# Patient Record
Sex: MALE | Race: WHITE | Employment: OTHER | URBAN - NONMETROPOLITAN AREA
[De-identification: names, ages, dates, MRNs, and addresses within clinical notes are randomized per-mention and may not be internally consistent; named-entity substitution may affect disease eponyms.]

---

## 2017-07-18 LAB
ALBUMIN SERPL-MCNC: 4 G/DL (ref 3.5–5.2)
ALP BLD-CCNC: 66 U/L (ref 40–130)
ALT SERPL-CCNC: 23 U/L (ref 5–41)
ANION GAP SERPL CALCULATED.3IONS-SCNC: 17 MMOL/L (ref 7–19)
AST SERPL-CCNC: 21 U/L (ref 5–40)
BASOPHILS ABSOLUTE: 0.1 K/UL (ref 0–0.2)
BASOPHILS RELATIVE PERCENT: 1.2 % (ref 0–1)
BILIRUB SERPL-MCNC: 0.6 MG/DL (ref 0.2–1.2)
BUN BLDV-MCNC: 16 MG/DL (ref 8–23)
CALCIUM SERPL-MCNC: 9.2 MG/DL (ref 8.8–10.2)
CHLORIDE BLD-SCNC: 106 MMOL/L (ref 98–111)
CHOLESTEROL, TOTAL: 200 MG/DL (ref 160–199)
CO2: 23 MMOL/L (ref 22–29)
CREAT SERPL-MCNC: 0.7 MG/DL (ref 0.5–1.2)
EOSINOPHILS ABSOLUTE: 0.3 K/UL (ref 0–0.6)
EOSINOPHILS RELATIVE PERCENT: 5.4 % (ref 0–5)
GFR NON-AFRICAN AMERICAN: >60
GLUCOSE BLD-MCNC: 96 MG/DL (ref 74–109)
HCT VFR BLD CALC: 47 % (ref 42–52)
HDLC SERPL-MCNC: 38 MG/DL (ref 55–121)
HEMOGLOBIN: 15.4 G/DL (ref 14–18)
LDL CHOLESTEROL CALCULATED: 128 MG/DL
LYMPHOCYTES ABSOLUTE: 2.1 K/UL (ref 1.1–4.5)
LYMPHOCYTES RELATIVE PERCENT: 36.9 % (ref 20–40)
MCH RBC QN AUTO: 31 PG (ref 27–31)
MCHC RBC AUTO-ENTMCNC: 32.8 G/DL (ref 33–37)
MCV RBC AUTO: 94.6 FL (ref 80–94)
MONOCYTES ABSOLUTE: 0.7 K/UL (ref 0–0.9)
MONOCYTES RELATIVE PERCENT: 11.5 % (ref 0–10)
NEUTROPHILS ABSOLUTE: 2.6 K/UL (ref 1.5–7.5)
NEUTROPHILS RELATIVE PERCENT: 44.8 % (ref 50–65)
PDW BLD-RTO: 13.2 % (ref 11.5–14.5)
PLATELET # BLD: 238 K/UL (ref 130–400)
PMV BLD AUTO: 10.8 FL (ref 9.4–12.4)
POTASSIUM SERPL-SCNC: 4.5 MMOL/L (ref 3.5–5)
PROSTATE SPECIFIC ANTIGEN: 0.72 NG/ML (ref 0–4)
RBC # BLD: 4.97 M/UL (ref 4.7–6.1)
SODIUM BLD-SCNC: 146 MMOL/L (ref 136–145)
TOTAL PROTEIN: 7.4 G/DL (ref 6.6–8.7)
TRIGL SERPL-MCNC: 168 MG/DL (ref 150–199)
WBC # BLD: 5.7 K/UL (ref 4.8–10.8)

## 2017-07-26 RX ORDER — GABAPENTIN 300 MG/1
300 CAPSULE ORAL 2 TIMES DAILY
COMMUNITY
End: 2018-07-30 | Stop reason: ALTCHOICE

## 2017-07-26 RX ORDER — MECLIZINE HYDROCHLORIDE 25 MG/1
25 TABLET ORAL 3 TIMES DAILY PRN
COMMUNITY
End: 2017-07-28 | Stop reason: SDUPTHER

## 2017-07-26 RX ORDER — FLUTICASONE PROPIONATE 50 MCG
2 SPRAY, SUSPENSION (ML) NASAL DAILY PRN
COMMUNITY
End: 2018-07-30

## 2017-07-26 RX ORDER — DICLOFENAC SODIUM 75 MG/1
75 TABLET, DELAYED RELEASE ORAL 2 TIMES DAILY
COMMUNITY
End: 2017-07-28 | Stop reason: ALTCHOICE

## 2017-07-26 RX ORDER — OMEPRAZOLE 20 MG/1
20 CAPSULE, DELAYED RELEASE ORAL DAILY
COMMUNITY
End: 2017-09-28 | Stop reason: SDUPTHER

## 2017-07-27 PROBLEM — G60.9 IDIOPATHIC PERIPHERAL NEUROPATHY: Chronic | Status: ACTIVE | Noted: 2017-07-27

## 2017-07-27 PROBLEM — G47.33 OBSTRUCTIVE SLEEP APNEA: Chronic | Status: ACTIVE | Noted: 2017-07-27

## 2017-07-27 PROBLEM — S86.012A PARTIAL TEAR OF LEFT ACHILLES TENDON: Chronic | Status: ACTIVE | Noted: 2017-07-27

## 2017-07-27 PROBLEM — E78.01 FAMILIAL HYPERCHOLESTEROLEMIA: Chronic | Status: ACTIVE | Noted: 2017-07-27

## 2017-07-27 PROBLEM — K21.9 GASTROESOPHAGEAL REFLUX DISEASE WITHOUT ESOPHAGITIS: Chronic | Status: ACTIVE | Noted: 2017-07-27

## 2017-07-28 ENCOUNTER — OFFICE VISIT (OUTPATIENT)
Dept: INTERNAL MEDICINE | Age: 72
End: 2017-07-28
Payer: MEDICARE

## 2017-07-28 VITALS
WEIGHT: 290.5 LBS | RESPIRATION RATE: 20 BRPM | SYSTOLIC BLOOD PRESSURE: 118 MMHG | HEIGHT: 75 IN | HEART RATE: 80 BPM | BODY MASS INDEX: 36.12 KG/M2 | OXYGEN SATURATION: 98 % | DIASTOLIC BLOOD PRESSURE: 74 MMHG

## 2017-07-28 DIAGNOSIS — J31.0 CHRONIC RHINITIS: ICD-10-CM

## 2017-07-28 DIAGNOSIS — G60.9 IDIOPATHIC PERIPHERAL NEUROPATHY: Chronic | ICD-10-CM

## 2017-07-28 DIAGNOSIS — Z12.5 PROSTATE CANCER SCREENING: ICD-10-CM

## 2017-07-28 DIAGNOSIS — K21.9 GASTROESOPHAGEAL REFLUX DISEASE WITHOUT ESOPHAGITIS: Chronic | ICD-10-CM

## 2017-07-28 DIAGNOSIS — E78.01 FAMILIAL HYPERCHOLESTEROLEMIA: Primary | Chronic | ICD-10-CM

## 2017-07-28 DIAGNOSIS — G47.33 OBSTRUCTIVE SLEEP APNEA: Chronic | ICD-10-CM

## 2017-07-28 DIAGNOSIS — J32.0 CHRONIC MAXILLARY SINUSITIS: ICD-10-CM

## 2017-07-28 PROCEDURE — 99213 OFFICE O/P EST LOW 20 MIN: CPT | Performed by: INTERNAL MEDICINE

## 2017-07-28 PROCEDURE — G0439 PPPS, SUBSEQ VISIT: HCPCS | Performed by: INTERNAL MEDICINE

## 2017-07-28 RX ORDER — MECLIZINE HYDROCHLORIDE 25 MG/1
25 TABLET ORAL 3 TIMES DAILY PRN
Qty: 60 TABLET | Refills: 1 | Status: SHIPPED | OUTPATIENT
Start: 2017-07-28 | End: 2021-04-28 | Stop reason: ALTCHOICE

## 2017-07-28 RX ORDER — AZELASTINE 1 MG/ML
2 SPRAY, METERED NASAL 2 TIMES DAILY
Qty: 1 BOTTLE | Refills: 3 | Status: SHIPPED | OUTPATIENT
Start: 2017-07-28 | End: 2018-07-30

## 2017-07-28 RX ORDER — AZITHROMYCIN 250 MG/1
TABLET, FILM COATED ORAL
Qty: 1 PACKET | Refills: 0 | Status: SHIPPED | OUTPATIENT
Start: 2017-07-28 | End: 2017-08-07

## 2017-07-28 ASSESSMENT — PATIENT HEALTH QUESTIONNAIRE - PHQ9
SUM OF ALL RESPONSES TO PHQ QUESTIONS 1-9: 0
SUM OF ALL RESPONSES TO PHQ9 QUESTIONS 1 & 2: 0
1. LITTLE INTEREST OR PLEASURE IN DOING THINGS: 0
2. FEELING DOWN, DEPRESSED OR HOPELESS: 0

## 2017-07-28 ASSESSMENT — ENCOUNTER SYMPTOMS
SHORTNESS OF BREATH: 0
TROUBLE SWALLOWING: 0
NAUSEA: 0
COUGH: 1
VOICE CHANGE: 0
SINUS PRESSURE: 0
ABDOMINAL PAIN: 0

## 2017-09-29 RX ORDER — OMEPRAZOLE 20 MG/1
CAPSULE, DELAYED RELEASE ORAL
Qty: 90 CAPSULE | Refills: 3 | Status: SHIPPED | OUTPATIENT
Start: 2017-09-29 | End: 2018-09-23 | Stop reason: SDUPTHER

## 2017-10-17 ENCOUNTER — OFFICE VISIT (OUTPATIENT)
Dept: GASTROENTEROLOGY | Facility: CLINIC | Age: 72
End: 2017-10-17

## 2017-10-17 VITALS
BODY MASS INDEX: 34.69 KG/M2 | HEIGHT: 75 IN | HEART RATE: 62 BPM | OXYGEN SATURATION: 98 % | SYSTOLIC BLOOD PRESSURE: 122 MMHG | DIASTOLIC BLOOD PRESSURE: 82 MMHG | WEIGHT: 279 LBS

## 2017-10-17 DIAGNOSIS — Z86.010 HX OF ADENOMATOUS COLONIC POLYPS: Primary | ICD-10-CM

## 2017-10-17 PROBLEM — Z86.0101 HX OF ADENOMATOUS COLONIC POLYPS: Status: ACTIVE | Noted: 2017-10-17

## 2017-10-17 PROCEDURE — S0260 H&P FOR SURGERY: HCPCS | Performed by: CLINICAL NURSE SPECIALIST

## 2017-10-17 RX ORDER — MECLIZINE HYDROCHLORIDE 25 MG/1
25 TABLET ORAL
COMMUNITY
Start: 2017-07-28 | End: 2020-12-01

## 2017-10-17 RX ORDER — GABAPENTIN 300 MG/1
300 CAPSULE ORAL
COMMUNITY
End: 2020-12-01

## 2017-10-17 RX ORDER — OMEPRAZOLE 20 MG/1
20 CAPSULE, DELAYED RELEASE ORAL 2 TIMES DAILY
COMMUNITY
Start: 2017-09-29 | End: 2022-07-15

## 2017-10-17 RX ORDER — FLUTICASONE PROPIONATE 50 MCG
SPRAY, SUSPENSION (ML) NASAL
COMMUNITY
End: 2020-12-01

## 2017-10-17 RX ORDER — SODIUM, POTASSIUM,MAG SULFATES 17.5-3.13G
SOLUTION, RECONSTITUTED, ORAL ORAL
Qty: 2 BOTTLE | Refills: 0 | Status: ON HOLD | OUTPATIENT
Start: 2017-10-17 | End: 2017-11-30

## 2017-10-17 NOTE — PROGRESS NOTES
Axel Morris  1945      10/17/2017  Chief Complaint   Patient presents with   • Colonoscopy     Subjective   HPI  Axel Morris is a 72 y.o. male who presents as a referral for preventative maintenance hx of adenomatous polyp. He has no complaints of nausea or vomiting. No change in bowels. No wt loss. No BRBPR. No melena. There is no family hx for colon cancer. No abdominal pain.  Past Medical History:   Diagnosis Date   • Arthritis    • GERD (gastroesophageal reflux disease)    • Hx of colonic polyps    • Hyperlipidemia      Past Surgical History:   Procedure Laterality Date   • CHOLECYSTECTOMY     • COLONOSCOPY W/ POLYPECTOMY  07/12/2012    Adenomatous polyp at 50 cm repeat exam in 5 years   • ENDOSCOPY AND COLONOSCOPY  08/07/2009    Acute ulcerative esophagogastritis grade c, Hyperplastic polyp at 50 cm repeat colonoscopy in 3 years   • REPLACEMENT TOTAL KNEE BILATERAL     • TONSILLECTOMY       Outpatient Prescriptions Marked as Taking for the 10/17/17 encounter (Office Visit) with ALEXUS Cummins   Medication Sig Dispense Refill   • aspirin 81 MG tablet Take 81 mg by mouth.     • fluticasone (FLONASE) 50 MCG/ACT nasal spray into each nostril.     • gabapentin (NEURONTIN) 300 MG capsule Take 300 mg by mouth.     • meclizine (ANTIVERT) 25 MG tablet Take 25 mg by mouth.     • omeprazole (priLOSEC) 20 MG capsule TAKE 1 CAPSULE DAILY       Allergies   Allergen Reactions   • Penicillins      Social History     Social History   • Marital status:      Spouse name: N/A   • Number of children: N/A   • Years of education: N/A     Occupational History   • Not on file.     Social History Main Topics   • Smoking status: Former Smoker   • Smokeless tobacco: Never Used   • Alcohol use No   • Drug use: Not on file   • Sexual activity: Not on file     Other Topics Concern   • Not on file     Social History Narrative     Family History   Problem Relation Age of Onset   • Colon cancer Neg Hx    • Colon polyps Neg  "Hx      Health Maintenance   Topic Date Due   • TDAP/TD VACCINES (1 - Tdap) 02/07/1962   • PNEUMOCOCCAL VACCINES (65+ LOW/MEDIUM RISK) (1 of 2 - PCV13) 02/07/2008   • ZOSTER VACCINE  06/07/2017   • INFLUENZA VACCINE  08/01/2017   • LIPID PANEL  10/17/2017   • COLONOSCOPY  07/12/2022       REVIEW OF SYSTEMS  General: well appearing, no fever chills or sweats, no unexplained wt loss  HEENT: no acute visual or hearing disturbances  Cardiovascular: No chest pain or palpitations  Pulmonary: No shortness of breath, coughing, wheezing or hemoptysis  : No burning, urgency, hematuria, or dysuria  Musculoskeletal: No joint pain or stiffness  Peripheral: no edema  Skin: No lesions or rashes  Neuro: No dizziness, headaches, stroke, syncope  Endocrine: No hot or cold intolerances  Hematological: No blood dyscrasias    Objective   Vitals:    10/17/17 1307   BP: 122/82   Pulse: 62   SpO2: 98%   Weight: 279 lb (127 kg)   Height: 75\" (190.5 cm)     Body mass index is 34.87 kg/(m^2).    PHYSICAL EXAM  General: age appropriate well nourished well appearing, no acute distress  Head: normocephalic and atraumatic  Global assessment-supple  Neck-No JVD noted, no lymphadenopathy  Pulmonary-clear to auscultation bilaterally, normal respiratory effort  Cardiovascular-normal rate and rhythm, normal heart sounds, S1 and S2 noted  Abdomen-soft, non tender, non distended, normal bowel sounds all 4 quadrants, no hepatosplenomegaly noted  Extremities-No clubbing cyanosis or edema  Neuro-Non focal, converses appropriately, awake, alert, oriented    Assessment/Plan     Axel was seen today for colonoscopy.    Diagnoses and all orders for this visit:    Hx of adenomatous colonic polyps  -     SUPREP BOWEL PREP KIT 17.5-3.13-1.6 GM/180ML solution oral solution; Take as directed by office instructions provided  -     Case Request; Standing  -     Implement Anesthesia Orders Day of Procedure; Standing  -     Obtain Informed Consent; Standing  -     " Verify bowel prep was successful; Standing  -     Case Request        COLONOSCOPY WITH ANESTHESIA (N/A)  Body mass index is 34.87 kg/(m^2).    Patient instructions on prep prior to procedure provided to the patient.    All risks, benefits, alternatives, and indications of colonoscopy procedure have been discussed with the patient. Risks to include perforation of the colon requiring possible surgery or colostomy, risk of bleeding from biopsies or removal of colon tissue, possibility of missing a colon polyp or cancer, or adverse drug reaction.  Benefits to include the diagnosis and management of disease of the colon and rectum. Alternatives to include barium enema, radiographic evaluation, lab testing or no intervention. Pt verbalizes understanding and agrees.

## 2017-11-30 ENCOUNTER — HOSPITAL ENCOUNTER (OUTPATIENT)
Facility: HOSPITAL | Age: 72
Setting detail: HOSPITAL OUTPATIENT SURGERY
Discharge: HOME | End: 2017-11-30
Attending: INTERNAL MEDICINE | Admitting: INTERNAL MEDICINE

## 2017-11-30 ENCOUNTER — ANESTHESIA (OUTPATIENT)
Dept: GASTROENTEROLOGY | Facility: HOSPITAL | Age: 72
End: 2017-11-30

## 2017-11-30 ENCOUNTER — ANESTHESIA EVENT (OUTPATIENT)
Dept: GASTROENTEROLOGY | Facility: HOSPITAL | Age: 72
End: 2017-11-30

## 2017-11-30 VITALS
WEIGHT: 278 LBS | HEIGHT: 75 IN | OXYGEN SATURATION: 97 % | BODY MASS INDEX: 34.57 KG/M2 | TEMPERATURE: 98 F | HEART RATE: 76 BPM | DIASTOLIC BLOOD PRESSURE: 66 MMHG | RESPIRATION RATE: 16 BRPM | SYSTOLIC BLOOD PRESSURE: 112 MMHG

## 2017-11-30 DIAGNOSIS — Z86.010 HX OF ADENOMATOUS COLONIC POLYPS: ICD-10-CM

## 2017-11-30 PROCEDURE — 25010000002 PROPOFOL 10 MG/ML EMULSION: Performed by: NURSE ANESTHETIST, CERTIFIED REGISTERED

## 2017-11-30 PROCEDURE — 88305 TISSUE EXAM BY PATHOLOGIST: CPT | Performed by: INTERNAL MEDICINE

## 2017-11-30 PROCEDURE — 45385 COLONOSCOPY W/LESION REMOVAL: CPT | Performed by: INTERNAL MEDICINE

## 2017-11-30 RX ORDER — PROPOFOL 10 MG/ML
VIAL (ML) INTRAVENOUS AS NEEDED
Status: DISCONTINUED | OUTPATIENT
Start: 2017-11-30 | End: 2017-11-30 | Stop reason: SURG

## 2017-11-30 RX ORDER — SODIUM CHLORIDE 9 MG/ML
500 INJECTION, SOLUTION INTRAVENOUS CONTINUOUS PRN
Status: DISCONTINUED | OUTPATIENT
Start: 2017-11-30 | End: 2017-11-30 | Stop reason: HOSPADM

## 2017-11-30 RX ORDER — LIDOCAINE HYDROCHLORIDE 20 MG/ML
INJECTION, SOLUTION INFILTRATION; PERINEURAL AS NEEDED
Status: DISCONTINUED | OUTPATIENT
Start: 2017-11-30 | End: 2017-11-30 | Stop reason: SURG

## 2017-11-30 RX ORDER — MULTIPLE VITAMINS W/ MINERALS TAB 9MG-400MCG
1 TAB ORAL DAILY
COMMUNITY

## 2017-11-30 RX ORDER — SODIUM CHLORIDE 0.9 % (FLUSH) 0.9 %
3 SYRINGE (ML) INJECTION AS NEEDED
Status: DISCONTINUED | OUTPATIENT
Start: 2017-11-30 | End: 2017-11-30 | Stop reason: HOSPADM

## 2017-11-30 RX ADMIN — LIDOCAINE HYDROCHLORIDE 0.5 ML: 10 INJECTION, SOLUTION EPIDURAL; INFILTRATION; INTRACAUDAL; PERINEURAL at 12:13

## 2017-11-30 RX ADMIN — PROPOFOL 100 MG: 10 INJECTION, EMULSION INTRAVENOUS at 12:46

## 2017-11-30 RX ADMIN — PROPOFOL 100 MG: 10 INJECTION, EMULSION INTRAVENOUS at 12:54

## 2017-11-30 RX ADMIN — PROPOFOL 50 MG: 10 INJECTION, EMULSION INTRAVENOUS at 12:50

## 2017-11-30 RX ADMIN — LIDOCAINE HYDROCHLORIDE 50 MG: 20 INJECTION, SOLUTION INFILTRATION; PERINEURAL at 12:46

## 2017-11-30 RX ADMIN — SODIUM CHLORIDE 500 ML: 9 INJECTION, SOLUTION INTRAVENOUS at 12:13

## 2017-11-30 NOTE — ANESTHESIA POSTPROCEDURE EVALUATION
Patient: Axel Morris    Procedure Summary     Date Anesthesia Start Anesthesia Stop Room / Location    11/30/17 1243 1259  [facility] /  [facility]       Procedure Diagnosis Surgeon Provider    COLONOSCOPY WITH ANESTHESIA (N/A ) Hx of adenomatous colonic polyps  (Hx of adenomatous colonic polyps [Z86.010]) MD STEPHENIE Manuel CRNA          Anesthesia Type: general  Last vitals  BP   120/66 (11/30/17 1315)   Temp   98 °F (36.7 °C) (11/30/17 1151)   Pulse   82 (11/30/17 1315)   Resp   15 (11/30/17 1315)     SpO2   96 % (11/30/17 1315)     Post Anesthesia Care and Evaluation    Patient location during evaluation: PACU  Patient participation: complete - patient participated  Level of consciousness: awake and alert  Pain score: 0  Pain management: adequate  Airway patency: patent  Anesthetic complications: No anesthetic complications    Cardiovascular status: acceptable and stable  Respiratory status: acceptable and unassisted  Hydration status: acceptable

## 2017-11-30 NOTE — ANESTHESIA PREPROCEDURE EVALUATION
Anesthesia Evaluation     Patient summary reviewed   no history of anesthetic complications:  NPO Solid Status: > 8 hours       Airway   Mallampati: II  TM distance: >3 FB  Neck ROM: full  Dental      Pulmonary    (+) sleep apnea on CPAP,   Cardiovascular - negative cardio ROS  Exercise tolerance: good (4-7 METS)        Neuro/Psych- negative ROS  GI/Hepatic/Renal/Endo    (+) obesity,  GERD,     Musculoskeletal     Abdominal    Substance History      OB/GYN          Other                                        Anesthesia Plan    ASA 2     general     intravenous induction   Anesthetic plan and risks discussed with patient.

## 2017-12-01 LAB
CYTO UR: NORMAL
LAB AP CASE REPORT: NORMAL
LAB AP CLINICAL INFORMATION: NORMAL
Lab: NORMAL
PATH REPORT.FINAL DX SPEC: NORMAL
PATH REPORT.GROSS SPEC: NORMAL

## 2018-07-24 DIAGNOSIS — K21.9 GASTROESOPHAGEAL REFLUX DISEASE WITHOUT ESOPHAGITIS: Chronic | ICD-10-CM

## 2018-07-24 DIAGNOSIS — Z12.5 PROSTATE CANCER SCREENING: ICD-10-CM

## 2018-07-24 DIAGNOSIS — E78.01 FAMILIAL HYPERCHOLESTEROLEMIA: Chronic | ICD-10-CM

## 2018-07-24 DIAGNOSIS — J31.0 CHRONIC RHINITIS: ICD-10-CM

## 2018-07-24 DIAGNOSIS — G47.33 OBSTRUCTIVE SLEEP APNEA: Chronic | ICD-10-CM

## 2018-07-24 LAB
ALBUMIN SERPL-MCNC: 4.1 G/DL (ref 3.5–5.2)
ALP BLD-CCNC: 58 U/L (ref 40–130)
ALT SERPL-CCNC: 29 U/L (ref 5–41)
ANION GAP SERPL CALCULATED.3IONS-SCNC: 12 MMOL/L (ref 7–19)
AST SERPL-CCNC: 25 U/L (ref 5–40)
BASOPHILS ABSOLUTE: 0.1 K/UL (ref 0–0.2)
BASOPHILS RELATIVE PERCENT: 1.3 % (ref 0–1)
BILIRUB SERPL-MCNC: 0.6 MG/DL (ref 0.2–1.2)
BUN BLDV-MCNC: 18 MG/DL (ref 8–23)
CALCIUM SERPL-MCNC: 9.2 MG/DL (ref 8.8–10.2)
CHLORIDE BLD-SCNC: 102 MMOL/L (ref 98–111)
CHOLESTEROL, TOTAL: 203 MG/DL (ref 160–199)
CO2: 26 MMOL/L (ref 22–29)
CREAT SERPL-MCNC: 0.7 MG/DL (ref 0.5–1.2)
EOSINOPHILS ABSOLUTE: 0.4 K/UL (ref 0–0.6)
EOSINOPHILS RELATIVE PERCENT: 6.1 % (ref 0–5)
GFR NON-AFRICAN AMERICAN: >60
GLUCOSE BLD-MCNC: 96 MG/DL (ref 74–109)
HCT VFR BLD CALC: 46.2 % (ref 42–52)
HDLC SERPL-MCNC: 37 MG/DL (ref 55–121)
HEMOGLOBIN: 15.2 G/DL (ref 14–18)
LDL CHOLESTEROL CALCULATED: 131 MG/DL
LYMPHOCYTES ABSOLUTE: 2.3 K/UL (ref 1.1–4.5)
LYMPHOCYTES RELATIVE PERCENT: 38.3 % (ref 20–40)
MCH RBC QN AUTO: 30.8 PG (ref 27–31)
MCHC RBC AUTO-ENTMCNC: 32.9 G/DL (ref 33–37)
MCV RBC AUTO: 93.7 FL (ref 80–94)
MONOCYTES ABSOLUTE: 0.7 K/UL (ref 0–0.9)
MONOCYTES RELATIVE PERCENT: 11.1 % (ref 0–10)
NEUTROPHILS ABSOLUTE: 2.6 K/UL (ref 1.5–7.5)
NEUTROPHILS RELATIVE PERCENT: 43 % (ref 50–65)
PDW BLD-RTO: 13.3 % (ref 11.5–14.5)
PLATELET # BLD: 223 K/UL (ref 130–400)
PMV BLD AUTO: 10.6 FL (ref 9.4–12.4)
POTASSIUM SERPL-SCNC: 4.2 MMOL/L (ref 3.5–5)
PROSTATE SPECIFIC ANTIGEN: 0.82 NG/ML (ref 0–4)
RBC # BLD: 4.93 M/UL (ref 4.7–6.1)
SODIUM BLD-SCNC: 140 MMOL/L (ref 136–145)
TOTAL PROTEIN: 7.1 G/DL (ref 6.6–8.7)
TRIGL SERPL-MCNC: 176 MG/DL (ref 0–149)
WBC # BLD: 6.1 K/UL (ref 4.8–10.8)

## 2018-07-30 ENCOUNTER — OFFICE VISIT (OUTPATIENT)
Dept: INTERNAL MEDICINE | Age: 73
End: 2018-07-30
Payer: MEDICARE

## 2018-07-30 VITALS
RESPIRATION RATE: 20 BRPM | WEIGHT: 288.4 LBS | SYSTOLIC BLOOD PRESSURE: 102 MMHG | HEART RATE: 91 BPM | BODY MASS INDEX: 35.86 KG/M2 | DIASTOLIC BLOOD PRESSURE: 70 MMHG | HEIGHT: 75 IN | OXYGEN SATURATION: 95 %

## 2018-07-30 DIAGNOSIS — Z12.5 PROSTATE CANCER SCREENING: ICD-10-CM

## 2018-07-30 DIAGNOSIS — Z23 NEED FOR SHINGLES VACCINE: ICD-10-CM

## 2018-07-30 DIAGNOSIS — G60.9 IDIOPATHIC PERIPHERAL NEUROPATHY: Primary | Chronic | ICD-10-CM

## 2018-07-30 DIAGNOSIS — E78.01 FAMILIAL HYPERCHOLESTEROLEMIA: Chronic | ICD-10-CM

## 2018-07-30 DIAGNOSIS — Z23 NEED FOR PNEUMOCOCCAL VACCINATION: ICD-10-CM

## 2018-07-30 DIAGNOSIS — K21.9 GASTROESOPHAGEAL REFLUX DISEASE WITHOUT ESOPHAGITIS: Chronic | ICD-10-CM

## 2018-07-30 DIAGNOSIS — G47.33 OBSTRUCTIVE SLEEP APNEA: Chronic | ICD-10-CM

## 2018-07-30 PROBLEM — J32.0 CHRONIC MAXILLARY SINUSITIS: Status: RESOLVED | Noted: 2017-07-28 | Resolved: 2018-07-30

## 2018-07-30 PROCEDURE — G8427 DOCREV CUR MEDS BY ELIG CLIN: HCPCS | Performed by: INTERNAL MEDICINE

## 2018-07-30 PROCEDURE — 4040F PNEUMOC VAC/ADMIN/RCVD: CPT | Performed by: INTERNAL MEDICINE

## 2018-07-30 PROCEDURE — 1036F TOBACCO NON-USER: CPT | Performed by: INTERNAL MEDICINE

## 2018-07-30 PROCEDURE — G0009 ADMIN PNEUMOCOCCAL VACCINE: HCPCS | Performed by: INTERNAL MEDICINE

## 2018-07-30 PROCEDURE — 90732 PPSV23 VACC 2 YRS+ SUBQ/IM: CPT | Performed by: INTERNAL MEDICINE

## 2018-07-30 PROCEDURE — 3017F COLORECTAL CA SCREEN DOC REV: CPT | Performed by: INTERNAL MEDICINE

## 2018-07-30 PROCEDURE — G0439 PPPS, SUBSEQ VISIT: HCPCS | Performed by: INTERNAL MEDICINE

## 2018-07-30 PROCEDURE — G8417 CALC BMI ABV UP PARAM F/U: HCPCS | Performed by: INTERNAL MEDICINE

## 2018-07-30 PROCEDURE — 1101F PT FALLS ASSESS-DOCD LE1/YR: CPT | Performed by: INTERNAL MEDICINE

## 2018-07-30 PROCEDURE — 1123F ACP DISCUSS/DSCN MKR DOCD: CPT | Performed by: INTERNAL MEDICINE

## 2018-07-30 PROCEDURE — 99212 OFFICE O/P EST SF 10 MIN: CPT | Performed by: INTERNAL MEDICINE

## 2018-07-30 RX ORDER — AMITRIPTYLINE HYDROCHLORIDE 25 MG/1
25 TABLET, FILM COATED ORAL
COMMUNITY
End: 2019-01-02

## 2018-07-30 ASSESSMENT — ENCOUNTER SYMPTOMS
SHORTNESS OF BREATH: 0
RHINORRHEA: 0
CONSTIPATION: 0
DIARRHEA: 0
SINUS PRESSURE: 0
BLOOD IN STOOL: 0
COLOR CHANGE: 0
ABDOMINAL PAIN: 0
NAUSEA: 0
TROUBLE SWALLOWING: 0
WHEEZING: 0
SORE THROAT: 0
COUGH: 0
BACK PAIN: 0

## 2018-07-30 ASSESSMENT — PATIENT HEALTH QUESTIONNAIRE - PHQ9
SUM OF ALL RESPONSES TO PHQ9 QUESTIONS 1 & 2: 0
1. LITTLE INTEREST OR PLEASURE IN DOING THINGS: 0
SUM OF ALL RESPONSES TO PHQ QUESTIONS 1-9: 0
2. FEELING DOWN, DEPRESSED OR HOPELESS: 0

## 2018-07-30 NOTE — PROGRESS NOTES
avila    Number of children: 2    Years of education: 21     Occupational History    retired teacher      Social History Main Topics    Smoking status: Former Smoker     Quit date: 1979    Smokeless tobacco: Never Used    Alcohol use No    Drug use: No    Sexual activity: Yes     Partners: Female      Comment: wife     Other Topics Concern    Not on file     Social History Narrative    No narrative on file       Substance Abuse Interventions:  · None indicated    Health Risk Assessment:        General Health Risk Interventions:  · None indicated       There is no height or weight on file to calculate BMI. Health Habits/Nutrition Interventions:  ·  Discussed weight loss       Hearing/Vision Interventions:  None indicated   He sees ophthalmology on a regular basis for his macular degeneration     Safety Interventions:  · None indicated       ADL Interventions:  · None indicated    Personalized Preventive Plan     Immunization History   Administered Date(s) Administered    Pneumococcal 13-valent Conjugate (Violetta Nekoma) 07/20/2016    Td 07/20/2016    Zoster Live (Zostavax) 06/18/2013       Health Maintenance Due   Topic Date Due    AAA screen  1945    Shingles Vaccine (1 of 2 - 2 Dose Series) 08/18/2013    DTaP/Tdap/Td vaccine (1 - Tdap) 07/21/2016    Pneumococcal low/med risk (2 of 2 - PPSV23) 07/20/2017       Recommendations for Preventive Services Due: see orders. Recommended screening schedule for the next 5-10 years is provided to the patient in written form: see Patient Instructions/AVS.      PROBLEM VISIT:      Chief Complaint   Patient presents with    Medicare AWV    Hyperlipidemia      HPI:  HUBERT with CPAP compliance and doing well on this. Uses amitriptyline at hs still. No reflux problems on PPI Rx    Still has PN issues that may be worse. Numbness is worse but pain is about the same. Discussed symptom treatment but he is not interested. Has some LOPEZ at times.   Did smoke for

## 2018-07-30 NOTE — PATIENT INSTRUCTIONS
Pneumovax 23 given in (L) arm per Dr. Zoya Perez orders. Pt tolerated well. Ankita Miranda 47 #[id number] LOT #[id number] EXP 3/7/2020.

## 2018-09-24 RX ORDER — OMEPRAZOLE 20 MG/1
CAPSULE, DELAYED RELEASE ORAL
Qty: 90 CAPSULE | Refills: 3 | Status: SHIPPED | OUTPATIENT
Start: 2018-09-24 | End: 2019-09-22 | Stop reason: SDUPTHER

## 2018-12-03 ENCOUNTER — TELEPHONE (OUTPATIENT)
Dept: INTERNAL MEDICINE | Age: 73
End: 2018-12-03

## 2018-12-03 ENCOUNTER — HOSPITAL ENCOUNTER (OUTPATIENT)
Dept: GENERAL RADIOLOGY | Age: 73
Discharge: HOME | End: 2018-12-03
Payer: MEDICARE

## 2018-12-03 ENCOUNTER — OFFICE VISIT (OUTPATIENT)
Dept: INTERNAL MEDICINE | Age: 73
End: 2018-12-03
Payer: MEDICARE

## 2018-12-03 VITALS
HEIGHT: 75 IN | SYSTOLIC BLOOD PRESSURE: 122 MMHG | WEIGHT: 290 LBS | OXYGEN SATURATION: 98 % | HEART RATE: 79 BPM | DIASTOLIC BLOOD PRESSURE: 80 MMHG | BODY MASS INDEX: 36.06 KG/M2

## 2018-12-03 DIAGNOSIS — R07.9 CHEST PAIN, UNSPECIFIED TYPE: ICD-10-CM

## 2018-12-03 DIAGNOSIS — R06.09 DYSPNEA ON EXERTION: ICD-10-CM

## 2018-12-03 DIAGNOSIS — R07.9 CHEST PAIN, UNSPECIFIED TYPE: Primary | ICD-10-CM

## 2018-12-03 LAB
ALBUMIN SERPL-MCNC: 4.1 G/DL (ref 3.5–5.2)
ALP BLD-CCNC: 65 U/L (ref 40–130)
ALT SERPL-CCNC: 36 U/L (ref 5–41)
ANION GAP SERPL CALCULATED.3IONS-SCNC: 15 MMOL/L (ref 7–19)
AST SERPL-CCNC: 26 U/L (ref 5–40)
BASOPHILS ABSOLUTE: 0.1 K/UL (ref 0–0.2)
BASOPHILS RELATIVE PERCENT: 0.9 % (ref 0–1)
BILIRUB SERPL-MCNC: 0.5 MG/DL (ref 0.2–1.2)
BUN BLDV-MCNC: 16 MG/DL (ref 8–23)
CALCIUM SERPL-MCNC: 9.6 MG/DL (ref 8.8–10.2)
CHLORIDE BLD-SCNC: 105 MMOL/L (ref 98–111)
CO2: 24 MMOL/L (ref 22–29)
CREAT SERPL-MCNC: 0.8 MG/DL (ref 0.5–1.2)
EOSINOPHILS ABSOLUTE: 0.3 K/UL (ref 0–0.6)
EOSINOPHILS RELATIVE PERCENT: 4.3 % (ref 0–5)
GFR NON-AFRICAN AMERICAN: >60
GLUCOSE BLD-MCNC: 104 MG/DL (ref 74–109)
HCT VFR BLD CALC: 47.7 % (ref 42–52)
HEMOGLOBIN: 15.5 G/DL (ref 14–18)
LYMPHOCYTES ABSOLUTE: 2.2 K/UL (ref 1.1–4.5)
LYMPHOCYTES RELATIVE PERCENT: 27 % (ref 20–40)
MCH RBC QN AUTO: 30.6 PG (ref 27–31)
MCHC RBC AUTO-ENTMCNC: 32.5 G/DL (ref 33–37)
MCV RBC AUTO: 94.3 FL (ref 80–94)
MONOCYTES ABSOLUTE: 0.8 K/UL (ref 0–0.9)
MONOCYTES RELATIVE PERCENT: 10.5 % (ref 0–10)
NEUTROPHILS ABSOLUTE: 4.6 K/UL (ref 1.5–7.5)
NEUTROPHILS RELATIVE PERCENT: 57 % (ref 50–65)
PDW BLD-RTO: 13.1 % (ref 11.5–14.5)
PLATELET # BLD: 250 K/UL (ref 130–400)
PMV BLD AUTO: 10.3 FL (ref 9.4–12.4)
POTASSIUM SERPL-SCNC: 4.8 MMOL/L (ref 3.5–5)
RBC # BLD: 5.06 M/UL (ref 4.7–6.1)
SODIUM BLD-SCNC: 144 MMOL/L (ref 136–145)
TOTAL PROTEIN: 7.5 G/DL (ref 6.6–8.7)
WBC # BLD: 8 K/UL (ref 4.8–10.8)

## 2018-12-03 PROCEDURE — 3017F COLORECTAL CA SCREEN DOC REV: CPT | Performed by: NURSE PRACTITIONER

## 2018-12-03 PROCEDURE — G8427 DOCREV CUR MEDS BY ELIG CLIN: HCPCS | Performed by: NURSE PRACTITIONER

## 2018-12-03 PROCEDURE — 4040F PNEUMOC VAC/ADMIN/RCVD: CPT | Performed by: NURSE PRACTITIONER

## 2018-12-03 PROCEDURE — 1101F PT FALLS ASSESS-DOCD LE1/YR: CPT | Performed by: NURSE PRACTITIONER

## 2018-12-03 PROCEDURE — 1036F TOBACCO NON-USER: CPT | Performed by: NURSE PRACTITIONER

## 2018-12-03 PROCEDURE — 93000 ELECTROCARDIOGRAM COMPLETE: CPT | Performed by: NURSE PRACTITIONER

## 2018-12-03 PROCEDURE — 71046 X-RAY EXAM CHEST 2 VIEWS: CPT

## 2018-12-03 PROCEDURE — 1123F ACP DISCUSS/DSCN MKR DOCD: CPT | Performed by: NURSE PRACTITIONER

## 2018-12-03 PROCEDURE — 99214 OFFICE O/P EST MOD 30 MIN: CPT | Performed by: NURSE PRACTITIONER

## 2018-12-03 PROCEDURE — G8417 CALC BMI ABV UP PARAM F/U: HCPCS | Performed by: NURSE PRACTITIONER

## 2018-12-03 PROCEDURE — G8484 FLU IMMUNIZE NO ADMIN: HCPCS | Performed by: NURSE PRACTITIONER

## 2018-12-03 ASSESSMENT — ENCOUNTER SYMPTOMS
COLOR CHANGE: 0
PHOTOPHOBIA: 0
RHINORRHEA: 0
COUGH: 0
SHORTNESS OF BREATH: 0
BACK PAIN: 0
VOMITING: 0
VOICE CHANGE: 0
NAUSEA: 0

## 2018-12-17 ENCOUNTER — TELEPHONE (OUTPATIENT)
Dept: INTERNAL MEDICINE | Age: 73
End: 2018-12-17

## 2018-12-17 ENCOUNTER — HOSPITAL ENCOUNTER (OUTPATIENT)
Dept: NON INVASIVE DIAGNOSTICS | Age: 73
Discharge: HOME | End: 2018-12-17
Payer: MEDICARE

## 2018-12-17 DIAGNOSIS — R07.9 CHEST PAIN, UNSPECIFIED TYPE: ICD-10-CM

## 2018-12-17 LAB
LV EF: 35 %
LVEF MODALITY: NORMAL

## 2018-12-17 PROCEDURE — 6360000004 HC RX CONTRAST MEDICATION: Performed by: INTERNAL MEDICINE

## 2018-12-17 PROCEDURE — C8928 TTE W OR W/O FOL W/CON,STRES: HCPCS

## 2018-12-17 RX ADMIN — PERFLUTREN 2.2 MG: 6.52 INJECTION, SUSPENSION INTRAVENOUS at 10:55

## 2018-12-18 ENCOUNTER — OFFICE VISIT (OUTPATIENT)
Dept: INTERNAL MEDICINE | Age: 73
End: 2018-12-18
Payer: MEDICARE

## 2018-12-18 ENCOUNTER — TELEPHONE (OUTPATIENT)
Dept: CARDIOLOGY | Age: 73
End: 2018-12-18

## 2018-12-18 VITALS — HEART RATE: 83 BPM | DIASTOLIC BLOOD PRESSURE: 80 MMHG | OXYGEN SATURATION: 97 % | SYSTOLIC BLOOD PRESSURE: 132 MMHG

## 2018-12-18 DIAGNOSIS — I25.9 CHEST PAIN DUE TO MYOCARDIAL ISCHEMIA, UNSPECIFIED ISCHEMIC CHEST PAIN TYPE: ICD-10-CM

## 2018-12-18 DIAGNOSIS — R94.39 POSITIVE CARDIAC STRESS TEST: Primary | ICD-10-CM

## 2018-12-18 PROCEDURE — 3017F COLORECTAL CA SCREEN DOC REV: CPT | Performed by: NURSE PRACTITIONER

## 2018-12-18 PROCEDURE — G8417 CALC BMI ABV UP PARAM F/U: HCPCS | Performed by: NURSE PRACTITIONER

## 2018-12-18 PROCEDURE — G8484 FLU IMMUNIZE NO ADMIN: HCPCS | Performed by: NURSE PRACTITIONER

## 2018-12-18 PROCEDURE — G8598 ASA/ANTIPLAT THER USED: HCPCS | Performed by: NURSE PRACTITIONER

## 2018-12-18 PROCEDURE — 1101F PT FALLS ASSESS-DOCD LE1/YR: CPT | Performed by: NURSE PRACTITIONER

## 2018-12-18 PROCEDURE — G8427 DOCREV CUR MEDS BY ELIG CLIN: HCPCS | Performed by: NURSE PRACTITIONER

## 2018-12-18 PROCEDURE — 1123F ACP DISCUSS/DSCN MKR DOCD: CPT | Performed by: NURSE PRACTITIONER

## 2018-12-18 PROCEDURE — 99214 OFFICE O/P EST MOD 30 MIN: CPT | Performed by: NURSE PRACTITIONER

## 2018-12-18 PROCEDURE — 4040F PNEUMOC VAC/ADMIN/RCVD: CPT | Performed by: NURSE PRACTITIONER

## 2018-12-18 PROCEDURE — 1036F TOBACCO NON-USER: CPT | Performed by: NURSE PRACTITIONER

## 2018-12-18 ASSESSMENT — ENCOUNTER SYMPTOMS
SHORTNESS OF BREATH: 1
VOICE CHANGE: 0
COLOR CHANGE: 0
COUGH: 0
PHOTOPHOBIA: 0
RHINORRHEA: 0
VOMITING: 0
NAUSEA: 0
BACK PAIN: 0

## 2018-12-18 NOTE — PROGRESS NOTES
[facility] INTERNAL MEDICINE  [facility] [address]  Dept: [phone]  Dept Fax: [phone]  Loc: [phone]    Mikaela Young is a 74 y.o. male who presents today for his medical conditions/complaints as noted below. Mikaela Young is c/o of Chest Pain (2 week follow  up/heart catch scheduled, has questions today)        HPI:     HPI   Chief Complaint   Patient presents with    Chest Pain     2 week follow  up/heart catch scheduled, has questions today     Patient presents today for follow-up chest pain. I saw patient 2 weeks ago and he was having some exertional chest pain; we did EKG in office that was normal. I ordered stress echo that was positive for myocardial ischemia. He is going to have a heart cath done either next week or the week after per Dr Cari Luis. He states his symptoms are relatively the same. He is avoiding any strenuous activity for the time being. He does have history of DVT; he is taking daily 81 mg ASA for this. We discussed he will need to stop this prior to procedure. His symptoms remain relatively unchanged today. He is in no acute distress.      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

## 2018-12-26 ENCOUNTER — HOSPITAL ENCOUNTER (OUTPATIENT)
Dept: CARDIAC CATH/INVASIVE PROCEDURES | Age: 73
Setting detail: OBSERVATION
Discharge: HOME | End: 2018-12-27
Attending: INTERNAL MEDICINE | Admitting: INTERNAL MEDICINE
Payer: MEDICARE

## 2018-12-26 DIAGNOSIS — R94.39 ABNORMAL STRESS ECHO: ICD-10-CM

## 2018-12-26 PROBLEM — R07.89 CHEST PRESSURE: Status: ACTIVE | Noted: 2018-12-26

## 2018-12-26 LAB
CHOLESTEROL, TOTAL: 184 MG/DL (ref 160–199)
HDLC SERPL-MCNC: 34 MG/DL (ref 55–121)
LDL CHOLESTEROL CALCULATED: 127 MG/DL
TRIGL SERPL-MCNC: 114 MG/DL (ref 0–149)
TROPONIN: <0.01 NG/ML (ref 0–0.03)

## 2018-12-26 PROCEDURE — 2580000003 HC RX 258: Performed by: INTERNAL MEDICINE

## 2018-12-26 PROCEDURE — 99152 MOD SED SAME PHYS/QHP 5/>YRS: CPT | Performed by: INTERNAL MEDICINE

## 2018-12-26 PROCEDURE — 92928 PRQ TCAT PLMT NTRAC ST 1 LES: CPT | Performed by: INTERNAL MEDICINE

## 2018-12-26 PROCEDURE — 6360000002 HC RX W HCPCS: Performed by: INTERNAL MEDICINE

## 2018-12-26 PROCEDURE — C1874 STENT, COATED/COV W/DEL SYS: HCPCS

## 2018-12-26 PROCEDURE — C1769 GUIDE WIRE: HCPCS

## 2018-12-26 PROCEDURE — C1760 CLOSURE DEV, VASC: HCPCS

## 2018-12-26 PROCEDURE — G0378 HOSPITAL OBSERVATION PER HR: HCPCS

## 2018-12-26 PROCEDURE — 84484 ASSAY OF TROPONIN QUANT: CPT

## 2018-12-26 PROCEDURE — C1894 INTRO/SHEATH, NON-LASER: HCPCS

## 2018-12-26 PROCEDURE — 93458 L HRT ARTERY/VENTRICLE ANGIO: CPT | Performed by: INTERNAL MEDICINE

## 2018-12-26 PROCEDURE — 6360000002 HC RX W HCPCS

## 2018-12-26 PROCEDURE — 99153 MOD SED SAME PHYS/QHP EA: CPT | Performed by: INTERNAL MEDICINE

## 2018-12-26 PROCEDURE — 6370000000 HC RX 637 (ALT 250 FOR IP)

## 2018-12-26 PROCEDURE — 6370000000 HC RX 637 (ALT 250 FOR IP): Performed by: INTERNAL MEDICINE

## 2018-12-26 PROCEDURE — 36415 COLL VENOUS BLD VENIPUNCTURE: CPT

## 2018-12-26 PROCEDURE — C1887 CATHETER, GUIDING: HCPCS

## 2018-12-26 PROCEDURE — 80061 LIPID PANEL: CPT

## 2018-12-26 PROCEDURE — 99999 PR OFFICE/OUTPT VISIT,PROCEDURE ONLY: CPT | Performed by: INTERNAL MEDICINE

## 2018-12-26 PROCEDURE — 2709999900 HC NON-CHARGEABLE SUPPLY

## 2018-12-26 RX ORDER — PANTOPRAZOLE SODIUM 40 MG/1
40 TABLET, DELAYED RELEASE ORAL DAILY
Status: DISCONTINUED | OUTPATIENT
Start: 2018-12-27 | End: 2018-12-27 | Stop reason: HOSPADM

## 2018-12-26 RX ORDER — OMEPRAZOLE 20 MG/1
1 CAPSULE, DELAYED RELEASE ORAL DAILY
Status: DISCONTINUED | OUTPATIENT
Start: 2018-12-26 | End: 2018-12-26 | Stop reason: CLARIF

## 2018-12-26 RX ORDER — PRASUGREL 10 MG/1
10 TABLET, FILM COATED ORAL DAILY
Status: DISCONTINUED | OUTPATIENT
Start: 2018-12-27 | End: 2018-12-27 | Stop reason: HOSPADM

## 2018-12-26 RX ORDER — AMITRIPTYLINE HYDROCHLORIDE 25 MG/1
25 TABLET, FILM COATED ORAL NIGHTLY
Status: DISCONTINUED | OUTPATIENT
Start: 2018-12-26 | End: 2018-12-27 | Stop reason: HOSPADM

## 2018-12-26 RX ORDER — ONDANSETRON 2 MG/ML
4 INJECTION INTRAMUSCULAR; INTRAVENOUS EVERY 6 HOURS PRN
Status: DISCONTINUED | OUTPATIENT
Start: 2018-12-26 | End: 2018-12-27 | Stop reason: HOSPADM

## 2018-12-26 RX ORDER — ASPIRIN 81 MG/1
81 TABLET, CHEWABLE ORAL DAILY
Status: DISCONTINUED | OUTPATIENT
Start: 2018-12-26 | End: 2018-12-26 | Stop reason: SDUPTHER

## 2018-12-26 RX ORDER — METOPROLOL SUCCINATE 25 MG/1
25 TABLET, EXTENDED RELEASE ORAL DAILY
Status: DISCONTINUED | OUTPATIENT
Start: 2018-12-26 | End: 2018-12-27 | Stop reason: HOSPADM

## 2018-12-26 RX ORDER — SODIUM CHLORIDE 0.9 % (FLUSH) 0.9 %
10 SYRINGE (ML) INJECTION PRN
Status: DISCONTINUED | OUTPATIENT
Start: 2018-12-26 | End: 2018-12-27 | Stop reason: HOSPADM

## 2018-12-26 RX ORDER — MECLIZINE HYDROCHLORIDE 25 MG/1
25 TABLET ORAL 3 TIMES DAILY PRN
Status: DISCONTINUED | OUTPATIENT
Start: 2018-12-26 | End: 2018-12-27 | Stop reason: HOSPADM

## 2018-12-26 RX ORDER — SODIUM CHLORIDE 9 MG/ML
INJECTION, SOLUTION INTRAVENOUS CONTINUOUS
Status: DISCONTINUED | OUTPATIENT
Start: 2018-12-26 | End: 2018-12-27 | Stop reason: HOSPADM

## 2018-12-26 RX ORDER — SODIUM CHLORIDE 0.9 % (FLUSH) 0.9 %
10 SYRINGE (ML) INJECTION EVERY 12 HOURS SCHEDULED
Status: DISCONTINUED | OUTPATIENT
Start: 2018-12-26 | End: 2018-12-27 | Stop reason: HOSPADM

## 2018-12-26 RX ORDER — ATORVASTATIN CALCIUM 40 MG/1
80 TABLET, FILM COATED ORAL NIGHTLY
Status: DISCONTINUED | OUTPATIENT
Start: 2018-12-26 | End: 2018-12-27 | Stop reason: HOSPADM

## 2018-12-26 RX ORDER — ACETAMINOPHEN 325 MG/1
650 TABLET ORAL EVERY 4 HOURS PRN
Status: DISCONTINUED | OUTPATIENT
Start: 2018-12-26 | End: 2018-12-27 | Stop reason: HOSPADM

## 2018-12-26 RX ORDER — ASPIRIN 81 MG/1
81 TABLET, CHEWABLE ORAL DAILY
Status: DISCONTINUED | OUTPATIENT
Start: 2018-12-27 | End: 2018-12-27 | Stop reason: HOSPADM

## 2018-12-26 RX ADMIN — BIVALIRUDIN 1 MG/KG/HR: 250 INJECTION, POWDER, LYOPHILIZED, FOR SOLUTION INTRAVENOUS at 20:27

## 2018-12-26 RX ADMIN — METOPROLOL SUCCINATE 25 MG: 25 TABLET, EXTENDED RELEASE ORAL at 21:00

## 2018-12-26 RX ADMIN — Medication 10 ML: at 20:28

## 2018-12-26 RX ADMIN — ATORVASTATIN CALCIUM 80 MG: 40 TABLET, FILM COATED ORAL at 21:00

## 2018-12-26 RX ADMIN — SODIUM CHLORIDE: 9 INJECTION, SOLUTION INTRAVENOUS at 15:06

## 2018-12-27 VITALS
TEMPERATURE: 97.6 F | WEIGHT: 287.8 LBS | HEIGHT: 75 IN | SYSTOLIC BLOOD PRESSURE: 127 MMHG | HEART RATE: 60 BPM | RESPIRATION RATE: 16 BRPM | BODY MASS INDEX: 35.78 KG/M2 | OXYGEN SATURATION: 93 % | DIASTOLIC BLOOD PRESSURE: 79 MMHG

## 2018-12-27 LAB
HCT VFR BLD CALC: 40.6 % (ref 42–52)
HEMOGLOBIN: 13.2 G/DL (ref 14–18)
MCH RBC QN AUTO: 30.4 PG (ref 27–31)
MCHC RBC AUTO-ENTMCNC: 32.5 G/DL (ref 33–37)
MCV RBC AUTO: 93.5 FL (ref 80–94)
PDW BLD-RTO: 13 % (ref 11.5–14.5)
PLATELET # BLD: 191 K/UL (ref 130–400)
PMV BLD AUTO: 9.9 FL (ref 9.4–12.4)
RBC # BLD: 4.34 M/UL (ref 4.7–6.1)
TROPONIN: 0.02 NG/ML (ref 0–0.03)
TROPONIN: <0.01 NG/ML (ref 0–0.03)
WBC # BLD: 7 K/UL (ref 4.8–10.8)

## 2018-12-27 PROCEDURE — 2580000003 HC RX 258: Performed by: INTERNAL MEDICINE

## 2018-12-27 PROCEDURE — 99217 PR OBSERVATION CARE DISCHARGE MANAGEMENT: CPT | Performed by: INTERNAL MEDICINE

## 2018-12-27 PROCEDURE — 36415 COLL VENOUS BLD VENIPUNCTURE: CPT

## 2018-12-27 PROCEDURE — 6370000000 HC RX 637 (ALT 250 FOR IP): Performed by: INTERNAL MEDICINE

## 2018-12-27 PROCEDURE — G0378 HOSPITAL OBSERVATION PER HR: HCPCS

## 2018-12-27 PROCEDURE — 93005 ELECTROCARDIOGRAM TRACING: CPT

## 2018-12-27 PROCEDURE — 85027 COMPLETE CBC AUTOMATED: CPT

## 2018-12-27 PROCEDURE — 84484 ASSAY OF TROPONIN QUANT: CPT

## 2018-12-27 RX ORDER — PRASUGREL 10 MG/1
10 TABLET, FILM COATED ORAL DAILY
Qty: 30 TABLET | Refills: 5 | Status: SHIPPED | OUTPATIENT
Start: 2018-12-28 | End: 2018-12-28 | Stop reason: SDUPTHER

## 2018-12-27 RX ORDER — ATORVASTATIN CALCIUM 80 MG/1
80 TABLET, FILM COATED ORAL NIGHTLY
Qty: 30 TABLET | Refills: 5 | Status: SHIPPED | OUTPATIENT
Start: 2018-12-27 | End: 2018-12-28 | Stop reason: SDUPTHER

## 2018-12-27 RX ORDER — METOPROLOL SUCCINATE 25 MG/1
25 TABLET, EXTENDED RELEASE ORAL DAILY
Qty: 30 TABLET | Refills: 5 | Status: SHIPPED | OUTPATIENT
Start: 2018-12-28 | End: 2019-01-08 | Stop reason: SDUPTHER

## 2018-12-27 RX ADMIN — PANTOPRAZOLE SODIUM 40 MG: 40 TABLET, DELAYED RELEASE ORAL at 08:20

## 2018-12-27 RX ADMIN — ASPIRIN 81 MG CHEWABLE TABLET 81 MG: 81 TABLET CHEWABLE at 08:20

## 2018-12-27 RX ADMIN — Medication 10 ML: at 08:20

## 2018-12-27 RX ADMIN — PRASUGREL 10 MG: 10 TABLET, FILM COATED ORAL at 08:20

## 2018-12-27 RX ADMIN — METOPROLOL SUCCINATE 25 MG: 25 TABLET, EXTENDED RELEASE ORAL at 08:20

## 2018-12-27 NOTE — CONSULTS
Patient was discharged prior to MI/PTCA/STENT TEACHING being conducted. Cardiac Rehab education packet was sent to the patient's address on record. Handouts included were titled; \"Home Instructions Following a Cardiac Event\", \"Cardiac Home Exercise Program - Phase I\", \"Risk Factors for Heart Disease and Stroke\" and \"Cardiac Diet/Low Cholesterol\".   Patient was instructed to call no less than 24 hours prior to confirm or cancel appointment to start Phase II Cardiac Rehab on 01/10/2019 @ 10:00am.

## 2018-12-27 NOTE — DISCHARGE SUMMARY
type  EKG 12 Lead       CBC Auto Differential     Comprehensive Metabolic Panel     XR CHEST STANDARD (2 VW)     ECHO Stress Test   2. Dyspnea on exertion         \"     With these symptoms, he had a stress test ordered and preformed with the following results:  12/17/2018  SE Positive for clinical and echo myocardial ischemia, intermediate risk findings, AUC indication 16, AUC score 7     He was electively admitted for cardiac catheterization. When being examined this afternoon, he has had no symptoms of exertional chest discomfort, unusual or change in shortness of air, presyncope or syncope. Hospital Course: The patient underwent cardiac catheterization according to the following criteria:  12/17/2018  SE Positive for clinical and echo myocardial ischemia, intermediate risk findings, AUC indication 16, AUC score 7, Diagnostic Catheterization Appropriate Use Criteria Description, [facility] 2012;[id number]. Selective left heart and coronary arteriography was preformed, which revealed:    1. Successful femoral artery ultrasound  2. Successful femoral artery arterogram  3. Supervision of the administration of moderate conscious sedation  4. Single vessel coronary artery disease involving the mid circumflex  5. Left ventricular function is normal   6. Long 80% lesion in the mid circumflex  7. Successful percutaneous coronary intervention utilizing a single drug eluding stent to the mid circumflex. .      There were no apparent complications. The rest of the patient's hospitalization was uneventful. He was discharged home on medical therapy with outpatient follow up. Consults:     IP CONSULT TO CARDIAC REHAB      Significant Diagnostic Studies:    1. Selective left heart and coronary arteriography, (femoral approach), 12/26/2018    Significant Therapeutic Endeavors:      1.  Percutaneous coronary intevention to the circumflex, 12/26/2018      Activity: activity as directed per discharge signed by Maria Ines Mtz MD on 12/27/18

## 2018-12-28 ENCOUNTER — TELEPHONE (OUTPATIENT)
Dept: INTERNAL MEDICINE | Age: 73
End: 2018-12-28

## 2018-12-28 LAB
EKG P AXIS: 58 DEGREES
EKG P-R INTERVAL: 184 MS
EKG Q-T INTERVAL: 420 MS
EKG QRS DURATION: 108 MS
EKG QTC CALCULATION (BAZETT): 421 MS
EKG T AXIS: 31 DEGREES

## 2018-12-28 RX ORDER — PRASUGREL 10 MG/1
10 TABLET, FILM COATED ORAL DAILY
Qty: 90 TABLET | Refills: 3 | Status: SHIPPED | OUTPATIENT
Start: 2018-12-28 | End: 2019-01-18 | Stop reason: SDUPTHER

## 2018-12-28 RX ORDER — ATORVASTATIN CALCIUM 80 MG/1
80 TABLET, FILM COATED ORAL NIGHTLY
Qty: 90 TABLET | Refills: 3 | Status: SHIPPED | OUTPATIENT
Start: 2018-12-28 | End: 2019-12-06 | Stop reason: SDUPTHER

## 2018-12-28 NOTE — TELEPHONE ENCOUNTER
[facility] Transitions Initial Follow Up Call    Outreach made within 2 business days of discharge: Yes    Patient: Eloisa Alejandra Patient : 1945   MRN: [id number]    Reason for Admission:   1. Coronary artery disease     2018  SE Positive for clinical and echo myocardial ischemia, intermediate risk findings, AUC indication 16, AUC score 7  18  Cath  80% mid circumflex, 3.0 x 28 OH, normal LVFX     2. Hypercholesteremia      Admission Date: 18  Discharge Date: 18       Spoke with: Patient    Discharge department/facility[facility] Patient Contact:  Was patient able to fill all prescriptions: Yes  Was patient instructed to bring all medications to the follow-up visit: Yes  Is patient taking all medications as directed in the discharge summary? Yes  Does patient understand their discharge instructions: Yes  Does patient have questions or concerns that need addressed prior to 7-14 day follow up office visit: no    Patient states that he is doing well. When questioned about his appetite, he stated \"nothing can hurt that! \". Patient states he slept better last night than he did the previous night. Advised patient to bring medications/list of medications with him to appt and to call if he has any questions or concerns before then.      Scheduled appointment with PCP within 7-14 days    Follow Up  Future Appointments  Date Time Provider Lynne Quinteros   2019 1:00 PM Deidre Cornelius MD [facility]   2019 10:00 AM LATASHA Beltran [facility] Cardio [facility]   2019 8:00 AM Deidre Cornelius MD [facility]       April GERDA Strong LPN

## 2019-01-02 ENCOUNTER — OFFICE VISIT (OUTPATIENT)
Dept: INTERNAL MEDICINE | Age: 74
End: 2019-01-02
Payer: MEDICARE

## 2019-01-02 VITALS
WEIGHT: 289 LBS | HEART RATE: 82 BPM | DIASTOLIC BLOOD PRESSURE: 70 MMHG | SYSTOLIC BLOOD PRESSURE: 110 MMHG | OXYGEN SATURATION: 98 % | HEIGHT: 75 IN | BODY MASS INDEX: 35.93 KG/M2

## 2019-01-02 DIAGNOSIS — I25.10 CORONARY ARTERY DISEASE INVOLVING NATIVE CORONARY ARTERY OF NATIVE HEART WITHOUT ANGINA PECTORIS: Primary | ICD-10-CM

## 2019-01-02 DIAGNOSIS — G47.33 OBSTRUCTIVE SLEEP APNEA: Chronic | ICD-10-CM

## 2019-01-02 DIAGNOSIS — E78.01 FAMILIAL HYPERCHOLESTEROLEMIA: Chronic | ICD-10-CM

## 2019-01-02 DIAGNOSIS — G62.9 NEUROPATHY: ICD-10-CM

## 2019-01-02 PROCEDURE — 99496 TRANSJ CARE MGMT HIGH F2F 7D: CPT | Performed by: INTERNAL MEDICINE

## 2019-01-02 PROCEDURE — 1111F DSCHRG MED/CURRENT MED MERGE: CPT | Performed by: INTERNAL MEDICINE

## 2019-01-02 RX ORDER — NITROGLYCERIN 0.4 MG/1
TABLET SUBLINGUAL
Refills: 3 | COMMUNITY
Start: 2018-12-18 | End: 2021-04-28 | Stop reason: ALTCHOICE

## 2019-01-08 RX ORDER — METOPROLOL SUCCINATE 25 MG/1
25 TABLET, EXTENDED RELEASE ORAL DAILY
Qty: 30 TABLET | Refills: 5 | Status: SHIPPED | OUTPATIENT
Start: 2019-01-08 | End: 2019-03-04 | Stop reason: SDUPTHER

## 2019-01-09 ASSESSMENT — ENCOUNTER SYMPTOMS
ABDOMINAL DISTENTION: 0
SINUS PRESSURE: 0
EYE REDNESS: 0
ABDOMINAL PAIN: 0
COUGH: 0
SHORTNESS OF BREATH: 0
EYE DISCHARGE: 0
BACK PAIN: 0

## 2019-01-18 RX ORDER — PRASUGREL 10 MG/1
10 TABLET, FILM COATED ORAL DAILY
Qty: 90 TABLET | Refills: 3 | Status: SHIPPED | OUTPATIENT
Start: 2019-01-18 | End: 2020-01-16

## 2019-01-30 ENCOUNTER — OFFICE VISIT (OUTPATIENT)
Dept: CARDIOLOGY | Age: 74
End: 2019-01-30
Payer: MEDICARE

## 2019-01-30 VITALS
SYSTOLIC BLOOD PRESSURE: 128 MMHG | HEIGHT: 75 IN | HEART RATE: 76 BPM | BODY MASS INDEX: 36.68 KG/M2 | DIASTOLIC BLOOD PRESSURE: 68 MMHG | WEIGHT: 295 LBS

## 2019-01-30 DIAGNOSIS — I25.10 CORONARY ARTERY DISEASE INVOLVING NATIVE CORONARY ARTERY OF NATIVE HEART WITHOUT ANGINA PECTORIS: Primary | ICD-10-CM

## 2019-01-30 DIAGNOSIS — E78.2 MIXED HYPERLIPIDEMIA: ICD-10-CM

## 2019-01-30 DIAGNOSIS — I10 ESSENTIAL HYPERTENSION: ICD-10-CM

## 2019-01-30 DIAGNOSIS — G47.33 OBSTRUCTIVE SLEEP APNEA: Chronic | ICD-10-CM

## 2019-01-30 PROCEDURE — G8484 FLU IMMUNIZE NO ADMIN: HCPCS | Performed by: CLINICAL NURSE SPECIALIST

## 2019-01-30 PROCEDURE — G8417 CALC BMI ABV UP PARAM F/U: HCPCS | Performed by: CLINICAL NURSE SPECIALIST

## 2019-01-30 PROCEDURE — 99213 OFFICE O/P EST LOW 20 MIN: CPT | Performed by: CLINICAL NURSE SPECIALIST

## 2019-01-30 PROCEDURE — 1123F ACP DISCUSS/DSCN MKR DOCD: CPT | Performed by: CLINICAL NURSE SPECIALIST

## 2019-01-30 PROCEDURE — 3017F COLORECTAL CA SCREEN DOC REV: CPT | Performed by: CLINICAL NURSE SPECIALIST

## 2019-01-30 PROCEDURE — 4040F PNEUMOC VAC/ADMIN/RCVD: CPT | Performed by: CLINICAL NURSE SPECIALIST

## 2019-01-30 PROCEDURE — G8427 DOCREV CUR MEDS BY ELIG CLIN: HCPCS | Performed by: CLINICAL NURSE SPECIALIST

## 2019-01-30 PROCEDURE — G8598 ASA/ANTIPLAT THER USED: HCPCS | Performed by: CLINICAL NURSE SPECIALIST

## 2019-01-30 PROCEDURE — 1036F TOBACCO NON-USER: CPT | Performed by: CLINICAL NURSE SPECIALIST

## 2019-01-30 PROCEDURE — 1101F PT FALLS ASSESS-DOCD LE1/YR: CPT | Performed by: CLINICAL NURSE SPECIALIST

## 2019-01-30 ASSESSMENT — ENCOUNTER SYMPTOMS
ABDOMINAL PAIN: 0
WHEEZING: 0
EYE REDNESS: 0
VOMITING: 0
CHEST TIGHTNESS: 0
NAUSEA: 0
COUGH: 0
SHORTNESS OF BREATH: 1
FACIAL SWELLING: 0

## 2019-01-31 DIAGNOSIS — E78.2 MIXED HYPERLIPIDEMIA: ICD-10-CM

## 2019-01-31 LAB
ALT SERPL-CCNC: 51 U/L (ref 5–41)
AST SERPL-CCNC: 35 U/L (ref 5–40)
CHOLESTEROL, TOTAL: 126 MG/DL (ref 160–199)
HDLC SERPL-MCNC: 38 MG/DL (ref 55–121)
LDL CHOLESTEROL CALCULATED: 67 MG/DL
TRIGL SERPL-MCNC: 103 MG/DL (ref 0–149)

## 2019-02-04 ENCOUNTER — TELEPHONE (OUTPATIENT)
Dept: CARDIOLOGY | Age: 74
End: 2019-02-04

## 2019-03-04 RX ORDER — METOPROLOL SUCCINATE 25 MG/1
25 TABLET, EXTENDED RELEASE ORAL DAILY
Qty: 90 TABLET | Refills: 3 | Status: SHIPPED | OUTPATIENT
Start: 2019-03-04 | End: 2020-01-16

## 2019-04-03 DIAGNOSIS — G62.9 NEUROPATHY: ICD-10-CM

## 2019-04-03 DIAGNOSIS — E78.01 FAMILIAL HYPERCHOLESTEROLEMIA: Chronic | ICD-10-CM

## 2019-04-03 DIAGNOSIS — I25.10 CORONARY ARTERY DISEASE INVOLVING NATIVE CORONARY ARTERY OF NATIVE HEART WITHOUT ANGINA PECTORIS: ICD-10-CM

## 2019-04-03 LAB
ALBUMIN SERPL-MCNC: 3.8 G/DL (ref 3.5–5.2)
ALP BLD-CCNC: 77 U/L (ref 40–130)
ALT SERPL-CCNC: 41 U/L (ref 5–41)
ANION GAP SERPL CALCULATED.3IONS-SCNC: 14 MMOL/L (ref 7–19)
AST SERPL-CCNC: 28 U/L (ref 5–40)
BILIRUB SERPL-MCNC: 0.5 MG/DL (ref 0.2–1.2)
BUN BLDV-MCNC: 19 MG/DL (ref 8–23)
CALCIUM SERPL-MCNC: 8.8 MG/DL (ref 8.8–10.2)
CHLORIDE BLD-SCNC: 106 MMOL/L (ref 98–111)
CHOLESTEROL, TOTAL: 107 MG/DL (ref 160–199)
CO2: 24 MMOL/L (ref 22–29)
CREAT SERPL-MCNC: 0.8 MG/DL (ref 0.5–1.2)
FOLATE: >20 NG/ML (ref 4.5–32.2)
GFR NON-AFRICAN AMERICAN: >60
GLUCOSE BLD-MCNC: 109 MG/DL (ref 74–109)
HCT VFR BLD CALC: 46.4 % (ref 42–52)
HDLC SERPL-MCNC: 37 MG/DL (ref 55–121)
HEMOGLOBIN: 15 G/DL (ref 14–18)
LDL CHOLESTEROL CALCULATED: 49 MG/DL
MCH RBC QN AUTO: 30.2 PG (ref 27–31)
MCHC RBC AUTO-ENTMCNC: 32.3 G/DL (ref 33–37)
MCV RBC AUTO: 93.5 FL (ref 80–94)
PDW BLD-RTO: 13.2 % (ref 11.5–14.5)
PLATELET # BLD: 214 K/UL (ref 130–400)
PMV BLD AUTO: 10.6 FL (ref 9.4–12.4)
POTASSIUM SERPL-SCNC: 4.2 MMOL/L (ref 3.5–5)
RBC # BLD: 4.96 M/UL (ref 4.7–6.1)
SODIUM BLD-SCNC: 144 MMOL/L (ref 136–145)
TOTAL PROTEIN: 6.9 G/DL (ref 6.6–8.7)
TRIGL SERPL-MCNC: 103 MG/DL (ref 0–149)
VITAMIN B-12: 555 PG/ML (ref 211–946)
WBC # BLD: 6.5 K/UL (ref 4.8–10.8)

## 2019-04-15 ENCOUNTER — OFFICE VISIT (OUTPATIENT)
Dept: INTERNAL MEDICINE | Age: 74
End: 2019-04-15
Payer: MEDICARE

## 2019-04-15 VITALS
OXYGEN SATURATION: 97 % | WEIGHT: 285 LBS | DIASTOLIC BLOOD PRESSURE: 86 MMHG | BODY MASS INDEX: 35.43 KG/M2 | HEIGHT: 75 IN | HEART RATE: 84 BPM | SYSTOLIC BLOOD PRESSURE: 120 MMHG

## 2019-04-15 DIAGNOSIS — I25.10 CORONARY ARTERY DISEASE INVOLVING NATIVE CORONARY ARTERY OF NATIVE HEART WITHOUT ANGINA PECTORIS: Primary | ICD-10-CM

## 2019-04-15 DIAGNOSIS — I10 ESSENTIAL HYPERTENSION: ICD-10-CM

## 2019-04-15 DIAGNOSIS — R73.9 HYPERGLYCEMIA: ICD-10-CM

## 2019-04-15 DIAGNOSIS — E78.2 MIXED HYPERLIPIDEMIA: ICD-10-CM

## 2019-04-15 DIAGNOSIS — Z12.5 PROSTATE CANCER SCREENING: ICD-10-CM

## 2019-04-15 PROCEDURE — 99213 OFFICE O/P EST LOW 20 MIN: CPT | Performed by: INTERNAL MEDICINE

## 2019-04-15 PROCEDURE — G8598 ASA/ANTIPLAT THER USED: HCPCS | Performed by: INTERNAL MEDICINE

## 2019-04-15 PROCEDURE — 1036F TOBACCO NON-USER: CPT | Performed by: INTERNAL MEDICINE

## 2019-04-15 PROCEDURE — G8417 CALC BMI ABV UP PARAM F/U: HCPCS | Performed by: INTERNAL MEDICINE

## 2019-04-15 PROCEDURE — G8427 DOCREV CUR MEDS BY ELIG CLIN: HCPCS | Performed by: INTERNAL MEDICINE

## 2019-04-15 PROCEDURE — 1123F ACP DISCUSS/DSCN MKR DOCD: CPT | Performed by: INTERNAL MEDICINE

## 2019-04-15 PROCEDURE — 4040F PNEUMOC VAC/ADMIN/RCVD: CPT | Performed by: INTERNAL MEDICINE

## 2019-04-15 RX ORDER — AMITRIPTYLINE HYDROCHLORIDE 50 MG/1
50 TABLET, FILM COATED ORAL NIGHTLY
COMMUNITY
End: 2019-07-25

## 2019-04-15 ASSESSMENT — PATIENT HEALTH QUESTIONNAIRE - PHQ9
2. FEELING DOWN, DEPRESSED OR HOPELESS: 0
SUM OF ALL RESPONSES TO PHQ QUESTIONS 1-9: 0
1. LITTLE INTEREST OR PLEASURE IN DOING THINGS: 0
SUM OF ALL RESPONSES TO PHQ QUESTIONS 1-9: 0
SUM OF ALL RESPONSES TO PHQ9 QUESTIONS 1 & 2: 0

## 2019-04-15 NOTE — PROGRESS NOTES
Chief Complaint   Patient presents with    3 Month Follow-Up    Coronary Artery Disease       HPI: Patient is here today for office visit and to follow-up coronary artery disease hypertension hyperlipidemia and other medical problems. No chest pain no chest pressure he feels okay he denies headaches dyspnea abdominal pain generally feeling pretty well no specific new complaint reviewed his plan of care and records and cardiology follow-up.     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     left at fibular head s/p decompression 2009       Past Surgical History:   Procedure Laterality Date    CATARACT REMOVAL WITH IMPLANT      CHOLECYSTECTOMY  07/2005    CORONARY ANGIOPLASTY WITH STENT PLACEMENT  12/26/2018    [place] to Circumflex    JOINT REPLACEMENT Bilateral 2003    Knee replacements    KNEE ARTHROPLASTY Bilateral 2003    complete combine condylye and plateau    NERVE SURGERY      Nerve release       Family History   Problem Relation Age of Onset    COPD Mother     Rheum Arthritis Father     COPD Brother     Lung Cancer Brother        Social History     Socioeconomic History    Marital status:      Spouse name: avila    Number of children: 2    Years of education: 21    Highest education level: Not on file   Occupational History    Occupation: retired teacher   Social Needs    Financial resource strain: Not on file    Food insecurity:     Worry: Not on file     Inability: Not on file   [employer] needs:     Medical: Not on file Non-medical: Not on file   Tobacco Use    Smoking status: Former Smoker     Packs/day: 1.00     Years: 12.00     Pack years: 12.00     Last attempt to quit: 1979     Years since quittin.3    Smokeless tobacco: Never Used   Substance and Sexual Activity    Alcohol use: No    Drug use: No    Sexual activity: Yes     Partners: Female     Comment: wife   Lifestyle    Physical activity:     Days per week: Not on file     Minutes per session: Not on file    Stress: Not on file   Relationships    Social connections:     Talks on phone: Not on file     Gets together: Not on file     Attends [employer] service: Not on file     Active member of club or organization: Not on file     Attends meetings of clubs or organizations: Not on file     Relationship status: Not on file    Intimate partner violence:     Fear of current or ex partner: Not on file     Emotionally abused: Not on file     Physically abused: Not on file     Forced sexual activity: Not on file   Other Topics Concern    Not on file   Social History Narrative    Not on file       Allergies   Allergen Reactions    Ampicillin Rash       Current Outpatient Medications   Medication Sig Dispense Refill    amitriptyline (ELAVIL) 50 MG tablet Take 50 mg by mouth nightly      nystatin-triamcinolone (MYCOLOG II) 517813-1.1 UNIT/GM-% cream Apply topically 2 times daily.  80 g 0    metoprolol succinate (TOPROL XL) 25 MG extended release tablet Take 1 tablet by mouth daily 90 tablet 3    prasugrel (EFFIENT) 10 MG TABS Take 1 tablet by mouth daily 90 tablet 3    nitroGLYCERIN (NITROSTAT) 0.4 MG SL tablet SAVANAH AS DIRECTED  3    atorvastatin (LIPITOR) 80 MG tablet Take 1 tablet by mouth nightly 90 tablet 3    omeprazole (PRILOSEC) 20 MG delayed release capsule TAKE 1 CAPSULE DAILY 90 capsule 3    meclizine (ANTIVERT) 25 MG tablet Take 1 tablet by mouth 3 times daily as needed for Dizziness 60 tablet 1    aspirin 81 MG tablet Take 81 mg by mouth daily      Cardiovascular: Normal heart sounds. Pulmonary/Chest: Breath sounds normal.   Musculoskeletal: He exhibits no edema. Lymphadenopathy:     He has no cervical adenopathy. Skin: No rash noted. Psychiatric: He has a normal mood and affect. Results for orders placed or performed in visit on 04/03/19   Folate   Result Value Ref Range    Folate >20.0 4.5 - 32.2 ng/mL   Vitamin B12   Result Value Ref Range    Vitamin B-12 555 211 - 946 pg/mL   Lipid Panel   Result Value Ref Range    Cholesterol, Total 107 (L) 160 - 199 mg/dL    Triglycerides 103 0 - 149 mg/dL    HDL 37 (L) 55 - 121 mg/dL    LDL Calculated 49 <100 mg/dL   CBC   Result Value Ref Range    WBC 6.5 4.8 - 10.8 K/uL    RBC 4.96 4.70 - 6.10 M/uL    Hemoglobin 15.0 14.0 - 18.0 g/dL    Hematocrit 46.4 42.0 - 52.0 %    MCV 93.5 80.0 - 94.0 fL    MCH 30.2 27.0 - 31.0 pg    MCHC 32.3 (L) 33.0 - 37.0 g/dL    RDW 13.2 11.5 - 14.5 %    Platelets 795 598 - 103 K/uL    MPV 10.6 9.4 - 12.4 fL   Comprehensive Metabolic Panel   Result Value Ref Range    Sodium 144 136 - 145 mmol/L    Potassium 4.2 3.5 - 5.0 mmol/L    Chloride 106 98 - 111 mmol/L    CO2 24 22 - 29 mmol/L    Anion Gap 14 7 - 19 mmol/L    Glucose 109 74 - 109 mg/dL    BUN 19 8 - 23 mg/dL    CREATININE 0.8 0.5 - 1.2 mg/dL    GFR Non-African American >60 >60    Calcium 8.8 8.8 - 10.2 mg/dL    Total Protein 6.9 6.6 - 8.7 g/dL    Alb 3.8 3.5 - 5.2 g/dL    Total Bilirubin 0.5 0.2 - 1.2 mg/dL    Alkaline Phosphatase 77 40 - 130 U/L    ALT 41 5 - 41 U/L    AST 28 5 - 40 U/L       ASSESSMENT/ PLAN:  1. Coronary artery disease involving native coronary artery of native heart without angina pectoris  Patient is stable continue current medications current plan of care follow healthy diet exercise risk factor modification    2. Essential hypertension  Blood pressure stable continue current care and follow    3. Mixed hyperlipidemia  High dose statin therapy  - CBC; Future  - TSH without Reflex;  Future  - Lipid Panel; Future    4. Hyperglycemia  Healthy diet and f/u  - Comprehensive Metabolic Panel; Future  - Hemoglobin A1C; Future    5. Prostate cancer screening    - Psa screening;  Future

## 2019-04-17 ENCOUNTER — OFFICE VISIT (OUTPATIENT)
Dept: CARDIOLOGY | Age: 74
End: 2019-04-17
Payer: MEDICARE

## 2019-04-17 VITALS
HEIGHT: 75 IN | WEIGHT: 286 LBS | DIASTOLIC BLOOD PRESSURE: 74 MMHG | SYSTOLIC BLOOD PRESSURE: 120 MMHG | HEART RATE: 72 BPM | BODY MASS INDEX: 35.56 KG/M2

## 2019-04-17 DIAGNOSIS — I10 ESSENTIAL HYPERTENSION: ICD-10-CM

## 2019-04-17 DIAGNOSIS — E78.2 MIXED HYPERLIPIDEMIA: ICD-10-CM

## 2019-04-17 DIAGNOSIS — I25.10 CORONARY ARTERY DISEASE INVOLVING NATIVE CORONARY ARTERY OF NATIVE HEART WITHOUT ANGINA PECTORIS: Primary | ICD-10-CM

## 2019-04-17 PROCEDURE — G8417 CALC BMI ABV UP PARAM F/U: HCPCS | Performed by: CLINICAL NURSE SPECIALIST

## 2019-04-17 PROCEDURE — 1123F ACP DISCUSS/DSCN MKR DOCD: CPT | Performed by: CLINICAL NURSE SPECIALIST

## 2019-04-17 PROCEDURE — G8427 DOCREV CUR MEDS BY ELIG CLIN: HCPCS | Performed by: CLINICAL NURSE SPECIALIST

## 2019-04-17 PROCEDURE — 1036F TOBACCO NON-USER: CPT | Performed by: CLINICAL NURSE SPECIALIST

## 2019-04-17 PROCEDURE — 99213 OFFICE O/P EST LOW 20 MIN: CPT | Performed by: CLINICAL NURSE SPECIALIST

## 2019-04-17 PROCEDURE — G8598 ASA/ANTIPLAT THER USED: HCPCS | Performed by: CLINICAL NURSE SPECIALIST

## 2019-04-17 PROCEDURE — 4040F PNEUMOC VAC/ADMIN/RCVD: CPT | Performed by: CLINICAL NURSE SPECIALIST

## 2019-04-17 ASSESSMENT — ENCOUNTER SYMPTOMS
COUGH: 0
CHEST TIGHTNESS: 0
VOMITING: 0
ABDOMINAL PAIN: 0
WHEEZING: 0
EYE REDNESS: 0
SHORTNESS OF BREATH: 1
FACIAL SWELLING: 0
NAUSEA: 0

## 2019-04-17 NOTE — PATIENT INSTRUCTIONS
Return in about 6 months (around 10/17/2019) for APRN. Do not hold either your Aspirin or your Effient for any elective procedure for one year following your stent. Encourage regular exercise and weight loss    Call with any questionsor concerns  Follow up with Hyun Vanegas MD for non cardiac problems  Report any new problems  Cardiovascular Fitness-Exercise as tolerated. Strive for 15 minutes of exercise most days of the week. Cardiac / HealthyDiet  Continue current medications as directed  Continue plan of treatment  It is always recommended that you bring your medicationsbottles with you to each visit - this is for your safety!

## 2019-04-17 NOTE — PROGRESS NOTES
[facility], [address] , [place] [address]  Phone: [phone]  Fax: [phone]    OFFICE VISIT:  2019    Marlin Merlin - : 1945    Reason For Visit:  Yareli Beyer is a 66 y.o. male who is here for hospital follow-up for CAD    HPI  Patient is here for CAD follow-up after a drug-eluting stent was placed to his circumflex artery on 18. Other history includes hypertension and hyperlipidemia. Patient states he is having no further chest pain. He has some chronic dyspnea which is unchanged. He denies orthopnea, PND, or palpitations. He has some chronic lower extremity edema which is also unchanged. He has sleep apnea and he treats is on a regular basis. Kuldeep Armas MD is PCP.   Marlin Merlin has the following history as recorded in [facility]:    Patient Active Problem List    Diagnosis Date Noted    Chest pressure 2018     Priority: High    Abnormal stress echo      Priority: High    Coronary artery disease involving native coronary artery of native heart without angina pectoris 2019    Essential hypertension 2019    Mixed hyperlipidemia 2019    Chronic rhinitis 2017    Gastroesophageal reflux disease without esophagitis 2017    Partial tear of left Achilles tendon 2017    Familial hypercholesterolemia 2017    Idiopathic peripheral neuropathy 2017    Obstructive sleep apnea 2017     Past Medical History:   Diagnosis Date    Arthritis     DVT, lower extremity, distal, chronic ([facility] [address]     Familial hypercholesterolemia 2017    Gastroesophageal reflux disease without esophagitis 2017    History of blood transfusion 2003    Knee replacements    Hx of blood clots 2003    Post Knee replacement tx    Idiopathic peripheral neuropathy 2017    Obstructive sleep apnea 2017    Partial tear of left Achilles tendon 2017    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     left at fibular head s/p decompression      Past Surgical History:   Procedure Laterality Date    CATARACT REMOVAL WITH IMPLANT      CHOLECYSTECTOMY  2005    CORONARY ANGIOPLASTY WITH STENT PLACEMENT  2018    OH to Circumflex    JOINT REPLACEMENT Bilateral     Knee replacements    KNEE ARTHROPLASTY Bilateral     complete combine condylye and plateau    NERVE SURGERY      Nerve release     Family History   Problem Relation Age of Onset    COPD Mother     Rheum Arthritis Father     COPD Brother     Lung Cancer Brother      Social History     Tobacco Use    Smoking status: Former Smoker     Packs/day: 1.00     Years: 12.00     Pack years: 12.00     Last attempt to quit: 1979     Years since quittin.3    Smokeless tobacco: Never Used   Substance Use Topics    Alcohol use: No      Current Outpatient Medications   Medication Sig Dispense Refill    amitriptyline (ELAVIL) 50 MG tablet Take 50 mg by mouth nightly      nystatin-triamcinolone (MYCOLOG II) 153933-9.1 UNIT/GM-% cream Apply topically 2 times daily. 80 g 0    metoprolol succinate (TOPROL XL) 25 MG extended release tablet Take 1 tablet by mouth daily 90 tablet 3    prasugrel (EFFIENT) 10 MG TABS Take 1 tablet by mouth daily 90 tablet 3    nitroGLYCERIN (NITROSTAT) 0.4 MG SL tablet SAVANAH AS DIRECTED  3    atorvastatin (LIPITOR) 80 MG tablet Take 1 tablet by mouth nightly 90 tablet 3    omeprazole (PRILOSEC) 20 MG delayed release capsule TAKE 1 CAPSULE DAILY 90 capsule 3    meclizine (ANTIVERT) 25 MG tablet Take 1 tablet by mouth 3 times daily as needed for Dizziness 60 tablet 1    aspirin 81 MG tablet Take 81 mg by mouth daily      CPAP Machine MISC by Does not apply route      Multiple Vitamins-Minerals (MULTIVITAMIN ADULT PO) Take by mouth daily      Multiple Vitamins-Minerals (OCUVITE PO) Take by mouth daily       No current facility-administered medications for this visit.       Allergies: Ampicillin    Review of Systems  Review of Systems   Constitutional: Negative for activity change, diaphoresis, fatigue, fever and unexpected weight change. HENT: Negative for facial swelling and nosebleeds. Eyes: Negative for redness and visual disturbance. Respiratory: Positive for shortness of breath (mild, chronic, unchanged). Negative for cough, chest tightness and wheezing. Cardiovascular: Positive for leg swelling (mild). Negative for chest pain and palpitations. Gastrointestinal: Negative for abdominal pain, nausea and vomiting. Endocrine: Negative for cold intolerance and heat intolerance. Genitourinary: Negative for dysuria and hematuria. Musculoskeletal: Negative for arthralgias and myalgias. Skin: Negative for pallor and rash. Neurological: Negative for dizziness, seizures, syncope, weakness and light-headedness. Hematological: Does not bruise/bleed easily. Psychiatric/Behavioral: Negative for agitation. The patient is not nervous/anxious. Objective  Vital Signs - /74   Pulse 72   Ht 6' 3\" (1.905 m)   Wt 286 lb (129.7 kg)   BMI 35.75 kg/m²   Physical Exam   Constitutional: He is oriented to person, place, and time. He appears well-developed and well-nourished. HENT:   Head: Normocephalic and atraumatic. Eyes: Pupils are equal, round, and reactive to light. Right eye exhibits no discharge. Left eye exhibits no discharge. Neck: No JVD present. No tracheal deviation present. Cardiovascular: Normal rate, regular rhythm, normal heart sounds and intact distal pulses. Exam reveals no gallop and no friction rub. No murmur heard. No carotid bruit   Pulmonary/Chest: Effort normal and breath sounds normal. No respiratory distress. He has no wheezes. He has no rales. Abdominal: Soft. There is no tenderness. Musculoskeletal: He exhibits no edema. Normal gait and station   Neurological: He is alert and oriented to person, place, and time. No cranial nerve deficit.    Skin: Skin is tolerated. Strive for 15 minutes of exercise most days of the week. Cardiac / HealthyDiet  Continue current medications as directed  Continue plan of treatment  It is always recommended that you bring your medicationsbottles with you to each visit - this is for your safety!        LATASHA Young

## 2019-04-29 ASSESSMENT — ENCOUNTER SYMPTOMS
EYE REDNESS: 0
COUGH: 0
ABDOMINAL PAIN: 0
EYE DISCHARGE: 0
SINUS PRESSURE: 0
ABDOMINAL DISTENTION: 0
SHORTNESS OF BREATH: 0
BACK PAIN: 0

## 2019-07-01 ENCOUNTER — OFFICE VISIT (OUTPATIENT)
Dept: INTERNAL MEDICINE | Age: 74
End: 2019-07-01
Payer: MEDICARE

## 2019-07-01 VITALS
HEIGHT: 75 IN | HEART RATE: 58 BPM | RESPIRATION RATE: 18 BRPM | OXYGEN SATURATION: 97 % | DIASTOLIC BLOOD PRESSURE: 86 MMHG | WEIGHT: 287 LBS | SYSTOLIC BLOOD PRESSURE: 120 MMHG | BODY MASS INDEX: 35.68 KG/M2

## 2019-07-01 DIAGNOSIS — J39.8 CONGESTION OF UPPER RESPIRATORY TRACT: ICD-10-CM

## 2019-07-01 DIAGNOSIS — R05.9 COUGH: Primary | ICD-10-CM

## 2019-07-01 DIAGNOSIS — G60.9 IDIOPATHIC PERIPHERAL NEUROPATHY: Chronic | ICD-10-CM

## 2019-07-01 PROCEDURE — G8417 CALC BMI ABV UP PARAM F/U: HCPCS | Performed by: NURSE PRACTITIONER

## 2019-07-01 PROCEDURE — 4040F PNEUMOC VAC/ADMIN/RCVD: CPT | Performed by: NURSE PRACTITIONER

## 2019-07-01 PROCEDURE — 99213 OFFICE O/P EST LOW 20 MIN: CPT | Performed by: NURSE PRACTITIONER

## 2019-07-01 PROCEDURE — G8427 DOCREV CUR MEDS BY ELIG CLIN: HCPCS | Performed by: NURSE PRACTITIONER

## 2019-07-01 PROCEDURE — 1123F ACP DISCUSS/DSCN MKR DOCD: CPT | Performed by: NURSE PRACTITIONER

## 2019-07-01 PROCEDURE — G8598 ASA/ANTIPLAT THER USED: HCPCS | Performed by: NURSE PRACTITIONER

## 2019-07-01 PROCEDURE — 1036F TOBACCO NON-USER: CPT | Performed by: NURSE PRACTITIONER

## 2019-07-01 RX ORDER — CEFDINIR 300 MG/1
300 CAPSULE ORAL 2 TIMES DAILY
Qty: 14 CAPSULE | Refills: 0 | Status: SHIPPED | OUTPATIENT
Start: 2019-07-01 | End: 2019-07-08

## 2019-07-01 RX ORDER — PREGABALIN 100 MG/1
100 CAPSULE ORAL 2 TIMES DAILY
Qty: 60 CAPSULE | Refills: 0 | Status: SHIPPED | OUTPATIENT
Start: 2019-07-01 | End: 2019-08-05

## 2019-07-01 ASSESSMENT — ENCOUNTER SYMPTOMS
DIARRHEA: 0
ABDOMINAL PAIN: 0
COUGH: 1
NAUSEA: 0
SHORTNESS OF BREATH: 0
WHEEZING: 0
TROUBLE SWALLOWING: 0
COLOR CHANGE: 0
VOMITING: 0
STRIDOR: 0
CONSTIPATION: 0
BLOOD IN STOOL: 0
EYE ITCHING: 0
CHOKING: 0
EYE DISCHARGE: 0
ABDOMINAL DISTENTION: 0

## 2019-07-01 NOTE — PROGRESS NOTES
STENT PLACEMENT  2018    OH to Circumflex    JOINT REPLACEMENT Bilateral 2003    Knee replacements    KNEE ARTHROPLASTY Bilateral     complete combine condylye and plateau    NERVE SURGERY      Nerve release       Vitals 2019 2019 4/15/2019 2019 2019    SYSTOLIC 339 890 [phone]   DIASTOLIC 86 74 86 68 70 79   Site - - Left Upper Arm - Left Upper Arm -   Position - - - - - -   Pulse 58 72 84 76 82 60   Temp - - - - - 97.6   Resp 18 - - - - 16   SpO2 97 - 97 - 98 93   Weight 287 lb 286 lb 285 lb 295 lb 289 lb -   Height 6' 3\" 6' 3\" 6' 3\" 6' 3\" 6' 3\" -   BMI (wt*703/ht~2) 35.87 kg/m2 35.74 kg/m2 35.62 kg/m2 36.87 kg/m2 36.12 kg/m2 -   Some recent data might be hidden       Family History   Problem Relation Age of Onset    COPD Mother     Rheum Arthritis Father     COPD Brother     Lung Cancer Brother        Social History     Tobacco Use    Smoking status: Former Smoker     Packs/day: 1.00     Years: 12.00     Pack years: 12.00     Last attempt to quit: 1979     Years since quittin.5    Smokeless tobacco: Never Used   Substance Use Topics    Alcohol use: No      Current Outpatient Medications   Medication Sig Dispense Refill    cefdinir (OMNICEF) 300 MG capsule Take 1 capsule by mouth 2 times daily for 7 days 14 capsule 0    pregabalin (LYRICA) 100 MG capsule Take 1 capsule by mouth 2 times daily for 30 days. 60 capsule 0    amitriptyline (ELAVIL) 50 MG tablet Take 50 mg by mouth nightly      nystatin-triamcinolone (MYCOLOG II) 529880-8.1 UNIT/GM-% cream Apply topically 2 times daily.  80 g 0    metoprolol succinate (TOPROL XL) 25 MG extended release tablet Take 1 tablet by mouth daily 90 tablet 3    prasugrel (EFFIENT) 10 MG TABS Take 1 tablet by mouth daily 90 tablet 3    nitroGLYCERIN (NITROSTAT) 0.4 MG SL tablet SAVANAH AS DIRECTED  3    atorvastatin (LIPITOR) 80 MG tablet Take 1 tablet by mouth nightly 90 tablet 3    omeprazole (PRILOSEC) 20 MG 0         ORDERS:  No orders of the defined types were placed in this encounter. Follow-up:  Return if symptoms worsen or fail to improve. PATIENT INSTRUCTIONS:  Patient Instructions   1. Cough; Will get a chest x-ray today. 2. Congestion; We will check a CBC and CMP while in office today. Start taking Mucinex 1200 mg by mouth twice per day. Start taking Cefdinir 300 mg twice daily. Make sure to get two doses in today. We discussed his allergy to ampicillin. He has developed a rash when taking it in the past. He was instructed to stop use and contact the office if a rash develops. Use saline nasal spray four times per day. Wait 5 minutes after using saline spray, and use Flonase, Nasonex, or Rhinocort nasal spray twice per day. Get chest x-ray today after leaving the office. 3. Neuropathy; We can try Lyrica 100 mg twice daily. He will need to follow-up with us if this does not improve neuropathy. He has a follow-up appointment with Dr. Emir Parker in 1 month. Electronically signed by LATASHA Ortega on 7/1/2019 at 11:34 AM    EMRDragon/transcription disclaimer:  Much of this encounter note is electronic transcription/translation of spoken language to printed texts. The electronic translation of spoken language may be erroneous, or at times,nonsensical words or phrases may be inadvertently transcribed.   Although I have reviewed the note for such errors, some may still exist.

## 2019-07-01 NOTE — PATIENT INSTRUCTIONS
1. Cough; Will get a chest x-ray today. 2. Congestion; We will check a CBC and CMP while in office today. Start taking Mucinex 1200 mg by mouth twice per day. Start taking Cefdinir 300 mg twice daily. Make sure to get two doses in today. We discussed his allergy to ampicillin. He has developed a rash when taking it in the past. He was instructed to stop use and contact the office if a rash develops. Use saline nasal spray four times per day. Wait 5 minutes after using saline spray, and use Flonase, Nasonex, or Rhinocort nasal spray twice per day. Get chest x-ray today after leaving the office. 3. Neuropathy; We can try Lyrica 100 mg twice daily. He will need to follow-up with us if this does not improve neuropathy. He has a follow-up appointment with Dr. Nelson Strong in 1 month.

## 2019-07-03 ENCOUNTER — TELEPHONE (OUTPATIENT)
Dept: INTERNAL MEDICINE | Age: 74
End: 2019-07-03

## 2019-07-03 NOTE — TELEPHONE ENCOUNTER
Spoke with pt, states insurance is not going to cover Lyrica. Is there something different that can be sent in? I asked if they were told what the insurance would cover and he said he was supposed to discuss option with his provider.

## 2019-07-05 ENCOUNTER — TELEPHONE (OUTPATIENT)
Dept: INTERNAL MEDICINE | Age: 74
End: 2019-07-05

## 2019-07-23 DIAGNOSIS — G60.9 IDIOPATHIC PERIPHERAL NEUROPATHY: Chronic | ICD-10-CM

## 2019-07-25 DIAGNOSIS — G60.9 IDIOPATHIC PERIPHERAL NEUROPATHY: Primary | ICD-10-CM

## 2019-07-25 RX ORDER — PREGABALIN 100 MG/1
100 CAPSULE ORAL 2 TIMES DAILY
Qty: 180 CAPSULE | Refills: 0 | OUTPATIENT
Start: 2019-07-25 | End: 2019-08-24

## 2019-07-25 RX ORDER — PREGABALIN 100 MG/1
100 CAPSULE ORAL 2 TIMES DAILY
Qty: 180 CAPSULE | Refills: 1 | Status: SHIPPED | OUTPATIENT
Start: 2019-07-25 | End: 2020-01-27 | Stop reason: SDUPTHER

## 2019-07-29 DIAGNOSIS — R73.9 HYPERGLYCEMIA: ICD-10-CM

## 2019-07-29 DIAGNOSIS — Z12.5 PROSTATE CANCER SCREENING: ICD-10-CM

## 2019-07-29 DIAGNOSIS — E78.2 MIXED HYPERLIPIDEMIA: ICD-10-CM

## 2019-07-29 LAB
CHOLESTEROL, TOTAL: 117 MG/DL (ref 160–199)
HBA1C MFR BLD: 5.8 % (ref 4–6)
HCT VFR BLD CALC: 47.5 % (ref 42–52)
HDLC SERPL-MCNC: 37 MG/DL (ref 55–121)
HEMOGLOBIN: 15.2 G/DL (ref 14–18)
LDL CHOLESTEROL CALCULATED: 55 MG/DL
MCH RBC QN AUTO: 30.6 PG (ref 27–31)
MCHC RBC AUTO-ENTMCNC: 32 G/DL (ref 33–37)
MCV RBC AUTO: 95.6 FL (ref 80–94)
PDW BLD-RTO: 13.4 % (ref 11.5–14.5)
PLATELET # BLD: 219 K/UL (ref 130–400)
PMV BLD AUTO: 10.6 FL (ref 9.4–12.4)
PROSTATE SPECIFIC ANTIGEN: 0.81 NG/ML (ref 0–4)
RBC # BLD: 4.97 M/UL (ref 4.7–6.1)
TRIGL SERPL-MCNC: 127 MG/DL (ref 0–149)
TSH SERPL DL<=0.05 MIU/L-ACNC: 2.7 UIU/ML (ref 0.27–4.2)
WBC # BLD: 6.6 K/UL (ref 4.8–10.8)

## 2019-08-05 ENCOUNTER — OFFICE VISIT (OUTPATIENT)
Dept: INTERNAL MEDICINE | Age: 74
End: 2019-08-05
Payer: MEDICARE

## 2019-08-05 VITALS
DIASTOLIC BLOOD PRESSURE: 64 MMHG | WEIGHT: 292 LBS | SYSTOLIC BLOOD PRESSURE: 116 MMHG | BODY MASS INDEX: 36.31 KG/M2 | HEART RATE: 68 BPM | HEIGHT: 75 IN | OXYGEN SATURATION: 97 %

## 2019-08-05 DIAGNOSIS — I10 ESSENTIAL HYPERTENSION: ICD-10-CM

## 2019-08-05 DIAGNOSIS — Z00.00 MEDICARE ANNUAL WELLNESS VISIT, SUBSEQUENT: Primary | ICD-10-CM

## 2019-08-05 DIAGNOSIS — I25.10 CORONARY ARTERY DISEASE INVOLVING NATIVE CORONARY ARTERY OF NATIVE HEART WITHOUT ANGINA PECTORIS: ICD-10-CM

## 2019-08-05 DIAGNOSIS — E78.01 FAMILIAL HYPERCHOLESTEROLEMIA: Chronic | ICD-10-CM

## 2019-08-05 DIAGNOSIS — E78.2 MIXED HYPERLIPIDEMIA: ICD-10-CM

## 2019-08-05 DIAGNOSIS — G60.9 IDIOPATHIC PERIPHERAL NEUROPATHY: Chronic | ICD-10-CM

## 2019-08-05 DIAGNOSIS — G47.33 OBSTRUCTIVE SLEEP APNEA: Chronic | ICD-10-CM

## 2019-08-05 DIAGNOSIS — J01.00 ACUTE MAXILLARY SINUSITIS, RECURRENCE NOT SPECIFIED: ICD-10-CM

## 2019-08-05 DIAGNOSIS — Z00.00 ROUTINE GENERAL MEDICAL EXAMINATION AT A HEALTH CARE FACILITY: ICD-10-CM

## 2019-08-05 PROCEDURE — G8427 DOCREV CUR MEDS BY ELIG CLIN: HCPCS | Performed by: INTERNAL MEDICINE

## 2019-08-05 PROCEDURE — G8598 ASA/ANTIPLAT THER USED: HCPCS | Performed by: INTERNAL MEDICINE

## 2019-08-05 PROCEDURE — 1036F TOBACCO NON-USER: CPT | Performed by: INTERNAL MEDICINE

## 2019-08-05 PROCEDURE — 4040F PNEUMOC VAC/ADMIN/RCVD: CPT | Performed by: INTERNAL MEDICINE

## 2019-08-05 PROCEDURE — 96372 THER/PROPH/DIAG INJ SC/IM: CPT | Performed by: INTERNAL MEDICINE

## 2019-08-05 PROCEDURE — 99213 OFFICE O/P EST LOW 20 MIN: CPT | Performed by: INTERNAL MEDICINE

## 2019-08-05 PROCEDURE — G0439 PPPS, SUBSEQ VISIT: HCPCS | Performed by: INTERNAL MEDICINE

## 2019-08-05 PROCEDURE — 1123F ACP DISCUSS/DSCN MKR DOCD: CPT | Performed by: INTERNAL MEDICINE

## 2019-08-05 PROCEDURE — G8417 CALC BMI ABV UP PARAM F/U: HCPCS | Performed by: INTERNAL MEDICINE

## 2019-08-05 RX ORDER — DOXYCYCLINE HYCLATE 100 MG
100 TABLET ORAL 2 TIMES DAILY
Qty: 20 TABLET | Refills: 0 | Status: SHIPPED | OUTPATIENT
Start: 2019-08-05 | End: 2019-08-15

## 2019-08-05 RX ORDER — METHYLPREDNISOLONE ACETATE 80 MG/ML
80 INJECTION, SUSPENSION INTRA-ARTICULAR; INTRALESIONAL; INTRAMUSCULAR; SOFT TISSUE ONCE
Status: COMPLETED | OUTPATIENT
Start: 2019-08-05 | End: 2019-08-05

## 2019-08-05 RX ADMIN — METHYLPREDNISOLONE ACETATE 80 MG: 80 INJECTION, SUSPENSION INTRA-ARTICULAR; INTRALESIONAL; INTRAMUSCULAR; SOFT TISSUE at 09:10

## 2019-08-05 ASSESSMENT — ENCOUNTER SYMPTOMS
RHINORRHEA: 1
SINUS PRESSURE: 1
BACK PAIN: 1
EYE REDNESS: 0
ABDOMINAL PAIN: 0
ABDOMINAL DISTENTION: 0
COUGH: 1
EYE DISCHARGE: 0
SHORTNESS OF BREATH: 1

## 2019-08-05 ASSESSMENT — PATIENT HEALTH QUESTIONNAIRE - PHQ9
SUM OF ALL RESPONSES TO PHQ QUESTIONS 1-9: 0
SUM OF ALL RESPONSES TO PHQ QUESTIONS 1-9: 0

## 2019-08-05 ASSESSMENT — LIFESTYLE VARIABLES: HOW OFTEN DO YOU HAVE A DRINK CONTAINING ALCOHOL: 0

## 2019-08-05 NOTE — PROGRESS NOTES
management care    6. Familial hypercholesterolemia  Low HDL continue current care and follow    7. Idiopathic peripheral neuropathy  Took Neurontin and amitriptyline with no relief. Just started Lyrica we will see how he does. Some of the symptoms sound worrisome for claudication but he has bounding pulses and really does not describe claudication but more a tight numb feeling. 8. Mixed hyperlipidemia  Work on healthy diet exercise    9. Obstructive sleep apnea  Continue to be compliant with CPAP make sure CPAP device is cleaned. Nasal saline also. Medicare Annual Wellness Visit  Name: Jessica Bucio Date: 2019   MRN: [id number] Sex: Male   Age: 66 y.o. Ethnicity: Non-/Non    : 1945 Race: Marisol Chou is here for Medicare AWV (medicare visit)    Screenings for behavioral, psychosocial and functional/safety risks, and cognitive dysfunction are all negative except as indicated below. These results, as well as other patient data from the [address] form, are documented in Flowsheets linked to this Encounter. Allergies   Allergen Reactions    Ampicillin Rash       Prior to Visit Medications    Medication Sig Taking? Authorizing Provider   doxycycline hyclate (VIBRA-TABS) 100 MG tablet Take 1 tablet by mouth 2 times daily for 10 days Yes Andrew Rosen MD   pregabalin (LYRICA) 100 MG capsule Take 1 capsule by mouth 2 times daily for 180 days.  Yes Andrew Rosen MD   metoprolol succinate (TOPROL XL) 25 MG extended release tablet Take 1 tablet by mouth daily Yes LATASHA Goddard   prasugrel (EFFIENT) 10 MG TABS Take 1 tablet by mouth daily Yes LATASHA Esquivel   nitroGLYCERIN (NITROSTAT) 0.4 MG SL tablet SAVANAH AS DIRECTED Yes Historical Provider, MD   atorvastatin (LIPITOR) 80 MG tablet Take 1 tablet by mouth nightly Yes LATASHA Goddard   omeprazole (PRILOSEC) 20 MG delayed release capsule TAKE 1 CAPSULE DAILY Yes Gary Hwang MD aspirin 81 MG tablet Take 81 mg by mouth daily Yes Historical Provider, MD   CPAP Machine MISC by Does not apply route Yes Historical Provider, MD   Multiple Vitamins-Minerals (MULTIVITAMIN ADULT PO) Take by mouth daily Yes Historical Provider, MD   Multiple Vitamins-Minerals (OCUVITE PO) Take by mouth daily Yes Historical Provider, MD   meclizine (ANTIVERT) 25 MG tablet Take 1 tablet by mouth 3 times daily as needed for Dizziness  Patient not taking: Reported on 8/5/2019  Donita Rodriguez MD       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     left at fibular head s/p decompression 2009     Past Surgical History:   Procedure Laterality Date    CATARACT REMOVAL WITH IMPLANT      CHOLECYSTECTOMY  07/2005    CORONARY ANGIOPLASTY WITH STENT PLACEMENT  12/26/2018    [place] to Circumflex    JOINT REPLACEMENT Bilateral 2003    Knee replacements    KNEE ARTHROPLASTY Bilateral 2003    complete combine condylye and plateau    NERVE SURGERY      Nerve release       Family History   Problem Relation Age of Onset    COPD Mother     Rheum Arthritis Father     COPD Brother     Lung Cancer Brother        CareTeam (Including outside providers/suppliers regularly involved in providing care):   Patient Care Team:  Diann Carrel, MD as PCP - General (Internal Medicine)  Diann Carrel, MD as PCP - [facility] Empaneled Provider  Osmany Vanegas MD as Consulting Physician (Gastroenterology)  Yokasta Alcala MD as Consulting Physician (Ophthalmology)  Priscilla Patel MD as Consulting Physician 07/30/2019    Shingles Vaccine (3 of 3) 08/10/2019    Flu vaccine (1) 09/01/2019    Potassium monitoring  04/03/2020    Creatinine monitoring  04/03/2020    PSA counseling  07/29/2020    Pneumococcal 65+ years Vaccine  Completed     Recommendations for Preventive Services Due: see orders and patient instructions/AVS.  .   Recommended screening schedule for the next 5-10 years is provided to the patient in written form: see Patient Instructions/AVS.

## 2019-08-05 NOTE — PATIENT INSTRUCTIONS
Personalized Preventive Plan for Clif Zapata - 8/5/2019  Medicare offers a range of preventive health benefits. Some of the tests and screenings are paid in full while other may be subject to a deductible, co-insurance, and/or copay. Some of these benefits include a comprehensive review of your medical history including lifestyle, illnesses that may run in your family, and various assessments and screenings as appropriate. After reviewing your medical record and screening and assessments performed today your provider may have ordered immunizations, labs, imaging, and/or referrals for you. A list of these orders (if applicable) as well as your Preventive Care list are included within your After Visit Summary for your review. Other Preventive Recommendations:    · A preventive eye exam performed by an eye specialist is recommended every 1-2 years to screen for glaucoma; cataracts, macular degeneration, and other eye disorders. · A preventive dental visit is recommended every 6 months. · Try to get at least 150 minutes of exercise per week or 10,000 steps per day on a pedometer . · Order or download the FREE \"Exercise & Physical Activity: Your Everyday Guide\" from [employer]. Call 0-294.400.5559 or search [employer] online. · You need 7353-1356 mg of calcium and 5010-3004 IU of vitamin D per day. It is possible to meet your calcium requirement with diet alone, but a vitamin D supplement is usually necessary to meet this goal.  · When exposed to the sun, use a sunscreen that protects against both UVA and UVB radiation with an SPF of 30 or greater. Reapply every 2 to 3 hours or after sweating, drying off with a towel, or swimming. · Always wear a seat belt when traveling in a car. Always wear a helmet when riding a bicycle or motorcycle.

## 2019-09-17 ENCOUNTER — OFFICE VISIT (OUTPATIENT)
Dept: INTERNAL MEDICINE | Age: 74
End: 2019-09-17
Payer: MEDICARE

## 2019-09-17 VITALS
BODY MASS INDEX: 35.56 KG/M2 | HEIGHT: 75 IN | WEIGHT: 286 LBS | HEART RATE: 56 BPM | SYSTOLIC BLOOD PRESSURE: 126 MMHG | DIASTOLIC BLOOD PRESSURE: 80 MMHG | OXYGEN SATURATION: 96 %

## 2019-09-17 DIAGNOSIS — I25.10 CORONARY ARTERY DISEASE INVOLVING NATIVE CORONARY ARTERY OF NATIVE HEART WITHOUT ANGINA PECTORIS: ICD-10-CM

## 2019-09-17 DIAGNOSIS — E78.2 MIXED HYPERLIPIDEMIA: ICD-10-CM

## 2019-09-17 DIAGNOSIS — R73.9 HYPERGLYCEMIA: ICD-10-CM

## 2019-09-17 DIAGNOSIS — I10 ESSENTIAL HYPERTENSION: ICD-10-CM

## 2019-09-17 DIAGNOSIS — G60.9 IDIOPATHIC PERIPHERAL NEUROPATHY: Chronic | ICD-10-CM

## 2019-09-17 DIAGNOSIS — E78.01 FAMILIAL HYPERCHOLESTEROLEMIA: Primary | Chronic | ICD-10-CM

## 2019-09-17 PROCEDURE — 4040F PNEUMOC VAC/ADMIN/RCVD: CPT | Performed by: INTERNAL MEDICINE

## 2019-09-17 PROCEDURE — 1036F TOBACCO NON-USER: CPT | Performed by: INTERNAL MEDICINE

## 2019-09-17 PROCEDURE — 99214 OFFICE O/P EST MOD 30 MIN: CPT | Performed by: INTERNAL MEDICINE

## 2019-09-17 PROCEDURE — G8427 DOCREV CUR MEDS BY ELIG CLIN: HCPCS | Performed by: INTERNAL MEDICINE

## 2019-09-17 PROCEDURE — G8598 ASA/ANTIPLAT THER USED: HCPCS | Performed by: INTERNAL MEDICINE

## 2019-09-17 PROCEDURE — G8417 CALC BMI ABV UP PARAM F/U: HCPCS | Performed by: INTERNAL MEDICINE

## 2019-09-17 PROCEDURE — 1123F ACP DISCUSS/DSCN MKR DOCD: CPT | Performed by: INTERNAL MEDICINE

## 2019-09-17 NOTE — PROGRESS NOTES
PLACEMENT  2018    [place] to Circumflex    JOINT REPLACEMENT Bilateral 2003    Knee replacements    KNEE ARTHROPLASTY Bilateral     complete combine condylye and plateau    NERVE SURGERY      Nerve release       Family History   Problem Relation Age of Onset    COPD Mother     Rheum Arthritis Father     COPD Brother     Lung Cancer Brother        Social History     Socioeconomic History    Marital status:      Spouse name: avila    Number of children: 2    Years of education: 21    Highest education level: Not on file   Occupational History    Occupation: retired teacher   Social Needs    Financial resource strain: Not on file    Food insecurity:     Worry: Not on file     Inability: Not on file   [employer] needs:     Medical: Not on file     Non-medical: Not on file   Tobacco Use    Smoking status: Former Smoker     Packs/day: 1.00     Years: 12.00     Pack years: 12.00     Last attempt to quit:      Years since quittin.7    Smokeless tobacco: Never Used   Substance and Sexual Activity    Alcohol use: No    Drug use: No    Sexual activity: Yes     Partners: Female     Comment: wife   Lifestyle    Physical activity:     Days per week: Not on file     Minutes per session: Not on file    Stress: Not on file   Relationships    Social connections:     Talks on phone: Not on file     Gets together: Not on file     Attends Religion service: Not on file     Active member of club or organization: Not on file     Attends meetings of clubs or organizations: Not on file     Relationship status: Not on file    Intimate partner violence:     Fear of current or ex partner: Not on file     Emotionally abused: Not on file     Physically abused: Not on file     Forced sexual activity: Not on file   Other Topics Concern    Not on file   Social History Narrative    Not on file       Allergies   Allergen Reactions    Ampicillin Rash       Current Outpatient Medications

## 2019-09-23 RX ORDER — OMEPRAZOLE 20 MG/1
CAPSULE, DELAYED RELEASE ORAL
Qty: 90 CAPSULE | Refills: 4 | Status: SHIPPED | OUTPATIENT
Start: 2019-09-23 | End: 2020-12-16

## 2019-09-29 ASSESSMENT — ENCOUNTER SYMPTOMS
EYE DISCHARGE: 0
COUGH: 0
ABDOMINAL DISTENTION: 0
ABDOMINAL PAIN: 0
SINUS PRESSURE: 0
EYE REDNESS: 0
SHORTNESS OF BREATH: 0

## 2019-11-08 ENCOUNTER — OFFICE VISIT (OUTPATIENT)
Dept: CARDIOLOGY | Age: 74
End: 2019-11-08
Payer: MEDICARE

## 2019-11-08 VITALS
WEIGHT: 293 LBS | SYSTOLIC BLOOD PRESSURE: 122 MMHG | DIASTOLIC BLOOD PRESSURE: 64 MMHG | HEART RATE: 72 BPM | HEIGHT: 74 IN | BODY MASS INDEX: 37.6 KG/M2

## 2019-11-08 DIAGNOSIS — I10 ESSENTIAL HYPERTENSION: ICD-10-CM

## 2019-11-08 DIAGNOSIS — E78.01 FAMILIAL HYPERCHOLESTEROLEMIA: Chronic | ICD-10-CM

## 2019-11-08 DIAGNOSIS — E78.2 MIXED HYPERLIPIDEMIA: ICD-10-CM

## 2019-11-08 DIAGNOSIS — I25.10 CORONARY ARTERY DISEASE INVOLVING NATIVE CORONARY ARTERY OF NATIVE HEART WITHOUT ANGINA PECTORIS: Primary | ICD-10-CM

## 2019-11-08 DIAGNOSIS — G47.33 OBSTRUCTIVE SLEEP APNEA: Chronic | ICD-10-CM

## 2019-11-08 PROCEDURE — G8484 FLU IMMUNIZE NO ADMIN: HCPCS | Performed by: CLINICAL NURSE SPECIALIST

## 2019-11-08 PROCEDURE — 4040F PNEUMOC VAC/ADMIN/RCVD: CPT | Performed by: CLINICAL NURSE SPECIALIST

## 2019-11-08 PROCEDURE — G8417 CALC BMI ABV UP PARAM F/U: HCPCS | Performed by: CLINICAL NURSE SPECIALIST

## 2019-11-08 PROCEDURE — G8598 ASA/ANTIPLAT THER USED: HCPCS | Performed by: CLINICAL NURSE SPECIALIST

## 2019-11-08 PROCEDURE — 99213 OFFICE O/P EST LOW 20 MIN: CPT | Performed by: CLINICAL NURSE SPECIALIST

## 2019-11-08 PROCEDURE — 1123F ACP DISCUSS/DSCN MKR DOCD: CPT | Performed by: CLINICAL NURSE SPECIALIST

## 2019-11-08 PROCEDURE — 93000 ELECTROCARDIOGRAM COMPLETE: CPT | Performed by: CLINICAL NURSE SPECIALIST

## 2019-11-08 PROCEDURE — 1036F TOBACCO NON-USER: CPT | Performed by: CLINICAL NURSE SPECIALIST

## 2019-11-08 PROCEDURE — G8427 DOCREV CUR MEDS BY ELIG CLIN: HCPCS | Performed by: CLINICAL NURSE SPECIALIST

## 2019-11-08 ASSESSMENT — ENCOUNTER SYMPTOMS
WHEEZING: 0
EYE REDNESS: 0
ABDOMINAL PAIN: 0
COUGH: 0
SHORTNESS OF BREATH: 1
NAUSEA: 0
VOMITING: 0
CHEST TIGHTNESS: 0
FACIAL SWELLING: 0

## 2019-11-12 ENCOUNTER — TELEPHONE (OUTPATIENT)
Dept: INTERNAL MEDICINE | Age: 74
End: 2019-11-12

## 2019-11-12 RX ORDER — IPRATROPIUM BROMIDE 21 UG/1
2 SPRAY, METERED NASAL 3 TIMES DAILY
Qty: 1 BOTTLE | Refills: 3 | Status: SHIPPED | OUTPATIENT
Start: 2019-11-12 | End: 2020-06-29 | Stop reason: SDUPTHER

## 2019-12-09 RX ORDER — ATORVASTATIN CALCIUM 80 MG/1
TABLET, FILM COATED ORAL
Qty: 90 TABLET | Refills: 3 | Status: SHIPPED | OUTPATIENT
Start: 2019-12-09 | End: 2021-11-18 | Stop reason: SDUPTHER

## 2019-12-10 PROBLEM — Z86.010 HX OF ADENOMATOUS COLONIC POLYPS: Status: ACTIVE | Noted: 2017-10-17

## 2019-12-10 PROBLEM — Z86.0101 HX OF ADENOMATOUS COLONIC POLYPS: Status: ACTIVE | Noted: 2017-10-17

## 2019-12-10 PROBLEM — Z96.1 PSEUDOPHAKIA: Status: ACTIVE | Noted: 2018-05-01

## 2020-01-09 DIAGNOSIS — R73.9 HYPERGLYCEMIA: ICD-10-CM

## 2020-01-09 DIAGNOSIS — E78.01 FAMILIAL HYPERCHOLESTEROLEMIA: Chronic | ICD-10-CM

## 2020-01-09 LAB
ALBUMIN SERPL-MCNC: 3.9 G/DL (ref 3.5–5.2)
ALP BLD-CCNC: 67 U/L (ref 40–130)
ALT SERPL-CCNC: 39 U/L (ref 5–41)
ANION GAP SERPL CALCULATED.3IONS-SCNC: 11 MMOL/L (ref 7–19)
AST SERPL-CCNC: 32 U/L (ref 5–40)
BILIRUB SERPL-MCNC: 0.8 MG/DL (ref 0.2–1.2)
BUN BLDV-MCNC: 14 MG/DL (ref 8–23)
CALCIUM SERPL-MCNC: 9 MG/DL (ref 8.8–10.2)
CHLORIDE BLD-SCNC: 106 MMOL/L (ref 98–111)
CHOLESTEROL, TOTAL: 113 MG/DL (ref 160–199)
CO2: 26 MMOL/L (ref 22–29)
CREAT SERPL-MCNC: 0.7 MG/DL (ref 0.5–1.2)
GFR NON-AFRICAN AMERICAN: >60
GLUCOSE BLD-MCNC: 109 MG/DL (ref 74–109)
HBA1C MFR BLD: 5.9 % (ref 4–6)
HCT VFR BLD CALC: 46.4 % (ref 42–52)
HDLC SERPL-MCNC: 38 MG/DL (ref 55–121)
HEMOGLOBIN: 14.8 G/DL (ref 14–18)
LDL CHOLESTEROL CALCULATED: 56 MG/DL
MCH RBC QN AUTO: 30.8 PG (ref 27–31)
MCHC RBC AUTO-ENTMCNC: 31.9 G/DL (ref 33–37)
MCV RBC AUTO: 96.7 FL (ref 80–94)
PDW BLD-RTO: 13.2 % (ref 11.5–14.5)
PLATELET # BLD: 199 K/UL (ref 130–400)
PMV BLD AUTO: 10.6 FL (ref 9.4–12.4)
POTASSIUM SERPL-SCNC: 4.5 MMOL/L (ref 3.5–5)
RBC # BLD: 4.8 M/UL (ref 4.7–6.1)
SODIUM BLD-SCNC: 143 MMOL/L (ref 136–145)
TOTAL PROTEIN: 6.8 G/DL (ref 6.6–8.7)
TRIGL SERPL-MCNC: 97 MG/DL (ref 0–149)
WBC # BLD: 5.6 K/UL (ref 4.8–10.8)

## 2020-01-16 ENCOUNTER — OFFICE VISIT (OUTPATIENT)
Dept: INTERNAL MEDICINE | Age: 75
End: 2020-01-16
Payer: MEDICARE

## 2020-01-16 VITALS
BODY MASS INDEX: 36.68 KG/M2 | SYSTOLIC BLOOD PRESSURE: 130 MMHG | WEIGHT: 295 LBS | DIASTOLIC BLOOD PRESSURE: 74 MMHG | HEART RATE: 58 BPM | HEIGHT: 75 IN | OXYGEN SATURATION: 95 %

## 2020-01-16 PROCEDURE — 1123F ACP DISCUSS/DSCN MKR DOCD: CPT | Performed by: INTERNAL MEDICINE

## 2020-01-16 PROCEDURE — 1036F TOBACCO NON-USER: CPT | Performed by: INTERNAL MEDICINE

## 2020-01-16 PROCEDURE — 4040F PNEUMOC VAC/ADMIN/RCVD: CPT | Performed by: INTERNAL MEDICINE

## 2020-01-16 PROCEDURE — G8427 DOCREV CUR MEDS BY ELIG CLIN: HCPCS | Performed by: INTERNAL MEDICINE

## 2020-01-16 PROCEDURE — G8417 CALC BMI ABV UP PARAM F/U: HCPCS | Performed by: INTERNAL MEDICINE

## 2020-01-16 PROCEDURE — G8484 FLU IMMUNIZE NO ADMIN: HCPCS | Performed by: INTERNAL MEDICINE

## 2020-01-16 PROCEDURE — 99214 OFFICE O/P EST MOD 30 MIN: CPT | Performed by: INTERNAL MEDICINE

## 2020-01-16 RX ORDER — METOPROLOL SUCCINATE 25 MG/1
TABLET, EXTENDED RELEASE ORAL
Qty: 90 TABLET | Refills: 4 | Status: SHIPPED | OUTPATIENT
Start: 2020-01-16 | End: 2021-04-12

## 2020-01-16 ASSESSMENT — PATIENT HEALTH QUESTIONNAIRE - PHQ9
SUM OF ALL RESPONSES TO PHQ9 QUESTIONS 1 & 2: 0
1. LITTLE INTEREST OR PLEASURE IN DOING THINGS: 0
SUM OF ALL RESPONSES TO PHQ QUESTIONS 1-9: 0
SUM OF ALL RESPONSES TO PHQ QUESTIONS 1-9: 0
2. FEELING DOWN, DEPRESSED OR HOPELESS: 0

## 2020-01-16 NOTE — PROGRESS NOTES
Medical: Not on file     Non-medical: Not on file   Tobacco Use    Smoking status: Former Smoker     Packs/day: 1.00     Years: 12.00     Pack years: 12.00     Last attempt to quit:      Years since quittin.0    Smokeless tobacco: Never Used   Substance and Sexual Activity    Alcohol use: No    Drug use: No    Sexual activity: Yes     Partners: Female     Comment: wife   Lifestyle    Physical activity:     Days per week: Not on file     Minutes per session: Not on file    Stress: Not on file   Relationships    Social connections:     Talks on phone: Not on file     Gets together: Not on file     Attends Pent[employer]al service: Not on file     Active member of club or organization: Not on file     Attends meetings of clubs or organizations: Not on file     Relationship status: Not on file    Intimate partner violence:     Fear of current or ex partner: Not on file     Emotionally abused: Not on file     Physically abused: Not on file     Forced sexual activity: Not on file   Other Topics Concern    Not on file   Social History Narrative    Not on file       Allergies   Allergen Reactions    Ampicillin Rash       Current Outpatient Medications   Medication Sig Dispense Refill    metoprolol succinate (TOPROL XL) 25 MG extended release tablet TAKE 1 TABLET DAILY 90 tablet 4    atorvastatin (LIPITOR) 80 MG tablet TAKE 1 TABLET NIGHTLY 90 tablet 3    ipratropium (ATROVENT) 0.03 % nasal spray 2 sprays by Nasal route 3 times daily 1 Bottle 3    omeprazole (PRILOSEC) 20 MG delayed release capsule TAKE 1 CAPSULE DAILY 90 capsule 4    pregabalin (LYRICA) 100 MG capsule Take 1 capsule by mouth 2 times daily for 180 days.  180 capsule 1    nitroGLYCERIN (NITROSTAT) 0.4 MG SL tablet SAVANAH AS DIRECTED  3    meclizine (ANTIVERT) 25 MG tablet Take 1 tablet by mouth 3 times daily as needed for Dizziness 60 tablet 1    aspirin 81 MG tablet Take 81 mg by mouth daily      CPAP Machine MISC by Does not apply route g/dL    Alb 3.9 3.5 - 5.2 g/dL    Total Bilirubin 0.8 0.2 - 1.2 mg/dL    Alkaline Phosphatase 67 40 - 130 U/L    ALT 39 5 - 41 U/L    AST 32 5 - 40 U/L   CBC   Result Value Ref Range    WBC 5.6 4.8 - 10.8 K/uL    RBC 4.80 4.70 - 6.10 M/uL    Hemoglobin 14.8 14.0 - 18.0 g/dL    Hematocrit 46.4 42.0 - 52.0 %    MCV 96.7 (H) 80.0 - 94.0 fL    MCH 30.8 27.0 - 31.0 pg    MCHC 31.9 (L) 33.0 - 37.0 g/dL    RDW 13.2 11.5 - 14.5 %    Platelets 333 542 - 342 K/uL    MPV 10.6 9.4 - 12.4 fL       ASSESSMENT/ PLAN:  1. Chronic pansinusitis  Long history chronic sinus and monitor  - CT SINUS WO CONTRAST; Future  - Polo Favre, MD, Otolaryngology, [place]    2. Mixed hyperlipidemia  Stable and f/u   - CBC; Future  - Comprehensive Metabolic Panel; Future  - TSH without Reflex; Future  - Lipid Panel; Future    3. Hyperglycemia    - Hemoglobin A1C; Future    4. Coronary artery disease involving native coronary artery of native heart without angina pectoris  Follow and continue current meds - ok to dc effient when current prescriptions run out    5. Essential hypertension  Follow and monitor    6. Idiopathic peripheral neuropathy  Continue current plan of care and f/u     7.  Obstructive sleep apnea  Pt is compliant with cpap and monitor

## 2020-01-19 ASSESSMENT — ENCOUNTER SYMPTOMS
SINUS PAIN: 1
EYE DISCHARGE: 0
SHORTNESS OF BREATH: 0
ABDOMINAL PAIN: 0
SINUS PRESSURE: 1
ABDOMINAL DISTENTION: 0
EYE REDNESS: 0
COUGH: 0

## 2020-01-27 RX ORDER — PREGABALIN 100 MG/1
100 CAPSULE ORAL 2 TIMES DAILY
Qty: 180 CAPSULE | Refills: 1 | Status: SHIPPED | OUTPATIENT
Start: 2020-01-27 | End: 2020-08-04 | Stop reason: SDUPTHER

## 2020-01-29 ENCOUNTER — HOSPITAL ENCOUNTER (OUTPATIENT)
Dept: CT IMAGING | Age: 75
Discharge: HOME | End: 2020-01-29
Payer: MEDICARE

## 2020-01-29 PROCEDURE — 70486 CT MAXILLOFACIAL W/O DYE: CPT

## 2020-01-30 ENCOUNTER — OFFICE VISIT (OUTPATIENT)
Dept: OTOLARYNGOLOGY | Age: 75
End: 2020-01-30
Payer: MEDICARE

## 2020-01-30 VITALS
WEIGHT: 294 LBS | HEIGHT: 75 IN | DIASTOLIC BLOOD PRESSURE: 78 MMHG | SYSTOLIC BLOOD PRESSURE: 134 MMHG | BODY MASS INDEX: 36.56 KG/M2 | TEMPERATURE: 98.4 F

## 2020-01-30 PROBLEM — J32.4 CHRONIC PANSINUSITIS: Status: ACTIVE | Noted: 2020-01-30

## 2020-01-30 PROCEDURE — 99202 OFFICE O/P NEW SF 15 MIN: CPT | Performed by: OTOLARYNGOLOGY

## 2020-01-30 PROCEDURE — 31231 NASAL ENDOSCOPY DX: CPT | Performed by: OTOLARYNGOLOGY

## 2020-01-30 PROCEDURE — G8417 CALC BMI ABV UP PARAM F/U: HCPCS | Performed by: OTOLARYNGOLOGY

## 2020-01-30 PROCEDURE — G8427 DOCREV CUR MEDS BY ELIG CLIN: HCPCS | Performed by: OTOLARYNGOLOGY

## 2020-01-30 PROCEDURE — G8484 FLU IMMUNIZE NO ADMIN: HCPCS | Performed by: OTOLARYNGOLOGY

## 2020-01-30 RX ORDER — TRIAMCINOLONE ACETONIDE 55 UG/1
2 SPRAY, METERED NASAL DAILY
Qty: 1 INHALER | Refills: 3 | Status: SHIPPED | OUTPATIENT
Start: 2020-01-30 | End: 2022-03-14 | Stop reason: ALTCHOICE

## 2020-01-30 RX ORDER — CEFUROXIME AXETIL 500 MG/1
500 TABLET ORAL 2 TIMES DAILY
Qty: 56 TABLET | Refills: 0 | Status: SHIPPED | OUTPATIENT
Start: 2020-01-30 | End: 2020-02-27

## 2020-01-30 RX ORDER — PREDNISONE 10 MG/1
TABLET ORAL
Qty: 34 TABLET | Refills: 0 | Status: SHIPPED | OUTPATIENT
Start: 2020-01-30 | End: 2021-04-28 | Stop reason: ALTCHOICE

## 2020-01-30 NOTE — ASSESSMENT & PLAN NOTE
Feels involvement of nearly all sinuses seen on CT. Patient's main symptomatic complaint however is drainage as he has no complaints of headache facial pain fever etc.  Much of this looks to be fluid on the CT scan. We will treat with Ceftin Nasacort nasal irrigations and a prednisone taper. Patient leaving for [place] tomorrow and will not return until March. I told him I would expect him to realize improvement after medical therapy but if his symptoms worsen he would need to seek out a specialist in [place].   Otherwise we will simply see him when he returns in March

## 2020-01-30 NOTE — PROGRESS NOTES
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     left at fibular head s/p decompression 2009     Past Surgical History:   Procedure Laterality Date    CATARACT REMOVAL WITH IMPLANT      CHOLECYSTECTOMY  07/2005    CORONARY ANGIOPLASTY WITH STENT PLACEMENT  12/26/2018    [place] to Circumflex    JOINT REPLACEMENT Bilateral 2003    Knee replacements    KNEE ARTHROPLASTY Bilateral 2003    complete combine condylye and plateau    NERVE SURGERY      Nerve release         REVIEW OF SYSTEMS:  all other systems reviewed and are negative  General Health: see HPI / problem list  Sleep: history of sleep apnea: Yes CPAP/ BIPAP: Yes  Ears: denies related complaints  Hearing: hearing loss: Yes  Tinnitus: Yes Bilateral  Nose: sinus pain: No, sinus pressure: No, runny nose: Yes, post nasal drip: Yes, congestion: No and obstruction: Yes       Comments:     PHYSICAL EXAM:    /78   Temp 98.4 °F (36.9 °C)   Ht 6' 3\" (1.905 m)   Wt 294 lb (133.4 kg)   BMI 36.75 kg/m²   Body mass index is 36.75 kg/m².     General Appearance: well developed , well nourished and no distress  Head/ Face: normocephalic and atraumatic  Vocal Quality: good/ normal  Ears: Right Ear: External: external ears normal Otoscopy Ear Canal: canal clear Otoscopy TM: TM's normal Left Ear: External: external ears normal Otoscopy Ear Canal: canal clear Otoscopy TM: TM's normal  Hearing: grossly intact  Nose: septum deviated Yes and  Left, mucosa inflamed, discharge: Yes, mass No, polyps No, sinus tenderness No and see endoscopy report  Oral:lips: normal Oropharynx:normal tongue: normal   Dentition: good dentition

## 2020-03-27 ENCOUNTER — HOSPITAL ENCOUNTER (OUTPATIENT)
Dept: GENERAL RADIOLOGY | Age: 75
Discharge: HOME | End: 2020-03-27
Payer: MEDICARE

## 2020-03-27 PROCEDURE — 70220 X-RAY EXAM OF SINUSES: CPT

## 2020-03-30 ENCOUNTER — OFFICE VISIT (OUTPATIENT)
Dept: OTOLARYNGOLOGY | Age: 75
End: 2020-03-30
Payer: MEDICARE

## 2020-03-30 VITALS
TEMPERATURE: 98.6 F | WEIGHT: 298 LBS | HEIGHT: 74 IN | DIASTOLIC BLOOD PRESSURE: 72 MMHG | SYSTOLIC BLOOD PRESSURE: 110 MMHG | BODY MASS INDEX: 38.24 KG/M2

## 2020-03-30 PROCEDURE — 4040F PNEUMOC VAC/ADMIN/RCVD: CPT | Performed by: OTOLARYNGOLOGY

## 2020-03-30 PROCEDURE — G8427 DOCREV CUR MEDS BY ELIG CLIN: HCPCS | Performed by: OTOLARYNGOLOGY

## 2020-03-30 PROCEDURE — 99212 OFFICE O/P EST SF 10 MIN: CPT | Performed by: OTOLARYNGOLOGY

## 2020-03-30 PROCEDURE — G8417 CALC BMI ABV UP PARAM F/U: HCPCS | Performed by: OTOLARYNGOLOGY

## 2020-03-30 PROCEDURE — 1036F TOBACCO NON-USER: CPT | Performed by: OTOLARYNGOLOGY

## 2020-03-30 PROCEDURE — 1123F ACP DISCUSS/DSCN MKR DOCD: CPT | Performed by: OTOLARYNGOLOGY

## 2020-03-30 PROCEDURE — G8484 FLU IMMUNIZE NO ADMIN: HCPCS | Performed by: OTOLARYNGOLOGY

## 2020-03-30 NOTE — ASSESSMENT & PLAN NOTE
Clinically and radiographically he is much improved. He just returned from [place]. He took the medications as directed. He no longer has any chronic purulent nasal discharge and had no reports of headache facial congestion etc.  Follow-up plain x-ray films show definite improvement. While he may not be completely clear for now he can be managed expectantly. I recommended he use the nasal steroid spray on a daily regular basis and use the sinus  twice daily.

## 2020-03-30 NOTE — PROGRESS NOTES
66 y.o.  male presents today with story and radiographic evidence of chronic sinusitis. He recently returned from [place]. Since I last saw him taken the medications as directed and feels he is much improved. He had no reports of facial pain or congestion and the purulent nasal discharge and posterior nasal drip that have been bothering him has resolved.     Family History   Problem Relation Age of Onset    COPD Mother     Rheum Arthritis Father     COPD Brother     Lung Cancer Brother      Social History     Socioeconomic History    Marital status:      Spouse name: Navneet Fajardo Number of children: 2    Years of education: 21    Highest education level: None   Occupational History    Occupation: retired teacher   Social Needs    Financial resource strain: None    Food insecurity     Worry: None     Inability: None    Transportation needs     Medical: None     Non-medical: None   Tobacco Use    Smoking status: Former Smoker     Packs/day: 1.00     Years: 12.00     Pack years: 12.00     Last attempt to quit:      Years since quittin.2    Smokeless tobacco: Never Used   Substance and Sexual Activity    Alcohol use: No    Drug use: No    Sexual activity: Yes     Partners: Female     Comment: wife   Lifestyle    Physical activity     Days per week: None     Minutes per session: None    Stress: None   Relationships    Social connections     Talks on phone: None     Gets together: None     Attends Anabaptist service: None     Active member of club or organization: None     Attends meetings of clubs or organizations: None     Relationship status: None    Intimate partner violence     Fear of current or ex partner: None     Emotionally abused: None     Physically abused: None     Forced sexual activity: None   Other Topics Concern    None   Social History Narrative    None     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     left at fibular head s/p decompression 2009     Past Surgical History:   Procedure Laterality Date    CATARACT REMOVAL WITH IMPLANT      CHOLECYSTECTOMY  07/2005    CORONARY ANGIOPLASTY WITH STENT PLACEMENT  12/26/2018    [place] to Circumflex    JOINT REPLACEMENT Bilateral 2003    Knee replacements    KNEE ARTHROPLASTY Bilateral 2003    complete combine condylye and plateau    NERVE SURGERY      Nerve release         REVIEW OF SYSTEMS:  all other systems reviewed and are negative  General Health: no change in health status since last visit and fevers: No  Nose: sinus pressure: No, sneezing: No, runny nose: No, post nasal drip: No and congestion: No       Comments:     PHYSICAL EXAM:    /72 (Site: Left Upper Arm)   Temp 98.6 °F (37 °C)   Ht 6' 2\" (1.88 m)   Wt 298 lb (135.2 kg)   BMI 38.26 kg/m²   Body mass index is 38.26 kg/m². General Appearance: well developed , well nourished and no distress  Head/ Face: normocephalic and atraumatic  Vocal Quality: good/ normal  Neuro: alert and oriented x3 and cranial nerves II- XII grossly intact  Psych/ Mood: cooperative and no depression, anxiety or agitation    Assessment & Plan:    Problem List Items Addressed This Visit        ENT Problems    Chronic pansinusitis     Clinically and radiographically he is much improved. He just returned from [place]. He took the medications as directed. He no longer has any chronic purulent nasal discharge and had no reports of headache facial congestion etc.  Follow-up plain x-ray films show definite improvement.   While he may not be

## 2020-04-01 RX ORDER — PRASUGREL 10 MG/1
TABLET, FILM COATED ORAL
Qty: 90 TABLET | Refills: 3 | Status: SHIPPED | OUTPATIENT
Start: 2020-04-01 | End: 2020-08-07

## 2020-06-29 ENCOUNTER — OFFICE VISIT (OUTPATIENT)
Dept: OTOLARYNGOLOGY | Age: 75
End: 2020-06-29
Payer: MEDICARE

## 2020-06-29 VITALS
WEIGHT: 300 LBS | SYSTOLIC BLOOD PRESSURE: 132 MMHG | HEIGHT: 74 IN | BODY MASS INDEX: 38.5 KG/M2 | DIASTOLIC BLOOD PRESSURE: 80 MMHG

## 2020-06-29 PROCEDURE — 4040F PNEUMOC VAC/ADMIN/RCVD: CPT | Performed by: OTOLARYNGOLOGY

## 2020-06-29 PROCEDURE — 1123F ACP DISCUSS/DSCN MKR DOCD: CPT | Performed by: OTOLARYNGOLOGY

## 2020-06-29 PROCEDURE — G8417 CALC BMI ABV UP PARAM F/U: HCPCS | Performed by: OTOLARYNGOLOGY

## 2020-06-29 PROCEDURE — 1036F TOBACCO NON-USER: CPT | Performed by: OTOLARYNGOLOGY

## 2020-06-29 PROCEDURE — 99212 OFFICE O/P EST SF 10 MIN: CPT | Performed by: OTOLARYNGOLOGY

## 2020-06-29 PROCEDURE — G8428 CUR MEDS NOT DOCUMENT: HCPCS | Performed by: OTOLARYNGOLOGY

## 2020-06-29 RX ORDER — IPRATROPIUM BROMIDE 21 UG/1
2 SPRAY, METERED NASAL 2 TIMES DAILY
Qty: 1 BOTTLE | Refills: 5 | Status: SHIPPED | OUTPATIENT
Start: 2020-06-29 | End: 2021-04-28 | Stop reason: ALTCHOICE

## 2020-07-31 DIAGNOSIS — Z91.89 ENCOUNTER FOR HCV SCREENING TEST FOR HIGH RISK PATIENT: ICD-10-CM

## 2020-07-31 DIAGNOSIS — E78.2 MIXED HYPERLIPIDEMIA: ICD-10-CM

## 2020-07-31 DIAGNOSIS — Z11.59 ENCOUNTER FOR HCV SCREENING TEST FOR HIGH RISK PATIENT: ICD-10-CM

## 2020-07-31 DIAGNOSIS — R73.9 HYPERGLYCEMIA: ICD-10-CM

## 2020-07-31 LAB
ALBUMIN SERPL-MCNC: 4.1 G/DL (ref 3.5–5.2)
ALP BLD-CCNC: 75 U/L (ref 40–130)
ALT SERPL-CCNC: 47 U/L (ref 5–41)
ANION GAP SERPL CALCULATED.3IONS-SCNC: 12 MMOL/L (ref 7–19)
AST SERPL-CCNC: 32 U/L (ref 5–40)
BILIRUB SERPL-MCNC: 0.8 MG/DL (ref 0.2–1.2)
BUN BLDV-MCNC: 18 MG/DL (ref 8–23)
CALCIUM SERPL-MCNC: 9.6 MG/DL (ref 8.8–10.2)
CHLORIDE BLD-SCNC: 106 MMOL/L (ref 98–111)
CHOLESTEROL, TOTAL: 121 MG/DL (ref 160–199)
CO2: 29 MMOL/L (ref 22–29)
CREAT SERPL-MCNC: 0.8 MG/DL (ref 0.5–1.2)
GFR AFRICAN AMERICAN: >59
GFR NON-AFRICAN AMERICAN: >60
GLUCOSE BLD-MCNC: 103 MG/DL (ref 74–109)
HBA1C MFR BLD: 6 % (ref 4–6)
HCT VFR BLD CALC: 48.5 % (ref 42–52)
HDLC SERPL-MCNC: 37 MG/DL (ref 55–121)
HEMOGLOBIN: 15.6 G/DL (ref 14–18)
HEPATITIS C ANTIBODY INTERPRETATION: NORMAL
LDL CHOLESTEROL CALCULATED: 59 MG/DL
MCH RBC QN AUTO: 30.9 PG (ref 27–31)
MCHC RBC AUTO-ENTMCNC: 32.2 G/DL (ref 33–37)
MCV RBC AUTO: 96 FL (ref 80–94)
PDW BLD-RTO: 13.1 % (ref 11.5–14.5)
PLATELET # BLD: 194 K/UL (ref 130–400)
PMV BLD AUTO: 10.6 FL (ref 9.4–12.4)
POTASSIUM SERPL-SCNC: 4.7 MMOL/L (ref 3.5–5)
RBC # BLD: 5.05 M/UL (ref 4.7–6.1)
SODIUM BLD-SCNC: 147 MMOL/L (ref 136–145)
TOTAL PROTEIN: 6.8 G/DL (ref 6.6–8.7)
TRIGL SERPL-MCNC: 123 MG/DL (ref 0–149)
TSH SERPL DL<=0.05 MIU/L-ACNC: 3.93 UIU/ML (ref 0.27–4.2)
WBC # BLD: 6 K/UL (ref 4.8–10.8)

## 2020-08-05 RX ORDER — PREGABALIN 100 MG/1
100 CAPSULE ORAL 2 TIMES DAILY
Qty: 180 CAPSULE | Refills: 2 | Status: SHIPPED | OUTPATIENT
Start: 2020-08-05 | End: 2021-01-27

## 2020-08-07 ENCOUNTER — OFFICE VISIT (OUTPATIENT)
Dept: CARDIOLOGY | Age: 75
End: 2020-08-07
Payer: MEDICARE

## 2020-08-07 ENCOUNTER — OFFICE VISIT (OUTPATIENT)
Dept: INTERNAL MEDICINE | Age: 75
End: 2020-08-07
Payer: MEDICARE

## 2020-08-07 VITALS
HEART RATE: 72 BPM | HEIGHT: 75 IN | OXYGEN SATURATION: 95 % | DIASTOLIC BLOOD PRESSURE: 82 MMHG | WEIGHT: 295 LBS | BODY MASS INDEX: 36.68 KG/M2 | SYSTOLIC BLOOD PRESSURE: 130 MMHG

## 2020-08-07 VITALS
HEART RATE: 63 BPM | BODY MASS INDEX: 38.37 KG/M2 | WEIGHT: 299 LBS | DIASTOLIC BLOOD PRESSURE: 86 MMHG | SYSTOLIC BLOOD PRESSURE: 120 MMHG | HEIGHT: 74 IN

## 2020-08-07 PROBLEM — E66.09 CLASS 2 OBESITY DUE TO EXCESS CALORIES WITHOUT SERIOUS COMORBIDITY IN ADULT: Status: ACTIVE | Noted: 2020-08-07

## 2020-08-07 PROBLEM — E66.812 CLASS 2 OBESITY DUE TO EXCESS CALORIES WITHOUT SERIOUS COMORBIDITY IN ADULT: Status: ACTIVE | Noted: 2020-08-07

## 2020-08-07 PROCEDURE — G8417 CALC BMI ABV UP PARAM F/U: HCPCS | Performed by: CLINICAL NURSE SPECIALIST

## 2020-08-07 PROCEDURE — G0439 PPPS, SUBSEQ VISIT: HCPCS | Performed by: INTERNAL MEDICINE

## 2020-08-07 PROCEDURE — 1123F ACP DISCUSS/DSCN MKR DOCD: CPT | Performed by: INTERNAL MEDICINE

## 2020-08-07 PROCEDURE — G8427 DOCREV CUR MEDS BY ELIG CLIN: HCPCS | Performed by: CLINICAL NURSE SPECIALIST

## 2020-08-07 PROCEDURE — 4040F PNEUMOC VAC/ADMIN/RCVD: CPT | Performed by: INTERNAL MEDICINE

## 2020-08-07 PROCEDURE — 1123F ACP DISCUSS/DSCN MKR DOCD: CPT | Performed by: CLINICAL NURSE SPECIALIST

## 2020-08-07 PROCEDURE — 1036F TOBACCO NON-USER: CPT | Performed by: CLINICAL NURSE SPECIALIST

## 2020-08-07 PROCEDURE — 4040F PNEUMOC VAC/ADMIN/RCVD: CPT | Performed by: CLINICAL NURSE SPECIALIST

## 2020-08-07 PROCEDURE — 99213 OFFICE O/P EST LOW 20 MIN: CPT | Performed by: CLINICAL NURSE SPECIALIST

## 2020-08-07 ASSESSMENT — ENCOUNTER SYMPTOMS
COUGH: 0
CHEST TIGHTNESS: 0
VOMITING: 0
ABDOMINAL PAIN: 0
EYE REDNESS: 0
FACIAL SWELLING: 0
WHEEZING: 0
COUGH: 0
SHORTNESS OF BREATH: 0
ABDOMINAL DISTENTION: 0
ABDOMINAL PAIN: 0
EYE REDNESS: 0
EYE DISCHARGE: 0
FACIAL SWELLING: 0
SHORTNESS OF BREATH: 1
NAUSEA: 0

## 2020-08-07 ASSESSMENT — PATIENT HEALTH QUESTIONNAIRE - PHQ9
2. FEELING DOWN, DEPRESSED OR HOPELESS: 0
SUM OF ALL RESPONSES TO PHQ9 QUESTIONS 1 & 2: 0
1. LITTLE INTEREST OR PLEASURE IN DOING THINGS: 0
SUM OF ALL RESPONSES TO PHQ QUESTIONS 1-9: 0
SUM OF ALL RESPONSES TO PHQ QUESTIONS 1-9: 0

## 2020-08-07 NOTE — PATIENT INSTRUCTIONS
Return in about 9 months (around 5/7/2021). - decide on cardiologist next visit  Encourage regular exercise and weight loss    Call with any questionsor concerns  Follow up with Keke Serrato MD for non cardiac problems  Report any new problems  Cardiovascular Fitness-Exercise as tolerated. Strive for 15 minutes of exercise most days of the week. Cardiac / HealthyDiet  Continue current medications as directed  Continue plan of treatment  It is always recommended that you bring your medicationsbottles with you to each visit - this is for your safety!

## 2020-08-07 NOTE — PROGRESS NOTES
[facility], [address] [place] [address]  Phone: [phone]  Fax: [phone]    OFFICE VISIT:  2020    Jere Anderson - : 1945    Reason For Visit:  Melinda Bertrand is a 66 y.o. male who is here for hospital follow-up for CAD     Diagnosis Orders   1. Obstructive sleep apnea     2. Class 2 obesity due to excess calories without serious comorbidity with body mass index (BMI) of 38.0 to 38.9 in adult          HPI  Patient is here for CAD follow-up after a drug-eluting stent was placed to his circumflex artery on 18. Other history includes hypertension and hyperlipidemia. Patient states he is having no further chest pain. He has some chronic dyspnea which is unchanged. He denies orthopnea, PND, or palpitations. He has sleep apnea and he treats is on a regular basis. He has stopped exercising regularly since the coronavirus outbreak    Jonathon Aviles MD is PCP.   Jere Anderson has the following history as recorded in [facility]:    Patient Active Problem List    Diagnosis Date Noted    Chest pressure 2018     Priority: High    Abnormal stress echo      Priority: High    Class 2 obesity due to excess calories without serious comorbidity in adult 2020    Chronic pansinusitis 2020    Coronary artery disease involving native coronary artery of native heart without angina pectoris 2019    Essential hypertension 2019    Mixed hyperlipidemia 2019    Pseudophakia 2018    Hx of adenomatous colonic polyps 10/17/2017    Chronic rhinitis 2017    Gastroesophageal reflux disease without esophagitis 2017    Partial tear of left Achilles tendon 2017    Idiopathic peripheral neuropathy 2017    Obstructive sleep apnea 2017     Past Medical History:   Diagnosis Date    Arthritis     DVT, lower extremity, distal, chronic (HCC)     Familial hypercholesterolemia 2017    Gastroesophageal reflux 0.4 MG SL tablet SAVANAH AS DIRECTED  3    meclizine (ANTIVERT) 25 MG tablet Take 1 tablet by mouth 3 times daily as needed for Dizziness 60 tablet 1    aspirin 81 MG tablet Take 81 mg by mouth daily      CPAP Machine MISC by Does not apply route      Multiple Vitamins-Minerals (MULTIVITAMIN ADULT PO) Take by mouth daily      Multiple Vitamins-Minerals (OCUVITE PO) Take by mouth daily      triamcinolone (NASACORT ALLERGY 24HR) 55 MCG/ACT nasal inhaler 2 sprays by Nasal route daily Use right hand to spray into left nostril and left hand to spray into right nostril  Do not inhale or snort after using medication. Simply wipe away excess  Use in a.m. 1 Inhaler 3     No current facility-administered medications for this visit. Allergies: Ampicillin    Review of Systems  Review of Systems   Constitutional: Negative for activity change, diaphoresis, fatigue, fever and unexpected weight change. HENT: Negative for facial swelling and nosebleeds. Eyes: Negative for redness and visual disturbance. Respiratory: Positive for shortness of breath (mild, chronic, unchanged). Negative for cough, chest tightness and wheezing. Cardiovascular: Negative for chest pain, palpitations and leg swelling. Gastrointestinal: Negative for abdominal pain, nausea and vomiting. Endocrine: Negative for cold intolerance and heat intolerance. Genitourinary: Negative for dysuria and hematuria. Musculoskeletal: Negative for arthralgias and myalgias. Skin: Negative for pallor and rash. Neurological: Negative for dizziness, seizures, syncope, weakness and light-headedness. Hematological: Does not bruise/bleed easily. Psychiatric/Behavioral: Negative for agitation. The patient is not nervous/anxious.         Objective  Vital Signs - /86   Pulse 63   Ht 6' 2\" (1.88 m)   Wt 299 lb (135.6 kg)   BMI 38.39 kg/m²    Wt Readings from Last 3 Encounters:   08/07/20 299 lb (135.6 kg)   06/29/20 300 lb (136.1 kg)   03/30/20 298 lb (135.2 kg)      Physical Exam  Vitals signs and nursing note reviewed. Constitutional:       Appearance: He is well-developed. He is obese. HENT:      Head: Normocephalic and atraumatic. Eyes:      General:         Right eye: No discharge. Left eye: No discharge. Pupils: Pupils are equal, round, and reactive to light. Neck:      Musculoskeletal: Neck supple. No muscular tenderness. Vascular: No carotid bruit or JVD. Trachea: No tracheal deviation. Cardiovascular:      Rate and Rhythm: Normal rate and regular rhythm. Heart sounds: Normal heart sounds. No murmur. No friction rub. No gallop. Comments: No carotid bruit  Pulmonary:      Effort: Pulmonary effort is normal. No respiratory distress. Breath sounds: Normal breath sounds. No wheezing or rales. Abdominal:      Palpations: Abdomen is soft. Tenderness: There is no abdominal tenderness. Musculoskeletal:         General: No swelling or deformity. Skin:     General: Skin is warm and dry. Findings: No rash. Neurological:      Mental Status: He is alert and oriented to person, place, and time. Cranial Nerves: No cranial nerve deficit. Psychiatric:         Behavior: Behavior normal.         Judgment: Judgment normal.         Data:  Heart Cath 12/26/18  1.  Successful femoral artery ultrasound  2.  Successful femoral artery arterogram  3.  Supervision of the administration of moderate conscious sedation  4.  Single vessel coronary artery disease involving the mid circumflex  5.  Left ventricular function is normal   6.  Long 80% lesion in the mid circumflex  7.  Successful percutaneous coronary intervention utilizing a single drug eluding stent to the mid circumflex. .      Lab Results   Component Value Date    CHOL 121 (L) 07/31/2020    TRIG 123 07/31/2020    HDL 37 (L) 07/31/2020    LDLCALC 59 07/31/2020     Lab Results   Component Value Date    ALT 47 (H) 07/31/2020    AST 32 07/31/2020 Assessment:     Diagnosis Orders   1. Obstructive sleep apnea     2. Class 2 obesity due to excess calories without serious comorbidity with body mass index (BMI) of 38.0 to 38.9 in adult       CAD-stable without angina. Continue aspirin, beta-blocker, statin    Hyperlipidemia- stable on statin therapy. Reviewed recent lipids with our goal    Hypertension-well controlled on current regimen    Obstructive sleep apnea- uses CPAP regularly    Obesity-encouraged patient to get back into an exercise regimen and work on weight loss      Stable cardiovascular status. No evidence of overt heart failure,angina or dysrhythmia. Plan    Return in about 9 months (around 5/7/2021). - decide on cardiologist next visit  Encourage regular exercise and weight loss    Call with any questionsor concerns  Follow up with Berny Krishnamurthy MD for non cardiac problems  Report any new problems  Cardiovascular Fitness-Exercise as tolerated. Strive for 15 minutes of exercise most days of the week. Cardiac / HealthyDiet  Continue current medications as directed  Continue plan of treatment  It is always recommended that you bring your medicationsbottles with you to each visit - this is for your safety!        Kyle Pulliam, APRN

## 2020-08-07 NOTE — PATIENT INSTRUCTIONS
Personalized Preventive Plan for Brandi Ga - 8/7/2020  Medicare offers a range of preventive health benefits. Some of the tests and screenings are paid in full while other may be subject to a deductible, co-insurance, and/or copay. Some of these benefits include a comprehensive review of your medical history including lifestyle, illnesses that may run in your family, and various assessments and screenings as appropriate. After reviewing your medical record and screening and assessments performed today your provider may have ordered immunizations, labs, imaging, and/or referrals for you. A list of these orders (if applicable) as well as your Preventive Care list are included within your After Visit Summary for your review. Other Preventive Recommendations:    · A preventive eye exam performed by an eye specialist is recommended every 1-2 years to screen for glaucoma; cataracts, macular degeneration, and other eye disorders. · A preventive dental visit is recommended every 6 months. · Try to get at least 150 minutes of exercise per week or 10,000 steps per day on a pedometer . · Order or download the FREE \"Exercise & Physical Activity: Your Everyday Guide\" from [employer]. Call 3-775.833.1458 or search [employer] online. · You need 2193-7483 mg of calcium and 9837-6813 IU of vitamin D per day. It is possible to meet your calcium requirement with diet alone, but a vitamin D supplement is usually necessary to meet this goal.  · When exposed to the sun, use a sunscreen that protects against both UVA and UVB radiation with an SPF of 30 or greater. Reapply every 2 to 3 hours or after sweating, drying off with a towel, or swimming. · Always wear a seat belt when traveling in a car. Always wear a helmet when riding a bicycle or motorcycle.

## 2020-08-07 NOTE — PROGRESS NOTES
Chief Complaint   Patient presents with    Medicare AWV    Hyperlipidemia    Hypertension       HPI: Patient is here today for Medicare annual wellness visit and to follow-up coronary artery disease hypertension hyperlipidemia. He is also here to follow-up idiopathic neuropathy this is stable he denies any specific complaints he had questions about help with weight loss. He is very compliant with his CPAP machine. He denies headache chest pain dyspnea abdominal pain. Pt wears cpap 7-8 hours a night and sleep well with it - travels with cpap.     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     left at fibular head s/p decompression 2009       Past Surgical History:   Procedure Laterality Date    CATARACT REMOVAL WITH IMPLANT      CHOLECYSTECTOMY  07/2005    CORONARY ANGIOPLASTY WITH STENT PLACEMENT  12/26/2018    [place] to Circumflex    JOINT REPLACEMENT Bilateral 2003    Knee replacements    KNEE ARTHROPLASTY Bilateral 2003    complete combine condylye and plateau    NERVE SURGERY      Nerve release       Family History   Problem Relation Age of Onset    COPD Mother     Rheum Arthritis Father     COPD Brother     Lung Cancer Brother        Social History     Socioeconomic History    Marital status:      Spouse name: avila    Number of children: 2    Years of education: 21    Highest education level: Not on file   Occupational History    Occupation: retired teacher   Social Needs    Financial resource strain: Not on file   Newman-Lakshmi insecurity Worry: Not on file     Inability: Not on file    Transportation needs     Medical: Not on file     Non-medical: Not on file   Tobacco Use    Smoking status: Former Smoker     Packs/day: 1.00     Years: 12.00     Pack years: 12.00     Last attempt to quit:      Years since quittin.6    Smokeless tobacco: Never Used   Substance and Sexual Activity    Alcohol use: No    Drug use: No    Sexual activity: Yes     Partners: Female     Comment: wife   Lifestyle    Physical activity     Days per week: Not on file     Minutes per session: Not on file    Stress: Not on file   Relationships    Social connections     Talks on phone: Not on file     Gets together: Not on file     Attends [employer] service: Not on file     Active member of club or organization: Not on file     Attends meetings of clubs or organizations: Not on file     Relationship status: Not on file    Intimate partner violence     Fear of current or ex partner: Not on file     Emotionally abused: Not on file     Physically abused: Not on file     Forced sexual activity: Not on file   Other Topics Concern    Not on file   Social History Narrative    Not on file       Allergies   Allergen Reactions    Ampicillin Rash       Current Outpatient Medications   Medication Sig Dispense Refill    pregabalin (LYRICA) 100 MG capsule Take 1 capsule by mouth 2 times daily for 180 days. 180 capsule 2    ipratropium (ATROVENT) 0.03 % nasal spray 2 sprays by Nasal route 2 times daily Use right hand to spray into left nostril and left hand to spray into right nostril 1 Bottle 5    triamcinolone (NASACORT ALLERGY 24HR) 55 MCG/ACT nasal inhaler 2 sprays by Nasal route daily Use right hand to spray into left nostril and left hand to spray into right nostril  Do not inhale or snort after using medication.  Simply wipe away excess  Use in a.m. 1 Inhaler 3    predniSONE (DELTASONE) 10 MG tablet 2 tablets twice daily for 6 days then 2 tablets once daily for 4 days then 1 tablet once daily for 2 days 34 tablet 0    metoprolol succinate (TOPROL XL) 25 MG extended release tablet TAKE 1 TABLET DAILY 90 tablet 4    atorvastatin (LIPITOR) 80 MG tablet TAKE 1 TABLET NIGHTLY 90 tablet 3    omeprazole (PRILOSEC) 20 MG delayed release capsule TAKE 1 CAPSULE DAILY 90 capsule 4    nitroGLYCERIN (NITROSTAT) 0.4 MG SL tablet SAVANAH AS DIRECTED  3    meclizine (ANTIVERT) 25 MG tablet Take 1 tablet by mouth 3 times daily as needed for Dizziness 60 tablet 1    aspirin 81 MG tablet Take 81 mg by mouth daily      CPAP Machine MISC by Does not apply route      Multiple Vitamins-Minerals (MULTIVITAMIN ADULT PO) Take by mouth daily      Multiple Vitamins-Minerals (OCUVITE PO) Take by mouth daily       No current facility-administered medications for this visit. Review of Systems   Constitutional: Positive for fatigue. Negative for chills and fever. HENT: Negative for facial swelling. Eyes: Negative for discharge and redness. Respiratory: Negative for cough and shortness of breath. Cardiovascular: Negative for chest pain, palpitations and leg swelling. Gastrointestinal: Negative for abdominal distention and abdominal pain. Genitourinary: Negative for dysuria, frequency and urgency. Musculoskeletal: Positive for arthralgias. Skin: Negative for rash and wound. Neurological: Positive for numbness. Negative for dizziness, light-headedness and headaches. Psychiatric/Behavioral: Negative for dysphoric mood and sleep disturbance. The patient is not nervous/anxious.         /82 (Site: Left Upper Arm)   Pulse 72   Ht 6' 3\" (1.905 m)   Wt 295 lb (133.8 kg)   SpO2 95%   BMI 36.87 kg/m²   BP Readings from Last 7 Encounters:   08/07/20 130/82   08/07/20 120/86   06/29/20 132/80   03/30/20 110/72   01/30/20 134/78   01/16/20 130/74   11/08/19 122/64     Wt Readings from Last 7 Encounters:   08/07/20 295 lb (133.8 kg)   08/07/20 299 lb (135.6 kg)   06/29/20 300 lb (136.1 kg)   03/30/20 298 lb (135.2 kg)   01/30/20 294 lb (133.4 kg)   01/16/20 295 lb (133.8 kg)   11/08/19 293 lb (132.9 kg)     BMI Readings from Last 7 Encounters:   08/07/20 36.87 kg/m²   08/07/20 38.39 kg/m²   06/29/20 38.52 kg/m²   03/30/20 38.26 kg/m²   01/30/20 36.75 kg/m²   01/16/20 36.87 kg/m²   11/08/19 37.62 kg/m²     Resp Readings from Last 7 Encounters:   07/01/19 18   12/27/18 16   07/30/18 20   07/28/17 20       Physical Exam  Constitutional:       General: He is not in acute distress. Appearance: Normal appearance. He is well-developed. HENT:      Right Ear: External ear normal. Tympanic membrane is not injected. Left Ear: External ear normal. Tympanic membrane is not injected. Mouth/Throat:      Pharynx: No oropharyngeal exudate. Eyes:      General: No scleral icterus. Conjunctiva/sclera: Conjunctivae normal.   Neck:      Musculoskeletal: Neck supple. Thyroid: No thyroid mass or thyromegaly. Vascular: No carotid bruit. Cardiovascular:      Rate and Rhythm: Normal rate and regular rhythm. Heart sounds: S1 normal and S2 normal. No murmur. No S3 or S4 sounds. Pulmonary:      Effort: Pulmonary effort is normal. No respiratory distress. Breath sounds: Normal breath sounds. No wheezing or rales. Abdominal:      General: Bowel sounds are normal. There is no distension. Palpations: Abdomen is soft. There is no mass. Tenderness: There is no abdominal tenderness. Musculoskeletal:      Right lower leg: Edema present. Left lower leg: Edema present. Comments: Trace lower extremity edema   Lymphadenopathy:      Cervical: No cervical adenopathy. Upper Body:      Right upper body: No supraclavicular adenopathy. Left upper body: No supraclavicular adenopathy. Skin:     Findings: No rash. Neurological:      Mental Status: He is alert and oriented to person, place, and time. Cranial Nerves: No cranial nerve deficit. Psychiatric:         Mood and Affect: Mood normal.         Results for orders placed or performed in visit on 07/31/20   Hepatitis C Antibody   Result Value Ref Range    Hep C Ab Interp Non-Reactive    Hemoglobin A1C   Result Value Ref Range    Hemoglobin A1C 6.0 4.0 - 6.0 %   Lipid Panel   Result Value Ref Range    Cholesterol, Total 121 (L) 160 - 199 mg/dL    Triglycerides 123 0 - 149 mg/dL    HDL 37 (L) 55 - 121 mg/dL    LDL Calculated 59 <100 mg/dL   TSH without Reflex   Result Value Ref Range    TSH 3.930 0.270 - 4.200 uIU/mL   Comprehensive Metabolic Panel   Result Value Ref Range    Sodium 147 (H) 136 - 145 mmol/L    Potassium 4.7 3.5 - 5.0 mmol/L    Chloride 106 98 - 111 mmol/L    CO2 29 22 - 29 mmol/L    Anion Gap 12 7 - 19 mmol/L    Glucose 103 74 - 109 mg/dL    BUN 18 8 - 23 mg/dL    CREATININE 0.8 0.5 - 1.2 mg/dL    GFR Non-African American >60 >60    GFR African American >59 >59    Calcium 9.6 8.8 - 10.2 mg/dL    Total Protein 6.8 6.6 - 8.7 g/dL    Alb 4.1 3.5 - 5.2 g/dL    Total Bilirubin 0.8 0.2 - 1.2 mg/dL    Alkaline Phosphatase 75 40 - 130 U/L    ALT 47 (H) 5 - 41 U/L    AST 32 5 - 40 U/L   CBC   Result Value Ref Range    WBC 6.0 4.8 - 10.8 K/uL    RBC 5.05 4.70 - 6.10 M/uL    Hemoglobin 15.6 14.0 - 18.0 g/dL    Hematocrit 48.5 42.0 - 52.0 %    MCV 96.0 (H) 80.0 - 94.0 fL    MCH 30.9 27.0 - 31.0 pg    MCHC 32.2 (L) 33.0 - 37.0 g/dL    RDW 13.1 11.5 - 14.5 %    Platelets 089 728 - 067 K/uL    MPV 10.6 9.4 - 12.4 fL       ASSESSMENT/ PLAN:  1. Medicare annual wellness visit, subsequent  Chart medications labs vaccines reviewed keep up-to-date with routine medical care and follow-up call with any problems or complaints    2. Coronary artery disease involving native coronary artery of native heart without angina pectoris  Stable continue current care referred to Dr. Margarita Appiah to establish care as his current cardiologist is no longer practicing    3.  Essential hypertension  Blood pressure stable and follow    4. Mixed hyperlipidemia  Continue high-dose statin therapy  - CBC; Future  - Comprehensive Metabolic Panel; Future  - TSH without Reflex; Future  - Lipid Panel; Future    5. Hyperglycemia  Work on healthy diet exercise low-carb intake follow  - Hemoglobin A1C; Future    6. Obstructive sleep apnea  Patient wears his CPAP nightly he is compliant with this continue CPAP therapy    7. Class 2 obesity due to excess calories without serious comorbidity with body mass index (BMI) of 38.0 to 38.9 in adult  Back carbs work on healthy diet get back into an exercise program    8. Screening for prostate cancer    - Psa screening; Future    9. Idiopathic peripheral neuropathy- follow stable                   Medicare Annual Wellness Visit  Name: Dorene Busby Date: 2020   MRN: [id number] Sex: Male   Age: 66 y.o. Ethnicity: Non-/Non    : 1945 Race: León Barriga is here for Medicare AWV; Hyperlipidemia; and Hypertension    Screenings for behavioral, psychosocial and functional/safety risks, and cognitive dysfunction are all negative except as indicated below. These results, as well as other patient data from the [address] form, are documented in Flowsheets linked to this Encounter. Allergies   Allergen Reactions    Ampicillin Rash       Prior to Visit Medications    Medication Sig Taking? Authorizing Provider   pregabalin (LYRICA) 100 MG capsule Take 1 capsule by mouth 2 times daily for 180 days. Nu Stringer MD   ipratropium (ATROVENT) 0.03 % nasal spray 2 sprays by Nasal route 2 times daily Use right hand to spray into left nostril and left hand to spray into right nostril  Tammy Quevedo MD   triamcinolone (NASACORT ALLERGY 24HR) 55 MCG/ACT nasal inhaler 2 sprays by Nasal route daily Use right hand to spray into left nostril and left hand to spray into right nostril  Do not inhale or snort after using medication. Simply wipe away excess  Use in carmella Knowles MD Yoli   predniSONE (DELTASONE) 10 MG tablet 2 tablets twice daily for 6 days then 2 tablets once daily for 4 days then 1 tablet once daily for 2 days  Oleksandr Stanley MD   metoprolol succinate (TOPROL XL) 25 MG extended release tablet TAKE 1 TABLET DAILY  Karma Michael APRN - NP   atorvastatin (LIPITOR) 80 MG tablet TAKE 1 TABLET NIGHTLY  Lynette Lopez APRN - CNP   omeprazole (PRILOSEC) 20 MG delayed release capsule TAKE 1 CAPSULE DAILY  Eduardo Olmedo MD   nitroGLYCERIN (NITROSTAT) 0.4 MG SL tablet SAVANAH AS DIRECTED  Historical Provider, MD   meclizine (ANTIVERT) 25 MG tablet Take 1 tablet by mouth 3 times daily as needed for Dizziness  Eduardo Olmedo MD   aspirin 81 MG tablet Take 81 mg by mouth daily  Historical Provider, MD   CPAP Machine MISC by Does not apply route  Historical Provider, MD   Multiple Vitamins-Minerals (MULTIVITAMIN ADULT PO) Take by mouth daily  Historical Provider, MD   Multiple Vitamins-Minerals (OCUVITE PO) Take by mouth daily  Historical Provider, MD       Past Medical History:   Diagnosis Date    Arthritis     DVT, lower extremity, distal, chronic ([address]     Familial hypercholesterolemia 7/27/2017    Gastroesophageal reflux disease without esophagitis 7/27/2017    History of blood transfusion 2003    Knee replacements    Hx of blood clots 2003    Post Knee replacement tx    Idiopathic peripheral neuropathy 7/27/2017    Obstructive sleep apnea 7/27/2017    Partial tear of left Achilles tendon 7/27/2017    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     left at fibular head s/p decompression 2009       Past Surgical History:   Procedure Laterality Date    CATARACT REMOVAL WITH IMPLANT      CHOLECYSTECTOMY  07/2005    CORONARY ANGIOPLASTY WITH STENT PLACEMENT  12/26/2018    [place] to Circumflex    JOINT REPLACEMENT Bilateral 2003    Knee replacements    KNEE ARTHROPLASTY Bilateral 2003    complete combine condylye and plateau    NERVE SURGERY      Nerve release       Family History Problem Relation Age of Onset    COPD Mother     Rheum Arthritis Father     COPD Brother     Lung Cancer Brother        CareTeam (Including outside providers/suppliers regularly involved in providing care):   Patient Care Team:  Joni Thomas MD as PCP - General (Internal Medicine)  Joni Thomas MD as PCP - [facility] Empaneled Provider  Jose Robles MD as Consulting Physician (Gastroenterology)  Asa Barnett MD as Consulting Physician (Ophthalmology)  Marion Dye MD as Consulting Physician (Otolaryngology)    Wt Readings from Last 3 Encounters:   08/07/20 295 lb (133.8 kg)   08/07/20 299 lb (135.6 kg)   06/29/20 300 lb (136.1 kg)     Vitals:    08/07/20 1150   BP: 130/82   Site: Left Upper Arm   Pulse: 72   SpO2: 95%   Weight: 295 lb (133.8 kg)   Height: 6' 3\" (1.905 m)     Body mass index is 36.87 kg/m². Based upon direct observation of the patient, evaluation of cognition reveals recent and remote memory intact. Patient's complete Health Risk Assessment and screening values have been reviewed and are found in Flowsheets. The following problems were reviewed today and where indicated follow up appointments were made and/or referrals ordered. Positive Risk Factor Screenings with Interventions:     Health Habits/Nutrition:  Health Habits/Nutrition  Do you exercise for at least 20 minutes 2-3 times per week?: (!) No  Have you lost any weight without trying in the past 3 months?: No  Do you eat fewer than 2 meals per day?: No  Have you seen a dentist within the past year?: Yes  Body mass index is 36.87 kg/m².   Health Habits/Nutrition Interventions:  · increase exercise    Personalized Preventive Plan   Current Health Maintenance Status  Immunization History   Administered Date(s) Administered    Pneumococcal Conjugate 13-valent ([id number]) 07/20/2016    Pneumococcal Polysaccharide ([id number]) 07/30/2018    Td, unspecified formulation 07/20/2016    Zoster Live (Zostavax) 06/18/2013    Zoster Recombinant (Shingrix) 06/15/2019, 10/01/2019        Health Maintenance   Topic Date Due    DTaP/Tdap/Td vaccine (1 - Tdap) 02[birth year]/1962    Annual Wellness Visit (AWV)  05/29/2019    PSA counseling  07/29/2020    Flu vaccine (1) 09/01/2020    Lipid screen  07/31/2021    Potassium monitoring  07/31/2021    Creatinine monitoring  07/31/2021    Shingles Vaccine  Completed    Pneumococcal 65+ years Vaccine  Completed    Hepatitis A vaccine  Aged Out    Hepatitis B vaccine  Aged Out    Hib vaccine  Aged Out    Meningococcal (ACWY) vaccine  Aged Out     Recommendations for [employer] Due: see orders and patient instructions/AVS.  . Recommended screening schedule for the next 5-10 years is provided to the patient in written form: see Patient Instructions/AVS.    Radha Josejameel was seen today for medicare awv, hyperlipidemia and hypertension. Diagnoses and all orders for this visit:    Medicare annual wellness visit, subsequent    Coronary artery disease involving native coronary artery of native heart without angina pectoris    Essential hypertension    Mixed hyperlipidemia  -     CBC; Future  -     Comprehensive Metabolic Panel; Future  -     TSH without Reflex; Future  -     Lipid Panel; Future    Hyperglycemia  -     Hemoglobin A1C; Future    Obstructive sleep apnea    Class 2 obesity due to excess calories without serious comorbidity with body mass index (BMI) of 38.0 to 38.9 in adult    Screening for prostate cancer  -     Psa screening;  Future

## 2020-11-02 ENCOUNTER — TELEPHONE (OUTPATIENT)
Dept: INTERNAL MEDICINE | Age: 75
End: 2020-11-02

## 2020-11-23 ENCOUNTER — TRANSCRIBE ORDERS (OUTPATIENT)
Dept: ADMINISTRATIVE | Facility: HOSPITAL | Age: 75
End: 2020-11-23

## 2020-11-23 DIAGNOSIS — Z01.818 PREOP TESTING: Primary | ICD-10-CM

## 2020-11-27 ENCOUNTER — LAB (OUTPATIENT)
Dept: LAB | Facility: HOSPITAL | Age: 75
End: 2020-11-27

## 2020-11-27 PROCEDURE — U0003 INFECTIOUS AGENT DETECTION BY NUCLEIC ACID (DNA OR RNA); SEVERE ACUTE RESPIRATORY SYNDROME CORONAVIRUS 2 (SARS-COV-2) (CORONAVIRUS DISEASE [COVID-19]), AMPLIFIED PROBE TECHNIQUE, MAKING USE OF HIGH THROUGHPUT TECHNOLOGIES AS DESCRIBED BY CMS-2020-01-R: HCPCS | Performed by: OTOLARYNGOLOGY

## 2020-11-27 PROCEDURE — C9803 HOPD COVID-19 SPEC COLLECT: HCPCS | Performed by: OTOLARYNGOLOGY

## 2020-11-28 LAB
COVID LABCORP PRIORITY: NORMAL
SARS-COV-2 RNA RESP QL NAA+PROBE: NOT DETECTED

## 2020-12-01 ENCOUNTER — OFFICE VISIT (OUTPATIENT)
Dept: OTOLARYNGOLOGY | Facility: CLINIC | Age: 75
End: 2020-12-01

## 2020-12-01 VITALS
TEMPERATURE: 98 F | HEIGHT: 75 IN | SYSTOLIC BLOOD PRESSURE: 136 MMHG | WEIGHT: 300.2 LBS | HEART RATE: 62 BPM | DIASTOLIC BLOOD PRESSURE: 82 MMHG | BODY MASS INDEX: 37.33 KG/M2

## 2020-12-01 DIAGNOSIS — J30.9 ALLERGIC RHINITIS, UNSPECIFIED SEASONALITY, UNSPECIFIED TRIGGER: ICD-10-CM

## 2020-12-01 DIAGNOSIS — J32.9 CHRONIC SINUSITIS, UNSPECIFIED LOCATION: ICD-10-CM

## 2020-12-01 DIAGNOSIS — J34.2 NASAL SEPTAL DEVIATION: ICD-10-CM

## 2020-12-01 DIAGNOSIS — H81.11 BPPV (BENIGN PAROXYSMAL POSITIONAL VERTIGO), RIGHT: ICD-10-CM

## 2020-12-01 DIAGNOSIS — G47.33 OSA ON CPAP: ICD-10-CM

## 2020-12-01 DIAGNOSIS — Z99.89 OSA ON CPAP: ICD-10-CM

## 2020-12-01 DIAGNOSIS — J34.3 HYPERTROPHY OF BOTH INFERIOR NASAL TURBINATES: ICD-10-CM

## 2020-12-01 DIAGNOSIS — J34.89 NASAL SEPTAL SPUR: ICD-10-CM

## 2020-12-01 DIAGNOSIS — H90.3 SENSORINEURAL HEARING LOSS (SNHL) OF BOTH EARS: ICD-10-CM

## 2020-12-01 DIAGNOSIS — H69.83 DYSFUNCTION OF BOTH EUSTACHIAN TUBES: ICD-10-CM

## 2020-12-01 DIAGNOSIS — H60.8X3 CHRONIC ACTINIC OTITIS EXTERNA OF BOTH EARS: ICD-10-CM

## 2020-12-01 DIAGNOSIS — J01.41 ACUTE RECURRENT PANSINUSITIS: Primary | ICD-10-CM

## 2020-12-01 DIAGNOSIS — K21.9 GASTROESOPHAGEAL REFLUX DISEASE, UNSPECIFIED WHETHER ESOPHAGITIS PRESENT: ICD-10-CM

## 2020-12-01 DIAGNOSIS — R42 DIZZINESS: ICD-10-CM

## 2020-12-01 PROBLEM — H69.93 DYSFUNCTION OF BOTH EUSTACHIAN TUBES: Status: ACTIVE | Noted: 2020-12-01

## 2020-12-01 PROCEDURE — 99203 OFFICE O/P NEW LOW 30 MIN: CPT | Performed by: PHYSICIAN ASSISTANT

## 2020-12-01 PROCEDURE — 31231 NASAL ENDOSCOPY DX: CPT | Performed by: PHYSICIAN ASSISTANT

## 2020-12-01 RX ORDER — PREDNISONE 10 MG/1
TABLET ORAL
Qty: 30 TABLET | Refills: 0 | Status: SHIPPED | OUTPATIENT
Start: 2020-12-01 | End: 2021-02-25

## 2020-12-01 RX ORDER — GLYCOPYRROLATE 1 MG/1
1 TABLET ORAL 3 TIMES DAILY PRN
Qty: 60 TABLET | Refills: 11 | Status: SHIPPED | OUTPATIENT
Start: 2020-12-01

## 2020-12-01 RX ORDER — PREGABALIN 100 MG/1
CAPSULE ORAL 2 TIMES DAILY
COMMUNITY
Start: 2020-11-10

## 2020-12-01 RX ORDER — MOMETASONE FUROATE 1 MG/ML
SOLUTION TOPICAL
Qty: 60 ML | Refills: 11 | Status: SHIPPED | OUTPATIENT
Start: 2020-12-01 | End: 2021-03-08

## 2020-12-01 RX ORDER — CLINDAMYCIN HYDROCHLORIDE 300 MG/1
300 CAPSULE ORAL 3 TIMES DAILY
Qty: 60 CAPSULE | Refills: 0 | Status: SHIPPED | OUTPATIENT
Start: 2020-12-01 | End: 2020-12-21

## 2020-12-01 RX ORDER — FLUTICASONE PROPIONATE 50 MCG
2 SPRAY, SUSPENSION (ML) NASAL DAILY
Qty: 16 G | Refills: 11 | Status: SHIPPED | OUTPATIENT
Start: 2020-12-01 | End: 2022-10-27

## 2020-12-01 RX ORDER — AZELASTINE 1 MG/ML
2 SPRAY, METERED NASAL 2 TIMES DAILY
Qty: 30 ML | Refills: 11 | Status: SHIPPED | OUTPATIENT
Start: 2020-12-01 | End: 2022-02-02 | Stop reason: ALTCHOICE

## 2020-12-01 NOTE — PROGRESS NOTES
OPERATIVE NOTE:  Axel Morris    DATE OF PROCEDURE: 12/1/2020    PROCEDURE:   Rigid Nasal Endoscopy    ANESTHESIA:  None    REASON FOR PROCEDURE:  Procedure was recommend for persistent nasal congestion and drainage despite adequate medical therapy  Risks, benefits and alternatives were discussed.      DETAILS of OPERATION:  The patient was seated in the exam chair.  A rigid nasal endoscopy was performed.  The 0 degree scope was introduced into the nasal cavity and the following findings noted.    FINDINGS:  MUCOSAL SURFACES:   The mucosal surfaces demonstrated erythema and edema with crusting.    NASAL SEPTUM:  The nasal septum was noted to be deviated to the right with a left septal spur.    INFERIOR TURBINATES:   The inferior turbinates were noted to have erythema and hypertrophy.    MIDDLE TURBINATES:  The middle turbinates were noted to have edema and erythema.      MIDDLE MEATUS:  The middle meatus was noted to have erythema with purulent drainage worse on the right.      SPHENOETHMOID RECESS:  The sphenoethmoid recess was noted to have erythema with purulent drainage      NASOPHARYNX:  The nasopharynx was noted to have erythema.

## 2020-12-01 NOTE — PATIENT INSTRUCTIONS
Will start antibiotics and steroids for sinus symptoms with the antibiotic being an extended course of 20 days. Take probiotic while on antibiotic. Will start robinul in place of the antivert for vertigo symptoms. Will start Flonase and Astelin daily and will discuss extended use after follow-up appointment. Will start elocon for outer ear irritation. Will recheck in 4-6 weeks with a sinus CT prior to appointment.

## 2020-12-16 RX ORDER — OMEPRAZOLE 20 MG/1
CAPSULE, DELAYED RELEASE ORAL
Qty: 90 CAPSULE | Refills: 3 | Status: SHIPPED | OUTPATIENT
Start: 2020-12-16 | End: 2021-01-21 | Stop reason: SDUPTHER

## 2020-12-16 NOTE — TELEPHONE ENCOUNTER
Leidy Garza called requesting a refill of the below medication which has been pended for you:     Requested Prescriptions     Pending Prescriptions Disp Refills    omeprazole (PRILOSEC) 20 MG delayed release capsule [Pharmacy Med Name: OMEPRAZOLE DR ZACKARY 20MG] 90 capsule 3     Sig: TAKE 1 CAPSULE DAILY       Last Appointment Date: 8/7/2020  Next Appointment Date: 1/7/2021    Allergies   Allergen Reactions    Ampicillin Rash

## 2020-12-30 DIAGNOSIS — E78.2 MIXED HYPERLIPIDEMIA: ICD-10-CM

## 2020-12-30 DIAGNOSIS — Z12.5 SCREENING FOR PROSTATE CANCER: ICD-10-CM

## 2020-12-30 DIAGNOSIS — R73.9 HYPERGLYCEMIA: ICD-10-CM

## 2020-12-30 LAB
ALBUMIN SERPL-MCNC: 3.7 G/DL (ref 3.5–5.2)
ALP BLD-CCNC: 75 U/L (ref 40–130)
ALT SERPL-CCNC: 57 U/L (ref 5–41)
ANION GAP SERPL CALCULATED.3IONS-SCNC: 9 MMOL/L (ref 7–19)
AST SERPL-CCNC: 39 U/L (ref 5–40)
BILIRUB SERPL-MCNC: 0.5 MG/DL (ref 0.2–1.2)
BUN BLDV-MCNC: 20 MG/DL (ref 8–23)
CALCIUM SERPL-MCNC: 9.6 MG/DL (ref 8.8–10.2)
CHLORIDE BLD-SCNC: 105 MMOL/L (ref 98–111)
CHOLESTEROL, TOTAL: 126 MG/DL (ref 160–199)
CO2: 29 MMOL/L (ref 22–29)
CREAT SERPL-MCNC: 0.7 MG/DL (ref 0.5–1.2)
GFR AFRICAN AMERICAN: >59
GFR NON-AFRICAN AMERICAN: >60
GLUCOSE BLD-MCNC: 103 MG/DL (ref 74–109)
HBA1C MFR BLD: 5.9 % (ref 4–6)
HCT VFR BLD CALC: 45.5 % (ref 42–52)
HDLC SERPL-MCNC: 41 MG/DL (ref 55–121)
HEMOGLOBIN: 15 G/DL (ref 14–18)
LDL CHOLESTEROL CALCULATED: 64 MG/DL
MCH RBC QN AUTO: 31.3 PG (ref 27–31)
MCHC RBC AUTO-ENTMCNC: 33 G/DL (ref 33–37)
MCV RBC AUTO: 94.8 FL (ref 80–94)
PDW BLD-RTO: 13.2 % (ref 11.5–14.5)
PLATELET # BLD: 212 K/UL (ref 130–400)
PMV BLD AUTO: 10.4 FL (ref 9.4–12.4)
POTASSIUM SERPL-SCNC: 4.4 MMOL/L (ref 3.5–5)
PROSTATE SPECIFIC ANTIGEN: 1.51 NG/ML (ref 0–4)
RBC # BLD: 4.8 M/UL (ref 4.7–6.1)
SODIUM BLD-SCNC: 143 MMOL/L (ref 136–145)
TOTAL PROTEIN: 6.6 G/DL (ref 6.6–8.7)
TRIGL SERPL-MCNC: 106 MG/DL (ref 0–149)
TSH SERPL DL<=0.05 MIU/L-ACNC: 3.97 UIU/ML (ref 0.27–4.2)
WBC # BLD: 6 K/UL (ref 4.8–10.8)

## 2021-01-05 ENCOUNTER — HOSPITAL ENCOUNTER (OUTPATIENT)
Dept: CT IMAGING | Facility: HOSPITAL | Age: 76
Discharge: HOME | End: 2021-01-05
Admitting: PHYSICIAN ASSISTANT

## 2021-01-05 DIAGNOSIS — J34.3 HYPERTROPHY OF BOTH INFERIOR NASAL TURBINATES: ICD-10-CM

## 2021-01-05 DIAGNOSIS — J30.9 ALLERGIC RHINITIS, UNSPECIFIED SEASONALITY, UNSPECIFIED TRIGGER: ICD-10-CM

## 2021-01-05 DIAGNOSIS — J34.89 NASAL SEPTAL SPUR: ICD-10-CM

## 2021-01-05 DIAGNOSIS — J34.2 NASAL SEPTAL DEVIATION: ICD-10-CM

## 2021-01-05 DIAGNOSIS — J32.9 CHRONIC SINUSITIS, UNSPECIFIED LOCATION: ICD-10-CM

## 2021-01-05 DIAGNOSIS — J01.41 ACUTE RECURRENT PANSINUSITIS: ICD-10-CM

## 2021-01-05 PROCEDURE — 70486 CT MAXILLOFACIAL W/O DYE: CPT

## 2021-01-06 ENCOUNTER — TRANSCRIBE ORDERS (OUTPATIENT)
Dept: LAB | Facility: HOSPITAL | Age: 76
End: 2021-01-06

## 2021-01-06 DIAGNOSIS — Z01.818 PRE-OP TESTING: Primary | ICD-10-CM

## 2021-01-07 ENCOUNTER — OFFICE VISIT (OUTPATIENT)
Dept: INTERNAL MEDICINE | Age: 76
End: 2021-01-07
Payer: MEDICARE

## 2021-01-07 VITALS
BODY MASS INDEX: 36.8 KG/M2 | HEIGHT: 75 IN | OXYGEN SATURATION: 96 % | WEIGHT: 296 LBS | SYSTOLIC BLOOD PRESSURE: 112 MMHG | DIASTOLIC BLOOD PRESSURE: 72 MMHG | HEART RATE: 68 BPM

## 2021-01-07 DIAGNOSIS — R42 DIZZINESS: ICD-10-CM

## 2021-01-07 DIAGNOSIS — R73.9 HYPERGLYCEMIA: ICD-10-CM

## 2021-01-07 DIAGNOSIS — I10 ESSENTIAL HYPERTENSION: Primary | ICD-10-CM

## 2021-01-07 DIAGNOSIS — E78.2 MIXED HYPERLIPIDEMIA: ICD-10-CM

## 2021-01-07 DIAGNOSIS — I25.10 CORONARY ARTERY DISEASE INVOLVING NATIVE CORONARY ARTERY OF NATIVE HEART WITHOUT ANGINA PECTORIS: ICD-10-CM

## 2021-01-07 DIAGNOSIS — J32.4 CHRONIC PANSINUSITIS: ICD-10-CM

## 2021-01-07 PROBLEM — H69.83 DYSFUNCTION OF BOTH EUSTACHIAN TUBES: Status: ACTIVE | Noted: 2020-12-01

## 2021-01-07 PROBLEM — H60.8X3 CHRONIC ACTINIC OTITIS EXTERNA OF BOTH EARS: Status: ACTIVE | Noted: 2020-12-01

## 2021-01-07 PROBLEM — J01.41 ACUTE RECURRENT PANSINUSITIS: Status: ACTIVE | Noted: 2020-12-01

## 2021-01-07 PROBLEM — Z96.1 PRESENCE OF INTRAOCULAR LENS: Status: ACTIVE | Noted: 2020-01-29

## 2021-01-07 PROBLEM — H69.93 DYSFUNCTION OF BOTH EUSTACHIAN TUBES: Status: ACTIVE | Noted: 2020-12-01

## 2021-01-07 PROBLEM — H81.11 BPPV (BENIGN PAROXYSMAL POSITIONAL VERTIGO), RIGHT: Status: ACTIVE | Noted: 2020-12-01

## 2021-01-07 PROBLEM — J34.2 NASAL SEPTAL DEVIATION: Status: ACTIVE | Noted: 2020-12-01

## 2021-01-07 PROBLEM — H35.3190 NONEXUDATIVE AGE-RELATED MACULAR DEGENERATION: Status: ACTIVE | Noted: 2020-01-29

## 2021-01-07 PROBLEM — H43.819 VITREOUS DEGENERATION: Status: ACTIVE | Noted: 2020-01-29

## 2021-01-07 PROCEDURE — 1123F ACP DISCUSS/DSCN MKR DOCD: CPT | Performed by: INTERNAL MEDICINE

## 2021-01-07 PROCEDURE — 99214 OFFICE O/P EST MOD 30 MIN: CPT | Performed by: INTERNAL MEDICINE

## 2021-01-07 PROCEDURE — G8417 CALC BMI ABV UP PARAM F/U: HCPCS | Performed by: INTERNAL MEDICINE

## 2021-01-07 PROCEDURE — G8484 FLU IMMUNIZE NO ADMIN: HCPCS | Performed by: INTERNAL MEDICINE

## 2021-01-07 PROCEDURE — G8427 DOCREV CUR MEDS BY ELIG CLIN: HCPCS | Performed by: INTERNAL MEDICINE

## 2021-01-07 PROCEDURE — 4040F PNEUMOC VAC/ADMIN/RCVD: CPT | Performed by: INTERNAL MEDICINE

## 2021-01-07 PROCEDURE — 1036F TOBACCO NON-USER: CPT | Performed by: INTERNAL MEDICINE

## 2021-01-07 SDOH — ECONOMIC STABILITY: INCOME INSECURITY: HOW HARD IS IT FOR YOU TO PAY FOR THE VERY BASICS LIKE FOOD, HOUSING, MEDICAL CARE, AND HEATING?: NOT HARD AT ALL

## 2021-01-07 ASSESSMENT — ENCOUNTER SYMPTOMS
ABDOMINAL PAIN: 0
SHORTNESS OF BREATH: 0
FACIAL SWELLING: 0
COUGH: 0
SINUS PRESSURE: 1
ABDOMINAL DISTENTION: 0
EYE REDNESS: 0
EYE DISCHARGE: 0

## 2021-01-07 NOTE — PROGRESS NOTES
Chief Complaint   Patient presents with    6 Month Follow-Up     sees ENT, Dr Monique Hatchet for chronic sinus issues    Hypertension    Hyperlipidemia       HPI: Is here today to follow-up hypertension hyperlipidemia is having terrible sinus congestion had a visit with ENT and the sinus CT which we reviewed he is got opacification of his sinuses. No fever no chills.     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     left at fibular head s/p decompression 2009       Past Surgical History:   Procedure Laterality Date    CATARACT REMOVAL WITH IMPLANT      CHOLECYSTECTOMY  07/2005    CORONARY ANGIOPLASTY WITH STENT PLACEMENT  12/26/2018    OH to Circumflex    JOINT REPLACEMENT Bilateral 2003    Knee replacements    KNEE ARTHROPLASTY Bilateral 2003    complete combine condylye and plateau    NERVE SURGERY      Nerve release       Family History   Problem Relation Age of Onset    COPD Mother     Rheum Arthritis Father     COPD Brother     Lung Cancer Brother        Social History     Socioeconomic History    Marital status:      Spouse name: avila    Number of children: 2    Years of education: 21    Highest education level: Not on file   Occupational History    Occupation: retired teacher   Social Needs    Financial resource strain: Not hard at all   Rosita-Lakshmi insecurity     Worry: Never true     Inability: Never true    Transportation needs     Medical: Not on file     Non-medical: Not on file   Tobacco Use    Smoking status: Former Smoker     Packs/day: 1.00     Years: 12.00     Pack years: 12.00     Quit date: 5     Years since quittin.0    Smokeless tobacco: Never Used   Substance and Sexual Activity    Alcohol use: No    Drug use: No    Sexual activity: Yes     Partners: Female     Comment: wife   Lifestyle    Physical activity     Days per week: Not on file     Minutes per session: Not on file    Stress: Not on file   Relationships    Social connections     Talks on phone: Not on file     Gets together: Not on file     Attends Confucianism service: Not on file     Active member of club or organization: Not on file     Attends meetings of clubs or organizations: Not on file     Relationship status: Not on file    Intimate partner violence     Fear of current or ex partner: Not on file     Emotionally abused: Not on file     Physically abused: Not on file     Forced sexual activity: Not on file   Other Topics Concern    Not on file   Social History Narrative    Not on file       Allergies   Allergen Reactions    Ampicillin Rash       Current Outpatient Medications   Medication Sig Dispense Refill    omeprazole (PRILOSEC) 20 MG delayed release capsule TAKE 1 CAPSULE DAILY 90 capsule 3    pregabalin (LYRICA) 100 MG capsule Take 1 capsule by mouth 2 times daily for 180 days. 180 capsule 2    ipratropium (ATROVENT) 0.03 % nasal spray 2 sprays by Nasal route 2 times daily Use right hand to spray into left nostril and left hand to spray into right nostril 1 Bottle 5    triamcinolone (NASACORT ALLERGY 24HR) 55 MCG/ACT nasal inhaler 2 sprays by Nasal route daily Use right hand to spray into left nostril and left hand to spray into right nostril  Do not inhale or snort after using medication.  Simply wipe away excess  Use in a.m. 1 Inhaler 3    predniSONE (DELTASONE) 10 MG tablet 2 tablets twice daily for 6 days then 2 tablets once daily for 4 days then 1 tablet once daily for 2 days 34 tablet 0    metoprolol succinate (TOPROL XL) 25 MG extended release tablet TAKE 1 TABLET DAILY 90 tablet 4    atorvastatin (LIPITOR) 80 MG tablet TAKE 1 TABLET NIGHTLY 90 tablet 3    nitroGLYCERIN (NITROSTAT) 0.4 MG SL tablet SAVANAH AS DIRECTED  3    meclizine (ANTIVERT) 25 MG tablet Take 1 tablet by mouth 3 times daily as needed for Dizziness 60 tablet 1    aspirin 81 MG tablet Take 81 mg by mouth daily      CPAP Machine MISC by Does not apply route      Multiple Vitamins-Minerals (MULTIVITAMIN ADULT PO) Take by mouth daily      Multiple Vitamins-Minerals (OCUVITE PO) Take by mouth daily       No current facility-administered medications for this visit. Review of Systems   Constitutional: Positive for fatigue. Negative for chills and fever. HENT: Positive for congestion and sinus pressure. Negative for facial swelling. Eyes: Negative for discharge and redness. Respiratory: Negative for cough and shortness of breath. Cardiovascular: Negative for chest pain, palpitations and leg swelling. Gastrointestinal: Negative for abdominal distention and abdominal pain. Genitourinary: Negative for dysuria, frequency and urgency. Musculoskeletal: Negative for arthralgias. Skin: Negative for rash and wound. Neurological: Positive for dizziness. Negative for light-headedness and headaches. Psychiatric/Behavioral: Negative for dysphoric mood and sleep disturbance. The patient is not nervous/anxious.         /72   Pulse 68   Ht 6' 3\" (1.905 m)   Wt 296 lb (134.3 kg)   SpO2 96%   BMI 37.00 kg/m²   BP Readings from Last 7 Encounters:   01/07/21 112/72   08/07/20 130/82   08/07/20 120/86   06/29/20 132/80   03/30/20 110/72   01/30/20 134/78   01/16/20 130/74     Wt Readings from Last 7 Encounters:   01/07/21 296 lb (134.3 kg)   08/07/20 295 lb (133.8 kg)   08/07/20 299 lb (135.6 kg)   06/29/20 300 lb (136.1 kg)   03/30/20 298 lb (135.2 kg)   01/30/20 294 lb (133.4 kg)   01/16/20 295 lb (133.8 kg)     BMI Readings from Last 7 Encounters:   01/07/21 37.00 kg/m²   08/07/20 36.87 kg/m²   08/07/20 38.39 kg/m²   06/29/20 38.52 kg/m²   03/30/20 38.26 kg/m²   01/30/20 36.75 kg/m²   01/16/20 36.87 kg/m²     Resp Readings from Last 7 Encounters:   07/01/19 18   12/27/18 16   07/30/18 20   07/28/17 20       Physical Exam  Constitutional:       General: He is not in acute distress. Eyes:      General: No scleral icterus. Neck:      Musculoskeletal: Neck supple. Cardiovascular:      Heart sounds: Normal heart sounds. Pulmonary:      Breath sounds: Normal breath sounds. Lymphadenopathy:      Cervical: No cervical adenopathy. Skin:     Findings: No rash.          Results for orders placed or performed in visit on 12/30/20   Psa screening   Result Value Ref Range    PSA 1.51 0.00 - 4.00 ng/mL   Lipid Panel   Result Value Ref Range    Cholesterol, Total 126 (L) 160 - 199 mg/dL    Triglycerides 106 0 - 149 mg/dL    HDL 41 (L) 55 - 121 mg/dL    LDL Calculated 64 <100 mg/dL   TSH without Reflex   Result Value Ref Range    TSH 3.970 0.270 - 4.200 uIU/mL   Hemoglobin A1C   Result Value Ref Range    Hemoglobin A1C 5.9 4.0 - 6.0 %   Comprehensive Metabolic Panel   Result Value Ref Range    Sodium 143 136 - 145 mmol/L    Potassium 4.4 3.5 - 5.0 mmol/L    Chloride 105 98 - 111 mmol/L    CO2 29 22 - 29 mmol/L    Anion Gap 9 7 - 19 mmol/L    Glucose 103 74 - 109 mg/dL    BUN 20 8 - 23 mg/dL    CREATININE 0.7 0.5 - 1.2 mg/dL    GFR Non-African American >60 >60    GFR African American >59 >59    Calcium 9.6 8.8 - 10.2 mg/dL    Total Protein 6.6 6.6 - 8.7 g/dL    Alb 3.7 3.5 - 5.2 g/dL    Total Bilirubin 0.5 0.2 - 1.2 mg/dL    Alkaline Phosphatase 75 40 - 130 U/L    ALT 57 (H) 5 - 41 U/L    AST 39 5 - 40 U/L   CBC   Result Value Ref Range    WBC 6.0 4.8 - 10.8 K/uL    RBC 4.80 4.70 - 6.10 M/uL    Hemoglobin 15.0 14.0 - 18.0 g/dL    Hematocrit 45.5 42.0 - 52.0 %    MCV 94.8 (H) 80.0 - 94.0 fL    MCH 31.3 (H) 27.0 - 31.0 pg    MCHC 33.0 33.0 - 37.0 g/dL    RDW 13.2 11.5 - 14.5 % Platelets 508 063 - 279 K/uL    MPV 10.4 9.4 - 12.4 fL       ASSESSMENT/ PLAN:  1. Essential hypertension  His blood pressure is stable with the present medical regimen. Continue with Toprol-XL follow    2. Mixed hyperlipidemia  High-dose statin therapy due to coronary artery disease we have emphasized to him in the past the reason for his different medications and we will follow  - CBC; Future  - Comprehensive Metabolic Panel; Future  - TSH without Reflex; Future  - Lipid Panel; Future    3. Hyperglycemia  Healthy diet exercise low carbohydrate intake encouraged  - Hemoglobin A1C; Future    4. Coronary artery disease involving native coronary artery of native heart without angina pectoris  Pressure control beta-blocker for coronary artery disease as needed nitroglycerin high-dose statin baby aspirin he is doing well no issues    5. Chronic pansinusitis  Chronic pansinusitis I am not going to give him an antibiotic he has an upcoming appointment with ENT in this will be assessed then may need surgical intervention from the looks of his CT we reviewed the CT with him and went over that report with him and we reviewed the ENT office's note    6. Dizziness  Vertigo type symptoms may be benign positional vertigo but also could be coming from his horrible sinusitis    Of course we recommend the COVID-19 vaccine and he is planning on getting that when available.   We reviewed and discussed his labs studies and other doctor's visits

## 2021-01-08 ENCOUNTER — LAB (OUTPATIENT)
Dept: LAB | Facility: HOSPITAL | Age: 76
End: 2021-01-08

## 2021-01-08 LAB — SARS-COV-2 ORF1AB RESP QL NAA+PROBE: NOT DETECTED

## 2021-01-08 PROCEDURE — C9803 HOPD COVID-19 SPEC COLLECT: HCPCS | Performed by: OTOLARYNGOLOGY

## 2021-01-08 PROCEDURE — U0004 COV-19 TEST NON-CDC HGH THRU: HCPCS | Performed by: OTOLARYNGOLOGY

## 2021-01-11 NOTE — PROGRESS NOTES
YOB: 1945  Location: [facility]  Location Address: [address] [place] [address], [facility], [address] [place] [address]  Location Phone: [phone]    Chief Complaint   Patient presents with   • Follow-up       History of Present Illness  Axel Morris is a 75 y.o. male.  Axel Morris is here for follow up of ENT complaints. The patient has had problems with nasal drainage, nasal congestion, repeated sinusitis, sinus pressure, postnasal drip, allergy and facial pain.  The symptoms are localized to the bilateral nose. The patient has had severe symptoms. The symptoms have been present for the last 2 years. The symptoms are aggravated by no identifiable factors. The symptoms are improved by no identifiable factors. The patient was treated with Ceftin and a steroid with Flonase and Atrovent after the sinus CT below. He has also been treated with clindamycin and and extended round of steroids with minimal relief. The patient reports improved symptoms, but not resolved and feels like the symptoms are coming back.     He does complain of globus but denies headaches.  His main complaint is congestion and drainage.  He is used nasal rinses in the past but has not been using them recently.  He has seen Dr. Leal previously.                             Study Result    Examination: CT Paranasal Sinuses dated 2021.     Indication: chronic recurrent severe sinusitis, possible surgical,  septal deviation/spur, turbinate hypertrophy, patient has been on  antibiotics, steroids, nasal sprays without resolved symptoms;  J01.41-Acute recurrent pansinusitis; J30.9-Allergic rhinitis,  unspecified; J32.9-Chronic sinusitis, unspecified; J34.2-Deviated nasal  septum; J34.89-Other specified disorders of nose and nasal sinuses;  J34.3-Hypertr     Comparison: None     Technique: Volumetric data were obtained from the level of the  ventricles to the level of the maxillary alveolus and reconstructed at 1  mm intervals in the axial,  coronal and sagittal plane.     Radiation dose equals  mGy-cm.  Automated exposure control dose  reduction technique was implemented.        Findings:     There is near complete opacification of the bilateral maxillary sinuses,  extending into the infundibula and hiatus semilunaris. Associated  mucoperiosteal reaction, consistent with chronic process. Significant  mucosal disease in bilateral ethmoid air cells and frontal sinuses as  well. Moderate to significant mucosal disease in the bilateral sphenoid  sinuses. There is hyperdense characteristic to the mucosal disease,  suggestive of inspissated secretions.     The bilateral nasofrontal recesses are significantly narrowed due to  mucosal disease. The bilateral sphenoethmoidal recesses are also  significantly narrowed due to mucosal disease.        Visualized parotid glands,  spaces, oral cavity and oropharynx  demonstrate discrete mass or fluid collection. There is no discrete mass  in the nasopharynx. No discrete mass in the nasal cavity.     Nasal septum is deviated to the right.        IMPRESSION:  Impression:     1.  Chronic pansinusitis as described above.  2.  Nasal septum deviation to the right.  This report was finalized on 01/05/2021 12:48 by Dr. Betty Noble MD.            Past Medical History:   Diagnosis Date   • Arthritis    • Dizziness    • GERD (gastroesophageal reflux disease)    • Hx of colonic polyps    • Hyperlipidemia    • Sinusitis        Past Surgical History:   Procedure Laterality Date   • CHOLECYSTECTOMY     • COLONOSCOPY N/A 11/30/2017    Procedure: COLONOSCOPY WITH ANESTHESIA;  Surgeon: Matthew Cagle MD;  Location[facility];  Service:    • COLONOSCOPY W/ POLYPECTOMY  07/12/2012    Adenomatous polyp at 50 cm repeat exam in 5 years   • ENDOSCOPY AND COLONOSCOPY  08/07/2009    Acute ulcerative esophagogastritis grade c, Hyperplastic polyp at 50 cm repeat colonoscopy in 3 years   • REPLACEMENT TOTAL KNEE  BILATERAL     • TONSILLECTOMY         Outpatient Medications Marked as Taking for the 1/12/21 encounter (Office Visit) with Roberto Og MD   Medication Sig Dispense Refill   • aspirin 81 MG tablet Take 81 mg by mouth.     • atorvastatin (LIPITOR) 80 MG tablet Take 40 mg by mouth Daily.     • glycopyrrolate (ROBINUL) 1 MG tablet Take 1 tablet by mouth 3 (Three) Times a Day As Needed (vertigo). 60 tablet 11   • metoprolol succinate XL (TOPROL-XL) 25 MG 24 hr tablet      • mometasone (ELOCON) 0.1 % lotion 3-4 drops in affected ear once a day 60 mL 11   • Multiple Vitamins-Minerals (MULTIVITAMIN WITH MINERALS) tablet tablet Take 1 tablet by mouth Daily.     • omeprazole (priLOSEC) 20 MG capsule TAKE 1 CAPSULE DAILY     • predniSONE (DELTASONE) 10 MG tablet Daily dose: 5 tabs for 2 days, then 4 tabs for 2 days, then 3 tabs for 2 days, then 2 tabs for 2 days, then 1 tab for 2 days 30 tablet 0   • pregabalin (LYRICA) 100 MG capsule          Ampicillin and Penicillins    Family History   Problem Relation Age of Onset   • Colon cancer Neg Hx    • Colon polyps Neg Hx        Social History     Socioeconomic History   • Marital status:      Spouse name: Not on file   • Number of children: Not on file   • Years of education: Not on file   • Highest education level: Not on file   Tobacco Use   • Smoking status: Former Smoker   • Smokeless tobacco: Never Used   Substance and Sexual Activity   • Alcohol use: No   • Drug use: Never       Review of Systems   Constitutional: Negative.  Negative for activity change, appetite change, chills, diaphoresis, fatigue, fever and unexpected weight change.   HENT: Positive for congestion, postnasal drip, rhinorrhea, sinus pressure and sinus pain. Negative for ear discharge, ear pain, facial swelling, hearing loss, mouth sores, nosebleeds, sneezing, sore throat, tinnitus, trouble swallowing and voice change.    Eyes: Negative.  Negative for pain, discharge, redness, itching and  visual disturbance.   Respiratory: Negative.  Negative for apnea, cough, choking, chest tightness, shortness of breath, wheezing and stridor.    Cardiovascular: Negative.    Gastrointestinal: Negative.  Negative for nausea and vomiting.   Endocrine: Negative.  Negative for cold intolerance and heat intolerance.   Genitourinary: Negative.    Musculoskeletal: Negative.  Negative for arthralgias, back pain, gait problem, neck pain and neck stiffness.   Skin: Negative.  Negative for rash.   Allergic/Immunologic: Positive for environmental allergies. Negative for food allergies.   Neurological: Positive for headaches. Negative for dizziness, tremors, seizures, syncope, facial asymmetry, speech difficulty, weakness, light-headedness and numbness.   Hematological: Negative.  Negative for adenopathy. Does not bruise/bleed easily.   Psychiatric/Behavioral: Negative.  Negative for behavioral problems, sleep disturbance and suicidal ideas. The patient is not nervous/anxious and is not hyperactive.        Vitals:    01/12/21 1034   BP: 161/93   Pulse: 73   Temp: 97.9 °F (36.6 °C)       Body mass index is 37.47 kg/m².    Objective     Physical Exam  Physical Exam  CONSTITUTIONAL: well nourished, alert, oriented, in no acute distress      COMMUNICATION AND VOICE: able to communicate normally, normal voice quality     HEAD: normocephalic, no lesions, atraumatic, no tenderness, no masses      FACE: appearance normal, no lesions, no tenderness, no deformities, facial motion symmetric     SALIVARY GLANDS: parotid glands with no tenderness, no swelling, no masses, submandibular glands with normal size, nontender     EYES: ocular motility normal, eyelids normal, orbits normal, no proptosis, conjunctiva normal , pupils equal, round      EARS:  Hearing: response to conversational voice normal bilaterally   External Ears: auricles without lesions  Otoscopic: tympanic membrane appearance normal, no lesions, no perforation, normal mobility,  no fluid     NOSE:  External Nose: structure normal, no tenderness on palpation, no nasal discharge, no lesions, no evidence of trauma, nostrils patent   Intranasal Exam: nasal mucosa edema and erythema, vestibule within normal limits, inferior turbinate normal, nasal septum deviated      ORAL:  Lips: upper and lower lips without lesion   Teeth: dentition within normal limits for age   Gums: gingivae healthy   Oral Mucosa: oral mucosa normal, no mucosal lesions   Floor of Mouth: Warthin’s duct patent, mucosa normal  Tongue: lingual mucosa normal without lesions, normal tongue mobility   Palate: soft and hard palates with normal mucosa and structure  Oropharynx: oropharyngeal mucosa erythema     NECK: neck appearance normal, no mass,  noted without erythema or tenderness     THYROID: no overt thyromegaly, no tenderness, nodules or mass present on palpation, position midline      LYMPH NODES: no lymphadenopathy     CHEST/RESPIRATORY: respiratory effort normaL     CARDIOVASCULAR: extremities without cyanosis or edema       NEUROLOGIC/PSYCHIATRIC: oriented to time, place and person, mood normal, affect appropriate, CN II-XII intact grossly    Assessment/Plan   Diagnoses and all orders for this visit:    1. GERI on CPAP (Primary)    2. Nasal septal spur    3. Nasal septal deviation    4. Hypertrophy of both inferior nasal turbinates    5. Chronic sinusitis, unspecified location    6. Allergic rhinitis, unspecified seasonality, unspecified trigger    7. Acute recurrent pansinusitis    8. Gastroesophageal reflux disease, unspecified whether esophagitis present      * Surgery not found *  No orders of the defined types were placed in this encounter.    Return for Recheck sinuses 6-8 weeks with Dr. Og.       Patient Instructions   Continue current treatment, start doing nasal saline flushes 3-4 times a week, increase omeprazole to 2 times a day before meals. Will recheck in 6-8 weeks.

## 2021-01-12 ENCOUNTER — OFFICE VISIT (OUTPATIENT)
Dept: OTOLARYNGOLOGY | Facility: CLINIC | Age: 76
End: 2021-01-12

## 2021-01-12 VITALS
HEART RATE: 73 BPM | HEIGHT: 75 IN | TEMPERATURE: 97.9 F | DIASTOLIC BLOOD PRESSURE: 93 MMHG | WEIGHT: 299.8 LBS | SYSTOLIC BLOOD PRESSURE: 161 MMHG | BODY MASS INDEX: 37.28 KG/M2

## 2021-01-12 DIAGNOSIS — K21.9 GASTROESOPHAGEAL REFLUX DISEASE, UNSPECIFIED WHETHER ESOPHAGITIS PRESENT: ICD-10-CM

## 2021-01-12 DIAGNOSIS — G47.33 OSA ON CPAP: Primary | ICD-10-CM

## 2021-01-12 DIAGNOSIS — J34.3 HYPERTROPHY OF BOTH INFERIOR NASAL TURBINATES: ICD-10-CM

## 2021-01-12 DIAGNOSIS — J34.2 NASAL SEPTAL DEVIATION: ICD-10-CM

## 2021-01-12 DIAGNOSIS — J30.9 ALLERGIC RHINITIS, UNSPECIFIED SEASONALITY, UNSPECIFIED TRIGGER: ICD-10-CM

## 2021-01-12 DIAGNOSIS — J01.41 ACUTE RECURRENT PANSINUSITIS: ICD-10-CM

## 2021-01-12 DIAGNOSIS — J34.89 NASAL SEPTAL SPUR: ICD-10-CM

## 2021-01-12 DIAGNOSIS — J32.9 CHRONIC SINUSITIS, UNSPECIFIED LOCATION: ICD-10-CM

## 2021-01-12 DIAGNOSIS — Z99.89 OSA ON CPAP: Primary | ICD-10-CM

## 2021-01-12 PROBLEM — H81.11 BPPV (BENIGN PAROXYSMAL POSITIONAL VERTIGO), RIGHT: Status: RESOLVED | Noted: 2020-12-01 | Resolved: 2021-01-12

## 2021-01-12 PROCEDURE — 99214 OFFICE O/P EST MOD 30 MIN: CPT | Performed by: OTOLARYNGOLOGY

## 2021-01-12 PROCEDURE — 31231 NASAL ENDOSCOPY DX: CPT | Performed by: OTOLARYNGOLOGY

## 2021-01-12 RX ORDER — METOPROLOL SUCCINATE 25 MG/1
25 TABLET, EXTENDED RELEASE ORAL DAILY
COMMUNITY
Start: 2021-01-10

## 2021-01-12 RX ORDER — ATORVASTATIN CALCIUM 80 MG/1
40 TABLET, FILM COATED ORAL DAILY
COMMUNITY

## 2021-01-12 NOTE — PROGRESS NOTES
PROCEDURE NOTE    Axel Morris    DATE OF PROCEDURE: 1/12/2021    PROCEDURE:   Bilateral Diagnostic Rigid Nasal Endoscopy    PREPROCEDURE DIAGNOSIS:   Chronic rhinosinusitis associated with globus    POSTPROCEDURE DIAGNOSIS:  SAME    ANESTHESIA:   None    PROCEDURE DESCRIPTION:    With the patient in the chair bilateral diagnostic rigid nasal endoscopy was performed with the Stortz 0° endoscope without difficulty.     An endoscope was passed along the left nasal floor to the nasopharynx.  It was then passed into the region of the middle meatus, middle turbinate, and the sphenoethmoid region. An identical procedure was performed on the right side.  The following findings were noted as stated below:    Findings: Mild left septal deflection with spurring at the midpoint of the middle turbinate.  Mild edema in the middle meatus with chronic inflammatory changes throughout the nasal cavity but no evidence of nasal polyposis.  The choana is patent.  On the right a high riding septal deflection is noted with lateralization of the middle turbinate and mild to moderate inflammatory changes noted throughout without nasal polyposis.  Mild edema is noted in the middle meatus.  Purulent secretions were appreciated bilaterally.    Condition:  Stable.  Patient tolerated procedure well.    Complications:  None  There was no significant bleeding.

## 2021-01-17 PROBLEM — R07.89 CHEST PRESSURE: Status: RESOLVED | Noted: 2018-12-26 | Resolved: 2021-01-17

## 2021-01-21 RX ORDER — OMEPRAZOLE 20 MG/1
20 CAPSULE, DELAYED RELEASE ORAL 2 TIMES DAILY
Qty: 180 CAPSULE | Refills: 3 | Status: SHIPPED | OUTPATIENT
Start: 2021-01-21 | End: 2021-04-23 | Stop reason: SDUPTHER

## 2021-01-24 ENCOUNTER — IMMUNIZATION (OUTPATIENT)
Age: 76
End: 2021-01-24
Payer: MEDICARE

## 2021-01-24 PROCEDURE — 91300 COVID-19, PFIZER VACCINE 30MCG/0.3ML DOSE: CPT | Performed by: FAMILY MEDICINE

## 2021-01-24 PROCEDURE — 0001A PR IMM ADMN SARSCOV2 30MCG/0.3ML DIL RECON 1ST DOSE: CPT | Performed by: FAMILY MEDICINE

## 2021-01-26 DIAGNOSIS — G60.9 IDIOPATHIC PERIPHERAL NEUROPATHY: ICD-10-CM

## 2021-01-29 RX ORDER — PREGABALIN 100 MG/1
CAPSULE ORAL
Qty: 180 CAPSULE | Refills: 2 | Status: SHIPPED | OUTPATIENT
Start: 2021-01-29 | End: 2021-08-25 | Stop reason: SDUPTHER

## 2021-02-14 ENCOUNTER — IMMUNIZATION (OUTPATIENT)
Age: 76
End: 2021-02-14
Payer: MEDICARE

## 2021-02-14 PROCEDURE — 91300 COVID-19, PFIZER VACCINE 30MCG/0.3ML DOSE: CPT | Performed by: FAMILY MEDICINE

## 2021-02-14 PROCEDURE — 0002A PR IMM ADMN SARSCOV2 30MCG/0.3ML DIL RECON 2ND DOSE: CPT | Performed by: FAMILY MEDICINE

## 2021-02-19 ENCOUNTER — TRANSCRIBE ORDERS (OUTPATIENT)
Dept: LAB | Facility: HOSPITAL | Age: 76
End: 2021-02-19

## 2021-02-19 DIAGNOSIS — Z01.818 PREOPERATIVE TESTING: Primary | ICD-10-CM

## 2021-02-22 ENCOUNTER — LAB (OUTPATIENT)
Dept: LAB | Facility: HOSPITAL | Age: 76
End: 2021-02-22

## 2021-02-22 LAB — SARS-COV-2 ORF1AB RESP QL NAA+PROBE: NOT DETECTED

## 2021-02-22 PROCEDURE — U0004 COV-19 TEST NON-CDC HGH THRU: HCPCS | Performed by: OTOLARYNGOLOGY

## 2021-02-22 PROCEDURE — C9803 HOPD COVID-19 SPEC COLLECT: HCPCS | Performed by: OTOLARYNGOLOGY

## 2021-02-24 NOTE — PROGRESS NOTES
YOB: 1945  Location: [facility]  Location Address: [address] [place] [address], [facility], [address] [place] [address]  Location Phone: [phone]    Chief Complaint   Patient presents with   • Follow-up     follow up on congestion, new lesion on left temple       History of Present Illness  Axel Morris is a 76 y.o. male.  Axel Morris is here for follow up of ENT complaints. The patient has had problems with a skin lesion.  The symptoms are localized to the left temple. The patient has had worsening symptoms. The symptoms have been present for the last several months. The lesion was biopsied and was shown to be a solar lentigo with atypia.     The patient also is here for follow-up evaluation of sinus symptoms s/p sinus CT. The patient has had severe to moderate chronic sinus disease. The symptoms have been present for years.   The symptoms are aggravated by  allergy and weather change. The symptoms are improved by antibiotics, nasal sprays and steroids.                 Past Medical History:   Diagnosis Date   • Arthritis    • Dizziness    • GERD (gastroesophageal reflux disease)    • Hx of colonic polyps    • Hyperlipidemia    • Sinusitis        Past Surgical History:   Procedure Laterality Date   • CHOLECYSTECTOMY     • COLONOSCOPY N/A 2017    Procedure: COLONOSCOPY WITH ANESTHESIA;  Surgeon: Matthew Cagle MD;  Location: [facility];  Service:    • COLONOSCOPY W/ POLYPECTOMY  2012    Adenomatous polyp at 50 cm repeat exam in 5 years   • ENDOSCOPY AND COLONOSCOPY  2009    Acute ulcerative esophagogastritis grade c, Hyperplastic polyp at 50 cm repeat colonoscopy in 3 years   • REPLACEMENT TOTAL KNEE BILATERAL     • TONSILLECTOMY         Outpatient Medications Marked as Taking for the 21 encounter (Office Visit) with Roberto Og MD   Medication Sig Dispense Refill   • aspirin 81 MG tablet Take 81 mg by mouth.     • atorvastatin (LIPITOR) 80 MG tablet Take 40 mg by mouth  Daily.     • fluticasone (FLONASE) 50 MCG/ACT nasal spray 2 sprays into the nostril(s) as directed by provider Daily. 16 g 11   • glycopyrrolate (ROBINUL) 1 MG tablet Take 1 tablet by mouth 3 (Three) Times a Day As Needed (vertigo). 60 tablet 11   • metoprolol succinate XL (TOPROL-XL) 25 MG 24 hr tablet      • Multiple Vitamins-Minerals (MULTIVITAMIN WITH MINERALS) tablet tablet Take 1 tablet by mouth Daily.     • omeprazole (priLOSEC) 20 MG capsule TAKE 1 CAPSULE DAILY     • pregabalin (LYRICA) 100 MG capsule          Ampicillin and Penicillins    Family History   Problem Relation Age of Onset   • Colon cancer Neg Hx    • Colon polyps Neg Hx        Social History     Socioeconomic History   • Marital status:      Spouse name: Not on file   • Number of children: Not on file   • Years of education: Not on file   • Highest education level: Not on file   Tobacco Use   • Smoking status: Former Smoker   • Smokeless tobacco: Never Used   Substance and Sexual Activity   • Alcohol use: No   • Drug use: Never       Review of Systems   Constitutional: Negative for activity change, appetite change, chills, diaphoresis, fatigue, fever and unexpected weight change.   HENT: Positive for congestion, postnasal drip, rhinorrhea, sinus pressure and sinus pain. Negative for ear discharge, ear pain, facial swelling, hearing loss, mouth sores, nosebleeds, sneezing, sore throat, tinnitus, trouble swallowing and voice change.    Eyes: Negative for pain, discharge, redness, itching and visual disturbance.   Respiratory: Negative for apnea, cough, choking, chest tightness, shortness of breath, wheezing and stridor.    Gastrointestinal: Negative for nausea and vomiting.        GERD   Endocrine: Negative for cold intolerance and heat intolerance.   Musculoskeletal: Negative for arthralgias, back pain, gait problem, neck pain and neck stiffness.   Skin: Negative for rash.        BCC left temple   Allergic/Immunologic: Positive for  environmental allergies. Negative for food allergies.   Neurological: Negative for dizziness, tremors, seizures, syncope, facial asymmetry, speech difficulty, weakness, light-headedness, numbness and headaches.   Hematological: Negative for adenopathy. Does not bruise/bleed easily.   Psychiatric/Behavioral: Negative for behavioral problems, sleep disturbance and suicidal ideas. The patient is not nervous/anxious and is not hyperactive.        Vitals:    02/25/21 0947   BP: 132/80   Pulse: 56   Temp: 97.1 °F (36.2 °C)       Body mass index is 37.55 kg/m².    Objective     Physical Exam  Physical Exam  CONSTITUTIONAL: well nourished, alert, oriented, in no acute distress      COMMUNICATION AND VOICE: able to communicate normally, normal voice quality     HEAD: normocephalic, no lesions, atraumatic, no tenderness, no masses      FACE: appearance normal, left temple lesion with 1.4 cm lentigo with atypia , no tenderness, no deformities, facial motion symmetric     SALIVARY GLANDS: parotid glands with no tenderness, no swelling, no masses, submandibular glands with normal size, nontender     EYES: ocular motility normal, eyelids normal, orbits normal, no proptosis, conjunctiva normal , pupils equal, round      EARS:  Hearing: response to conversational voice normal bilaterally   External Ears: auricles without lesions  Otoscopic: tympanic membrane appearance normal, no lesions, no perforation, normal mobility, no fluid     NOSE:  External Nose: structure normal, no tenderness on palpation, no nasal discharge, no lesions, no evidence of trauma, nostrils patent   Intranasal Exam: nasal mucosa erythema and edema with purulent drainage, vestibule within normal limits, inferior turbinate hypertrophy, nasal septum right deviation with left septal spur        ORAL:  Lips: upper and lower lips without lesion   Teeth: dentition within normal limits for age   Gums: gingivae healthy   Oral Mucosa: oral mucosa normal, no mucosal  lesions   Floor of Mouth: Warthin’s duct patent, mucosa normal  Tongue: lingual mucosa normal without lesions, normal tongue mobility   Palate: soft and hard palates with normal mucosa and structure  Oropharynx: oropharyngeal mucosa erythema with postnasal drainage    NECK: neck appearance normal, no mass,  noted without erythema or tenderness     THYROID: no overt thyromegaly, no tenderness, nodules or mass present on palpation, position midline      LYMPH NODES: no lymphadenopathy     CHEST/RESPIRATORY: respiratory effort normaL     CARDIOVASCULAR: extremities without cyanosis or edema       NEUROLOGIC/PSYCHIATRIC: oriented to time, place and person, mood normal, affect appropriate, CN II-XII intact grossly    Assessment/Plan   Diagnoses and all orders for this visit:    1. Atypical pigmented skin lesion (Primary)  -     Case Request; Standing  -     Comprehensive Metabolic Panel; Future  -     CBC and Differential; Future  -     ECG 12 Lead; Future  -     XR Chest 2 View; Future  -     Case Request    2. Allergic rhinitis, unspecified seasonality, unspecified trigger    3. Sensorineural hearing loss (SNHL) of both ears    4. Nasal septal spur    5. Nasal septal deviation    6. Hypertrophy of both inferior nasal turbinates    7. Dysfunction of both eustachian tubes    8. Chronic pansinusitis    9. Chronic actinic otitis externa of both ears    10. Acute recurrent pansinusitis  -     clindamycin (CLEOCIN) 300 MG capsule; Take 1 capsule by mouth 3 (Three) Times a Day for 20 days.  Dispense: 60 capsule; Refill: 0  -     predniSONE (DELTASONE) 10 MG tablet; Daily dose: 5 tabs for 2 days, then 4 tabs for 2 days, then 3 tabs for 2 days, then 2 tabs for 2 days, then 1 tab for 2 days  Dispense: 30 tablet; Refill: 0  -     ipratropium (ATROVENT) 0.06 % nasal spray; 2 sprays into the nostril(s) as directed by provider 3 (Three) Times a Day.  Dispense: 15 mL; Refill: 5    11. Gastroesophageal reflux disease, unspecified  whether esophagitis present    12. GERI on CPAP    Other orders  -     Follow Anesthesia Guidelines / Standing Orders; Future  -     Obtain Informed Consent  -     Follow Anesthesia Guidelines / Standing Orders; Standing  -     Verify NPO Status; Standing  -     Obtain Informed Consent; Standing  -     SCD (Sequential Compression Device) - To Be Placed on Patient in Pre-Op; Standing  -     NPO Diet; Standing  -     Provide Patient With Instructions on NPO Status; Future  -     Modify Scheduled Case Request; Standing  -     Modify Scheduled Case Obtain Informed Consent; Standing      EXCISION OF BASAL CELL CARCINOMA OF THE LEFT CENTRAL TEMPLE REGION WITH FROZEN SECTION AND POSSIBLE FLAP OR GRAFT. (Left)  Orders Placed This Encounter   Procedures   • XR Chest 2 View     Standing Status:   Future     Standing Expiration Date:   2/25/2022     Order Specific Question:   Reason for Exam:     Answer:   HYPERTENSION   • Comprehensive Metabolic Panel     Standing Status:   Future     Standing Expiration Date:   2/25/2022   • Follow Anesthesia Guidelines / Standing Orders     Standing Status:   Future     Standing Expiration Date:   2/25/2022   • Obtain Informed Consent     EXCISION LESION with possible flap or graft-     Order Specific Question:   Informed Consent Given For     Answer:   EXCISION OF BASAL CELL CARCINOMA OF THE LEFT CENTRAL TEMPLE REGION WITH FROZEN SECTION AND POSSIBLE FLAP OR GRAFT.   • Provide Patient With Instructions on NPO Status     Standing Status:   Future   • ECG 12 Lead     Standing Status:   Future     Standing Expiration Date:   2/25/2022     Order Specific Question:   Reason for Exam:     Answer:   HYPERTENSION   • CBC and Differential     Standing Status:   Future     Standing Expiration Date:   2/25/2022     Order Specific Question:   Manual Differential     Answer:   No     Return for Follow-up post-operatively as directed.       Patient Instructions   Discussion of skin lesion. Discussed  risks, benefits, alternatives, and possible complications of excision of the skin lesion with reconstruction utilizing local tissue rearrangement, full-thickness skin grafting, or local interpolated flaps. Risks include, but are not limited too: bleeding, infection, hematoma, recurrence, need for additional procedures, flap failure, cosmetic deformity. Patient understands risks and would like to proceed with surgery.     Will start clindamycin, prednisone taper, and ipratropium nasal spray for sinus infection. Will recheck sinuses at follow-up after suture removal.

## 2021-02-25 ENCOUNTER — OFFICE VISIT (OUTPATIENT)
Dept: OTOLARYNGOLOGY | Facility: CLINIC | Age: 76
End: 2021-02-25

## 2021-02-25 VITALS
SYSTOLIC BLOOD PRESSURE: 132 MMHG | WEIGHT: 300.4 LBS | DIASTOLIC BLOOD PRESSURE: 80 MMHG | TEMPERATURE: 97.1 F | HEART RATE: 56 BPM | BODY MASS INDEX: 37.35 KG/M2 | HEIGHT: 75 IN

## 2021-02-25 DIAGNOSIS — J30.9 ALLERGIC RHINITIS, UNSPECIFIED SEASONALITY, UNSPECIFIED TRIGGER: ICD-10-CM

## 2021-02-25 DIAGNOSIS — H90.3 SENSORINEURAL HEARING LOSS (SNHL) OF BOTH EARS: ICD-10-CM

## 2021-02-25 DIAGNOSIS — L81.9 ATYPICAL PIGMENTED SKIN LESION: Primary | ICD-10-CM

## 2021-02-25 DIAGNOSIS — H60.8X3 CHRONIC ACTINIC OTITIS EXTERNA OF BOTH EARS: ICD-10-CM

## 2021-02-25 DIAGNOSIS — J34.89 NASAL SEPTAL SPUR: ICD-10-CM

## 2021-02-25 DIAGNOSIS — J01.41 ACUTE RECURRENT PANSINUSITIS: ICD-10-CM

## 2021-02-25 DIAGNOSIS — Z99.89 OSA ON CPAP: ICD-10-CM

## 2021-02-25 DIAGNOSIS — J34.3 HYPERTROPHY OF BOTH INFERIOR NASAL TURBINATES: ICD-10-CM

## 2021-02-25 DIAGNOSIS — H69.83 DYSFUNCTION OF BOTH EUSTACHIAN TUBES: ICD-10-CM

## 2021-02-25 DIAGNOSIS — J34.2 NASAL SEPTAL DEVIATION: ICD-10-CM

## 2021-02-25 DIAGNOSIS — J32.4 CHRONIC PANSINUSITIS: ICD-10-CM

## 2021-02-25 DIAGNOSIS — K21.9 GASTROESOPHAGEAL REFLUX DISEASE, UNSPECIFIED WHETHER ESOPHAGITIS PRESENT: ICD-10-CM

## 2021-02-25 DIAGNOSIS — G47.33 OSA ON CPAP: ICD-10-CM

## 2021-02-25 PROBLEM — C44.319 BASAL CELL CARCINOMA OF LEFT TEMPLE REGION: Status: ACTIVE | Noted: 2021-02-25

## 2021-02-25 PROCEDURE — 99214 OFFICE O/P EST MOD 30 MIN: CPT | Performed by: PHYSICIAN ASSISTANT

## 2021-02-25 RX ORDER — IPRATROPIUM BROMIDE 42 UG/1
2 SPRAY, METERED NASAL 3 TIMES DAILY
Qty: 15 ML | Refills: 5 | Status: SHIPPED | OUTPATIENT
Start: 2021-02-25 | End: 2022-02-02 | Stop reason: ALTCHOICE

## 2021-02-25 RX ORDER — PREDNISONE 10 MG/1
TABLET ORAL
Qty: 30 TABLET | Refills: 0 | Status: SHIPPED | OUTPATIENT
Start: 2021-02-25 | End: 2022-02-02

## 2021-02-25 RX ORDER — CLINDAMYCIN HYDROCHLORIDE 300 MG/1
300 CAPSULE ORAL 3 TIMES DAILY
Qty: 60 CAPSULE | Refills: 0 | Status: SHIPPED | OUTPATIENT
Start: 2021-02-25 | End: 2021-03-17

## 2021-02-25 NOTE — PATIENT INSTRUCTIONS
Discussion of skin lesion. Discussed risks, benefits, alternatives, and possible complications of excision of the skin lesion with reconstruction utilizing local tissue rearrangement, full-thickness skin grafting, or local interpolated flaps. Risks include, but are not limited too: bleeding, infection, hematoma, recurrence, need for additional procedures, flap failure, cosmetic deformity. Patient understands risks and would like to proceed with surgery.     Will start clindamycin, prednisone taper, and ipratropium nasal spray for sinus infection. Will recheck sinuses at follow-up after suture removal.

## 2021-03-03 ENCOUNTER — TRANSCRIBE ORDERS (OUTPATIENT)
Dept: ADMINISTRATIVE | Facility: HOSPITAL | Age: 76
End: 2021-03-03

## 2021-03-03 DIAGNOSIS — Z11.59 SCREENING FOR VIRAL DISEASE: Primary | ICD-10-CM

## 2021-03-08 ENCOUNTER — APPOINTMENT (OUTPATIENT)
Dept: PREADMISSION TESTING | Facility: HOSPITAL | Age: 76
End: 2021-03-08

## 2021-03-08 ENCOUNTER — HOSPITAL ENCOUNTER (OUTPATIENT)
Dept: GENERAL RADIOLOGY | Facility: HOSPITAL | Age: 76
Discharge: HOME | End: 2021-03-08

## 2021-03-08 ENCOUNTER — LAB (OUTPATIENT)
Dept: LAB | Facility: HOSPITAL | Age: 76
End: 2021-03-08

## 2021-03-08 VITALS
RESPIRATION RATE: 18 BRPM | BODY MASS INDEX: 38.79 KG/M2 | HEART RATE: 56 BPM | HEIGHT: 74 IN | DIASTOLIC BLOOD PRESSURE: 79 MMHG | WEIGHT: 302.25 LBS | SYSTOLIC BLOOD PRESSURE: 144 MMHG | OXYGEN SATURATION: 97 %

## 2021-03-08 DIAGNOSIS — L81.9 ATYPICAL PIGMENTED SKIN LESION: ICD-10-CM

## 2021-03-08 LAB
ALBUMIN SERPL-MCNC: 3.8 G/DL (ref 3.5–5.2)
ALBUMIN/GLOB SERPL: 1.4 G/DL
ALP SERPL-CCNC: 72 U/L (ref 39–117)
ALT SERPL W P-5'-P-CCNC: 47 U/L (ref 1–41)
ANION GAP SERPL CALCULATED.3IONS-SCNC: 7 MMOL/L (ref 5–15)
AST SERPL-CCNC: 29 U/L (ref 1–40)
BASOPHILS # BLD AUTO: 0.02 10*3/MM3 (ref 0–0.2)
BASOPHILS NFR BLD AUTO: 0.2 % (ref 0–1.5)
BILIRUB SERPL-MCNC: 0.6 MG/DL (ref 0–1.2)
BUN SERPL-MCNC: 19 MG/DL (ref 8–23)
BUN/CREAT SERPL: 27.5 (ref 7–25)
CALCIUM SPEC-SCNC: 8.6 MG/DL (ref 8.6–10.5)
CHLORIDE SERPL-SCNC: 106 MMOL/L (ref 98–107)
CO2 SERPL-SCNC: 29 MMOL/L (ref 22–29)
CREAT SERPL-MCNC: 0.69 MG/DL (ref 0.76–1.27)
DEPRECATED RDW RBC AUTO: 44.2 FL (ref 37–54)
EOSINOPHIL # BLD AUTO: 0.08 10*3/MM3 (ref 0–0.4)
EOSINOPHIL NFR BLD AUTO: 0.8 % (ref 0.3–6.2)
ERYTHROCYTE [DISTWIDTH] IN BLOOD BY AUTOMATED COUNT: 13.3 % (ref 12.3–15.4)
GFR SERPL CREATININE-BSD FRML MDRD: 111 ML/MIN/1.73
GLOBULIN UR ELPH-MCNC: 2.8 GM/DL
GLUCOSE SERPL-MCNC: 112 MG/DL (ref 65–99)
HCT VFR BLD AUTO: 43.9 % (ref 37.5–51)
HGB BLD-MCNC: 14.8 G/DL (ref 13–17.7)
IMM GRANULOCYTES # BLD AUTO: 0.06 10*3/MM3 (ref 0–0.05)
IMM GRANULOCYTES NFR BLD AUTO: 0.6 % (ref 0–0.5)
LYMPHOCYTES # BLD AUTO: 1.65 10*3/MM3 (ref 0.7–3.1)
LYMPHOCYTES NFR BLD AUTO: 16.1 % (ref 19.6–45.3)
MCH RBC QN AUTO: 30.6 PG (ref 26.6–33)
MCHC RBC AUTO-ENTMCNC: 33.7 G/DL (ref 31.5–35.7)
MCV RBC AUTO: 90.9 FL (ref 79–97)
MONOCYTES # BLD AUTO: 0.93 10*3/MM3 (ref 0.1–0.9)
MONOCYTES NFR BLD AUTO: 9.1 % (ref 5–12)
NEUTROPHILS NFR BLD AUTO: 7.5 10*3/MM3 (ref 1.7–7)
NEUTROPHILS NFR BLD AUTO: 73.2 % (ref 42.7–76)
NRBC BLD AUTO-RTO: 0 /100 WBC (ref 0–0.2)
PLATELET # BLD AUTO: 208 10*3/MM3 (ref 140–450)
PMV BLD AUTO: 11.2 FL (ref 6–12)
POTASSIUM SERPL-SCNC: 4 MMOL/L (ref 3.5–5.2)
PROT SERPL-MCNC: 6.6 G/DL (ref 6–8.5)
RBC # BLD AUTO: 4.83 10*6/MM3 (ref 4.14–5.8)
SARS-COV-2 ORF1AB RESP QL NAA+PROBE: NOT DETECTED
SODIUM SERPL-SCNC: 142 MMOL/L (ref 136–145)
WBC # BLD AUTO: 10.24 10*3/MM3 (ref 3.4–10.8)

## 2021-03-08 PROCEDURE — 93005 ELECTROCARDIOGRAM TRACING: CPT

## 2021-03-08 PROCEDURE — 36415 COLL VENOUS BLD VENIPUNCTURE: CPT

## 2021-03-08 PROCEDURE — 71046 X-RAY EXAM CHEST 2 VIEWS: CPT

## 2021-03-08 PROCEDURE — 93010 ELECTROCARDIOGRAM REPORT: CPT | Performed by: INTERNAL MEDICINE

## 2021-03-08 PROCEDURE — C9803 HOPD COVID-19 SPEC COLLECT: HCPCS | Performed by: OTOLARYNGOLOGY

## 2021-03-08 PROCEDURE — 80053 COMPREHEN METABOLIC PANEL: CPT

## 2021-03-08 PROCEDURE — U0004 COV-19 TEST NON-CDC HGH THRU: HCPCS | Performed by: OTOLARYNGOLOGY

## 2021-03-08 PROCEDURE — 85025 COMPLETE CBC W/AUTO DIFF WBC: CPT

## 2021-03-08 NOTE — DISCHARGE INSTRUCTIONS
DAY OF SURGERY INSTRUCTIONS        YOUR SURGEON: ***GIOVANNA CHIANG    PROCEDURE: ***EXCISION SKIN LESION    DATE OF SURGERY: ***MARCH 10,2021  ARRIVAL TIME: AS DIRECTED BY OFFICE    YOU MAY TAKE THE FOLLOWING MEDICATION(S) THE MORNING OF SURGERY WITH A SIP OF WATER: ***METOPROLOL    ALL OTHER HOME MEDICATIONS CHECK WITH YOUR DOCTOR    DO NOT TAKE ANY ERECTILE DYSFUNCTION MEDICATIONS (EX:  CIALIS, VIAGRA) 24 HOURS PRIOR TO SURGERY              MANAGING PAIN AFTER SURGERY    We know you are probably wondering what your pain will be like after surgery.  Following surgery it is unrealistic to expect you will not have pain.   Pain is how our bodies let us know that something is wrong or cautions us to be careful.  That said, our goal is to make your pain tolerable.    Methods we may use to treat your pain include (oral or IV medications, PCAs, epidurals, nerve blocks, etc.)   While some procedures require IV pain medications for a short time after surgery, transitioning to pain medications by mouth allows for better management of pain.   Your nurse will encourage you to take oral pain medications whenever possible.  IV medications work almost immediately, but only last a short while.  Taking medications by mouth allows for a more constant level of medication in your blood stream for a longer period of time.      Once your pain is out of control it is harder to get back under control.  It is important you are aware when your next dose of pain medication is due.  If you are admitted, your nurse may write the time of your next dose on the white board in your room to help you remember.      We are interested in your pain and encourage you to inform us about aggravating factors during your visit.   Many times a simple repositioning every few hours can make a big difference.    If your physician says it is okay, do not let your pain prevent you from getting out of bed. Be sure to call your nurse for assistance prior to getting up  so you do not fall.      Before surgery, please decide your tolerable pain goal.  These faces help describe the pain ratings we use on a 0-10 scale.   Be prepared to tell us your goal and whether or not you take pain or anxiety medications at home.      BEFORE YOU COME TO THE HOSPITAL  (Pre-op instructions)  • Do not eat, drink, smoke or chew gum after midnight the night before surgery.  This also includes no mints.  • Morning of surgery take only the medicines you have been instructed with a sip of water unless otherwise instructed  by your physician.  • Do not shave, wear makeup or dark nail polish.  • Remove all jewelry including rings.  • Leave anything you consider valuable at home.  • Leave your suitcase in the car until after your surgery.  • Bring the following with you if applicable:  o Picture ID and insurance, Medicare or Medicaid cards  o Co-pay/deductible required by insurance (cash, check, credit card)  o Copy of advance directive, living will or power-of- documents if not brought to PAT  o CPAP or BIPAP mask and tubing  o Relaxation aids ( book, magazine), etc.  o Hearing aids                                 ON THE DAY OF SURGERY  · On the day of surgery check in at registration located at the main entrance of the hospital.   ? You will be registered and given a beeper with instructions where to wait in the main lobby.  ? When your beeper lights up and vibrates a member of the Outpatient Surgery staff will meet you at the double doors under the stair steps and escort you to your preoperative room.   · You may have cloth compression devices placed on your legs. These help to prevent blood clots and reduce swelling in your legs.  · An IV may be inserted into one of your veins.  · In the operating room, you may be given one or more of the following:  ? A medicine to help you relax (sedative).  ? A medicine to numb the area (local anesthetic).  ? A medicine to make you fall asleep (general  "anesthetic).  ? A medicine that is injected into an area of your body to numb everything below the injection site (regional anesthetic).  · Your surgical site will be marked or identified.  · You may be given an antibiotic through your IV to help prevent infection.  Contact a health care provider if you:  · Develop a fever of more than 100.4°F (38°C) or other feelings of illness during the 48 hours before your surgery.  · Have symptoms that get worse.  Have questions or concerns about your surgery    General Anesthesia/Surgery, Adult  General anesthesia is the use of medicines to make a person \"go to sleep\" (unconscious) for a medical procedure. General anesthesia must be used for certain procedures, and is often recommended for procedures that:  · Last a long time.  · Require you to be still or in an unusual position.  · Are major and can cause blood loss.  The medicines used for general anesthesia are called general anesthetics. As well as making you unconscious for a certain amount of time, these medicines:  · Prevent pain.  · Control your blood pressure.  · Relax your muscles.  Tell a health care provider about:  · Any allergies you have.  · All medicines you are taking, including vitamins, herbs, eye drops, creams, and over-the-counter medicines.  · Any problems you or family members have had with anesthetic medicines.  · Types of anesthetics you have had in the past.  · Any blood disorders you have.  · Any surgeries you have had.  · Any medical conditions you have.  · Any recent upper respiratory, chest, or ear infections.  · Any history of:  ? Heart or lung conditions, such as heart failure, sleep apnea, asthma, or chronic obstructive pulmonary disease (COPD).  ?  service.  ? Depression or anxiety.  · Any tobacco or drug use, including marijuana or alcohol use.  · Whether you are pregnant or may be pregnant.  What are the risks?  Generally, this is a safe procedure. However, problems may occur, " including:  · Allergic reaction.  · Lung and heart problems.  · Inhaling food or liquid from the stomach into the lungs (aspiration).  · Nerve injury.  · Air in the bloodstream, which can lead to stroke.  · Extreme agitation or confusion (delirium) when you wake up from the anesthetic.  · Waking up during your procedure and being unable to move. This is rare.  These problems are more likely to develop if you are having a major surgery or if you have an advanced or serious medical condition. You can prevent some of these complications by answering all of your health care provider's questions thoroughly and by following all instructions before your procedure.  General anesthesia can cause side effects, including:  · Nausea or vomiting.  · A sore throat from the breathing tube.  · Hoarseness.  · Wheezing or coughing.  · Shaking chills.  · Tiredness.  · Body aches.  · Anxiety.  · Sleepiness or drowsiness.  · Confusion or agitation.  RISKS AND COMPLICATIONS OF SURGERY  Your health care provider will discuss possible risks and complications with you before surgery. Common risks and complications include:    · Problems due to the use of anesthetics.  · Blood loss and replacement (does not apply to minor surgical procedures).  · Temporary increase in pain due to surgery.  · Uncorrected pain or problems that the surgery was meant to correct.  · Infection.  · New damage.    What happens before the procedure?    Medicines  Ask your health care provider about:  · Changing or stopping your regular medicines. This is especially important if you are taking diabetes medicines or blood thinners.  · Taking medicines such as aspirin and ibuprofen. These medicines can thin your blood. Do not take these medicines unless your health care provider tells you to take them.  · Taking over-the-counter medicines, vitamins, herbs, and supplements. Do not take these during the week before your procedure unless your health care provider approves  them.  General instructions  · Starting 3-6 weeks before the procedure, do not use any products that contain nicotine or tobacco, such as cigarettes and e-cigarettes. If you need help quitting, ask your health care provider.  · If you brush your teeth on the morning of the procedure, make sure to spit out all of the toothpaste.  · Tell your health care provider if you become ill or develop a cold, cough, or fever.  · If instructed by your health care provider, bring your sleep apnea device with you on the day of your surgery (if applicable).  · Ask your health care provider if you will be going home the same day, the following day, or after a longer hospital stay.  ? Plan to have someone take you home from the hospital or clinic.  ? Plan to have a responsible adult care for you for at least 24 hours after you leave the hospital or clinic. This is important.  What happens during the procedure?  · You will be given anesthetics through both of the following:  ? A mask placed over your nose and mouth.  ? An IV in one of your veins.  · You may receive a medicine to help you relax (sedative).  · After you are unconscious, a breathing tube may be inserted down your throat to help you breathe. This will be removed before you wake up.  · An anesthesia specialist will stay with you throughout your procedure. He or she will:  ? Keep you comfortable and safe by continuing to give you medicines and adjusting the amount of medicine that you get.  ? Monitor your blood pressure, pulse, and oxygen levels to make sure that the anesthetics do not cause any problems.  The procedure may vary among health care providers and hospitals.  What happens after the procedure?  · Your blood pressure, temperature, heart rate, breathing rate, and blood oxygen level will be monitored until the medicines you were given have worn off.  · You will wake up in a recovery area. You may wake up slowly.  · If you feel anxious or agitated, you may be given  medicine to help you calm down.  · If you will be going home the same day, your health care provider may check to make sure you can walk, drink, and urinate.  · Your health care provider will treat any pain or side effects you have before you go home.  · Do not drive for 24 hours if you were given a sedative.  Summary  · General anesthesia is used to keep you still and prevent pain during a procedure.  · It is important to tell your healthcare provider about your medical history and any surgeries you have had, and previous experience with anesthesia.  · Follow your healthcare provider’s instructions about when to stop eating, drinking, or taking certain medicines before your procedure.  · Plan to have someone take you home from the hospital or clinic.  This information is not intended to replace advice given to you by your health care provider. Make sure you discuss any questions you have with your health care provider.  Document Released: 03/26/2009 Document Revised: 08/03/2018 Document Reviewed: 08/03/2018  [employer] Interactive Patient Education © 2019 [employer]      Fall Prevention in Hospitals, Adult  As a hospital patient, your condition and the treatments you receive can increase your risk for falls. Some additional risk factors for falls in a hospital include:  · Being in an unfamiliar environment.  · Being on bed rest.  · Your surgery.  · Taking certain medicines.  · Your tubing requirements, such as intravenous (IV) therapy or catheters.  It is important that you learn how to decrease fall risks while at the hospital. Below are important tips that can help prevent falls.  SAFETY TIPS FOR PREVENTING FALLS  Talk about your risk of falling.  · Ask your health care provider why you are at risk for falling. Is it your medicine, illness, tubing placement, or something else?  · Make a plan with your health care provider to keep you safe from falls.  · Ask your health care provider or pharmacist about side  effects of your medicines. Some medicines can make you dizzy or affect your coordination.  Ask for help.  · Ask for help before getting out of bed. You may need to press your call button.  · Ask for assistance in getting safely to the toilet.  · Ask for a walker or cane to be put at your bedside. Ask that most of the side rails on your bed be placed up before your health care provider leaves the room.  · Ask family or friends to sit with you.  · Ask for things that are out of your reach, such as your glasses, hearing aids, telephone, bedside table, or call button.  Follow these tips to avoid falling:  · Stay lying or seated, rather than standing, while waiting for help.  · Wear rubber-soled slippers or shoes whenever you walk in the hospital.  · Avoid quick, sudden movements.  ¨ Change positions slowly.  ¨ Sit on the side of your bed before standing.  ¨ Stand up slowly and wait before you start to walk.  · Let your health care provider know if there is a spill on the floor.  · Pay careful attention to the medical equipment, electrical cords, and tubes around you.  · When you need help, use your call button by your bed or in the bathroom. Wait for one of your health care providers to help you.  · If you feel dizzy or unsure of your footing, return to bed and wait for assistance.  · Avoid being distracted by the TV, telephone, or another person in your room.  · Do not lean or support yourself on rolling objects, such as IV poles or bedside tables.     This information is not intended to replace advice given to you by your health care provider. Make sure you discuss any questions you have with your health care provider.     Document Released: 12/15/2001 Document Revised: 01/08/2016 Document Reviewed: 08/25/2013  [employer] Interactive Patient Education ©2016 [employer]            PATIENT/FAMILY/RESPONSIBLE PARTY VERBALIZES UNDERSTANDING OF ABOVE EDUCATION.  COPY OF PAIN SCALE GIVEN AND REVIEWED WITH VERBALIZED  UNDERSTANDING.

## 2021-03-09 LAB
QT INTERVAL: 444 MS
QTC INTERVAL: 432 MS

## 2021-03-10 ENCOUNTER — ANESTHESIA (OUTPATIENT)
Dept: PERIOP | Facility: HOSPITAL | Age: 76
End: 2021-03-10

## 2021-03-10 ENCOUNTER — ANESTHESIA EVENT (OUTPATIENT)
Dept: PERIOP | Facility: HOSPITAL | Age: 76
End: 2021-03-10

## 2021-03-10 ENCOUNTER — HOSPITAL ENCOUNTER (OUTPATIENT)
Facility: HOSPITAL | Age: 76
Setting detail: HOSPITAL OUTPATIENT SURGERY
Discharge: HOME | End: 2021-03-10
Attending: OTOLARYNGOLOGY | Admitting: OTOLARYNGOLOGY

## 2021-03-10 VITALS
TEMPERATURE: 97.1 F | DIASTOLIC BLOOD PRESSURE: 86 MMHG | HEART RATE: 58 BPM | RESPIRATION RATE: 18 BRPM | SYSTOLIC BLOOD PRESSURE: 143 MMHG | OXYGEN SATURATION: 100 %

## 2021-03-10 DIAGNOSIS — C44.319 BASAL CELL CARCINOMA OF LEFT TEMPLE REGION: Primary | ICD-10-CM

## 2021-03-10 DIAGNOSIS — L81.9 ATYPICAL PIGMENTED SKIN LESION: ICD-10-CM

## 2021-03-10 PROCEDURE — 88305 TISSUE EXAM BY PATHOLOGIST: CPT | Performed by: OTOLARYNGOLOGY

## 2021-03-10 PROCEDURE — 14040 TIS TRNFR F/C/C/M/N/A/G/H/F: CPT | Performed by: OTOLARYNGOLOGY

## 2021-03-10 PROCEDURE — 25010000002 PROPOFOL 10 MG/ML EMULSION: Performed by: NURSE ANESTHETIST, CERTIFIED REGISTERED

## 2021-03-10 PROCEDURE — 25010000002 FENTANYL CITRATE (PF) 100 MCG/2ML SOLUTION: Performed by: NURSE ANESTHETIST, CERTIFIED REGISTERED

## 2021-03-10 RX ORDER — OXYCODONE AND ACETAMINOPHEN 10; 325 MG/1; MG/1
1 TABLET ORAL ONCE AS NEEDED
Status: DISCONTINUED | OUTPATIENT
Start: 2021-03-10 | End: 2021-03-10 | Stop reason: HOSPADM

## 2021-03-10 RX ORDER — FLUMAZENIL 0.1 MG/ML
0.2 INJECTION INTRAVENOUS AS NEEDED
Status: DISCONTINUED | OUTPATIENT
Start: 2021-03-10 | End: 2021-03-10 | Stop reason: HOSPADM

## 2021-03-10 RX ORDER — HYDROCODONE BITARTRATE AND ACETAMINOPHEN 5; 325 MG/1; MG/1
1 TABLET ORAL EVERY 4 HOURS PRN
Qty: 8 TABLET | Refills: 0 | Status: SHIPPED | OUTPATIENT
Start: 2021-03-10 | End: 2022-02-02

## 2021-03-10 RX ORDER — SODIUM CHLORIDE 0.9 % (FLUSH) 0.9 %
10 SYRINGE (ML) INJECTION EVERY 12 HOURS SCHEDULED
Status: DISCONTINUED | OUTPATIENT
Start: 2021-03-10 | End: 2021-03-10 | Stop reason: HOSPADM

## 2021-03-10 RX ORDER — MAGNESIUM HYDROXIDE 1200 MG/15ML
LIQUID ORAL AS NEEDED
Status: DISCONTINUED | OUTPATIENT
Start: 2021-03-10 | End: 2021-03-10 | Stop reason: HOSPADM

## 2021-03-10 RX ORDER — FENTANYL CITRATE 50 UG/ML
25 INJECTION, SOLUTION INTRAMUSCULAR; INTRAVENOUS
Status: DISCONTINUED | OUTPATIENT
Start: 2021-03-10 | End: 2021-03-10 | Stop reason: HOSPADM

## 2021-03-10 RX ORDER — LABETALOL HYDROCHLORIDE 5 MG/ML
5 INJECTION, SOLUTION INTRAVENOUS
Status: DISCONTINUED | OUTPATIENT
Start: 2021-03-10 | End: 2021-03-10 | Stop reason: HOSPADM

## 2021-03-10 RX ORDER — ONDANSETRON 4 MG/1
4 TABLET, FILM COATED ORAL ONCE AS NEEDED
Status: DISCONTINUED | OUTPATIENT
Start: 2021-03-10 | End: 2021-03-10 | Stop reason: HOSPADM

## 2021-03-10 RX ORDER — DEXTROSE MONOHYDRATE 25 G/50ML
12.5 INJECTION, SOLUTION INTRAVENOUS AS NEEDED
Status: DISCONTINUED | OUTPATIENT
Start: 2021-03-10 | End: 2021-03-10 | Stop reason: HOSPADM

## 2021-03-10 RX ORDER — SODIUM CHLORIDE 0.9 % (FLUSH) 0.9 %
3 SYRINGE (ML) INJECTION AS NEEDED
Status: DISCONTINUED | OUTPATIENT
Start: 2021-03-10 | End: 2021-03-10 | Stop reason: HOSPADM

## 2021-03-10 RX ORDER — SODIUM CHLORIDE 0.9 % (FLUSH) 0.9 %
10 SYRINGE (ML) INJECTION AS NEEDED
Status: DISCONTINUED | OUTPATIENT
Start: 2021-03-10 | End: 2021-03-10 | Stop reason: HOSPADM

## 2021-03-10 RX ORDER — HYDROCODONE BITARTRATE AND ACETAMINOPHEN 5; 325 MG/1; MG/1
1 TABLET ORAL ONCE AS NEEDED
Status: DISCONTINUED | OUTPATIENT
Start: 2021-03-10 | End: 2021-03-10 | Stop reason: HOSPADM

## 2021-03-10 RX ORDER — OXYCODONE AND ACETAMINOPHEN 7.5; 325 MG/1; MG/1
2 TABLET ORAL EVERY 4 HOURS PRN
Status: DISCONTINUED | OUTPATIENT
Start: 2021-03-10 | End: 2021-03-10 | Stop reason: HOSPADM

## 2021-03-10 RX ORDER — LIDOCAINE HYDROCHLORIDE AND EPINEPHRINE 10; 10 MG/ML; UG/ML
INJECTION, SOLUTION INFILTRATION; PERINEURAL AS NEEDED
Status: DISCONTINUED | OUTPATIENT
Start: 2021-03-10 | End: 2021-03-10 | Stop reason: HOSPADM

## 2021-03-10 RX ORDER — SODIUM CHLORIDE, SODIUM LACTATE, POTASSIUM CHLORIDE, CALCIUM CHLORIDE 600; 310; 30; 20 MG/100ML; MG/100ML; MG/100ML; MG/100ML
9 INJECTION, SOLUTION INTRAVENOUS CONTINUOUS
Status: DISCONTINUED | OUTPATIENT
Start: 2021-03-10 | End: 2021-03-10 | Stop reason: HOSPADM

## 2021-03-10 RX ORDER — SODIUM CHLORIDE, SODIUM LACTATE, POTASSIUM CHLORIDE, CALCIUM CHLORIDE 600; 310; 30; 20 MG/100ML; MG/100ML; MG/100ML; MG/100ML
1000 INJECTION, SOLUTION INTRAVENOUS CONTINUOUS
Status: DISCONTINUED | OUTPATIENT
Start: 2021-03-10 | End: 2021-03-10 | Stop reason: HOSPADM

## 2021-03-10 RX ORDER — FENTANYL CITRATE 50 UG/ML
INJECTION, SOLUTION INTRAMUSCULAR; INTRAVENOUS AS NEEDED
Status: DISCONTINUED | OUTPATIENT
Start: 2021-03-10 | End: 2021-03-10 | Stop reason: SURG

## 2021-03-10 RX ORDER — PROPOFOL 10 MG/ML
VIAL (ML) INTRAVENOUS AS NEEDED
Status: DISCONTINUED | OUTPATIENT
Start: 2021-03-10 | End: 2021-03-10 | Stop reason: SURG

## 2021-03-10 RX ORDER — ONDANSETRON 2 MG/ML
4 INJECTION INTRAMUSCULAR; INTRAVENOUS ONCE AS NEEDED
Status: DISCONTINUED | OUTPATIENT
Start: 2021-03-10 | End: 2021-03-10 | Stop reason: HOSPADM

## 2021-03-10 RX ORDER — NALOXONE HCL 0.4 MG/ML
0.4 VIAL (ML) INJECTION AS NEEDED
Status: DISCONTINUED | OUTPATIENT
Start: 2021-03-10 | End: 2021-03-10 | Stop reason: HOSPADM

## 2021-03-10 RX ORDER — IBUPROFEN 600 MG/1
600 TABLET ORAL ONCE AS NEEDED
Status: DISCONTINUED | OUTPATIENT
Start: 2021-03-10 | End: 2021-03-10 | Stop reason: HOSPADM

## 2021-03-10 RX ORDER — LIDOCAINE HYDROCHLORIDE 20 MG/ML
INJECTION, SOLUTION EPIDURAL; INFILTRATION; INTRACAUDAL; PERINEURAL AS NEEDED
Status: DISCONTINUED | OUTPATIENT
Start: 2021-03-10 | End: 2021-03-10 | Stop reason: SURG

## 2021-03-10 RX ORDER — LIDOCAINE HYDROCHLORIDE 10 MG/ML
0.5 INJECTION, SOLUTION EPIDURAL; INFILTRATION; INTRACAUDAL; PERINEURAL ONCE AS NEEDED
Status: DISCONTINUED | OUTPATIENT
Start: 2021-03-10 | End: 2021-03-10 | Stop reason: HOSPADM

## 2021-03-10 RX ADMIN — PROPOFOL 75 MCG/KG/MIN: 10 INJECTION, EMULSION INTRAVENOUS at 09:32

## 2021-03-10 RX ADMIN — FENTANYL CITRATE 25 MCG: 50 INJECTION, SOLUTION INTRAMUSCULAR; INTRAVENOUS at 09:35

## 2021-03-10 RX ADMIN — SODIUM CHLORIDE, POTASSIUM CHLORIDE, SODIUM LACTATE AND CALCIUM CHLORIDE 1000 ML: 600; 310; 30; 20 INJECTION, SOLUTION INTRAVENOUS at 07:26

## 2021-03-10 RX ADMIN — LIDOCAINE HYDROCHLORIDE 100 MG: 20 INJECTION, SOLUTION EPIDURAL; INFILTRATION; INTRACAUDAL; PERINEURAL at 09:32

## 2021-03-10 RX ADMIN — PROPOFOL 40 MG: 10 INJECTION, EMULSION INTRAVENOUS at 09:32

## 2021-03-10 RX ADMIN — FENTANYL CITRATE 25 MCG: 50 INJECTION, SOLUTION INTRAMUSCULAR; INTRAVENOUS at 09:32

## 2021-03-10 NOTE — DISCHARGE INSTRUCTIONS
YOUR NEXT PAIN MEDICATION IS DUE AT______________        Moderate Conscious Sedation, Adult, Care After  Refer to this sheet in the next few weeks. These instructions provide you with information on caring for yourself after your procedure. Your health care provider may also give you more specific instructions. Your treatment has been planned according to current medical practices, but problems sometimes occur. Call your health care provider if you have any problems or questions after your procedure.  WHAT TO EXPECT AFTER THE PROCEDURE    After your procedure:  · You may feel sleepy, clumsy, and have poor balance for several hours.  · Vomiting may occur if you eat too soon after the procedure.  HOME CARE INSTRUCTIONS  · Do not participate in any activities where you could become injured for at least 24 hours. Do not:  ¨ Drive.  ¨ Swim.  ¨ Ride a bicycle.  ¨ Operate heavy machinery.  ¨ Cook.  ¨ Use power tools.  ¨ Climb ladders.  ¨ Work from a high place.  · Do not make important decisions or sign legal documents until you are improved.  · If you vomit, drink water, juice, or soup when you can drink without vomiting. Make sure you have little or no nausea before eating solid foods.  · Only take over-the-counter or prescription medicines for pain, discomfort, or fever as directed by your health care provider.  · Make sure you and your family fully understand everything about the medicines given to you, including what side effects may occur.  · You should not drink alcohol, take sleeping pills, or take medicines that cause drowsiness for at least 24 hours.  · If you smoke, do not smoke without supervision.  · If you are feeling better, you may resume normal activities 24 hours after you were sedated.  · Keep all appointments with your health care provider.  SEEK MEDICAL CARE IF:  · Your skin is pale or bluish in color.  · You continue to feel nauseous or vomit.  · Your pain is getting worse and is not helped by  medicine.  · You have bleeding or swelling.  · You are still sleepy or feeling clumsy after 24 hours.  SEEK IMMEDIATE MEDICAL CARE IF:  · You develop a rash.  · You have difficulty breathing.  · You develop any type of allergic problem.  · You have a fever.  MAKE SURE YOU:  · Understand these instructions.  · Will watch your condition.  · Will get help right away if you are not doing well or get worse.     This information is not intended to replace advice given to you by your health care provider. Make sure you discuss any questions you have with your health care provider.     Document Released: 10/08/2014 Document Revised: 01/08/2016 Document Reviewed: 10/08/2014  [employer] Interactive Patient Education ©2016 [employer]         CALL YOUR PHYSICIAN IF YOU EXPERIENCE  INCREASED PAIN NOT HELPED BY YOUR PAIN MEDICATION.        Fall Prevention in the Home      Falls can cause injuries. They can happen to people of all ages. There are many things you can do to make your home safe and to help prevent falls.    WHAT CAN I DO ON THE OUTSIDE OF MY HOME?  · Regularly fix the edges of walkways and driveways and fix any cracks.  · Remove anything that might make you trip as you walk through a door, such as a raised step or threshold.  · Trim any bushes or trees on the path to your home.  · Use bright outdoor lighting.  · Clear any walking paths of anything that might make someone trip, such as rocks or tools.  · Regularly check to see if handrails are loose or broken. Make sure that both sides of any steps have handrails.  · Any raised decks and porches should have guardrails on the edges.  · Have any leaves, snow, or ice cleared regularly.  · Use sand or salt on walking paths during winter.  · Clean up any spills in your garage right away. This includes oil or grease spills.  WHAT CAN I DO IN THE BATHROOM?    · Use night lights.  · Install grab bars by the toilet and in the tub and shower. Do not use towel bars as grab  bars.  · Use non-skid mats or decals in the tub or shower.  · If you need to sit down in the shower, use a plastic, non-slip stool.  · Keep the floor dry. Clean up any water that spills on the floor as soon as it happens.  · Remove soap buildup in the tub or shower regularly.  · Attach bath mats securely with double-sided non-slip rug tape.  · Do not have throw rugs and other things on the floor that can make you trip.  WHAT CAN I DO IN THE BEDROOM?  · Use night lights.  · Make sure that you have a light by your bed that is easy to reach.  · Do not use any sheets or blankets that are too big for your bed. They should not hang down onto the floor.  · Have a firm chair that has side arms. You can use this for support while you get dressed.  · Do not have throw rugs and other things on the floor that can make you trip.  WHAT CAN I DO IN THE KITCHEN?  · Clean up any spills right away.  · Avoid walking on wet floors.  · Keep items that you use a lot in easy-to-reach places.  · If you need to reach something above you, use a strong step stool that has a grab bar.  · Keep electrical cords out of the way.  · Do not use floor polish or wax that makes floors slippery. If you must use wax, use non-skid floor wax.  · Do not have throw rugs and other things on the floor that can make you trip.  WHAT CAN I DO WITH MY STAIRS?  · Do not leave any items on the stairs.  · Make sure that there are handrails on both sides of the stairs and use them. Fix handrails that are broken or loose. Make sure that handrails are as long as the stairways.  · Check any carpeting to make sure that it is firmly attached to the stairs. Fix any carpet that is loose or worn.  · Avoid having throw rugs at the top or bottom of the stairs. If you do have throw rugs, attach them to the floor with carpet tape.  · Make sure that you have a light switch at the top of the stairs and the bottom of the stairs. If you do not have them, ask someone to add them for  you.  WHAT ELSE CAN I DO TO HELP PREVENT FALLS?  · Wear shoes that:  ¨ Do not have high heels.  ¨ Have rubber bottoms.  ¨ Are comfortable and fit you well.  ¨ Are closed at the toe. Do not wear sandals.  · If you use a stepladder:  ¨ Make sure that it is fully opened. Do not climb a closed stepladder.  ¨ Make sure that both sides of the stepladder are locked into place.  ¨ Ask someone to hold it for you, if possible.  · Clearly chris and make sure that you can see:  ¨ Any grab bars or handrails.  ¨ First and last steps.  ¨ Where the edge of each step is.  · Use tools that help you move around (mobility aids) if they are needed. These include:  ¨ Canes.  ¨ Walkers.  ¨ Scooters.  ¨ Crutches.  · Turn on the lights when you go into a dark area. Replace any light bulbs as soon as they burn out.  · Set up your furniture so you have a clear path. Avoid moving your furniture around.  · If any of your floors are uneven, fix them.  · If there are any pets around you, be aware of where they are.  · Review your medicines with your doctor. Some medicines can make you feel dizzy. This can increase your chance of falling.  Ask your doctor what other things that you can do to help prevent falls.     This information is not intended to replace advice given to you by your health care provider. Make sure you discuss any questions you have with your health care provider.     Document Released: 10/14/2010 Document Revised: 05/03/2016 Document Reviewed: 01/22/2016  [employer] Interactive Patient Education ©2016 [employer]     PATIENT/FAMILY/RESPONSIBLE PARTY VERBALIZES UNDERSTANDING OF ABOVE EDUCATION.  COPY OF PAIN SCALE GIVEN AND REVIEWED WITH VERBALIZED UNDERSTANDING.

## 2021-03-10 NOTE — ANESTHESIA PREPROCEDURE EVALUATION
Anesthesia Evaluation     Patient summary reviewed   no history of anesthetic complications:  NPO Solid Status: > 8 hours             Airway   Mallampati: III  TM distance: >3 FB  Neck ROM: full  Dental      Pulmonary    (+) sleep apnea on CPAP,   (-) COPD, not a smoker  Cardiovascular   Exercise tolerance: excellent (>7 METS)    ECG reviewed    (+) hypertension, CAD, cardiac stents (12/2019) hyperlipidemia,   (-) pacemaker, past MI, angina      Neuro/Psych  (-) seizures, TIA, CVA  GI/Hepatic/Renal/Endo    (+) obesity,  GERD,    (-) liver disease, no renal disease, diabetes    Musculoskeletal     Abdominal    Substance History      OB/GYN          Other                        Anesthesia Plan    ASA 3     MAC       Anesthetic plan, all risks, benefits, and alternatives have been provided, discussed and informed consent has been obtained with: patient.

## 2021-03-10 NOTE — OP NOTE
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       Approximately 10mL 1% Xylocaine with epinephrine was injected in the planned excision site in the left temple\preauricular area.  Excision was accomplished with a #15 blade in an oblong fashion without difficulty.  The excision was approximately 2.1 cm  x 1.5 cm.  The lesion was approximately 1.5 cm x 1.0 cm.  The margin was 2.5 mm. Upon excision the specimen was marked and submitted for permanent pathologic examination.    A superiorly based transposition flap was fashioned and wide undermining performed with curved iris scissors and double prong skin hooks.  Minimal bleeding was encountered which was controlled with electrocautery and low settings.  The flap was created to preserve normal anatomic relationships and to facilitate closure.  The flap was transposed and the donor site as well as the flap sutured in place utilizing interrupted 4-0 Monocryl subcutaneously and interrupted 5-0 nylon to reapproximate the epidermis.  Bactroban ointment was applied and the procedure terminated.       The patient was transported upon extubation to the postanesthesia care unit in  stable condition.    SPECIMENS:  A: Atypical pigmented lesion of the left preauricular area\temple    COMPLICATIONS: NONE    ESTIMATED BLOOD LOSS:  < 25 cc    Roberto Og MD  3/10/2021

## 2021-03-10 NOTE — ANESTHESIA POSTPROCEDURE EVALUATION
Patient: Axel Morris    Procedure Summary     Date: 03/10/21 Room / Location:  [facility] 02 /  [facility]    Anesthesia Start: 0929 Anesthesia Stop: 1026    Procedures:       EXCISION OF ATYPICAL PIGMENTED LESION OF THE LEFT CENTRAL TEMPLE REGION WITH POSSIBLE FLAP OR GRAFT. - Left (Left )      POSSIBLE FLAP OR GRAFT. 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          Post Anesthesia Care and Evaluation    Patient location during evaluation: PHASE II  Patient participation: complete - patient participated  Level of consciousness: awake and alert  Pain management: adequate  Airway patency: patent  Anesthetic complications: No anesthetic complications  PONV Status: none  Cardiovascular status: acceptable  Respiratory status: acceptable  Hydration status: acceptable  No anesthesia care post op

## 2021-03-11 ENCOUNTER — TELEPHONE (OUTPATIENT)
Dept: OTOLARYNGOLOGY | Facility: CLINIC | Age: 76
End: 2021-03-11

## 2021-03-11 NOTE — TELEPHONE ENCOUNTER
----- Message from Roberto Og MD sent at 3/11/2021  4:49 PM CST -----  Please call the patient regarding his abnormal result.  I would recommend referral to dermatopathology preferably Velma February or Damion Sweet in [place].  (Their daughter is a physician in [place])

## 2021-03-22 ENCOUNTER — OFFICE VISIT (OUTPATIENT)
Dept: OTOLARYNGOLOGY | Facility: CLINIC | Age: 76
End: 2021-03-22

## 2021-03-22 DIAGNOSIS — L81.4 SOLAR LENTIGO: Primary | ICD-10-CM

## 2021-03-22 PROCEDURE — 99024 POSTOP FOLLOW-UP VISIT: CPT | Performed by: OTOLARYNGOLOGY

## 2021-03-22 NOTE — PROGRESS NOTES
Procedure   Suture Removal    Date/Time: 3/22/2021 9:41 AM  Performed by: Madelaine Duke RN  Authorized by: Roberto Og MD   Body area: head/neck  Location details: scalp  Comments: Patient presents for suture removal. The incision is well-approximated with no redness or edema noted. Path discussed.

## 2021-04-06 LAB
CYTO UR: NORMAL
LAB AP CASE REPORT: NORMAL
LAB AP CLINICAL INFORMATION: NORMAL
PATH REPORT.FINAL DX SPEC: NORMAL
PATH REPORT.GROSS SPEC: NORMAL

## 2021-04-12 RX ORDER — METOPROLOL SUCCINATE 25 MG/1
TABLET, EXTENDED RELEASE ORAL
Qty: 90 TABLET | Refills: 0 | Status: SHIPPED | OUTPATIENT
Start: 2021-04-12 | End: 2021-06-10 | Stop reason: SDUPTHER

## 2021-04-23 RX ORDER — OMEPRAZOLE 20 MG/1
20 CAPSULE, DELAYED RELEASE ORAL 2 TIMES DAILY
Qty: 180 CAPSULE | Refills: 3 | Status: SHIPPED | OUTPATIENT
Start: 2021-04-23 | End: 2022-04-19

## 2021-06-10 RX ORDER — METOPROLOL SUCCINATE 25 MG/1
TABLET, EXTENDED RELEASE ORAL
Qty: 90 TABLET | Refills: 3 | Status: SHIPPED | OUTPATIENT
Start: 2021-06-10 | End: 2022-05-02

## 2021-07-09 ENCOUNTER — OFFICE VISIT (OUTPATIENT)
Dept: OTOLARYNGOLOGY | Facility: CLINIC | Age: 76
End: 2021-07-09

## 2021-07-09 VITALS
SYSTOLIC BLOOD PRESSURE: 132 MMHG | DIASTOLIC BLOOD PRESSURE: 68 MMHG | BODY MASS INDEX: 37.3 KG/M2 | TEMPERATURE: 98.6 F | HEIGHT: 75 IN | WEIGHT: 300 LBS | HEART RATE: 53 BPM

## 2021-07-09 DIAGNOSIS — L81.4 SOLAR LENTIGO: Primary | ICD-10-CM

## 2021-07-09 PROCEDURE — 99212 OFFICE O/P EST SF 10 MIN: CPT | Performed by: NURSE PRACTITIONER

## 2021-08-25 DIAGNOSIS — G60.9 IDIOPATHIC PERIPHERAL NEUROPATHY: ICD-10-CM

## 2021-08-26 RX ORDER — PREGABALIN 100 MG/1
CAPSULE ORAL
Qty: 180 CAPSULE | Refills: 2 | Status: SHIPPED | OUTPATIENT
Start: 2021-08-26 | End: 2021-09-01 | Stop reason: SDUPTHER

## 2021-08-30 DIAGNOSIS — G60.9 IDIOPATHIC PERIPHERAL NEUROPATHY: ICD-10-CM

## 2021-09-01 ENCOUNTER — TELEPHONE (OUTPATIENT)
Dept: OTOLARYNGOLOGY | Facility: CLINIC | Age: 76
End: 2021-09-01

## 2021-09-01 RX ORDER — PREGABALIN 100 MG/1
CAPSULE ORAL
Qty: 180 CAPSULE | Refills: 1 | Status: SHIPPED | OUTPATIENT
Start: 2021-09-01 | End: 2022-01-18 | Stop reason: SDUPTHER

## 2021-09-01 RX ORDER — CEFUROXIME AXETIL 500 MG/1
500 TABLET ORAL 2 TIMES DAILY
Qty: 42 TABLET | Refills: 0 | Status: SHIPPED | OUTPATIENT
Start: 2021-09-01 | End: 2021-09-22

## 2021-09-01 NOTE — TELEPHONE ENCOUNTER
Spoke with patient regarding continued sinus complaints. He reports some improvement following prolonged dose of antibiotics. He is not particularly interested in surgical management at this time. He would like to try antibiotics again prior to considering surgical intervention. He will continue nasal sprays and we will follow-up.

## 2021-09-13 ENCOUNTER — OFFICE VISIT (OUTPATIENT)
Dept: INTERNAL MEDICINE | Age: 76
End: 2021-09-13
Payer: MEDICARE

## 2021-09-13 VITALS
HEART RATE: 70 BPM | DIASTOLIC BLOOD PRESSURE: 72 MMHG | SYSTOLIC BLOOD PRESSURE: 120 MMHG | OXYGEN SATURATION: 96 % | BODY MASS INDEX: 36.8 KG/M2 | WEIGHT: 296 LBS | HEIGHT: 75 IN

## 2021-09-13 DIAGNOSIS — Z00.00 ROUTINE GENERAL MEDICAL EXAMINATION AT A HEALTH CARE FACILITY: ICD-10-CM

## 2021-09-13 DIAGNOSIS — Z00.00 MEDICARE ANNUAL WELLNESS VISIT, SUBSEQUENT: Primary | ICD-10-CM

## 2021-09-13 DIAGNOSIS — R73.9 HYPERGLYCEMIA: ICD-10-CM

## 2021-09-13 DIAGNOSIS — G60.9 IDIOPATHIC PERIPHERAL NEUROPATHY: Chronic | ICD-10-CM

## 2021-09-13 DIAGNOSIS — I25.10 CORONARY ARTERY DISEASE INVOLVING NATIVE CORONARY ARTERY OF NATIVE HEART WITHOUT ANGINA PECTORIS: ICD-10-CM

## 2021-09-13 DIAGNOSIS — J32.4 CHRONIC PANSINUSITIS: ICD-10-CM

## 2021-09-13 DIAGNOSIS — I10 ESSENTIAL HYPERTENSION: ICD-10-CM

## 2021-09-13 DIAGNOSIS — Z12.5 SCREENING FOR PROSTATE CANCER: ICD-10-CM

## 2021-09-13 DIAGNOSIS — E78.2 MIXED HYPERLIPIDEMIA: ICD-10-CM

## 2021-09-13 PROCEDURE — G0439 PPPS, SUBSEQ VISIT: HCPCS | Performed by: INTERNAL MEDICINE

## 2021-09-13 PROCEDURE — 4040F PNEUMOC VAC/ADMIN/RCVD: CPT | Performed by: INTERNAL MEDICINE

## 2021-09-13 PROCEDURE — 90694 VACC AIIV4 NO PRSRV 0.5ML IM: CPT | Performed by: INTERNAL MEDICINE

## 2021-09-13 PROCEDURE — 1123F ACP DISCUSS/DSCN MKR DOCD: CPT | Performed by: INTERNAL MEDICINE

## 2021-09-13 PROCEDURE — G0008 ADMIN INFLUENZA VIRUS VAC: HCPCS | Performed by: INTERNAL MEDICINE

## 2021-09-13 RX ORDER — MONTELUKAST SODIUM 10 MG/1
10 TABLET ORAL NIGHTLY
COMMUNITY
End: 2022-03-14 | Stop reason: ALTCHOICE

## 2021-09-13 ASSESSMENT — PATIENT HEALTH QUESTIONNAIRE - PHQ9
DEPRESSION UNABLE TO ASSESS: FUNCTIONAL CAPACITY MOTIVATION LIMITS ACCURACY
1. LITTLE INTEREST OR PLEASURE IN DOING THINGS: 0
2. FEELING DOWN, DEPRESSED OR HOPELESS: 0
SUM OF ALL RESPONSES TO PHQ QUESTIONS 1-9: 0
SUM OF ALL RESPONSES TO PHQ9 QUESTIONS 1 & 2: 0
SUM OF ALL RESPONSES TO PHQ QUESTIONS 1-9: 0
SUM OF ALL RESPONSES TO PHQ QUESTIONS 1-9: 0

## 2021-09-13 ASSESSMENT — LIFESTYLE VARIABLES: HOW OFTEN DO YOU HAVE A DRINK CONTAINING ALCOHOL: 0

## 2021-09-13 NOTE — PROGRESS NOTES
Chief Complaint   Patient presents with    Annual Exam       HPI: Patient is here today for annual exam and to follow-up medical problems which include coronary artery disease obstructive sleep apnea chronic sinusitis he is following with Dr. Magdy Quarles likely going to need sinus surgery. Main complaint is sinus congestion just chronic congestion fullness in his sinus and postnasal drainage but mainly stuffy and congested no fever chills. Motelulkast and 21 days cefuroxemine.   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     left at fibular head s/p decompression 2009       Past Surgical History:   Procedure Laterality Date    CATARACT REMOVAL WITH IMPLANT      CHOLECYSTECTOMY  07/2005    CORONARY ANGIOPLASTY WITH STENT PLACEMENT  12/26/2018    [place] to Circumflex    JOINT REPLACEMENT Bilateral 2003    Knee replacements    KNEE ARTHROPLASTY Bilateral 2003    complete combine condylye and plateau    NERVE SURGERY      Nerve release       Family History   Problem Relation Age of Onset    COPD Mother     Rheum Arthritis Father     COPD Brother     Lung Cancer Brother        Social History     Socioeconomic History    Marital status:      Spouse name: avila    Number of children: 2    Years of education: 21    Highest education level: Not on file   Occupational History    Occupation: retired teacher   Tobacco Use    Smoking status: Former Smoker     Packs/day: 1.00     Years: 12.00     Pack years: excess  Use in a.m. 1 Inhaler 3    atorvastatin (LIPITOR) 80 MG tablet TAKE 1 TABLET NIGHTLY 90 tablet 3    aspirin 81 MG tablet Take 81 mg by mouth daily      CPAP Machine MISC by Does not apply route      Multiple Vitamins-Minerals (MULTIVITAMIN ADULT PO) Take by mouth daily      pregabalin (LYRICA) 100 MG capsule TAKE 1 CAPSULE TWICE A DAY (Patient not taking: Reported on 9/13/2021) 180 capsule 1     No current facility-administered medications for this visit. Review of Systems   Constitutional: Positive for fatigue. Negative for chills and fever. HENT: Negative for facial swelling. Eyes: Negative for discharge and redness. Respiratory: Negative for cough and shortness of breath. Cardiovascular: Negative for chest pain, palpitations and leg swelling. Gastrointestinal: Negative for abdominal distention and abdominal pain. Genitourinary: Negative for dysuria, frequency and urgency. Musculoskeletal: Positive for arthralgias. Skin: Negative for rash and wound. Neurological: Positive for numbness. Negative for dizziness, light-headedness and headaches. Psychiatric/Behavioral: Negative for dysphoric mood and sleep disturbance. The patient is not nervous/anxious.         /72   Pulse 70   Ht 6' 3\" (1.905 m)   Wt 296 lb (134.3 kg)   SpO2 96%   BMI 37.00 kg/m²   BP Readings from Last 7 Encounters:   09/13/21 120/72   01/07/21 112/72   08/07/20 130/82   08/07/20 120/86   06/29/20 132/80   03/30/20 110/72   01/30/20 134/78     Wt Readings from Last 7 Encounters:   09/13/21 296 lb (134.3 kg)   01/07/21 296 lb (134.3 kg)   08/07/20 295 lb (133.8 kg)   08/07/20 299 lb (135.6 kg)   06/29/20 300 lb (136.1 kg)   03/30/20 298 lb (135.2 kg)   01/30/20 294 lb (133.4 kg)     BMI Readings from Last 7 Encounters:   09/13/21 37.00 kg/m²   01/07/21 37.00 kg/m²   08/07/20 36.87 kg/m²   08/07/20 38.39 kg/m²   06/29/20 38.52 kg/m²   03/30/20 38.26 kg/m²   01/30/20 36.75 kg/m²     Resp Readings from Last 7 Encounters:   07/01/19 18   12/27/18 16   07/30/18 20   07/28/17 20       Physical Exam  Constitutional:       General: He is not in acute distress. Appearance: Normal appearance. He is well-developed. HENT:      Head: Normocephalic. Right Ear: External ear normal. Tympanic membrane is not injected. Left Ear: External ear normal. Tympanic membrane is not injected. Mouth/Throat:      Pharynx: No oropharyngeal exudate. Eyes:      General: No scleral icterus. Conjunctiva/sclera: Conjunctivae normal.   Neck:      Thyroid: No thyroid mass or thyromegaly. Vascular: No carotid bruit. Cardiovascular:      Rate and Rhythm: Normal rate and regular rhythm. Heart sounds: S1 normal and S2 normal. No murmur heard. No S3 or S4 sounds. Pulmonary:      Effort: Pulmonary effort is normal. No respiratory distress. Breath sounds: Normal breath sounds. No wheezing or rales. Abdominal:      General: Bowel sounds are normal. There is no distension. Palpations: Abdomen is soft. There is no mass. Tenderness: There is no abdominal tenderness. Musculoskeletal:      Cervical back: Neck supple. Right lower leg: Edema present. Left lower leg: Edema present. Comments: Trace edema   Lymphadenopathy:      Cervical: No cervical adenopathy. Upper Body:      Right upper body: No supraclavicular adenopathy. Left upper body: No supraclavicular adenopathy. Skin:     Findings: No rash. Neurological:      Mental Status: He is alert and oriented to person, place, and time. Cranial Nerves: No cranial nerve deficit.    Psychiatric:         Mood and Affect: Mood normal.         Results for orders placed or performed in visit on 09/07/21   Lipid Panel   Result Value Ref Range    Cholesterol, Total 141 (L) 160 - 199 mg/dL    Triglycerides 148 0 - 149 mg/dL    HDL 38 (L) 55 - 121 mg/dL    LDL Calculated 73 <100 mg/dL   TSH without Reflex   Result Value Ref Range TSH 3.400 0.270 - 4.200 uIU/mL   Hemoglobin A1C   Result Value Ref Range    Hemoglobin A1C 5.8 4.0 - 6.0 %   Comprehensive Metabolic Panel   Result Value Ref Range    Sodium 142 136 - 145 mmol/L    Potassium 4.9 3.5 - 5.0 mmol/L    Chloride 105 98 - 111 mmol/L    CO2 28 22 - 29 mmol/L    Anion Gap 9 7 - 19 mmol/L    Glucose 111 (H) 74 - 109 mg/dL    BUN 22 8 - 23 mg/dL    CREATININE 0.8 0.5 - 1.2 mg/dL    GFR Non-African American >60 >60    GFR African American >59 >59    Calcium 9.3 8.8 - 10.2 mg/dL    Total Protein 7.2 6.6 - 8.7 g/dL    Albumin 4.0 3.5 - 5.2 g/dL    Total Bilirubin 0.8 0.2 - 1.2 mg/dL    Alkaline Phosphatase 80 40 - 130 U/L    ALT 72 (H) 5 - 41 U/L    AST 48 (H) 5 - 40 U/L   CBC   Result Value Ref Range    WBC 6.9 4.8 - 10.8 K/uL    RBC 5.19 4.70 - 6.10 M/uL    Hemoglobin 15.8 14.0 - 18.0 g/dL    Hematocrit 50.1 42.0 - 52.0 %    MCV 96.5 (H) 80.0 - 94.0 fL    MCH 30.4 27.0 - 31.0 pg    MCHC 31.5 (L) 33.0 - 37.0 g/dL    RDW 12.9 11.5 - 14.5 %    Platelets 943 200 - 718 K/uL    MPV 10.5 9.4 - 12.4 fL       ASSESSMENT/ PLAN:  1. Medicare annual wellness visit, subsequent  Chart, medications, labs, vaccines reviewed. Keep up to date with routine care and follow up. Call with any problems or complaints. Keep up to date with routine screening recomendations and vaccines. 2. Routine general medical examination at a health care facility --- Chart, medications, labs, vaccines reviewed. Keep up to date with routine care and follow up. Call with any problems or complaints. Keep up to date with routine screening recomendations and vaccines. 3. Screening for prostate cancer    - PSA screening; Future    4. Mixed hyperlipidemia  Continue with statin   - CBC; Future  - Comprehensive Metabolic Panel; Future  - TSH without Reflex; Future  - Lipid Panel; Future    5. Hyperglycemia  Check hga1c and monitor closely   - Hemoglobin A1C; Future    6.  Chronic pansinusitis  Keep f/u with Dr Cristy Acosta Ramandeep-     7. Essential hypertension  bp stable     8. Coronary artery disease involving native coronary artery of native heart without angina pectoris  Stable coronary artery disease continue anticoagulant therapy statin and beta-blocker and follow    9. Idiopathic neuropathy- controlled with lyrica                  Medicare Annual Wellness Visit  Name: Rowan Ferraro Date: 2021   MRN: [id number] Sex: Male   Age: 64 y.o. Ethnicity: Non- / Non    : 1945 Race: White (non-)      Robert Patino is here for Annual Exam    Screenings for behavioral, psychosocial and functional/safety risks, and cognitive dysfunction are all negative except as indicated below. These results, as well as other patient data from the [address] form, are documented in Flowsheets linked to this Encounter. Allergies   Allergen Reactions    Ampicillin Rash         Prior to Visit Medications    Medication Sig Taking? Authorizing Provider   montelukast (SINGULAIR) 10 MG tablet Take 10 mg by mouth nightly Yes Historical Provider, MD   metoprolol succinate (TOPROL XL) 25 MG extended release tablet TAKE 1 TABLET DAILY Yes Jose Loco MD   omeprazole (PRILOSEC) 20 MG delayed release capsule Take 1 capsule by mouth 2 times daily Yes Jose Loco MD   triamcinolone (NASACORT ALLERGY 24HR) 55 MCG/ACT nasal inhaler 2 sprays by Nasal route daily Use right hand to spray into left nostril and left hand to spray into right nostril  Do not inhale or snort after using medication. Simply wipe away excess  Use in a.m.  Yes Melia Doshi MD   atorvastatin (LIPITOR) 80 MG tablet TAKE 1 TABLET NIGHTLY Yes Lynette Lopez, APRN - CNP   aspirin 81 MG tablet Take 81 mg by mouth daily Yes Historical Provider, MD   CPAP Machine MISC by Does not apply route Yes Historical Provider, MD   Multiple Vitamins-Minerals (MULTIVITAMIN ADULT PO) Take by mouth daily Yes Historical Provider, MD   pregabalin (LYRICA) 100 MG capsule TAKE 1 CAPSULE TWICE A DAY  Patient not taking: Reported on 9/13/2021  Iqra Watkins MD         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     left at fibular head s/p decompression 2009       Past Surgical History:   Procedure Laterality Date    CATARACT REMOVAL WITH IMPLANT      CHOLECYSTECTOMY  07/2005    CORONARY ANGIOPLASTY WITH STENT PLACEMENT  12/26/2018    [place] to Circumflex    JOINT REPLACEMENT Bilateral 2003    Knee replacements    KNEE ARTHROPLASTY Bilateral 2003    complete combine condylye and plateau    NERVE SURGERY      Nerve release         Family History   Problem Relation Age of Onset    COPD Mother     Rheum Arthritis Father     COPD Brother     Lung Cancer Brother        CareTeam (Including outside providers/suppliers regularly involved in providing care):   Patient Care Team:  Iqra Watkins MD as PCP - General (Internal Medicine)  Iqra Watkins MD as [facility]CP - [facility]  Victorino Abraham MD as Consulting Physician (Gastroenterology)  Brigida Delgado MD as Consulting Physician (Ophthalmology)  Ryan Calvin MD as Consulting Physician (Otolaryngology)    Wt Readings from Last 3 Encounters:   09/13/21 296 lb (134.3 kg)   01/07/21 296 lb (134.3 kg)   08/07/20 295 lb (133.8 kg)     Vitals:    09/13/21 1309   BP: 120/72   Pulse: 70   SpO2: 96%   Weight: 296 lb (134.3 kg)   Height: 6' 3\" (1.905 m)     Body mass index is 37 kg/m².     Based upon direct observation of the patient, evaluation of cognition reveals no issues  Patient's complete Health Risk Assessment and screening values have been reviewed and are found in Flowsheets. The following problems were reviewed today and where indicated follow up appointments were made and/or referrals ordered. Positive Risk Factor Screenings with Interventions:     Fall Risk:  Timed Up and Go Test > 12 seconds? (Complete if either Fall Risk answers are Yes): (!) yes  2 or more falls in past year?: no  Fall with injury in past year?: no  Fall Risk Interventions:    · Caution against falling     Depression:  Depression Unable to Assess: Functional capacity motivation limits accuracy  PHQ-2 Score: 0  PHQ-9 Total Score: 0    Severity:1-4 = minimal depression, 5-9 = mild depression, 10-14 = moderate depression, 15-19 = moderately severe depression, 20-27 = severe depression        General Health and ACP:  General  In general, how would you say your health is?: Very Good  In the past 7 days, have you experienced any of the following?  New or Increased Pain, New or Increased Fatigue, Loneliness, Social Isolation, Stress or Anger?: None of These  Do you get the social and emotional support that you need?: Yes  Do you have a Living Will?: Yes  Advance Directives     Power of [address] [place] [address] Will ACP-Advance Directive ACP-Power of     Not on File Not on File Not on File Not on File      General Health Risk Interventions:  · Patient has a living well very close to his family    Health Habits/Nutrition:  Health Habits/Nutrition  Do you exercise for at least 20 minutes 2-3 times per week?: (!) No  Have you lost any weight without trying in the past 3 months?: No  Do you eat only one meal per day?: No  Have you seen the dentist within the past year?: Yes  Body mass index: (!) 36.99  Health Habits/Nutrition Interventions:  · Healthy diet and try to work toward wt loss    Hearing/Vision:  No exam data present  Hearing/Vision  Do you or your family notice any trouble with your hearing that hasn't been managed with hearing aids?: (!) Yes  Do you have difficulty driving, watching TV, or doing any of your daily activities because of your eyesight?: No  Have you had an eye exam within the past year?: Yes  Hearing/Vision Interventions:  ·  keep up to date with eye exam     Safety:  Safety  Do you have working smoke detectors?: Yes  Have all throw rugs been removed or fastened?: (!) No  Do you have non-slip mats or surfaces in all bathtubs/showers?: (!) No  Do all of your stairways have a railing or banister?: Yes  Are your doorways, halls and stairs free of clutter?: Yes  Do you always fasten your seatbelt when you are in a car?: Yes  Safety Interventions:  · No issues     Personalized Preventive Plan   Current Health Maintenance Status  Immunization History   Administered Date(s) Administered    COVID-19, Botello Peter, PF, 30mcg/0.3mL 01/24/2021, 02/14/2021    Influenza, High-dose, Quadv, 65 yrs +, IM (Fluzone) 10/31/2020    Influenza, Quadv, adjuvanted, 65 yrs +, IM, PF (Fluad) 09/13/2021    Pneumococcal Conjugate 13-valent (Maryruth Salmeron) 07/20/2016    Pneumococcal Polysaccharide ([id number]) 07/30/2018    Td, unspecified formulation 07/20/2016    Zoster Live (Zostavax) 06/18/2013    Zoster Recombinant (Shingrix) 06/15/2019, 10/01/2019        Health Maintenance   Topic Date Due    DTaP/Tdap/Td vaccine (1 - Tdap) 07/21/2016    Annual Wellness Visit (AWV)  Never done    PSA counseling  12/30/2021    Lipid screen  09/07/2022    Potassium monitoring  09/07/2022    Creatinine monitoring  09/07/2022    Flu vaccine  Completed    Shingles Vaccine  Completed    Pneumococcal 65+ years Vaccine  Completed    COVID-19 Vaccine  Completed    Hepatitis C screen  Completed    Hepatitis A vaccine  Aged Out    Hepatitis B vaccine  Aged Out    Hib vaccine  Aged Out    Meningococcal (ACWY) vaccine  Aged Out     Recommendations for [employer] Due: see orders and patient instructions/AVS.  . Recommended screening schedule for the next 5-10 years is provided to the patient in written form: see Patient Instructions/AVS.    Azalea Peres was seen today for annual exam.    Diagnoses and all orders for this visit:    Medicare annual wellness visit, subsequent    Routine general medical examination at a [facility] facility    Screening for prostate cancer  -     PSA screening; Future    Mixed hyperlipidemia  -     CBC; Future  -     Comprehensive Metabolic Panel; Future  -     TSH without Reflex; Future  -     Lipid Panel;  Future    Hyperglycemia  -     Hemoglobin A1C; Future    Chronic pansinusitis    Essential hypertension    Coronary artery disease involving native coronary artery of native heart without angina pectoris    Other orders  -     INFLUENZA, QUADV, ADJUVANTED, 65 YRS =, IM, PF, PREFILL SYR, 0.5ML (FLUAD)

## 2021-09-13 NOTE — PATIENT INSTRUCTIONS
Personalized Preventive Plan for Jaci Arroyo - 9/13/2021  Medicare offers a range of preventive health benefits. Some of the tests and screenings are paid in full while other may be subject to a deductible, co-insurance, and/or copay. Some of these benefits include a comprehensive review of your medical history including lifestyle, illnesses that may run in your family, and various assessments and screenings as appropriate. After reviewing your medical record and screening and assessments performed today your provider may have ordered immunizations, labs, imaging, and/or referrals for you. A list of these orders (if applicable) as well as your Preventive Care list are included within your After Visit Summary for your review. Other Preventive Recommendations:    · A preventive eye exam performed by an eye specialist is recommended every 1-2 years to screen for glaucoma; cataracts, macular degeneration, and other eye disorders. · A preventive dental visit is recommended every 6 months. · Try to get at least 150 minutes of exercise per week or 10,000 steps per day on a pedometer . · Order or download the FREE \"Exercise & Physical Activity: Your Everyday Guide\" from [employer]. Call 1-978.200.9397 or search [employer] online. · You need 8135-9690 mg of calcium and 6541-9014 IU of vitamin D per day. It is possible to meet your calcium requirement with diet alone, but a vitamin D supplement is usually necessary to meet this goal.  · When exposed to the sun, use a sunscreen that protects against both UVA and UVB radiation with an SPF of 30 or greater. Reapply every 2 to 3 hours or after sweating, drying off with a towel, or swimming. · Always wear a seat belt when traveling in a car. Always wear a helmet when riding a bicycle or motorcycle.

## 2021-09-25 ASSESSMENT — ENCOUNTER SYMPTOMS
EYE REDNESS: 0
SHORTNESS OF BREATH: 0
COUGH: 0
FACIAL SWELLING: 0
EYE DISCHARGE: 0
ABDOMINAL PAIN: 0
ABDOMINAL DISTENTION: 0

## 2021-10-19 ENCOUNTER — TELEPHONE (OUTPATIENT)
Dept: INTERNAL MEDICINE | Age: 76
End: 2021-10-19

## 2021-10-19 DIAGNOSIS — I25.10 CORONARY ARTERY DISEASE INVOLVING NATIVE CORONARY ARTERY OF NATIVE HEART WITHOUT ANGINA PECTORIS: Primary | ICD-10-CM

## 2021-10-19 NOTE — TELEPHONE ENCOUNTER
----- Message from Val Garvin sent at 10/18/2021  4:34 PM CDT -----  Subject: Referral Request    QUESTIONS   Reason for referral request? Patient would like a referral to Dr. Domenic Morataya for a baseline on his heart. He needs to have a yearly check up, he   used to see Dr. Jaylen Mc but he left. Has the physician seen you for this condition before? No   Preferred Specialist (if applicable)? Do you already have an appointment scheduled? No  Additional Information for Provider? Please advise patient once this has   been sent.   ---------------------------------------------------------------------------  --------------  CALL BACK INFO  What is the best way for the office to contact you?  OK to leave message on   voicemail  Preferred Call Back Phone Number? [phone]

## 2021-10-20 PROBLEM — C44.319 BASAL CELL CARCINOMA OF LEFT TEMPLE REGION: Status: ACTIVE | Noted: 2021-02-25

## 2021-11-02 DIAGNOSIS — Z01.818 PREOPERATIVE TESTING: Primary | ICD-10-CM

## 2021-11-08 ENCOUNTER — LAB (OUTPATIENT)
Dept: LAB | Facility: HOSPITAL | Age: 76
End: 2021-11-08

## 2021-11-08 DIAGNOSIS — Z01.818 PREOPERATIVE TESTING: ICD-10-CM

## 2021-11-08 LAB — SARS-COV-2 ORF1AB RESP QL NAA+PROBE: NOT DETECTED

## 2021-11-08 PROCEDURE — U0004 COV-19 TEST NON-CDC HGH THRU: HCPCS

## 2021-11-08 PROCEDURE — C9803 HOPD COVID-19 SPEC COLLECT: HCPCS

## 2021-11-10 NOTE — PROGRESS NOTES
YOB: 1945  Location: [facility]  Location Address: [address] [place] [address], [facility], [address] [place] [address]  Location Phone: [phone]    Chief Complaint   Patient presents with   • Follow-up     recheck sinus       History of Present Illness  Axel Morris is a 76 y.o. male.  Axel Morris is here for follow up of ENT complaints. The patient has had problems with sinus problems  The symptoms are not localized to a particular location. The patient has had mild to moderate symptoms. The symptoms have been present for the last 2 years There have been no identified factors that aggravate the symptoms. The symptoms are improved by flonase and astelin   Patient presents for follow up of sinus congestion.   He feels as if his congestion as improved once starting flonase and astelin. He uses cpap at night and feels better upon waking, but has increased congestion throughout the day  He was prescribed a prolonged course of antibiotics by Ankita Pardo at last visit in 2021   he also has a lesion to the right temple area that he has noticed for the past several months that he is wanting looked at. He states the area has continued to scab over.       Study Result    Narrative & Impression   Examination: CT Paranasal Sinuses dated 2021.     Indication: chronic recurrent severe sinusitis, possible surgical,  septal deviation/spur, turbinate hypertrophy, patient has been on  antibiotics, steroids, nasal sprays without resolved symptoms;  J01.41-Acute recurrent pansinusitis; J30.9-Allergic rhinitis,  unspecified; J32.9-Chronic sinusitis, unspecified; J34.2-Deviated nasal  septum; J34.89-Other specified disorders of nose and nasal sinuses;  J34.3-Hypertr     Comparison: None     Technique: Volumetric data were obtained from the level of the  ventricles to the level of the maxillary alveolus and reconstructed at 1  mm intervals in the axial, coronal and sagittal plane.     Radiation dose equals  mGy-cm.   Automated exposure control dose  reduction technique was implemented.        Findings:     There is near complete opacification of the bilateral maxillary sinuses,  extending into the infundibula and hiatus semilunaris. Associated  mucoperiosteal reaction, consistent with chronic process. Significant  mucosal disease in bilateral ethmoid air cells and frontal sinuses as  well. Moderate to significant mucosal disease in the bilateral sphenoid  sinuses. There is hyperdense characteristic to the mucosal disease,  suggestive of inspissated secretions.     The bilateral nasofrontal recesses are significantly narrowed due to  mucosal disease. The bilateral sphenoethmoidal recesses are also  significantly narrowed due to mucosal disease.        Visualized parotid glands,  spaces, oral cavity and oropharynx  demonstrate discrete mass or fluid collection. There is no discrete mass  in the nasopharynx. No discrete mass in the nasal cavity.     Nasal septum is deviated to the right.        IMPRESSION:  Impression:     1.  Chronic pansinusitis as described above.  2.  Nasal septum deviation to the right.  This report was finalized on 01/05/2021 12:48 by Dr. Betty Noble MD.          Past Medical History:   Diagnosis Date   • Arthritis    • COVID-19 vaccine series completed     pfizer   • Dizziness    • GERD (gastroesophageal reflux disease)    • Hx of colonic polyps    • Hyperlipidemia    • Neuropathy    • Pneumonia    • Rheumatic fever    • Sinusitis        Past Surgical History:   Procedure Laterality Date   • CARDIAC SURGERY      HEART STENT   • CHOLECYSTECTOMY     • COLONOSCOPY N/A 11/30/2017    Procedure: COLONOSCOPY WITH ANESTHESIA;  Surgeon: Matthew Cagle MD;  Location: [facility] ENDOSCOPY;  Service:    • COLONOSCOPY W/ POLYPECTOMY  07/12/2012    Adenomatous polyp at 50 cm repeat exam in 5 years   • ENDOSCOPY AND COLONOSCOPY  08/07/2009    Acute ulcerative esophagogastritis grade c, Hyperplastic polyp at 50  cm repeat colonoscopy in 3 years   • EYE SURGERY      DONNIE CATARACT REMOVAL   • FLAP HEAD/NECK Left 3/10/2021    Procedure: POSSIBLE FLAP OR GRAFT.;  Surgeon: Roberto Og MD;  Location:  [facility] OR;  Service: ENT;  Laterality: Left;   • HEAD/NECK LESION/CYST EXCISION Left 3/10/2021    Procedure: EXCISION OF ATYPICAL PIGMENTED LESION OF THE LEFT CENTRAL TEMPLE REGION WITH POSSIBLE FLAP OR GRAFT. - Left;  Surgeon: Roberto Og MD;  Location:  [facility] OR;  Service: ENT;  Laterality: Left;   • REPLACEMENT TOTAL KNEE BILATERAL     • TONSILLECTOMY         Outpatient Medications Marked as Taking for the 11/11/21 encounter (Office Visit) with Roberto Og MD   Medication Sig Dispense Refill   • aspirin 81 MG tablet Take 81 mg by mouth.     • atorvastatin (LIPITOR) 80 MG tablet Take 40 mg by mouth Daily.     • azelastine (ASTELIN) 0.1 % nasal spray 2 sprays into the nostril(s) as directed by provider 2 (Two) Times a Day. Use in each nostril as directed 30 mL 11   • fluticasone (FLONASE) 50 MCG/ACT nasal spray 2 sprays into the nostril(s) as directed by provider Daily. 16 g 11   • glycopyrrolate (ROBINUL) 1 MG tablet Take 1 tablet by mouth 3 (Three) Times a Day As Needed (vertigo). 60 tablet 11   • HYDROcodone-acetaminophen (NORCO) 5-325 MG per tablet Take 1 tablet by mouth Every 4 (Four) Hours As Needed for Moderate Pain  or Severe Pain  (Pain) for up to 8 doses. 8 tablet 0   • ipratropium (ATROVENT) 0.06 % nasal spray 2 sprays into the nostril(s) as directed by provider 3 (Three) Times a Day. 15 mL 5   • metoprolol succinate XL (TOPROL-XL) 25 MG 24 hr tablet      • montelukast (SINGULAIR) 10 MG tablet Take 10 mg by mouth.     • Multiple Vitamins-Minerals (MULTIVITAMIN WITH MINERALS) tablet tablet Take 1 tablet by mouth Daily.     • omeprazole (priLOSEC) 20 MG capsule Take 20 mg by mouth 2 (two) times a day.     • predniSONE (DELTASONE) 10 MG tablet Daily dose: 5 tabs for 2 days, then 4 tabs for 2 days,  then 3 tabs for 2 days, then 2 tabs for 2 days, then 1 tab for 2 days 30 tablet 0   • pregabalin (LYRICA) 100 MG capsule          Ampicillin and Penicillins    Family History   Problem Relation Age of Onset   • Colon cancer Neg Hx    • Colon polyps Neg Hx        Social History     Socioeconomic History   • Marital status:    Tobacco Use   • Smoking status: Former Smoker     Types: Cigarettes     Quit date:      Years since quittin.8   • Smokeless tobacco: Never Used   Vaping Use   • Vaping Use: Never used   Substance and Sexual Activity   • Alcohol use: No   • Drug use: Never   • Sexual activity: Defer       Review of Systems   Constitutional: Negative.    HENT: Positive for congestion. Negative for sinus pressure.    Eyes: Negative.    Respiratory: Negative.    Cardiovascular: Negative.    Endocrine: Negative.    Genitourinary: Negative.    Skin:        Admits skin lesion      Neurological: Negative.        Vitals:    21 1439   BP: (!) 88/63   Pulse: 65   Temp: 97.7 °F (36.5 °C)       Body mass index is 36.75 kg/m².    Objective     Physical Exam  Vitals reviewed.   Constitutional:       Appearance: He is obese.   HENT:      Head: Normocephalic.        Right Ear: Tympanic membrane normal. Decreased hearing noted.      Left Ear: Tympanic membrane normal. Decreased hearing noted.      Ears:      Comments: Seborrheic dermatitis in bilateral ear canal      Nose: Septal deviation present.      Right Turbinates: Enlarged.      Left Turbinates: Enlarged.      Mouth/Throat:      Lips: Pink.      Mouth: Mucous membranes are moist.      Pharynx: Oropharynx is clear. Uvula midline.   Musculoskeletal:      Cervical back: Full passive range of motion without pain and normal range of motion.   Neurological:      Mental Status: He is alert.         Assessment/Plan   Diagnoses and all orders for this visit:    1. Atypical pigmented skin lesion (Primary)    2. Seborrheic dermatitis    3. Solar lentigo    4.  Sensorineural hearing loss (SNHL) of both ears    5. Allergic rhinitis, unspecified seasonality, unspecified trigger    6. Nasal septal spur    7. Nasal septal deviation    8. Hypertrophy of both inferior nasal turbinates    will schedule skin lesion removal in office   Will reevaluate sinus symptoms in 3 months and assess need for repeat ct   Return for any worsening symptoms     * Surgery not found *  No orders of the defined types were placed in this encounter.    Return in about 3 months (around 2022) for Recheck sinus.       Patient Instructions   CONTACT INFORMATION:  The main office phone number is [phone]. For emergencies after hours and on weekends, this number will convert over to our answering service and the on call provider will answer. Please try to keep non emergent phone calls/ questions to office hours 9am-5pm Monday through Friday.      KwiClick  As an alternative, you can sign up and use the Epic MyChart system for more direct and quicker access for non emergent questions/ problems.  Totango allows you to send messages to your doctor, view your test results, renew your prescriptions, schedule appointments, and more. To sign up, go to [employer] and click on the Sign Up Now link in the New User? box. Enter your KwiClick Activation Code exactly as it appears below along with the last four digits of your Social Security Number and your Date of Birth () to complete the sign-up process. If you do not sign up before the expiration date, you must request a new code.     KwiClick Activation Code: Activation code not generated  Current KwiClick Status: Active     If you have questions, you can email [email or web address] or call [phone] to talk to our KwiClick staff. Remember, KwiClick is NOT to be used for urgent needs. For medical emergencies, dial 911.     IF YOU SMOKE OR USE TOBACCO PLEASE READ THE FOLLOWING:  Why is smoking bad for me?  Smoking  increases the risk of heart disease, lung disease, vascular disease, stroke, and cancer. If you smoke, STOP!        IF YOU SMOKE OR USE TOBACCO PLEASE READ THE FOLLOWING:  Why is smoking bad for me?  Smoking increases the risk of heart disease, lung disease, vascular disease, stroke, and cancer. If you smoke, STOP!     For more information:  Quit Now [place]  1-800-QUIT-NOW  https://[place].quitlogix.org/en-[place]/    Discussion of skin lesion. Discussed risks, benefits, alternatives, and possible complications of excision of the skin lesion with reconstruction utilizing local tissue rearrangement, full-thickness skin grafting, or local interpolated flaps. Risks include, but are not limited too: bleeding, infection, hematoma, recurrence, need for additional procedures, flap failure, cosmetic deformity. Patient understands risks and would like to proceed with surgery.

## 2021-11-11 ENCOUNTER — OFFICE VISIT (OUTPATIENT)
Dept: OTOLARYNGOLOGY | Facility: CLINIC | Age: 76
End: 2021-11-11

## 2021-11-11 VITALS
TEMPERATURE: 97.7 F | SYSTOLIC BLOOD PRESSURE: 88 MMHG | HEIGHT: 75 IN | DIASTOLIC BLOOD PRESSURE: 63 MMHG | HEART RATE: 65 BPM | BODY MASS INDEX: 36.56 KG/M2 | WEIGHT: 294 LBS

## 2021-11-11 DIAGNOSIS — L81.9 ATYPICAL PIGMENTED SKIN LESION: Primary | ICD-10-CM

## 2021-11-11 DIAGNOSIS — J34.3 HYPERTROPHY OF BOTH INFERIOR NASAL TURBINATES: ICD-10-CM

## 2021-11-11 DIAGNOSIS — J34.2 NASAL SEPTAL DEVIATION: ICD-10-CM

## 2021-11-11 DIAGNOSIS — L81.4 SOLAR LENTIGO: ICD-10-CM

## 2021-11-11 DIAGNOSIS — J30.9 ALLERGIC RHINITIS, UNSPECIFIED SEASONALITY, UNSPECIFIED TRIGGER: ICD-10-CM

## 2021-11-11 DIAGNOSIS — H90.3 SENSORINEURAL HEARING LOSS (SNHL) OF BOTH EARS: ICD-10-CM

## 2021-11-11 DIAGNOSIS — L21.9 SEBORRHEIC DERMATITIS: ICD-10-CM

## 2021-11-11 DIAGNOSIS — J34.89 NASAL SEPTAL SPUR: ICD-10-CM

## 2021-11-11 PROCEDURE — 99213 OFFICE O/P EST LOW 20 MIN: CPT | Performed by: NURSE PRACTITIONER

## 2021-11-11 RX ORDER — MONTELUKAST SODIUM 10 MG/1
10 TABLET ORAL
COMMUNITY
End: 2022-10-27

## 2021-11-11 RX ORDER — MOMETASONE FUROATE 1 MG/G
1 CREAM TOPICAL DAILY
Qty: 45 G | Refills: 1 | Status: SHIPPED | OUTPATIENT
Start: 2021-11-11 | End: 2022-02-02

## 2021-11-11 NOTE — PATIENT INSTRUCTIONS
CONTACT INFORMATION:  The main office phone number is [phone]. For emergencies after hours and on weekends, this number will convert over to our answering service and the on call provider will answer. Please try to keep non emergent phone calls/ questions to office hours 9am-5pm Monday through Friday.      [employer]ander  As an alternative, you can sign up and use the Epic MyChart system for more direct and quicker access for non emergent questions/ problems.  Sponge allows you to send messages to your doctor, view your test results, renew your prescriptions, schedule appointments, and more. To sign up, go to [employer] and click on the Sign Up Now link in the New User? box. Enter your Wander Activation Code exactly as it appears below along with the last four digits of your Social Security Number and your Date of Birth () to complete the sign-up process. If you do not sign up before the expiration date, you must request a new code.     Wander Activation Code: Activation code not generated  Current Wander Status: Active     If you have questions, you can email [email or web address] or call [phone] to talk to our Wander staff. Remember, Wander is NOT to be used for urgent needs. For medical emergencies, dial 911.     IF YOU SMOKE OR USE TOBACCO PLEASE READ THE FOLLOWING:  Why is smoking bad for me?  Smoking increases the risk of heart disease, lung disease, vascular disease, stroke, and cancer. If you smoke, STOP!        IF YOU SMOKE OR USE TOBACCO PLEASE READ THE FOLLOWING:  Why is smoking bad for me?  Smoking increases the risk of heart disease, lung disease, vascular disease, stroke, and cancer. If you smoke, STOP!     For more information:  Quit Now [place]  -QUIT-NOW  https://[place].quitlogix.org/en-[place]/    Discussion of skin lesion. Discussed risks, benefits, alternatives, and possible complications of excision of the skin lesion with reconstruction  utilizing local tissue rearrangement, full-thickness skin grafting, or local interpolated flaps. Risks include, but are not limited too: bleeding, infection, hematoma, recurrence, need for additional procedures, flap failure, cosmetic deformity. Patient understands risks and would like to proceed with surgery.

## 2021-11-17 ENCOUNTER — TELEPHONE (OUTPATIENT)
Dept: INTERNAL MEDICINE | Age: 76
End: 2021-11-17

## 2021-11-18 RX ORDER — ATORVASTATIN CALCIUM 80 MG/1
TABLET, FILM COATED ORAL
Qty: 90 TABLET | Refills: 3 | Status: SHIPPED | OUTPATIENT
Start: 2021-11-18

## 2021-12-23 PROBLEM — I10 PRIMARY HYPERTENSION: Status: ACTIVE | Noted: 2021-12-23

## 2021-12-23 PROBLEM — E78.2 MIXED HYPERLIPIDEMIA: Status: ACTIVE | Noted: 2019-01-30

## 2021-12-23 PROBLEM — I25.10 CORONARY ARTERY DISEASE INVOLVING NATIVE CORONARY ARTERY OF NATIVE HEART WITHOUT ANGINA PECTORIS: Status: ACTIVE | Noted: 2019-01-30

## 2022-01-13 ENCOUNTER — PROCEDURE VISIT (OUTPATIENT)
Dept: OTOLARYNGOLOGY | Facility: CLINIC | Age: 77
End: 2022-01-13

## 2022-01-13 DIAGNOSIS — D48.9 NEOPLASM OF UNCERTAIN BEHAVIOR: Primary | ICD-10-CM

## 2022-01-13 PROCEDURE — 13151 CMPLX RPR E/N/E/L 1.1-2.5 CM: CPT | Performed by: OTOLARYNGOLOGY

## 2022-01-13 PROCEDURE — 11442 EXC FACE-MM B9+MARG 1.1-2 CM: CPT | Performed by: OTOLARYNGOLOGY

## 2022-01-13 NOTE — PROGRESS NOTES
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  Following the injection of local anesthesia circumferential elliptical excision was accomplished of the lesion of the right temple without difficulty utilizing a #15 blade.  Excision was  3.2 cm x 1.1 cm.  The lesion was  1.3 cm x 0.9 cm.  The margin was  1 mm. Wide undermining was performed with curved iris scissors in order to facilitate closure and preserve normal anatomic relationships.  The specimen was submitted for permanent pathologic examination.  There was minimal to no bleeding.    Reapproximation was accomplished with interrupted interrupted 4-0 Vicryl subcutaneously and interrupted 5-0 nylon to reapproximate the epidermis.  2 interrupted stitches of 3-0 nylon were utilized to additionally close the epidermis.    Bacitracin ointment was applied and the procedure terminated.  The patient tolerated the procedure well. There were no complications.

## 2022-01-18 DIAGNOSIS — G60.9 IDIOPATHIC PERIPHERAL NEUROPATHY: ICD-10-CM

## 2022-01-18 RX ORDER — PREGABALIN 100 MG/1
CAPSULE ORAL
Qty: 180 CAPSULE | Refills: 1 | Status: SHIPPED | OUTPATIENT
Start: 2022-01-18 | End: 2022-05-25 | Stop reason: SDUPTHER

## 2022-01-20 ENCOUNTER — TELEPHONE (OUTPATIENT)
Dept: OTOLARYNGOLOGY | Facility: CLINIC | Age: 77
End: 2022-01-20

## 2022-02-02 ENCOUNTER — OFFICE VISIT (OUTPATIENT)
Dept: CARDIOLOGY | Facility: CLINIC | Age: 77
End: 2022-02-02

## 2022-02-02 VITALS
BODY MASS INDEX: 37.42 KG/M2 | DIASTOLIC BLOOD PRESSURE: 80 MMHG | OXYGEN SATURATION: 98 % | SYSTOLIC BLOOD PRESSURE: 120 MMHG | WEIGHT: 301 LBS | HEIGHT: 75 IN | HEART RATE: 55 BPM

## 2022-02-02 DIAGNOSIS — E78.2 MIXED HYPERLIPIDEMIA: ICD-10-CM

## 2022-02-02 DIAGNOSIS — M79.89 LEG SWELLING: ICD-10-CM

## 2022-02-02 DIAGNOSIS — R06.02 SHORTNESS OF BREATH: ICD-10-CM

## 2022-02-02 DIAGNOSIS — I25.10 CORONARY ARTERY DISEASE INVOLVING NATIVE CORONARY ARTERY OF NATIVE HEART WITHOUT ANGINA PECTORIS: Primary | ICD-10-CM

## 2022-02-02 DIAGNOSIS — I10 PRIMARY HYPERTENSION: ICD-10-CM

## 2022-02-02 PROCEDURE — 93000 ELECTROCARDIOGRAM COMPLETE: CPT | Performed by: INTERNAL MEDICINE

## 2022-02-02 PROCEDURE — 99204 OFFICE O/P NEW MOD 45 MIN: CPT | Performed by: INTERNAL MEDICINE

## 2022-02-02 NOTE — PROGRESS NOTES
[facility] - CARDIOLOGY  New Patient Initial Outpatient Evaluation    Primary Care Physician: Isabel Best MD    Subjective     Chief Complaint/Reason for Consult: Transfer of care, coronary artery disease    History of Present Illness  Mr. Morris is a 76-year-old who has previously been cared for at [facility] for coronary disease including a stent to the circumflex artery on 12/26/2018. He is also on medication for hyperlipidemia, and hypertension.    Mr. Morris comes to the clinic today stating that he was having chest pain on exertion prior to having his stent placed, which resolved after the stent was placed. He denies having anything like it since then.     He reports that he does have some swelling in his legs, especially when he wears his socks with a tight band. He states that the swelling gets worse throughout the day, and then looks better when he wakes up in the morning. He reports that he occasionally experiences symptoms of orthopnea. He states that he cannot get enough air in, but in 1 minute it passes, and he does not have to sit up for it to pass. He denies waking up in the middle of the night gasping for air where he has to sit up to catch his breath. He denies getting limited by breathing throughout the course of the day unless he climbs steps at which time he can get out of breath. He states that it has not gotten any worse than it was 6 months ago.     He reports that he takes an 81 mg aspirin daily.    Blood pressure is well-controlled on metoprolol.        Review of Systems   Constitutional: Negative for malaise/fatigue.   Cardiovascular: Positive for leg swelling. Negative for chest pain, claudication, dyspnea on exertion, near-syncope, orthopnea, palpitations, paroxysmal nocturnal dyspnea and syncope.   Respiratory: Positive for shortness of breath.    Hematologic/Lymphatic: Does not bruise/bleed easily.        Otherwise complete ROS reviewed and negative except as mentioned in the  HPI.      Past Medical History:   Past Medical History:   Diagnosis Date   • Abnormal ECG    • Arthritis    • Cancer (HCC)    • Coronary artery disease    • COVID-19 vaccine series completed     pfizer   • Dizziness    • GERD (gastroesophageal reflux disease)    • Hx of colonic polyps    • Hyperlipidemia    • Neuropathy    • Pneumonia    • Rheumatic fever    • Sinusitis    • Sleep apnea        Past Surgical History:  Past Surgical History:   Procedure Laterality Date   • CARDIAC CATHETERIZATION     • CARDIAC SURGERY      HEART STENT   • CHOLECYSTECTOMY     • COLONOSCOPY N/A 11/30/2017    Procedure: COLONOSCOPY WITH ANESTHESIA;  Surgeon: Matthew Cagle MD;  Location:  [facility];  Service:    • COLONOSCOPY W/ POLYPECTOMY  07/12/2012    Adenomatous polyp at 50 cm repeat exam in 5 years   • CORONARY STENT PLACEMENT     • ENDOSCOPY AND COLONOSCOPY  08/07/2009    Acute ulcerative esophagogastritis grade c, Hyperplastic polyp at 50 cm repeat colonoscopy in 3 years   • EYE SURGERY      DONNIE CATARACT REMOVAL   • FLAP HEAD/NECK Left 3/10/2021    Procedure: POSSIBLE FLAP OR GRAFT.;  Surgeon: Roberto Og MD;  Location:  [facility] OR;  Service: ENT;  Laterality: Left;   • HEAD/NECK LESION/CYST EXCISION Left 3/10/2021    Procedure: EXCISION OF ATYPICAL PIGMENTED LESION OF THE LEFT CENTRAL TEMPLE REGION WITH POSSIBLE FLAP OR GRAFT. - Left;  Surgeon: Roberto Og MD;  Location:  [facility] OR;  Service: ENT;  Laterality: Left;   • REPLACEMENT TOTAL KNEE BILATERAL     • TONSILLECTOMY         Family History: family history includes Heart failure in his father.    Social History:  reports that he quit smoking about 44 years ago. His smoking use included cigarettes. He has never used smokeless tobacco. He reports that he does not drink alcohol and does not use drugs.    Medications:  Prior to Admission medications    Medication Sig Start Date End Date Taking? Authorizing Provider   aspirin 81 MG tablet Take 81 mg by  mouth.   Yes Ignacio Woo MD   atorvastatin (LIPITOR) 80 MG tablet Take 40 mg by mouth Daily.   Yes Ignacio Woo MD   fluticasone (FLONASE) 50 MCG/ACT nasal spray 2 sprays into the nostril(s) as directed by provider Daily.  Patient taking differently: 2 sprays into the nostril(s) as directed by provider As Needed. 12/1/20  Yes Blayne Dos Santos PA   glycopyrrolate (ROBINUL) 1 MG tablet Take 1 tablet by mouth 3 (Three) Times a Day As Needed (vertigo). 12/1/20  Yes Blayne Dos Santos PA   metoprolol succinate XL (TOPROL-XL) 25 MG 24 hr tablet  1/10/21  Yes Ignacio Woo MD   montelukast (SINGULAIR) 10 MG tablet Take 10 mg by mouth.   Yes Ignacio Woo MD   Multiple Vitamins-Minerals (MULTIVITAMIN WITH MINERALS) tablet tablet Take 1 tablet by mouth Daily.   Yes Ignacio Woo MD   Multiple Vitamins-Minerals (OCUVITE ADULT FORMULA PO) Take  by mouth.   Yes Ignacio Woo MD   omeprazole (priLOSEC) 20 MG capsule Take 20 mg by mouth 2 (two) times a day. 9/29/17  Yes Ignacio Woo MD   pregabalin (LYRICA) 100 MG capsule 2 (Two) Times a Day. 11/10/20  Yes Ignacio Woo MD   azelastine (ASTELIN) 0.1 % nasal spray 2 sprays into the nostril(s) as directed by provider 2 (Two) Times a Day. Use in each nostril as directed  Patient taking differently: 2 sprays into the nostril(s) as directed by provider As Needed. Use in each nostril as directed 12/1/20 2/2/22 Yes Blayne Dos Santos PA   HYDROcodone-acetaminophen (NORCO) 5-325 MG per tablet Take 1 tablet by mouth Every 4 (Four) Hours As Needed for Moderate Pain  or Severe Pain  (Pain) for up to 8 doses. 3/10/21 2/2/22  Roberto Og MD   ipratropium (ATROVENT) 0.06 % nasal spray 2 sprays into the nostril(s) as directed by provider 3 (Three) Times a Day. 2/25/21 2/2/22  Blayne Dos Santos PA   mometasone (ELOCON) 0.1 % cream Apply 1 application topically to the appropriate area as directed  "Daily. 11/11/21 2/2/22  Abeba Medina APRN   predniSONE (DELTASONE) 10 MG tablet Daily dose: 5 tabs for 2 days, then 4 tabs for 2 days, then 3 tabs for 2 days, then 2 tabs for 2 days, then 1 tab for 2 days 2/25/21 2/2/22  Blayne Dos Santos PA     Allergies:  Allergies   Allergen Reactions   • Ampicillin Rash   • Penicillins Rash       Objective     Vital Signs: /80   Pulse 55   Ht 190.5 cm (75\")   Wt (!) 137 kg (301 lb)   SpO2 98%   BMI 37.62 kg/m²     Vitals and nursing note reviewed.   Constitutional:       General: Not in acute distress.     Appearance: Not in distress.   Neck:      Vascular: No JVD or JVR. JVD normal.   Pulmonary:      Effort: Pulmonary effort is normal.      Breath sounds: Normal breath sounds.   Cardiovascular:      Normal rate. Regular rhythm.      Murmurs: There is no murmur.      No gallop. No rub.   Pulses:     Intact distal pulses.   Edema:     Pretibial: bilateral 1+ edema of the pretibial area.     Ankle: bilateral 1+ edema of the ankle.     Feet: bilateral 1+ edema of the feet.  Skin:     General: Skin is warm and dry.   Neurological:      Mental Status: Alert, oriented to person, place, and time and oriented to person, place and time.         Results Reviewed:  External records were reviewed including all available records through [facility] from which I learned that he had had a 3.0 x 28 mm drug-eluting stent placed to his circumferential artery on 12/26/2018 by Dr. Mc Robledo.    I reviewed an office visit note from that practice with ALEXUS Luna, on 08/07/2020, in which he was stated to be doing well, and no changes in medication were recommended.    LABS REVIEWED:  Lipid profile from 09/07/2021 were reviewed: Total cholesterol 141, HDL 38, LDL 73. HbA1c was 5.8%.      ECG 12 Lead    Date/Time: 2/2/2022 1:22 PM  Performed by: Evangelist Tamayo MD  Authorized by: Evangelist Tamayo MD   Comparison: compared with previous ECG from 3/8/2021  Similar to " previous ECG  Rhythm: sinus bradycardia  BPM: 55  QRS axis: left  Other findings: low voltage and poor R wave progression    Clinical impression: abnormal EKG              Lab Results   Component Value Date    TRIG 148 09/07/2021    HDL 38 (L) 09/07/2021     Lab Results   Component Value Date    HGBA1C 5.8 09/07/2021           Assessment / Plan        Problem List Items Addressed This Visit        Cardiac and Vasculature    Coronary artery disease involving native coronary artery of native heart without angina pectoris - Primary    Overview     3.0 x 28 mm HENNA to LCx 12/26/18 (Robledo)           Mixed hyperlipidemia    Primary hypertension      Other Visit Diagnoses     Leg swelling        Relevant Orders    Adult Transthoracic Echo Complete w/ Color, Spectral and Contrast if necessary per protocol    Shortness of breath        Relevant Orders    Adult Transthoracic Echo Complete w/ Color, Spectral and Contrast if necessary per protocol      Recommendations/plans:    1. Coronary artery disease: Circumflex stenting 3+ years ago, and doing well with no recurrence of symptoms. He did have typical angina prior to that resolved after that procedure.  - Continue aspirin 81 mg daily for life without interruption.  - Continue high-intensity statin to maintain goal of LDL, and other risk factor modifications.  - Advised to call back if he notices any worsening symptoms.    2. Leg swelling: I suspect his leg swelling is most likely due to venous insufficiency as he does not have any pulmonary edema on exam today, but he does endorse some mild shortness of breath when lying flat.  - We will check an echocardiogram to assess for biventricular size and function, as well as valvular function; if no significant structural defects are noted, then we will provide advice regarding chronic management of venous insufficiency.    3. Essential hypertension: Well-controlled.  - Continue Toprol XL.    4. Mixed hyperlipidemia: Most recent  LDL was reviewed today at 73 is very near goal, so we will not recommend any changes in therapy at present.  - Continue atorvastatin 80 mg at bedtime.  - If routine LDL assessment shows that value raises much above current value (with goal < 7), would then add Zetia or consider additional PCSK9 inhibitor therapy.    Follow up in 1 year, or sooner with any new or worsening symptoms or if the echocardiogram shows any significant structural defect.    Thank you for the referral.      Transcribed from ambient dictation for Evangelist Tamayo MD by Karla Kumar.  02/02/22   14:35 CST    Patient verbalized consent to the visit recording.   I Evangelist Tamayo MD have personally performed the services described in this document as scribed by the above individual, and it is both accurate and complete.   I have edited each component as needed.    Evangelist Tamayo MD  2/2/2022  23:40 CST

## 2022-03-07 DIAGNOSIS — Z12.5 SCREENING FOR PROSTATE CANCER: ICD-10-CM

## 2022-03-07 DIAGNOSIS — R73.9 HYPERGLYCEMIA: ICD-10-CM

## 2022-03-07 DIAGNOSIS — E78.2 MIXED HYPERLIPIDEMIA: ICD-10-CM

## 2022-03-07 LAB
ALBUMIN SERPL-MCNC: 4 G/DL (ref 3.5–5.2)
ALP BLD-CCNC: 74 U/L (ref 40–130)
ALT SERPL-CCNC: 52 U/L (ref 5–41)
ANION GAP SERPL CALCULATED.3IONS-SCNC: 10 MMOL/L (ref 7–19)
AST SERPL-CCNC: 41 U/L (ref 5–40)
BILIRUB SERPL-MCNC: 0.7 MG/DL (ref 0.2–1.2)
BUN BLDV-MCNC: 19 MG/DL (ref 8–23)
CALCIUM SERPL-MCNC: 9.3 MG/DL (ref 8.8–10.2)
CHLORIDE BLD-SCNC: 107 MMOL/L (ref 98–111)
CHOLESTEROL, TOTAL: 134 MG/DL (ref 160–199)
CO2: 28 MMOL/L (ref 22–29)
CREAT SERPL-MCNC: 0.7 MG/DL (ref 0.5–1.2)
GFR AFRICAN AMERICAN: >59
GFR NON-AFRICAN AMERICAN: >60
GLUCOSE BLD-MCNC: 100 MG/DL (ref 74–109)
HBA1C MFR BLD: 5.8 % (ref 4–6)
HCT VFR BLD CALC: 47.8 % (ref 42–52)
HDLC SERPL-MCNC: 42 MG/DL (ref 55–121)
HEMOGLOBIN: 15.2 G/DL (ref 14–18)
LDL CHOLESTEROL CALCULATED: 70 MG/DL
MCH RBC QN AUTO: 30.8 PG (ref 27–31)
MCHC RBC AUTO-ENTMCNC: 31.8 G/DL (ref 33–37)
MCV RBC AUTO: 97 FL (ref 80–94)
PDW BLD-RTO: 13.1 % (ref 11.5–14.5)
PLATELET # BLD: 198 K/UL (ref 130–400)
PMV BLD AUTO: 11.2 FL (ref 9.4–12.4)
POTASSIUM SERPL-SCNC: 4.9 MMOL/L (ref 3.5–5)
PROSTATE SPECIFIC ANTIGEN: 1.05 NG/ML (ref 0–4)
RBC # BLD: 4.93 M/UL (ref 4.7–6.1)
SODIUM BLD-SCNC: 145 MMOL/L (ref 136–145)
TOTAL PROTEIN: 6.5 G/DL (ref 6.6–8.7)
TRIGL SERPL-MCNC: 108 MG/DL (ref 0–149)
TSH SERPL DL<=0.05 MIU/L-ACNC: 2.18 UIU/ML (ref 0.27–4.2)
WBC # BLD: 6.8 K/UL (ref 4.8–10.8)

## 2022-03-14 ENCOUNTER — OFFICE VISIT (OUTPATIENT)
Dept: INTERNAL MEDICINE | Age: 77
End: 2022-03-14
Payer: MEDICARE

## 2022-03-14 VITALS
HEART RATE: 74 BPM | BODY MASS INDEX: 37.3 KG/M2 | DIASTOLIC BLOOD PRESSURE: 70 MMHG | WEIGHT: 300 LBS | OXYGEN SATURATION: 96 % | HEIGHT: 75 IN | SYSTOLIC BLOOD PRESSURE: 112 MMHG

## 2022-03-14 DIAGNOSIS — E78.2 MIXED HYPERLIPIDEMIA: ICD-10-CM

## 2022-03-14 DIAGNOSIS — I25.10 CORONARY ARTERY DISEASE INVOLVING NATIVE CORONARY ARTERY OF NATIVE HEART WITHOUT ANGINA PECTORIS: ICD-10-CM

## 2022-03-14 DIAGNOSIS — G60.9 IDIOPATHIC PERIPHERAL NEUROPATHY: Chronic | ICD-10-CM

## 2022-03-14 DIAGNOSIS — I10 ESSENTIAL HYPERTENSION: Primary | ICD-10-CM

## 2022-03-14 DIAGNOSIS — E66.09 CLASS 2 OBESITY DUE TO EXCESS CALORIES WITHOUT SERIOUS COMORBIDITY WITH BODY MASS INDEX (BMI) OF 38.0 TO 38.9 IN ADULT: ICD-10-CM

## 2022-03-14 PROCEDURE — G8427 DOCREV CUR MEDS BY ELIG CLIN: HCPCS | Performed by: INTERNAL MEDICINE

## 2022-03-14 PROCEDURE — 4040F PNEUMOC VAC/ADMIN/RCVD: CPT | Performed by: INTERNAL MEDICINE

## 2022-03-14 PROCEDURE — G8417 CALC BMI ABV UP PARAM F/U: HCPCS | Performed by: INTERNAL MEDICINE

## 2022-03-14 PROCEDURE — G8484 FLU IMMUNIZE NO ADMIN: HCPCS | Performed by: INTERNAL MEDICINE

## 2022-03-14 PROCEDURE — 99214 OFFICE O/P EST MOD 30 MIN: CPT | Performed by: INTERNAL MEDICINE

## 2022-03-14 PROCEDURE — 1123F ACP DISCUSS/DSCN MKR DOCD: CPT | Performed by: INTERNAL MEDICINE

## 2022-03-14 PROCEDURE — 1036F TOBACCO NON-USER: CPT | Performed by: INTERNAL MEDICINE

## 2022-03-14 RX ORDER — FLUTICASONE PROPIONATE 50 MCG
1 SPRAY, SUSPENSION (ML) NASAL DAILY
COMMUNITY
End: 2022-09-15

## 2022-03-14 SDOH — ECONOMIC STABILITY: FOOD INSECURITY: WITHIN THE PAST 12 MONTHS, THE FOOD YOU BOUGHT JUST DIDN'T LAST AND YOU DIDN'T HAVE MONEY TO GET MORE.: NEVER TRUE

## 2022-03-14 SDOH — ECONOMIC STABILITY: FOOD INSECURITY: WITHIN THE PAST 12 MONTHS, YOU WORRIED THAT YOUR FOOD WOULD RUN OUT BEFORE YOU GOT MONEY TO BUY MORE.: NEVER TRUE

## 2022-03-14 ASSESSMENT — SOCIAL DETERMINANTS OF HEALTH (SDOH): HOW HARD IS IT FOR YOU TO PAY FOR THE VERY BASICS LIKE FOOD, HOUSING, MEDICAL CARE, AND HEATING?: NOT VERY HARD

## 2022-03-14 NOTE — PROGRESS NOTES
Chief Complaint   Patient presents with    6 Month Follow-Up    Hypertension       HPI: Patient is here today to follow-up hypertension coronary artery disease and other medical issues. He feels okay he really denies complaints he denies headache chest pain dyspnea abdominal pain he does persist with neuropathy symptoms.   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     left at fibular head s/p decompression        Past Surgical History:   Procedure Laterality Date    CATARACT REMOVAL WITH IMPLANT      CHOLECYSTECTOMY  2005    CORONARY ANGIOPLASTY WITH STENT PLACEMENT  2018    OH to Circumflex    JOINT REPLACEMENT Bilateral     Knee replacements    KNEE ARTHROPLASTY Bilateral     complete combine condylye and plateau    NERVE SURGERY      Nerve release       Family History   Problem Relation Age of Onset    COPD Mother     Rheum Arthritis Father     COPD Brother     Lung Cancer Brother        Social History     Socioeconomic History    Marital status:      Spouse name: avila    Number of children: 2    Years of education: 21    Highest education level: Not on file   Occupational History    Occupation: retired teacher   Tobacco Use    Smoking status: Former Smoker     Packs/day: 1.00     Years: 12.00     Pack years: 12.00     Quit date:      Years since quittin.2    Smokeless tobacco: Never Used   Vaping Use    Vaping Use: Never used   Substance and Sexual Activity  Alcohol use: No    Drug use: No    Sexual activity: Yes     Partners: Female     Comment: wife   Other Topics Concern    Not on file   Social History Narrative    Not on file     Social Determinants of Health     Financial Resource Strain: Low Risk     Difficulty of Paying Living Expenses: Not very hard   Food Insecurity: No Food Insecurity    Worried About Running Out of Food in the Last Year: Never true    Dayan of Food in the Last Year: Never true   Transportation Needs:     Lack of Transportation (Medical): Not on file    Lack of Transportation (Non-Medical):  Not on file   Physical Activity:     Days of Exercise per Week: Not on file    Minutes of Exercise per Session: Not on file   Stress:     Feeling of Stress : Not on file   Social Connections:     Frequency of Communication with Friends and Family: Not on file    Frequency of Social Gatherings with Friends and Family: Not on file    Attends Restoration Services: Not on file    Active Member of [address] [place] [address] or Organizations: Not on file    Attends Club or Organization Meetings: Not on file    Marital Status: Not on file   Intimate Partner Violence:     Fear of Current or Ex-Partner: Not on file    Emotionally Abused: Not on file    Physically Abused: Not on file    Sexually Abused: Not on file   Housing Stability:     Unable to Pay for Housing in the Last Year: Not on file    Number of [place] in the Last Year: Not on file    Unstable Housing in the Last Year: Not on file       Allergies   Allergen Reactions    Ampicillin Rash       Current Outpatient Medications   Medication Sig Dispense Refill    fluticasone (FLONASE) 50 MCG/ACT nasal spray 1 spray by Each Nostril route daily      pregabalin (LYRICA) 100 MG capsule TAKE 1 CAPSULE TWICE A  capsule 1    atorvastatin (LIPITOR) 80 MG tablet TAKE 1 TABLET NIGHTLY 90 tablet 3    metoprolol succinate (TOPROL XL) 25 MG extended release tablet TAKE 1 TABLET DAILY 90 tablet 3  omeprazole (PRILOSEC) 20 MG delayed release capsule Take 1 capsule by mouth 2 times daily 180 capsule 3    aspirin 81 MG tablet Take 81 mg by mouth daily      CPAP Machine MISC by Does not apply route      Multiple Vitamins-Minerals (MULTIVITAMIN ADULT PO) Take by mouth daily       No current facility-administered medications for this visit. Review of Systems    /70   Pulse 74   Ht 6' 3\" (1.905 m)   Wt 300 lb (136.1 kg)   SpO2 96%   BMI 37.50 kg/m²   BP Readings from Last 7 Encounters:   03/14/22 112/70   09/13/21 120/72   01/07/21 112/72   08/07/20 130/82   08/07/20 120/86   06/29/20 132/80   03/30/20 110/72     Wt Readings from Last 7 Encounters:   03/14/22 300 lb (136.1 kg)   09/13/21 296 lb (134.3 kg)   01/07/21 296 lb (134.3 kg)   08/07/20 295 lb (133.8 kg)   08/07/20 299 lb (135.6 kg)   06/29/20 300 lb (136.1 kg)   03/30/20 298 lb (135.2 kg)     BMI Readings from Last 7 Encounters:   03/14/22 37.50 kg/m²   09/13/21 37.00 kg/m²   01/07/21 37.00 kg/m²   08/07/20 36.87 kg/m²   08/07/20 38.39 kg/m²   06/29/20 38.52 kg/m²   03/30/20 38.26 kg/m²     Resp Readings from Last 7 Encounters:   07/01/19 18   12/27/18 16   07/30/18 20   07/28/17 20       Physical Exam  Constitutional:       General: He is not in acute distress. Eyes:      General: No scleral icterus. Cardiovascular:      Heart sounds: Normal heart sounds. Pulmonary:      Breath sounds: Normal breath sounds. Musculoskeletal:      Cervical back: Neck supple. Lymphadenopathy:      Cervical: No cervical adenopathy. Skin:     Findings: No rash.          Results for orders placed or performed in visit on 03/07/22   PSA screening   Result Value Ref Range    PSA 1.05 0.00 - 4.00 ng/mL   Lipid Panel   Result Value Ref Range    Cholesterol, Total 134 (L) 160 - 199 mg/dL    Triglycerides 108 0 - 149 mg/dL    HDL 42 (L) 55 - 121 mg/dL    LDL Calculated 70 <100 mg/dL   TSH without Reflex   Result Value Ref Range    TSH 2.180 0.270 - 4.200 uIU/mL   Hemoglobin A1C   Result Value Ref Range    Hemoglobin A1C 5.8 4.0 - 6.0 %   Comprehensive Metabolic Panel   Result Value Ref Range    Sodium 145 136 - 145 mmol/L    Potassium 4.9 3.5 - 5.0 mmol/L    Chloride 107 98 - 111 mmol/L    CO2 28 22 - 29 mmol/L    Anion Gap 10 7 - 19 mmol/L    Glucose 100 74 - 109 mg/dL    BUN 19 8 - 23 mg/dL    CREATININE 0.7 0.5 - 1.2 mg/dL    GFR Non-African American >60 >60    GFR African American >59 >59    Calcium 9.3 8.8 - 10.2 mg/dL    Total Protein 6.5 (L) 6.6 - 8.7 g/dL    Albumin 4.0 3.5 - 5.2 g/dL    Total Bilirubin 0.7 0.2 - 1.2 mg/dL    Alkaline Phosphatase 74 40 - 130 U/L    ALT 52 (H) 5 - 41 U/L    AST 41 (H) 5 - 40 U/L   CBC   Result Value Ref Range    WBC 6.8 4.8 - 10.8 K/uL    RBC 4.93 4.70 - 6.10 M/uL    Hemoglobin 15.2 14.0 - 18.0 g/dL    Hematocrit 47.8 42.0 - 52.0 %    MCV 97.0 (H) 80.0 - 94.0 fL    MCH 30.8 27.0 - 31.0 pg    MCHC 31.8 (L) 33.0 - 37.0 g/dL    RDW 13.1 11.5 - 14.5 %    Platelets 470 478 - 740 K/uL    MPV 11.2 9.4 - 12.4 fL       ASSESSMENT/ PLAN:  1. Essential hypertension  Good blood pressure control continue with current medical plan of care    2. Coronary artery disease involving native coronary artery of native heart without angina pectoris  Labs follow beta-blocker statin therapy anticoagulant therapy and follow-up also sees Dr. Angel Luis Garrison cardiology  - CBC; Future  - Comprehensive Metabolic Panel; Future  - TSH; Future  - Lipid Panel; Future    3. Mixed hyperlipidemia  Continue with high-dose statin therapy  - TSH; Future  - Lipid Panel; Future    4. Class 2 obesity due to excess calories without serious comorbidity with body mass index (BMI) of 38.0 to 38.9 in adult  Diet exercise and follow    5.  Idiopathic peripheral neuropathy  Feels much better when on Lyrica we will continue with this plan of care    Labs and cardiology note reviewed discussed and interpreted

## 2022-03-18 ENCOUNTER — HOSPITAL ENCOUNTER (OUTPATIENT)
Dept: CARDIOLOGY | Facility: HOSPITAL | Age: 77
Discharge: HOME | End: 2022-03-18
Admitting: INTERNAL MEDICINE

## 2022-03-18 VITALS
SYSTOLIC BLOOD PRESSURE: 112 MMHG | BODY MASS INDEX: 37.5 KG/M2 | WEIGHT: 301.59 LBS | DIASTOLIC BLOOD PRESSURE: 70 MMHG | HEIGHT: 75 IN

## 2022-03-18 DIAGNOSIS — M79.89 LEG SWELLING: ICD-10-CM

## 2022-03-18 DIAGNOSIS — R06.02 SHORTNESS OF BREATH: ICD-10-CM

## 2022-03-18 PROCEDURE — 93356 MYOCRD STRAIN IMG SPCKL TRCK: CPT | Performed by: INTERNAL MEDICINE

## 2022-03-18 PROCEDURE — 93306 TTE W/DOPPLER COMPLETE: CPT

## 2022-03-18 PROCEDURE — 93356 MYOCRD STRAIN IMG SPCKL TRCK: CPT

## 2022-03-18 PROCEDURE — 93306 TTE W/DOPPLER COMPLETE: CPT | Performed by: INTERNAL MEDICINE

## 2022-03-21 LAB
BH CV ECHO LEFT VENTRICLE GLOBAL LONGITUDINAL STRAIN: -12.3 %
BH CV ECHO MEAS - AO MAX PG (FULL): 5.5 MMHG
BH CV ECHO MEAS - AO MAX PG: 8.8 MMHG
BH CV ECHO MEAS - AO MEAN PG (FULL): 2 MMHG
BH CV ECHO MEAS - AO MEAN PG: 4 MMHG
BH CV ECHO MEAS - AO ROOT AREA (BSA CORRECTED): 1.1
BH CV ECHO MEAS - AO ROOT AREA: 6.6 CM^2
BH CV ECHO MEAS - AO ROOT DIAM: 2.9 CM
BH CV ECHO MEAS - AO V2 MAX: 148 CM/SEC
BH CV ECHO MEAS - AO V2 MEAN: 93.6 CM/SEC
BH CV ECHO MEAS - AO V2 VTI: 33.1 CM
BH CV ECHO MEAS - AVA(I,A): 1.9 CM^2
BH CV ECHO MEAS - AVA(I,D): 1.9 CM^2
BH CV ECHO MEAS - AVA(V,A): 1.9 CM^2
BH CV ECHO MEAS - AVA(V,D): 1.9 CM^2
BH CV ECHO MEAS - BSA(HAYCOCK): 2.7 M^2
BH CV ECHO MEAS - BSA: 2.6 M^2
BH CV ECHO MEAS - BZI_BMI: 37.6 KILOGRAMS/M^2
BH CV ECHO MEAS - BZI_METRIC_HEIGHT: 190.5 CM
BH CV ECHO MEAS - BZI_METRIC_WEIGHT: 136.5 KG
BH CV ECHO MEAS - EDV(CUBED): 94.8 ML
BH CV ECHO MEAS - EDV(MOD-SP2): 103 ML
BH CV ECHO MEAS - EDV(MOD-SP4): 198 ML
BH CV ECHO MEAS - EDV(TEICH): 95.4 ML
BH CV ECHO MEAS - EF(CUBED): 70.7 %
BH CV ECHO MEAS - EF(MOD-SP2): 66.9 %
BH CV ECHO MEAS - EF(MOD-SP4): 64.7 %
BH CV ECHO MEAS - EF(TEICH): 62.4 %
BH CV ECHO MEAS - ESV(CUBED): 27.8 ML
BH CV ECHO MEAS - ESV(MOD-SP2): 34.1 ML
BH CV ECHO MEAS - ESV(MOD-SP4): 69.8 ML
BH CV ECHO MEAS - ESV(TEICH): 35.9 ML
BH CV ECHO MEAS - FS: 33.6 %
BH CV ECHO MEAS - IVS/LVPW: 1.1
BH CV ECHO MEAS - IVSD: 1.4 CM
BH CV ECHO MEAS - LA DIMENSION: 3.2 CM
BH CV ECHO MEAS - LA/AO: 1.1
BH CV ECHO MEAS - LAT PEAK E' VEL: 10.6 CM/SEC
BH CV ECHO MEAS - LV DIASTOLIC VOL/BSA (35-75): 75.8 ML/M^2
BH CV ECHO MEAS - LV MASS(C)D: 232.3 GRAMS
BH CV ECHO MEAS - LV MASS(C)DI: 89 GRAMS/M^2
BH CV ECHO MEAS - LV MAX PG: 3.3 MMHG
BH CV ECHO MEAS - LV MEAN PG: 2 MMHG
BH CV ECHO MEAS - LV SYSTOLIC VOL/BSA (12-30): 26.7 ML/M^2
BH CV ECHO MEAS - LV V1 MAX: 90.2 CM/SEC
BH CV ECHO MEAS - LV V1 MEAN: 59.9 CM/SEC
BH CV ECHO MEAS - LV V1 VTI: 20 CM
BH CV ECHO MEAS - LVIDD: 4.6 CM
BH CV ECHO MEAS - LVIDS: 3 CM
BH CV ECHO MEAS - LVLD AP2: 8.2 CM
BH CV ECHO MEAS - LVLD AP4: 9.8 CM
BH CV ECHO MEAS - LVLS AP2: 6.1 CM
BH CV ECHO MEAS - LVLS AP4: 7.8 CM
BH CV ECHO MEAS - LVOT AREA (M): 3.1 CM^2
BH CV ECHO MEAS - LVOT AREA: 3.1 CM^2
BH CV ECHO MEAS - LVOT DIAM: 2 CM
BH CV ECHO MEAS - LVPWD: 1.2 CM
BH CV ECHO MEAS - MED PEAK E' VEL: 5.87 CM/SEC
BH CV ECHO MEAS - MV A MAX VEL: 97 CM/SEC
BH CV ECHO MEAS - MV DEC TIME: 0.3 SEC
BH CV ECHO MEAS - MV E MAX VEL: 81.5 CM/SEC
BH CV ECHO MEAS - MV E/A: 0.84
BH CV ECHO MEAS - RAP SYSTOLE: 10 MMHG
BH CV ECHO MEAS - RVSP: 14.2 MMHG
BH CV ECHO MEAS - SI(AO): 83.7 ML/M^2
BH CV ECHO MEAS - SI(CUBED): 25.7 ML/M^2
BH CV ECHO MEAS - SI(LVOT): 24.1 ML/M^2
BH CV ECHO MEAS - SI(MOD-SP2): 26.4 ML/M^2
BH CV ECHO MEAS - SI(MOD-SP4): 49.1 ML/M^2
BH CV ECHO MEAS - SI(TEICH): 22.8 ML/M^2
BH CV ECHO MEAS - SV(AO): 218.6 ML
BH CV ECHO MEAS - SV(CUBED): 67 ML
BH CV ECHO MEAS - SV(LVOT): 62.8 ML
BH CV ECHO MEAS - SV(MOD-SP2): 68.9 ML
BH CV ECHO MEAS - SV(MOD-SP4): 128.2 ML
BH CV ECHO MEAS - SV(TEICH): 59.5 ML
BH CV ECHO MEAS - TR MAX VEL: 103 CM/SEC
BH CV ECHO MEASUREMENTS AVERAGE E/E' RATIO: 9.9
LEFT ATRIUM VOLUME: 92 CM3
MAXIMAL PREDICTED HEART RATE: 141 BPM
STRESS TARGET HR: 120 BPM

## 2022-04-18 DIAGNOSIS — K21.9 GASTROESOPHAGEAL REFLUX DISEASE WITHOUT ESOPHAGITIS: Primary | ICD-10-CM

## 2022-04-18 NOTE — TELEPHONE ENCOUNTER
Ginna Llamas called to request a refill on his medication.       Last office visit : 3/14/2022   Next office visit : 9/15/2022     Requested Prescriptions     Pending Prescriptions Disp Refills    omeprazole (PRILOSEC) 20 MG delayed release capsule [Pharmacy Med Name: OMEPRAZOLE DR CAPS 20MG] 180 capsule 3     Sig: TAKE 1 CAPSULE TWICE A DAY            Gabbie Lindsey, [facility]

## 2022-04-19 RX ORDER — OMEPRAZOLE 20 MG/1
CAPSULE, DELAYED RELEASE ORAL
Qty: 180 CAPSULE | Refills: 3 | Status: SHIPPED | OUTPATIENT
Start: 2022-04-19 | End: 2022-06-20

## 2022-05-02 DIAGNOSIS — I10 ESSENTIAL HYPERTENSION: Primary | ICD-10-CM

## 2022-05-02 RX ORDER — METOPROLOL SUCCINATE 25 MG/1
TABLET, EXTENDED RELEASE ORAL
Qty: 90 TABLET | Refills: 3 | Status: SHIPPED | OUTPATIENT
Start: 2022-05-02

## 2022-05-02 NOTE — TELEPHONE ENCOUNTER
True Cruz called to request a refill on his medication.       Last office visit : 3/14/2022   Next office visit : 9/15/2022     Requested Prescriptions     Pending Prescriptions Disp Refills    metoprolol succinate (TOPROL XL) 25 MG extended release tablet [Pharmacy Med Name: METOPROLOL SUCCINATE ER TABS 25MG] 90 tablet 3     Sig: TAKE 1 TABLET DAILY            Fanta Guajardo MA

## 2022-05-23 ENCOUNTER — TELEPHONE (OUTPATIENT)
Dept: INTERNAL MEDICINE | Age: 77
End: 2022-05-23

## 2022-05-23 DIAGNOSIS — G60.9 IDIOPATHIC PERIPHERAL NEUROPATHY: ICD-10-CM

## 2022-05-25 RX ORDER — PREGABALIN 100 MG/1
CAPSULE ORAL
Qty: 180 CAPSULE | Refills: 1 | Status: SHIPPED | OUTPATIENT
Start: 2022-05-25 | End: 2022-07-15

## 2022-06-15 ENCOUNTER — APPOINTMENT (OUTPATIENT)
Dept: CT IMAGING | Facility: HOSPITAL | Age: 77
End: 2022-06-15

## 2022-06-15 ENCOUNTER — TELEPHONE (OUTPATIENT)
Dept: OTOLARYNGOLOGY | Facility: CLINIC | Age: 77
End: 2022-06-15

## 2022-06-15 ENCOUNTER — APPOINTMENT (OUTPATIENT)
Dept: GENERAL RADIOLOGY | Facility: HOSPITAL | Age: 77
End: 2022-06-15

## 2022-06-15 ENCOUNTER — HOSPITAL ENCOUNTER (EMERGENCY)
Facility: HOSPITAL | Age: 77
Discharge: HOME | End: 2022-06-15
Admitting: EMERGENCY MEDICINE

## 2022-06-15 VITALS
WEIGHT: 300 LBS | HEART RATE: 55 BPM | BODY MASS INDEX: 38.5 KG/M2 | HEIGHT: 74 IN | TEMPERATURE: 98.5 F | DIASTOLIC BLOOD PRESSURE: 73 MMHG | SYSTOLIC BLOOD PRESSURE: 131 MMHG | RESPIRATION RATE: 18 BRPM | OXYGEN SATURATION: 95 %

## 2022-06-15 DIAGNOSIS — R47.9 SPEECH DISTURBANCE, UNSPECIFIED TYPE: Primary | ICD-10-CM

## 2022-06-15 LAB
ALBUMIN SERPL-MCNC: 4.1 G/DL (ref 3.5–5.2)
ALBUMIN/GLOB SERPL: 1.6 G/DL
ALP SERPL-CCNC: 74 U/L (ref 39–117)
ALT SERPL W P-5'-P-CCNC: 44 U/L (ref 1–41)
ANION GAP SERPL CALCULATED.3IONS-SCNC: 8 MMOL/L (ref 5–15)
AST SERPL-CCNC: 39 U/L (ref 1–40)
BASOPHILS # BLD AUTO: 0.06 10*3/MM3 (ref 0–0.2)
BASOPHILS NFR BLD AUTO: 1 % (ref 0–1.5)
BILIRUB SERPL-MCNC: 0.8 MG/DL (ref 0–1.2)
BUN SERPL-MCNC: 17 MG/DL (ref 8–23)
BUN/CREAT SERPL: 20.2 (ref 7–25)
CALCIUM SPEC-SCNC: 9.4 MG/DL (ref 8.6–10.5)
CHLORIDE SERPL-SCNC: 105 MMOL/L (ref 98–107)
CO2 SERPL-SCNC: 29 MMOL/L (ref 22–29)
CREAT SERPL-MCNC: 0.84 MG/DL (ref 0.76–1.27)
DEPRECATED RDW RBC AUTO: 44.2 FL (ref 37–54)
EGFRCR SERPLBLD CKD-EPI 2021: 88.7 ML/MIN/1.73
EOSINOPHIL # BLD AUTO: 0.26 10*3/MM3 (ref 0–0.4)
EOSINOPHIL NFR BLD AUTO: 4.2 % (ref 0.3–6.2)
ERYTHROCYTE [DISTWIDTH] IN BLOOD BY AUTOMATED COUNT: 12.7 % (ref 12.3–15.4)
GLOBULIN UR ELPH-MCNC: 2.5 GM/DL
GLUCOSE SERPL-MCNC: 148 MG/DL (ref 65–99)
HCT VFR BLD AUTO: 45 % (ref 37.5–51)
HGB BLD-MCNC: 15.2 G/DL (ref 13–17.7)
HOLD SPECIMEN: NORMAL
HOLD SPECIMEN: NORMAL
IMM GRANULOCYTES # BLD AUTO: 0.01 10*3/MM3 (ref 0–0.05)
IMM GRANULOCYTES NFR BLD AUTO: 0.2 % (ref 0–0.5)
LYMPHOCYTES # BLD AUTO: 1.61 10*3/MM3 (ref 0.7–3.1)
LYMPHOCYTES NFR BLD AUTO: 26.3 % (ref 19.6–45.3)
MCH RBC QN AUTO: 31.9 PG (ref 26.6–33)
MCHC RBC AUTO-ENTMCNC: 33.8 G/DL (ref 31.5–35.7)
MCV RBC AUTO: 94.3 FL (ref 79–97)
MONOCYTES # BLD AUTO: 0.63 10*3/MM3 (ref 0.1–0.9)
MONOCYTES NFR BLD AUTO: 10.3 % (ref 5–12)
NEUTROPHILS NFR BLD AUTO: 3.55 10*3/MM3 (ref 1.7–7)
NEUTROPHILS NFR BLD AUTO: 58 % (ref 42.7–76)
NRBC BLD AUTO-RTO: 0 /100 WBC (ref 0–0.2)
NT-PROBNP SERPL-MCNC: 69.6 PG/ML (ref 0–1800)
PLATELET # BLD AUTO: 220 10*3/MM3 (ref 140–450)
PMV BLD AUTO: 10.8 FL (ref 6–12)
POTASSIUM SERPL-SCNC: 4 MMOL/L (ref 3.5–5.2)
PROT SERPL-MCNC: 6.6 G/DL (ref 6–8.5)
RBC # BLD AUTO: 4.77 10*6/MM3 (ref 4.14–5.8)
SODIUM SERPL-SCNC: 142 MMOL/L (ref 136–145)
TROPONIN T SERPL-MCNC: <0.01 NG/ML (ref 0–0.03)
WBC NRBC COR # BLD: 6.12 10*3/MM3 (ref 3.4–10.8)
WHOLE BLOOD HOLD COAG: NORMAL
WHOLE BLOOD HOLD SPECIMEN: NORMAL

## 2022-06-15 PROCEDURE — 84484 ASSAY OF TROPONIN QUANT: CPT | Performed by: EMERGENCY MEDICINE

## 2022-06-15 PROCEDURE — 71045 X-RAY EXAM CHEST 1 VIEW: CPT

## 2022-06-15 PROCEDURE — 85025 COMPLETE CBC W/AUTO DIFF WBC: CPT | Performed by: EMERGENCY MEDICINE

## 2022-06-15 PROCEDURE — 36415 COLL VENOUS BLD VENIPUNCTURE: CPT

## 2022-06-15 PROCEDURE — 70450 CT HEAD/BRAIN W/O DYE: CPT

## 2022-06-15 PROCEDURE — 83880 ASSAY OF NATRIURETIC PEPTIDE: CPT | Performed by: EMERGENCY MEDICINE

## 2022-06-15 PROCEDURE — 93010 ELECTROCARDIOGRAM REPORT: CPT | Performed by: INTERNAL MEDICINE

## 2022-06-15 PROCEDURE — 80053 COMPREHEN METABOLIC PANEL: CPT | Performed by: EMERGENCY MEDICINE

## 2022-06-15 PROCEDURE — 99284 EMERGENCY DEPT VISIT MOD MDM: CPT

## 2022-06-15 PROCEDURE — 0 IOPAMIDOL PER 1 ML: Performed by: NURSE PRACTITIONER

## 2022-06-15 PROCEDURE — 93005 ELECTROCARDIOGRAM TRACING: CPT | Performed by: EMERGENCY MEDICINE

## 2022-06-15 PROCEDURE — 70496 CT ANGIOGRAPHY HEAD: CPT

## 2022-06-15 PROCEDURE — 70498 CT ANGIOGRAPHY NECK: CPT

## 2022-06-15 RX ORDER — SODIUM CHLORIDE 0.9 % (FLUSH) 0.9 %
10 SYRINGE (ML) INJECTION AS NEEDED
Status: DISCONTINUED | OUTPATIENT
Start: 2022-06-15 | End: 2022-06-15 | Stop reason: HOSPADM

## 2022-06-15 RX ADMIN — IOPAMIDOL 80 ML: 755 INJECTION, SOLUTION INTRAVENOUS at 19:00

## 2022-06-15 NOTE — ED PROVIDER NOTES
Subjective   77 yom with PMH of hyperlipidemia, arthritis, GERD, sinusitis, CAD and sleep apnea presents with c/o a speech problem.  He states he was working as a  today when his tongue felt swollen and he had difficulty speaking.  He states the symptoms resolved.  He reports a similar issue last week.  It was, again, when he was working as a  and had to speak for an extended period of time.  He states they must talk the entire tour.  Symptoms resolved at that time as well with no intervention other than when he stopped talking. He denies any weakness.  He denies any numbness or tingling.  No facial droop.  No CP or SOB.           Review of Systems   Constitutional: Negative for activity change, appetite change, fatigue and fever.   HENT: Negative for congestion, ear pain, facial swelling and sore throat.    Eyes: Negative for discharge and visual disturbance.   Respiratory: Negative for apnea, chest tightness, shortness of breath, wheezing and stridor.    Cardiovascular: Negative for chest pain and palpitations.   Gastrointestinal: Negative for abdominal distention, abdominal pain, diarrhea, nausea and vomiting.   Genitourinary: Negative for difficulty urinating and dysuria.   Musculoskeletal: Negative for arthralgias and myalgias.   Skin: Negative for rash and wound.   Neurological: Positive for speech difficulty. Negative for dizziness and seizures.   Psychiatric/Behavioral: Negative for agitation and confusion.       Past Medical History:   Diagnosis Date   • Abnormal ECG    • Arthritis    • Cancer (HCC)    • Coronary artery disease    • COVID-19 vaccine series completed     pfizer   • Dizziness    • GERD (gastroesophageal reflux disease)    • Hx of colonic polyps    • Hyperlipidemia    • Neuropathy    • Pneumonia    • Rheumatic fever    • Sinusitis    • Sleep apnea        Allergies   Allergen Reactions   • Ampicillin Rash   • Penicillins Rash       Past Surgical History:   Procedure  Laterality Date   • CARDIAC CATHETERIZATION     • CARDIAC SURGERY      HEART STENT   • CHOLECYSTECTOMY     • COLONOSCOPY N/A 2017    Procedure: COLONOSCOPY WITH ANESTHESIA;  Surgeon: Matthew Cagle MD;  Location:  [facility];  Service:    • COLONOSCOPY W/ POLYPECTOMY  2012    Adenomatous polyp at 50 cm repeat exam in 5 years   • CORONARY STENT PLACEMENT     • ENDOSCOPY AND COLONOSCOPY  2009    Acute ulcerative esophagogastritis grade c, Hyperplastic polyp at 50 cm repeat colonoscopy in 3 years   • EYE SURGERY      DONNIE CATARACT REMOVAL   • FLAP HEAD/NECK Left 3/10/2021    Procedure: POSSIBLE FLAP OR GRAFT.;  Surgeon: Roberto Og MD;  Location:  [facility];  Service: ENT;  Laterality: Left;   • HEAD/NECK LESION/CYST EXCISION Left 3/10/2021    Procedure: EXCISION OF ATYPICAL PIGMENTED LESION OF THE LEFT CENTRAL TEMPLE REGION WITH POSSIBLE FLAP OR GRAFT. - Left;  Surgeon: Roberto Og MD;  Location:  [facility];  Service: ENT;  Laterality: Left;   • REPLACEMENT TOTAL KNEE BILATERAL     • TONSILLECTOMY         Family History   Problem Relation Age of Onset   • Heart failure Father    • Colon cancer Neg Hx    • Colon polyps Neg Hx        Social History     Socioeconomic History   • Marital status:    Tobacco Use   • Smoking status: Former Smoker     Types: Cigarettes     Quit date:      Years since quittin.5   • Smokeless tobacco: Never Used   Vaping Use   • Vaping Use: Never used   Substance and Sexual Activity   • Alcohol use: No   • Drug use: Never   • Sexual activity: Defer           Objective   Physical Exam  Vitals and nursing note reviewed.   Constitutional:       Appearance: He is well-developed.   HENT:      Head: Normocephalic.   Eyes:      Pupils: Pupils are equal, round, and reactive to light.   Cardiovascular:      Rate and Rhythm: Normal rate and regular rhythm.      Heart sounds: No murmur heard.  Pulmonary:      Effort: Pulmonary effort is normal.       "Breath sounds: Normal breath sounds.   Abdominal:      General: Bowel sounds are normal.      Palpations: Abdomen is soft.   Musculoskeletal:         General: Normal range of motion.      Cervical back: Normal range of motion and neck supple.   Skin:     General: Skin is warm and dry.   Neurological:      Mental Status: He is alert and oriented to person, place, and time.      GCS: GCS eye subscore is 4. GCS verbal subscore is 5. GCS motor subscore is 6.      Cranial Nerves: No cranial nerve deficit or facial asymmetry.      Sensory: Sensation is intact.      Motor: No weakness.      Gait: Gait is intact.      Comments: He is neurologically intact.  Speech is clear         Procedures           ED Course  ED Course as of 06/28/22 0628   Wed Husam 15, 2022   1739 Chest xray - IMPRESSION: 1. No radiographic evidence of acute cardiopulmonary process  CT of the head - IMPRESSION: 1. No acute intracranial abnormality is seen. 2. Diffuse sinusitis changes. [KS]   1828 I spoke with BAM Stevenson PA-C, with neurology.  CTA of the head and neck ordered.  The patient can be dc'd home to f/u with Pcp if those tests are negative.  [KS]   1920 CTA of the head - IMPRESSION: CT angiogram of the brain is within normal limits demonstrating no major branch occlusion or aneurysm.  CTA of the neck - IMPRESSION:1. NASCET criteria utilized. 2. The vertebral arteries are patent in the neck bilaterally without plaque or stenosis.3. The carotid arteries are patent in the neck with no significant plaque or stenosis.  4. Bilateral thyroid nodules. Consider follow-up ultrasound if not performed previously. This is marked as \"incidental findings\" in PACS for follow-up.  I reviewed his case with Dr Anderson. He will be dc'd home to f/u with PCP.  He voiced understanding of results and instructions.   I also spoke with his wife who voiced understanding of results and instructions. [KS]      ED Course User Index  [KS] Cherri Bales, APRN    "                                              MDM  Number of Diagnoses or Management Options  Speech disturbance, unspecified type: new and requires workup     Amount and/or Complexity of Data Reviewed  Clinical lab tests: ordered and reviewed  Tests in the radiology section of CPT®: ordered and reviewed  Discuss the patient with other providers: yes    Risk of Complications, Morbidity, and/or Mortality  Presenting problems: low  Diagnostic procedures: low  Management options: low    Patient Progress  Patient progress: stable      Final diagnoses:   Speech disturbance, unspecified type       ED Disposition  ED Disposition     ED Disposition   Discharge    Condition   Stable    Comment   --             Isabel Best MD  [address] [place] [address] [address]  [facility] [id number] [phone]    Call in 1 day  Routine ED follow up         Medication List      Changed    fluticasone 50 MCG/ACT nasal spray  Commonly known as: FLONASE  2 sprays into the nostril(s) as directed by provider Daily.  What changed:   · when to take this  · reasons to take this             Cherri Bales, APRFAVIAN  06/28/22 3134

## 2022-06-15 NOTE — TELEPHONE ENCOUNTER
Patient called to state tongue feels odd and has difficulty with speech. Patient told to go to ER, which he did

## 2022-06-16 ENCOUNTER — TELEPHONE (OUTPATIENT)
Dept: INTERNAL MEDICINE | Age: 77
End: 2022-06-16

## 2022-06-16 ENCOUNTER — TRANSCRIBE ORDERS (OUTPATIENT)
Dept: ADMINISTRATIVE | Facility: HOSPITAL | Age: 77
End: 2022-06-16

## 2022-06-16 ENCOUNTER — HOSPITAL ENCOUNTER (OUTPATIENT)
Dept: CARDIOLOGY | Facility: HOSPITAL | Age: 77
Discharge: HOME | End: 2022-06-16

## 2022-06-16 ENCOUNTER — OFFICE VISIT (OUTPATIENT)
Dept: INTERNAL MEDICINE | Age: 77
End: 2022-06-16
Payer: MEDICARE

## 2022-06-16 ENCOUNTER — HOSPITAL ENCOUNTER (OUTPATIENT)
Dept: MRI IMAGING | Facility: HOSPITAL | Age: 77
Discharge: HOME | End: 2022-06-16

## 2022-06-16 VITALS
HEIGHT: 75 IN | SYSTOLIC BLOOD PRESSURE: 134 MMHG | BODY MASS INDEX: 37.3 KG/M2 | OXYGEN SATURATION: 97 % | DIASTOLIC BLOOD PRESSURE: 82 MMHG | WEIGHT: 300 LBS | HEART RATE: 66 BPM

## 2022-06-16 DIAGNOSIS — G45.9 TIA (TRANSIENT ISCHEMIC ATTACK): Primary | ICD-10-CM

## 2022-06-16 DIAGNOSIS — I10 ESSENTIAL HYPERTENSION: ICD-10-CM

## 2022-06-16 DIAGNOSIS — G45.9 TIA (TRANSIENT ISCHEMIC ATTACK): ICD-10-CM

## 2022-06-16 DIAGNOSIS — R47.81 SLURRED SPEECH: ICD-10-CM

## 2022-06-16 DIAGNOSIS — K21.9 GASTROESOPHAGEAL REFLUX DISEASE WITHOUT ESOPHAGITIS: ICD-10-CM

## 2022-06-16 DIAGNOSIS — H81.11 BPPV (BENIGN PAROXYSMAL POSITIONAL VERTIGO), RIGHT: Primary | ICD-10-CM

## 2022-06-16 DIAGNOSIS — R42 DIZZINESS: ICD-10-CM

## 2022-06-16 DIAGNOSIS — I25.10 CORONARY ARTERY DISEASE INVOLVING NATIVE CORONARY ARTERY OF NATIVE HEART WITHOUT ANGINA PECTORIS: ICD-10-CM

## 2022-06-16 DIAGNOSIS — H81.11 BPPV (BENIGN PAROXYSMAL POSITIONAL VERTIGO), RIGHT: ICD-10-CM

## 2022-06-16 PROBLEM — D48.9 NEOPLASM OF UNCERTAIN BEHAVIOR: Status: ACTIVE | Noted: 2022-01-13

## 2022-06-16 LAB
QT INTERVAL: 396 MS
QTC INTERVAL: 427 MS

## 2022-06-16 PROCEDURE — G8417 CALC BMI ABV UP PARAM F/U: HCPCS | Performed by: INTERNAL MEDICINE

## 2022-06-16 PROCEDURE — 93242 EXT ECG>48HR<7D RECORDING: CPT

## 2022-06-16 PROCEDURE — 1123F ACP DISCUSS/DSCN MKR DOCD: CPT | Performed by: INTERNAL MEDICINE

## 2022-06-16 PROCEDURE — G8427 DOCREV CUR MEDS BY ELIG CLIN: HCPCS | Performed by: INTERNAL MEDICINE

## 2022-06-16 PROCEDURE — 1036F TOBACCO NON-USER: CPT | Performed by: INTERNAL MEDICINE

## 2022-06-16 PROCEDURE — 99214 OFFICE O/P EST MOD 30 MIN: CPT | Performed by: INTERNAL MEDICINE

## 2022-06-16 PROCEDURE — 70551 MRI BRAIN STEM W/O DYE: CPT

## 2022-06-16 RX ORDER — CLOPIDOGREL BISULFATE 75 MG/1
75 TABLET ORAL DAILY
Qty: 30 TABLET | Refills: 3 | Status: SHIPPED | OUTPATIENT
Start: 2022-06-16 | End: 2022-07-15

## 2022-06-16 ASSESSMENT — PATIENT HEALTH QUESTIONNAIRE - PHQ9
1. LITTLE INTEREST OR PLEASURE IN DOING THINGS: 0
SUM OF ALL RESPONSES TO PHQ QUESTIONS 1-9: 0
SUM OF ALL RESPONSES TO PHQ9 QUESTIONS 1 & 2: 0
2. FEELING DOWN, DEPRESSED OR HOPELESS: 0
SUM OF ALL RESPONSES TO PHQ QUESTIONS 1-9: 0

## 2022-06-16 NOTE — TELEPHONE ENCOUNTER
Patient is having trouble with speech and was slurring words. Wife took patient to the Emergency Room to be looked at and would like to speak with nurse regarding this.

## 2022-06-16 NOTE — DISCHARGE INSTRUCTIONS
Drink plenty of fluid.  Continue home medication.  Follow up with Pcp -call tomorrow for appointment. Return to ED if condition does not improve or worsens

## 2022-06-16 NOTE — PROGRESS NOTES
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 Currently week ago and was temporary. No other neurologic symptoms he had not taken any other medication at that time he had this happen again yesterday he had a little vertigo and had taken a Robinul yesterday. It happened again yesterday but it happened a week ago and Tuesday without the medication pt has had slurred speech and on for 1 week. Pt went to ER and had CTA head, neck  And ct without and labs. 3 episodes of slurred speech last few days. He denies palpitations lightheadedness chest pain. He does have some vertigo intermittently ENT had given him the Robinul for vertigo because he did not tolerate meclizine.     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     left at fibular head s/p decompression 2009       Past Surgical History:   Procedure Laterality Date    CATARACT EXTRACTION W/  INTRAOCULAR LENS IMPLANT      CHOLECYSTECTOMY  07/2005    CORONARY ANGIOPLASTY WITH STENT PLACEMENT  12/26/2018    [place] to Circumflex    JOINT REPLACEMENT Bilateral 2003    Knee replacements    KNEE ARTHROPLASTY Bilateral 2003    complete combine condylye and plateau    NERVE SURGERY      Nerve release       Family History   Problem Relation Age of Onset    COPD Mother     Rheum Arthritis Father     COPD Brother     Lung Cancer Brother        Social History     Socioeconomic History    Marital status:      Spouse name: Keyona Guerrero Number of children: 2    Years of education: 21    Highest education level: Not on file   Occupational History    Occupation: retired teacher   Tobacco Use    Smoking status: Former Smoker     Packs/day: 1.00     Years: 12.00     Pack years: 12.00     Quit date:      Years since quittin.4    Smokeless tobacco: Never Used   Vaping Use    Vaping Use: Never used   Substance and Sexual Activity    Alcohol use: No    Drug use: No    Sexual activity: Yes     Partners: Female     Comment: wife   Other Topics Concern    Not on file   Social History Narrative    Not on file     Social Determinants of Health     Financial Resource Strain: Low Risk     Difficulty of Paying Living Expenses: Not very hard   Food Insecurity: No Food Insecurity    Worried About Running Out of Food in the Last Year: Never true    Dayan of Food in the Last Year: Never true   Transportation Needs:     Lack of Transportation (Medical): Not on file    Lack of Transportation (Non-Medical):  Not on file   Physical Activity:     Days of Exercise per Week: Not on file    Minutes of Exercise per Session: Not on file   Stress:     Feeling of Stress : Not on file   Social Connections:     Frequency of Communication with Friends and Family: Not on file    Frequency of Social Gatherings with Friends and Family: Not on file    Attends Confucianist Services: Not on file    Active Member of [address] [place] [address] or Organizations: Not on file    Attends Club or Organization Meetings: Not on file    Marital Status: Not on file   Intimate Partner Violence:     Fear of Current or Ex-Partner: Not on file    Emotionally Abused: Not on file    Physically Abused: Not on file    Sexually Abused: Not on file   Housing Stability:     Unable to Pay for Housing in the Last Year: Not on file    Number of [place] in the Last Year: Not on file    Unstable Housing in the Last Year: Not on file       Allergies Allergen Reactions    Ampicillin Rash       Current Outpatient Medications   Medication Sig Dispense Refill    clopidogrel (PLAVIX) 75 MG tablet Take 1 tablet by mouth daily 30 tablet 3    pregabalin (LYRICA) 100 MG capsule TAKE 1 CAPSULE TWICE A  capsule 1    metoprolol succinate (TOPROL XL) 25 MG extended release tablet TAKE 1 TABLET DAILY 90 tablet 3    omeprazole (PRILOSEC) 20 MG delayed release capsule TAKE 1 CAPSULE TWICE A  capsule 3    fluticasone (FLONASE) 50 MCG/ACT nasal spray 1 spray by Each Nostril route daily      atorvastatin (LIPITOR) 80 MG tablet TAKE 1 TABLET NIGHTLY 90 tablet 3    aspirin 81 MG tablet Take 81 mg by mouth daily      CPAP Machine MISC by Does not apply route      Multiple Vitamins-Minerals (MULTIVITAMIN ADULT PO) Take by mouth daily       No current facility-administered medications for this visit. Review of Systems    /82   Pulse 66   Ht 6' 3\" (1.905 m)   Wt 300 lb (136.1 kg)   SpO2 97%   BMI 37.50 kg/m²   BP Readings from Last 7 Encounters:   06/16/22 134/82   03/14/22 112/70   09/13/21 120/72   01/07/21 112/72   08/07/20 130/82   08/07/20 120/86   06/29/20 132/80     Wt Readings from Last 7 Encounters:   06/16/22 300 lb (136.1 kg)   03/14/22 300 lb (136.1 kg)   09/13/21 296 lb (134.3 kg)   01/07/21 296 lb (134.3 kg)   08/07/20 295 lb (133.8 kg)   08/07/20 299 lb (135.6 kg)   06/29/20 300 lb (136.1 kg)     BMI Readings from Last 7 Encounters:   06/16/22 37.50 kg/m²   03/14/22 37.50 kg/m²   09/13/21 37.00 kg/m²   01/07/21 37.00 kg/m²   08/07/20 36.87 kg/m²   08/07/20 38.39 kg/m²   06/29/20 38.52 kg/m²     Resp Readings from Last 7 Encounters:   07/01/19 18   12/27/18 16   07/30/18 20   07/28/17 20       Physical Exam  Constitutional:       General: He is not in acute distress. Appearance: He is obese. Eyes:      General: No scleral icterus. Cardiovascular:      Heart sounds: Normal heart sounds.    Pulmonary:      Breath sounds: Normal breath sounds. Musculoskeletal:      Cervical back: Neck supple. Lymphadenopathy:      Cervical: No cervical adenopathy. Skin:     Findings: No rash. Mild nystagmus vertigo with Epley maneuver    Results for orders placed or performed in visit on 03/07/22   PSA screening   Result Value Ref Range    PSA 1.05 0.00 - 4.00 ng/mL   Lipid Panel   Result Value Ref Range    Cholesterol, Total 134 (L) 160 - 199 mg/dL    Triglycerides 108 0 - 149 mg/dL    HDL 42 (L) 55 - 121 mg/dL    LDL Calculated 70 <100 mg/dL   TSH without Reflex   Result Value Ref Range    TSH 2.180 0.270 - 4.200 uIU/mL   Hemoglobin A1C   Result Value Ref Range    Hemoglobin A1C 5.8 4.0 - 6.0 %   Comprehensive Metabolic Panel   Result Value Ref Range    Sodium 145 136 - 145 mmol/L    Potassium 4.9 3.5 - 5.0 mmol/L    Chloride 107 98 - 111 mmol/L    CO2 28 22 - 29 mmol/L    Anion Gap 10 7 - 19 mmol/L    Glucose 100 74 - 109 mg/dL    BUN 19 8 - 23 mg/dL    CREATININE 0.7 0.5 - 1.2 mg/dL    GFR Non-African American >60 >60    GFR African American >59 >59    Calcium 9.3 8.8 - 10.2 mg/dL    Total Protein 6.5 (L) 6.6 - 8.7 g/dL    Albumin 4.0 3.5 - 5.2 g/dL    Total Bilirubin 0.7 0.2 - 1.2 mg/dL    Alkaline Phosphatase 74 40 - 130 U/L    ALT 52 (H) 5 - 41 U/L    AST 41 (H) 5 - 40 U/L   CBC   Result Value Ref Range    WBC 6.8 4.8 - 10.8 K/uL    RBC 4.93 4.70 - 6.10 M/uL    Hemoglobin 15.2 14.0 - 18.0 g/dL    Hematocrit 47.8 42.0 - 52.0 %    MCV 97.0 (H) 80.0 - 94.0 fL    MCH 30.8 27.0 - 31.0 pg    MCHC 31.8 (L) 33.0 - 37.0 g/dL    RDW 13.1 11.5 - 14.5 %    Platelets 825 054 - 064 K/uL    MPV 11.2 9.4 - 12.4 fL       ASSESSMENT/ PLAN:  1. TIA (transient ischemic attack)  Reviewed his EKGs urology note for him February. I reviewed the ER notes from yesterday CTA of the neck and head. EKG and labs.   He is going out of town to stay on adding Plavix he needs MRI of the brain ASAP long-term continuous Zio patch for 4 days and a referral to [facility] neurology. He has been instructed if this reoccurs he is to go back to the ER  - [address]; Future  - LONGTERM CONTINUOUS CARDIAC EVENT MONITOR (ZIO); Future  - External Referral To Neurology    2. Slurred speech  The above go back to ER if this recurs also watch blood pressure closely add Plavix to his aspirin    3. Coronary artery disease involving native coronary artery of native heart without angina pectoris  No recurrent symptoms    4. BPPV (benign paroxysmal positional vertigo), right  I prefer not to use of Robinul if he has worsening vertigo let us know we can refer him to physical therapy for vertigo  - [facility]    5.  Essential hypertension  Blood pressure closely

## 2022-06-16 NOTE — TELEPHONE ENCOUNTER
You had referred him for vestibular treatment and she is calling to let you know that they only have 1 person that does this and she works part time. They are still trying to get patients scheduled from April.

## 2022-06-17 ENCOUNTER — TELEPHONE (OUTPATIENT)
Dept: INTERNAL MEDICINE | Age: 77
End: 2022-06-17

## 2022-06-17 NOTE — TELEPHONE ENCOUNTER
He is calling because he says that plavix does not mix well with omeprazole. Does he still take them both?

## 2022-06-20 RX ORDER — FAMOTIDINE 20 MG/1
20 TABLET, FILM COATED ORAL 2 TIMES DAILY
Qty: 180 TABLET | Refills: 1 | Status: SHIPPED | OUTPATIENT
Start: 2022-06-20

## 2022-06-20 NOTE — PATIENT INSTRUCTIONS
Patient Education        Epley Maneuver at Home for Vertigo: Exercises  Introduction  Vertigo is a spinning or whirling sensation when you move your head. Your doctor may have moved you in different positions to help your vertigo get better faster. This is called the Epley maneuver. Your doctor also may haveasked you to do these exercises at home. Do the exercises as often as your doctor recommends. If your vertigo is gettingworse, your doctor may have you change the exercise or stop it. Step 1  Step 1    1. Sit on the edge of a bed or sofa. Step 2    1. Turn your head 45 degrees in the direction your doctor told you to. This should be toward the ear that causes the most vertigo for you. In this picture, the woman is turning toward her left ear. Step 3    1. Tilt yourself backward until you are lying on your back. Your head should still be at a 45-degree turn. Your head should be about midway between looking straight ahead and looking out to your side. Hold for 30 seconds. If you have vertigo, stay in this position until it stops. Step 4    1. Turn your head 90 degrees toward the ear that has the least vertigo. In this picture, the woman is turning to the right because she has vertigo on her left side. The point of your chin should be raised and over your shoulder. Hold for 30 seconds. Step 5    1. Roll onto the side with the least vertigo. You should now be looking at the floor. Hold for 30 seconds. Follow-up care is a key part of your treatment and safety. Be sure to make and go to all appointments, and call your doctor if you are having problems. It's also a good idea to know your test results and keep alist of the medicines you take. Where can you learn more? Go to https://chpepiceweb.[email or web address] org and sign in to your Neurotrack account. Enter P283 in the Corvil box to learn more about \"Epley Maneuver at Home for Vertigo: Exercises. \"     If you do not have an account, please click on the \"Sign Up Now\" link. Current as of: December 13, 2021               Content Version: 13.2  © 2006-2022 [employer]. Care instructions adapted under license by [facility] ([facility]). If you have questions about a medical condition or this instruction, always ask your healthcare professional. [address] any warranty or liability for your use of this information. ,Patient Education        Cawthorne Exercises for Vertigo: Care Instructions  Your Care Instructions  Simple exercises can help you regain your balance when you have vertigo. If you have Ménière's disease, benign paroxysmal positional vertigo (BPPV), or anotherinner ear problem, you may have vertigo off and on. Do these exercises first thing in the morning and before you go to bed. You might get dizzy when you first start them. If this happens, try to do them for at least 5 minutes. Do a group of exercises at a time, starting at the top of the list. It may take several weeks before you can do all the exercises withoutfeeling dizzy. Follow-up care is a key part of your treatment and safety. Be sure to make and go to all appointments, and call your doctor if you are having problems. It's also a good idea to know your test results and keep alist of the medicines you take. How can you care for yourself at home? Exercise 1  While sitting on the side of the bed and holding your head still:   Look up as far as you can.  Look down as far as you can.  Look from side to side as far as you can.  Stretch your arm straight out in front of you. Focus on your index finger. Continue to focus on your finger while you bring it to your nose. Exercise 2  While sitting on the side of the bed:   Bring your head as far back as you can.  Bring your head forward to touch your chin to your chest.   Turn your head from side to side.  Do these exercises first with your eyes open. Then try with your eyes closed.   Exercise 3  While sitting on the side of the bed:   Shrug your shoulders straight upward, then relax them.  Bend over and try to touch the ground with your fingers. Then go back to a sitting position.  Toss a small ball from one hand to the other. Throw the ball higher than your eyes so you have to look up. Exercise 4  While standing (with someone close by if you feel uncomfortable):   Repeat Exercise 1.   Repeat Exercise 2.   Pass a ball between your legs and above your head.  Sit down and then stand up. Repeat. Turn around in a [place] a different way each time you stand.  With someone close by to help you, try the above exercises with your eyes closed. Exercise 5  In a room that is cleared of obstacles:   Walk to a corner of the room, turn to your right, and walk back to the starting point. Now, repeat and turn left.  Walk up and down a slope. Now try stairs.  While holding on to someone's arm, try these exercises with your eyes closed. When should you call for help? Watch closely for changes in your health, and be sure to contact your doctor if:     You do not get better as expected. Where can you learn more? Go to https://Mico Innovationspepiceweb.Yandex. org and sign in to your SendRR account. Enter G440 in the BuildCircle box to learn more about \"Cawthorne Exercises for Vertigo: Care Instructions. \"     If you do not have an account, please click on the \"Sign Up Now\" link. Current as of: September 8, 2021               Content Version: 13.2  © 2006-2022 Healthwise, Incorporated. Care instructions adapted under license by [facility] ([facility]). If you have questions about a medical condition or this instruction, always ask your healthcare professional. [place] [address] any warranty or liability for your use of this information. .Patient Education        Vertigo: Exercises  Introduction  Here are some examples of exercises for you to try.  The exercises may be suggested for a condition or for rehabilitation. Start each exercise slowly. Ease off the exercises if you start to have pain. You will be told when to start these exercises and which ones will work bestfor you. How to do the exercises  Exercise 1    1. Stand with a chair in front of you and a wall behind you. If you begin to fall, you may use them for support. 2. Stand with your feet together and your arms at your sides. 3. Move your head up and down 10 times. Exercise 2    1. Move your head side to side 10 times. Exercise 3    1. Move your head diagonally up and down 10 times. Exercise 4    1. Move your head diagonally up and down 10 times on the other side. Follow-up care is a key part of your treatment and safety. Be sure to make and go to all appointments, and call your doctor if you are having problems. It's also a good idea to know your test results and keep alist of the medicines you take. Where can you learn more? Go to https://AppMakrpeVoÃ¶lks [email or web address].[employer]. org and sign in to your Bigfoot Networks account. Enter F349 in the ebooxter.com box to learn more about \"Vertigo: Exercises. \"     If you do not have an account, please click on the \"Sign Up Now\" link. Current as of: September 8, 2021               Content Version: 13.2  © 2006-2022 Healthwise, Incorporated. Care instructions adapted under license by [facility] ([facility]). If you have questions about a medical condition or this instruction, always ask your healthcare professional. [place] [address] any warranty or liability for your use of this information.

## 2022-06-20 NOTE — TELEPHONE ENCOUNTER
I placed another order for PT vertigo - he could go to [facility] or [facility]- I think they both have it also

## 2022-06-30 LAB
MAXIMAL PREDICTED HEART RATE: 141 BPM
STRESS TARGET HR: 120 BPM

## 2022-06-30 PROCEDURE — 93244 EXT ECG>48HR<7D REV&INTERPJ: CPT | Performed by: INTERNAL MEDICINE

## 2022-07-02 ENCOUNTER — APPOINTMENT (OUTPATIENT)
Dept: GENERAL RADIOLOGY | Facility: HOSPITAL | Age: 77
End: 2022-07-02

## 2022-07-02 ENCOUNTER — HOSPITAL ENCOUNTER (EMERGENCY)
Facility: HOSPITAL | Age: 77
Discharge: HOME | End: 2022-07-02
Attending: EMERGENCY MEDICINE | Admitting: EMERGENCY MEDICINE

## 2022-07-02 VITALS
WEIGHT: 295 LBS | HEIGHT: 74 IN | RESPIRATION RATE: 18 BRPM | TEMPERATURE: 98.1 F | HEART RATE: 80 BPM | OXYGEN SATURATION: 98 % | BODY MASS INDEX: 37.86 KG/M2 | DIASTOLIC BLOOD PRESSURE: 68 MMHG | SYSTOLIC BLOOD PRESSURE: 124 MMHG

## 2022-07-02 DIAGNOSIS — U07.1 COVID: Primary | ICD-10-CM

## 2022-07-02 LAB
ALBUMIN SERPL-MCNC: 4.1 G/DL (ref 3.5–5.2)
ALBUMIN/GLOB SERPL: 1.4 G/DL
ALP SERPL-CCNC: 81 U/L (ref 39–117)
ALT SERPL W P-5'-P-CCNC: 63 U/L (ref 1–41)
ANION GAP SERPL CALCULATED.3IONS-SCNC: 8 MMOL/L (ref 5–15)
AST SERPL-CCNC: 54 U/L (ref 1–40)
BASOPHILS # BLD AUTO: 0.07 10*3/MM3 (ref 0–0.2)
BASOPHILS NFR BLD AUTO: 1.2 % (ref 0–1.5)
BILIRUB SERPL-MCNC: 0.7 MG/DL (ref 0–1.2)
BUN SERPL-MCNC: 13 MG/DL (ref 8–23)
BUN/CREAT SERPL: 17.8 (ref 7–25)
CALCIUM SPEC-SCNC: 9.2 MG/DL (ref 8.6–10.5)
CHLORIDE SERPL-SCNC: 104 MMOL/L (ref 98–107)
CO2 SERPL-SCNC: 29 MMOL/L (ref 22–29)
CREAT SERPL-MCNC: 0.73 MG/DL (ref 0.76–1.27)
D DIMER PPP FEU-MCNC: 0.71 MCGFEU/ML (ref 0–0.5)
DEPRECATED RDW RBC AUTO: 45.7 FL (ref 37–54)
EGFRCR SERPLBLD CKD-EPI 2021: 92.5 ML/MIN/1.73
EOSINOPHIL # BLD AUTO: 0.22 10*3/MM3 (ref 0–0.4)
EOSINOPHIL NFR BLD AUTO: 3.9 % (ref 0.3–6.2)
ERYTHROCYTE [DISTWIDTH] IN BLOOD BY AUTOMATED COUNT: 13.1 % (ref 12.3–15.4)
GLOBULIN UR ELPH-MCNC: 2.9 GM/DL
GLUCOSE SERPL-MCNC: 108 MG/DL (ref 65–99)
HCT VFR BLD AUTO: 45.7 % (ref 37.5–51)
HGB BLD-MCNC: 15.1 G/DL (ref 13–17.7)
IMM GRANULOCYTES # BLD AUTO: 0.03 10*3/MM3 (ref 0–0.05)
IMM GRANULOCYTES NFR BLD AUTO: 0.5 % (ref 0–0.5)
LYMPHOCYTES # BLD AUTO: 1.18 10*3/MM3 (ref 0.7–3.1)
LYMPHOCYTES NFR BLD AUTO: 21 % (ref 19.6–45.3)
MAGNESIUM SERPL-MCNC: 2.1 MG/DL (ref 1.6–2.4)
MCH RBC QN AUTO: 31.2 PG (ref 26.6–33)
MCHC RBC AUTO-ENTMCNC: 33 G/DL (ref 31.5–35.7)
MCV RBC AUTO: 94.4 FL (ref 79–97)
MONOCYTES # BLD AUTO: 1.07 10*3/MM3 (ref 0.1–0.9)
MONOCYTES NFR BLD AUTO: 19 % (ref 5–12)
NEUTROPHILS NFR BLD AUTO: 3.06 10*3/MM3 (ref 1.7–7)
NEUTROPHILS NFR BLD AUTO: 54.4 % (ref 42.7–76)
NRBC BLD AUTO-RTO: 0 /100 WBC (ref 0–0.2)
NT-PROBNP SERPL-MCNC: 56.7 PG/ML (ref 0–1800)
PLATELET # BLD AUTO: 181 10*3/MM3 (ref 140–450)
PMV BLD AUTO: 10.5 FL (ref 6–12)
POTASSIUM SERPL-SCNC: 4.1 MMOL/L (ref 3.5–5.2)
PROT SERPL-MCNC: 7 G/DL (ref 6–8.5)
RBC # BLD AUTO: 4.84 10*6/MM3 (ref 4.14–5.8)
SARS-COV-2 RNA PNL SPEC NAA+PROBE: DETECTED
SODIUM SERPL-SCNC: 141 MMOL/L (ref 136–145)
TROPONIN T SERPL-MCNC: <0.01 NG/ML (ref 0–0.03)
WBC NRBC COR # BLD: 5.63 10*3/MM3 (ref 3.4–10.8)

## 2022-07-02 PROCEDURE — 85379 FIBRIN DEGRADATION QUANT: CPT | Performed by: EMERGENCY MEDICINE

## 2022-07-02 PROCEDURE — 85025 COMPLETE CBC W/AUTO DIFF WBC: CPT | Performed by: EMERGENCY MEDICINE

## 2022-07-02 PROCEDURE — 93005 ELECTROCARDIOGRAM TRACING: CPT | Performed by: EMERGENCY MEDICINE

## 2022-07-02 PROCEDURE — 36415 COLL VENOUS BLD VENIPUNCTURE: CPT

## 2022-07-02 PROCEDURE — 80053 COMPREHEN METABOLIC PANEL: CPT | Performed by: EMERGENCY MEDICINE

## 2022-07-02 PROCEDURE — 84484 ASSAY OF TROPONIN QUANT: CPT | Performed by: EMERGENCY MEDICINE

## 2022-07-02 PROCEDURE — 83880 ASSAY OF NATRIURETIC PEPTIDE: CPT | Performed by: EMERGENCY MEDICINE

## 2022-07-02 PROCEDURE — 99283 EMERGENCY DEPT VISIT LOW MDM: CPT

## 2022-07-02 PROCEDURE — 93010 ELECTROCARDIOGRAM REPORT: CPT | Performed by: EMERGENCY MEDICINE

## 2022-07-02 PROCEDURE — 71045 X-RAY EXAM CHEST 1 VIEW: CPT

## 2022-07-02 PROCEDURE — 87635 SARS-COV-2 COVID-19 AMP PRB: CPT | Performed by: EMERGENCY MEDICINE

## 2022-07-02 PROCEDURE — 83735 ASSAY OF MAGNESIUM: CPT | Performed by: EMERGENCY MEDICINE

## 2022-07-02 RX ORDER — METHYLPREDNISOLONE SODIUM SUCCINATE 125 MG/2ML
125 INJECTION, POWDER, LYOPHILIZED, FOR SOLUTION INTRAMUSCULAR; INTRAVENOUS ONCE AS NEEDED
Status: DISCONTINUED | OUTPATIENT
Start: 2022-07-02 | End: 2022-07-02

## 2022-07-02 RX ORDER — DIPHENHYDRAMINE HYDROCHLORIDE 50 MG/ML
50 INJECTION INTRAMUSCULAR; INTRAVENOUS ONCE AS NEEDED
Status: DISCONTINUED | OUTPATIENT
Start: 2022-07-02 | End: 2022-07-02

## 2022-07-02 RX ORDER — BEBTELOVIMAB 87.5 MG/ML
175 INJECTION, SOLUTION INTRAVENOUS ONCE
Status: DISCONTINUED | OUTPATIENT
Start: 2022-07-02 | End: 2022-07-02

## 2022-07-02 RX ORDER — SODIUM CHLORIDE 0.9 % (FLUSH) 0.9 %
10 SYRINGE (ML) INJECTION AS NEEDED
Status: DISCONTINUED | OUTPATIENT
Start: 2022-07-02 | End: 2022-07-02 | Stop reason: HOSPADM

## 2022-07-02 RX ORDER — EPINEPHRINE 0.3 MG/.3ML
0.3 INJECTION SUBCUTANEOUS ONCE AS NEEDED
Status: DISCONTINUED | OUTPATIENT
Start: 2022-07-02 | End: 2022-07-02

## 2022-07-02 RX ORDER — SODIUM CHLORIDE 9 MG/ML
30 INJECTION, SOLUTION INTRAVENOUS ONCE
Status: DISCONTINUED | OUTPATIENT
Start: 2022-07-02 | End: 2022-07-02

## 2022-07-02 RX ORDER — DIPHENHYDRAMINE HCL 50 MG
50 CAPSULE ORAL ONCE AS NEEDED
Status: DISCONTINUED | OUTPATIENT
Start: 2022-07-02 | End: 2022-07-02

## 2022-07-02 NOTE — DISCHARGE INSTRUCTIONS
It was very nice to meet you, Axel. Thank you for allowing us to take care of you today at [facility].    You were evaluated in the ER for COVID-19. Like we discussed, while taking the Paxlovid, please do not use the atorvastatin or flonase or discuss with your PCP prior to starting Paxlovid. Your work-up today did not show any emergent findings or emergent indications for admission to the hospital. While it is unclear what exactly is the cause of your symptoms, please understand that an ER evaluation is considered to be just the start of your evaluation. We will do what we can in one visit, but we are often unable to fully figure out what is causing your symptoms from one evaluation. Thus our primary goal is to determine whether you need to be evaluated in the hospital or if it is safe for you to go home and see other doctors such as a primary care physician or a specialist on an outpatient basis. A copy of your results should be included in your paperwork.     It is VERY IMPORTANT that you follow up (call them to set up an appointment) with your primary care doctor* within the next few days or as soon as possible so that you can be re-evaluated for improvement in your symptoms or for any other questions. If you were prescribed any medications, please take them as directed or call us back with any questions.     Please return to the emergency room within 12-48 hours if you experience fever, chills, chest pain or shortness of breath, pain with inspiration/expiration, pain that travels to your arms, neck or back, nausea, vomiting, severe headache, tearing pain in your chest, dizziness, feel as though you are about to pass out, have any worsening symptoms, or any other concerns.

## 2022-07-02 NOTE — ED PROVIDER NOTES
Subjective   Patient says he awakened suddenly this morning with his CPAP all night felt he could not breathe.  He felt like he needed to cough but could not.  He could not get air to go in or out.  He managed to push on his chest to have some pressure and eventually start breathing little bit.  He is now feeling better though not completely well.  He did not have any chest pain or palpitations or fever or chills.  He does have a chronic cough but it is no worse than usual.  He did not produce anything with the cough.  As mentioned above he is feeling better now but still not completely well and wanted to be checked out.      History provided by:  Patient   used: No    Shortness of Breath  Severity:  Severe  Onset quality:  Sudden  Duration:  30 minutes  Timing:  Constant  Progression:  Partially resolved  Chronicity:  New  Context: not activity, not animal exposure, not emotional upset, not fumes, not known allergens, not occupational exposure, not pollens, not smoke exposure, not strong odors, not URI and not weather changes    Relieved by:  Nothing  Worsened by:  Nothing  Ineffective treatments:  None tried  Associated symptoms: cough    Associated symptoms: no abdominal pain, no chest pain, no diaphoresis, no headaches, no hemoptysis, no PND, no sore throat, no syncope and no swollen glands    Risk factors: no recent alcohol use, no family hx of DVT, no hx of cancer, no hx of PE/DVT, no obesity, no prolonged immobilization and no recent surgery        Review of Systems   Constitutional: Negative.  Negative for diaphoresis.   HENT: Negative.  Negative for sore throat.    Respiratory: Positive for cough and shortness of breath. Negative for hemoptysis.    Cardiovascular: Negative.  Negative for chest pain, syncope and PND.   Gastrointestinal: Negative.  Negative for abdominal pain.   Genitourinary: Negative.    Musculoskeletal: Negative.    Skin: Negative.    Neurological: Negative.  Negative  for headaches.   Psychiatric/Behavioral: Negative.    All other systems reviewed and are negative.      Past Medical History:   Diagnosis Date   • Abnormal ECG    • Arthritis    • Cancer (HCC)    • Coronary artery disease    • COVID-19 vaccine series completed     pfizer   • Dizziness    • GERD (gastroesophageal reflux disease)    • Hx of colonic polyps    • Hyperlipidemia    • Neuropathy    • Pneumonia    • Rheumatic fever    • Sinusitis    • Sleep apnea        Allergies   Allergen Reactions   • Ampicillin Rash   • Penicillins Rash       Past Surgical History:   Procedure Laterality Date   • CARDIAC CATHETERIZATION     • CARDIAC SURGERY      HEART STENT   • CHOLECYSTECTOMY     • COLONOSCOPY N/A 11/30/2017    Procedure: COLONOSCOPY WITH ANESTHESIA;  Surgeon: Matthew Cagle MD;  Location: [facility] ENDOSCOPY;  Service:    • COLONOSCOPY W/ POLYPECTOMY  07/12/2012    Adenomatous polyp at 50 cm repeat exam in 5 years   • CORONARY STENT PLACEMENT     • ENDOSCOPY AND COLONOSCOPY  08/07/2009    Acute ulcerative esophagogastritis grade c, Hyperplastic polyp at 50 cm repeat colonoscopy in 3 years   • EYE SURGERY      DONNIE CATARACT REMOVAL   • FLAP HEAD/NECK Left 3/10/2021    Procedure: POSSIBLE FLAP OR GRAFT.;  Surgeon: Roberto Og MD;  Location: [facility] OR;  Service: ENT;  Laterality: Left;   • HEAD/NECK LESION/CYST EXCISION Left 3/10/2021    Procedure: EXCISION OF ATYPICAL PIGMENTED LESION OF THE LEFT CENTRAL TEMPLE REGION WITH POSSIBLE FLAP OR GRAFT. - Left;  Surgeon: Roberto Og MD;  Location: [facility] OR;  Service: ENT;  Laterality: Left;   • REPLACEMENT TOTAL KNEE BILATERAL     • TONSILLECTOMY         Family History   Problem Relation Age of Onset   • Heart failure Father    • Colon cancer Neg Hx    • Colon polyps Neg Hx        Social History     Socioeconomic History   • Marital status:    Tobacco Use   • Smoking status: Former Smoker     Types: Cigarettes     Quit date: 1978     Years since  quittin.5   • Smokeless tobacco: Never Used   Vaping Use   • Vaping Use: Never used   Substance and Sexual Activity   • Alcohol use: No   • Drug use: Never   • Sexual activity: Defer       Prior to Admission medications    Medication Sig Start Date End Date Taking? Authorizing Provider   aspirin 81 MG tablet Take 81 mg by mouth.    Ignacio Woo MD   atorvastatin (LIPITOR) 80 MG tablet Take 40 mg by mouth Daily.    Ignacio Woo MD   fluticasone (FLONASE) 50 MCG/ACT nasal spray 2 sprays into the nostril(s) as directed by provider Daily.  Patient taking differently: 2 sprays into the nostril(s) as directed by provider As Needed. 20   Blayne Dos Santos PA   glycopyrrolate (ROBINUL) 1 MG tablet Take 1 tablet by mouth 3 (Three) Times a Day As Needed (vertigo). 20   Blayne Dos Santos PA   metoprolol succinate XL (TOPROL-XL) 25 MG 24 hr tablet  1/10/21   Ignacio Woo MD   montelukast (SINGULAIR) 10 MG tablet Take 10 mg by mouth.    Ignacio Woo MD   Multiple Vitamins-Minerals (MULTIVITAMIN WITH MINERALS) tablet tablet Take 1 tablet by mouth Daily.    Ignacio Woo MD   Multiple Vitamins-Minerals (OCUVITE ADULT FORMULA PO) Take  by mouth.    Ignacio Woo MD   omeprazole (priLOSEC) 20 MG capsule Take 20 mg by mouth 2 (two) times a day. 17   Ignacio Woo MD   pregabalin (LYRICA) 100 MG capsule 2 (Two) Times a Day. 11/10/20   Ignacio Woo MD       Medications - No data to display    Vitals:    22 0739   BP: 124/68   Pulse: 80   Resp: 18   Temp: 98.1 °F (36.7 °C)   SpO2: 98%         Objective   Physical Exam  Vitals and nursing note reviewed.   Constitutional:       Appearance: He is well-developed.   HENT:      Head: Normocephalic and atraumatic.   Eyes:      Extraocular Movements: Extraocular movements intact.      Pupils: Pupils are equal, round, and reactive to light.   Cardiovascular:      Rate and Rhythm: Normal rate  and regular rhythm.   Pulmonary:      Effort: Pulmonary effort is normal.      Breath sounds: Normal breath sounds.   Abdominal:      General: Bowel sounds are normal.      Palpations: Abdomen is soft.   Musculoskeletal:         General: Normal range of motion.      Cervical back: Normal range of motion and neck supple.   Skin:     General: Skin is warm and dry.   Neurological:      General: No focal deficit present.      Mental Status: He is alert and oriented to person, place, and time.   Psychiatric:         Mood and Affect: Mood normal.         Behavior: Behavior normal.         Procedures         Lab Results (last 24 hours)     Procedure Component Value Units Date/Time    CBC & Differential [[id number]]  (Abnormal) Collected: 07/02/22 0544    Specimen: Blood from Arm, Left Updated: 07/02/22 0603    Narrative:      The following orders were created for panel order CBC & Differential.  Procedure                               Abnormality         Status                     ---------                               -----------         ------                     CBC Auto Differential[[id number]]        Abnormal            Final result                 Please view results for these tests on the individual orders.    Comprehensive Metabolic Panel [[id number]]  (Abnormal) Collected: 07/02/22 0544    Specimen: Blood from Arm, Left Updated: 07/02/22 0624     Glucose 108 mg/dL      BUN 13 mg/dL      Creatinine 0.73 mg/dL      Sodium 141 mmol/L      Potassium 4.1 mmol/L      Chloride 104 mmol/L      CO2 29.0 mmol/L      Calcium 9.2 mg/dL      Total Protein 7.0 g/dL      Albumin 4.10 g/dL      ALT (SGPT) 63 U/L      AST (SGOT) 54 U/L      Alkaline Phosphatase 81 U/L      Total Bilirubin 0.7 mg/dL      Globulin 2.9 gm/dL      A/G Ratio 1.4 g/dL      BUN/Creatinine Ratio 17.8     Anion Gap 8.0 mmol/L      eGFR 92.5 mL/min/1.73      Comment: National Kidney Foundation and American Society of Nephrology (ASN) Task Force recommended  calculation based on the Chronic Kidney Disease Epidemiology Collaboration (CKD-EPI) equation refit without adjustment for race.       Narrative:      GFR Normal >60  Chronic Kidney Disease <60  Kidney Failure <15      D-dimer, Quantitative [[id number]]  (Abnormal) Collected: 07/02/22 0544    Specimen: Blood from Arm, Left Updated: 07/02/22 0620     D-Dimer, Quantitative 0.71 MCGFEU/mL     Narrative:      Reference Range is 0-0.50 MCGFEU/mL. However, results <0.50 MCGFEU/mL tends to rule out DVT or PE. Results >0.50 MCGFEU/mL are not useful in predicting absence or presence of DVT or PE.      Troponin [[id number]]  (Normal) Collected: 07/02/22 0544    Specimen: Blood from Arm, Left Updated: 07/02/22 0620     Troponin T <0.010 ng/mL     Narrative:      Troponin T Reference Range:  <= 0.03 ng/mL-   Negative for AMI  >0.03 ng/mL-     Abnormal for myocardial necrosis.  Clinicians would have to utilize clinical acumen, EKG, Troponin and serial changes to determine if it is an Acute Myocardial Infarction or myocardial injury due to an underlying chronic condition.       Results may be falsely decreased if patient taking Biotin.      BNP [[id number]]  (Normal) Collected: 07/02/22 0544    Specimen: Blood from Arm, Left Updated: 07/02/22 0619     proBNP 56.7 pg/mL     Narrative:      Among patients with dyspnea, NT-proBNP is highly sensitive for the detection of acute congestive heart failure. In addition NT-proBNP of <300 pg/ml effectively rules out acute congestive heart failure with 99% negative predictive value.    Results may be falsely decreased if patient taking Biotin.      Magnesium [[id number]]  (Normal) Collected: 07/02/22 0544    Specimen: Blood from Arm, Left Updated: 07/02/22 0618     Magnesium 2.1 mg/dL     CBC Auto Differential [[id number]]  (Abnormal) Collected: 07/02/22 0544    Specimen: Blood from Arm, Left Updated: 07/02/22 0603     WBC 5.63 10*3/mm3      RBC 4.84 10*6/mm3      Hemoglobin 15.1 g/dL       Hematocrit 45.7 %      MCV 94.4 fL      MCH 31.2 pg      MCHC 33.0 g/dL      RDW 13.1 %      RDW-SD 45.7 fl      MPV 10.5 fL      Platelets 181 10*3/mm3      Neutrophil % 54.4 %      Lymphocyte % 21.0 %      Monocyte % 19.0 %      Eosinophil % 3.9 %      Basophil % 1.2 %      Immature Grans % 0.5 %      Neutrophils, Absolute 3.06 10*3/mm3      Lymphocytes, Absolute 1.18 10*3/mm3      Monocytes, Absolute 1.07 10*3/mm3      Eosinophils, Absolute 0.22 10*3/mm3      Basophils, Absolute 0.07 10*3/mm3      Immature Grans, Absolute 0.03 10*3/mm3      nRBC 0.0 /100 WBC     COVID PRE-OP / PRE-PROCEDURE SCREENING ORDER (NO ISOLATION) - Swab, Nasal Cavity [[id number]]  (Abnormal) Collected: 07/02/22 0544    Specimen: Swab from Nasal Cavity Updated: 07/02/22 0632    Narrative:      The following orders were created for panel order COVID PRE-OP / PRE-PROCEDURE SCREENING ORDER (NO ISOLATION) - Swab, Nasal Cavity.  Procedure                               Abnormality         Status                     ---------                               -----------         ------                     COVID-19,العلي [facility] IN-ILDA...[[id number]]  Abnormal            Final result                 Please view results for these tests on the individual orders.    COVID-19,الع[facility] IN-HOUSE,Nasal Swab No Transport Media 3-4 HR TAT - Swab, Nasal Cavity [[id number]]  (Abnormal) Collected: 07/02/22 0544    Specimen: Swab from Nasal Cavity Updated: 07/02/22 0632     COVID19 Detected    Narrative:      Fact sheet for providers: https://www.fda.gov/media/708711/download     Fact sheet for patients: https://www.fda.gov/media/661746/download    Test performed by PCR.    Consider negative results in combination with clinical observations, patient history, and epidemiological information.          XR Chest 1 View   Final Result   1. Hypoventilation with vascular crowding.   2. Cardiomegaly.           This report was finalized on 07/02/2022 07:00 by Dr. Jaime Berger,  MD.          ED Course  ED Course as of 07/02/22 1837   Sat Jul 02, 2022   0640 Patient is COVID-positive and explains his symptoms.  His chest x-ray looks unchanged to me.  His dimer is 0.71 but and J age-adjusted for right that is normal.  His CBC and chemistries are okay.  His severe symptoms did start yesterday so we presume that is when the COVID started because he has had a cough and some congestion for a long time but there was a new change yesterday.  We will give him the infusion.  He will be discharged in stable condition after that. [TR]   0700 Patient does meet criteria for the outpatient antiviral medication, Paxlovid.  He does need to stop taking his atorvastatin and his Flonase during the 5 days of the antiviral medication.  I went over the risks and benefits of the antibody infusion versus the oral pill.  He elected to choose oral pill at this time. [NP]      ED Course User Index  [NP] Jade Saab MD  [TR] Foster Oleary Jr., MD          MDM  Number of Diagnoses or Management Options  COVID: new and requires workup     Amount and/or Complexity of Data Reviewed  Clinical lab tests: reviewed  Tests in the radiology section of CPT®: reviewed  Tests in the medicine section of CPT®: reviewed    Risk of Complications, Morbidity, and/or Mortality  Presenting problems: moderate  Diagnostic procedures: moderate  Management options: moderate    Patient Progress  Patient progress: stable      Final diagnoses:   Foster Banda Jr., MD  07/02/22 6577

## 2022-07-03 LAB
QT INTERVAL: 390 MS
QTC INTERVAL: 441 MS

## 2022-07-14 ENCOUNTER — TELEPHONE (OUTPATIENT)
Dept: NEUROLOGY | Facility: CLINIC | Age: 77
End: 2022-07-14

## 2022-07-14 NOTE — TELEPHONE ENCOUNTER
Provider: DIAL  Caller: PATIENT  Relationship to Patient: SELF  Pharmacy: N/A  Phone Number: [phone]  Reason for Call: PATIENT TELEPHONED; ON THE ADVICE OF HIS PCP, HE IS ASKING TO BE SEEN SOONER.    PLEASE REVIEW PROVIDER SCHEDULE TO DETERMINE DATE/TIME PATIENT CAN BE SEEN.    PLEASE CALL.    THANK YOU.

## 2022-07-15 ENCOUNTER — LAB (OUTPATIENT)
Dept: LAB | Facility: HOSPITAL | Age: 77
End: 2022-07-15

## 2022-07-15 ENCOUNTER — OFFICE VISIT (OUTPATIENT)
Dept: INTERNAL MEDICINE | Age: 77
End: 2022-07-15
Payer: MEDICARE

## 2022-07-15 ENCOUNTER — OFFICE VISIT (OUTPATIENT)
Dept: NEUROLOGY | Facility: CLINIC | Age: 77
End: 2022-07-15

## 2022-07-15 ENCOUNTER — TRANSCRIBE ORDERS (OUTPATIENT)
Dept: ADMINISTRATIVE | Facility: HOSPITAL | Age: 77
End: 2022-07-15

## 2022-07-15 VITALS
BODY MASS INDEX: 37.3 KG/M2 | SYSTOLIC BLOOD PRESSURE: 106 MMHG | WEIGHT: 300 LBS | DIASTOLIC BLOOD PRESSURE: 60 MMHG | HEIGHT: 75 IN | HEART RATE: 80 BPM | OXYGEN SATURATION: 95 %

## 2022-07-15 VITALS
RESPIRATION RATE: 16 BRPM | BODY MASS INDEX: 37.22 KG/M2 | DIASTOLIC BLOOD PRESSURE: 74 MMHG | HEART RATE: 85 BPM | OXYGEN SATURATION: 98 % | WEIGHT: 290 LBS | SYSTOLIC BLOOD PRESSURE: 122 MMHG | HEIGHT: 74 IN

## 2022-07-15 DIAGNOSIS — H70.91 MASTOIDITIS OF RIGHT SIDE: ICD-10-CM

## 2022-07-15 DIAGNOSIS — J32.9 CHRONIC SINUSITIS, UNSPECIFIED LOCATION: ICD-10-CM

## 2022-07-15 DIAGNOSIS — G47.33 OBSTRUCTIVE SLEEP APNEA: Chronic | ICD-10-CM

## 2022-07-15 DIAGNOSIS — R47.1 DYSARTHRIA: Primary | ICD-10-CM

## 2022-07-15 DIAGNOSIS — E78.2 MIXED HYPERLIPIDEMIA: ICD-10-CM

## 2022-07-15 DIAGNOSIS — I25.10 CORONARY ARTERY DISEASE INVOLVING NATIVE CORONARY ARTERY OF NATIVE HEART WITHOUT ANGINA PECTORIS: ICD-10-CM

## 2022-07-15 DIAGNOSIS — E04.1 THYROID NODULE: Primary | ICD-10-CM

## 2022-07-15 DIAGNOSIS — E04.1 THYROID NODULE: ICD-10-CM

## 2022-07-15 DIAGNOSIS — R47.81 SLURRED SPEECH: Primary | ICD-10-CM

## 2022-07-15 DIAGNOSIS — I10 ESSENTIAL HYPERTENSION: ICD-10-CM

## 2022-07-15 DIAGNOSIS — R47.1 DYSARTHRIA: ICD-10-CM

## 2022-07-15 PROCEDURE — 1036F TOBACCO NON-USER: CPT | Performed by: INTERNAL MEDICINE

## 2022-07-15 PROCEDURE — 86255 FLUORESCENT ANTIBODY SCREEN: CPT

## 2022-07-15 PROCEDURE — G8427 DOCREV CUR MEDS BY ELIG CLIN: HCPCS | Performed by: INTERNAL MEDICINE

## 2022-07-15 PROCEDURE — G8417 CALC BMI ABV UP PARAM F/U: HCPCS | Performed by: INTERNAL MEDICINE

## 2022-07-15 PROCEDURE — 99204 OFFICE O/P NEW MOD 45 MIN: CPT | Performed by: PSYCHIATRY & NEUROLOGY

## 2022-07-15 PROCEDURE — 99214 OFFICE O/P EST MOD 30 MIN: CPT | Performed by: INTERNAL MEDICINE

## 2022-07-15 PROCEDURE — 36415 COLL VENOUS BLD VENIPUNCTURE: CPT

## 2022-07-15 PROCEDURE — 1123F ACP DISCUSS/DSCN MKR DOCD: CPT | Performed by: INTERNAL MEDICINE

## 2022-07-15 PROCEDURE — 83519 RIA NONANTIBODY: CPT

## 2022-07-15 RX ORDER — FAMOTIDINE 20 MG/1
1 TABLET, FILM COATED ORAL 2 TIMES DAILY
COMMUNITY
Start: 2022-06-20

## 2022-07-15 RX ORDER — CLOPIDOGREL BISULFATE 75 MG/1
75 TABLET ORAL DAILY
COMMUNITY
Start: 2022-06-16 | End: 2022-10-27

## 2022-07-15 NOTE — PROGRESS NOTES
Chief Complaint    Subjective        Axel Morris presents to [facility]    77-year-old with CAD, HLD, GERI, GERD who was referred for evaluation of speech issues.  He states this has been happening for a little over a month.  He states his tongue will give out and he is unable to talk correctly.  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  While giving a tour his tongue will feel funny and not do what he wants.  He will not be able to talk plainly.  This will happen near the end of the tour.  It has happened several times.  And only happens if he talks for a while, like for 10 or 15 minutes.  It is also happened after eating ice cream for a while or after eating something hot like soup for a while.  There is no pain.  If he stops talking it will get better.  He says it sounds like he is talking with a lisp when it happens.  He denies ever having any double vision or droopy eyes.  No trouble swallowing.  No issues chewing.  No dyspnea.  He has on occasion gotten some numbness above his upper lip.  He does not think this happens at the same time as his tongue and speech issue.  His primary care physician put him on Plavix about a month ago because of these recurrent symptoms.  He is continuing to get symptoms.    He had an MRI which did not show any stroke.  When he presented to the ED, he had a CTA H/N which did not show any significant stenosis.  He had a 4-day Holter monitor which did not show any atrial fibrillation.  His most recent echo in February did not show any relevant abnormalities.  LDL was 70 in March.  A1c was 5.8 at that time.  He was on aspirin 81 prior to this and he is on atorvastatin 80 mg.          Past Medical History:   Diagnosis Date   • Abnormal ECG    • Arthritis    • Cancer (HCC)    • Coronary artery disease    • COVID-19 vaccine series completed     pfizer   • Dizziness    • GERD (gastroesophageal reflux disease)    • Hx of colonic polyps    • Hyperlipidemia   "  • Neuropathy    • Pneumonia    • Rheumatic fever    • Sinusitis    • Sleep apnea           Current Outpatient Medications:   •  aspirin 81 MG tablet, Take 81 mg by mouth., Disp: , Rfl:   •  atorvastatin (LIPITOR) 80 MG tablet, Take 40 mg by mouth Daily., Disp: , Rfl:   •  clopidogrel (PLAVIX) 75 MG tablet, Take 75 mg by mouth Daily., Disp: , Rfl:   •  famotidine (PEPCID) 20 MG tablet, Take 1 tablet by mouth 2 (Two) Times a Day., Disp: , Rfl:   •  fluticasone (FLONASE) 50 MCG/ACT nasal spray, 2 sprays into the nostril(s) as directed by provider Daily. (Patient taking differently: 2 sprays into the nostril(s) as directed by provider As Needed.), Disp: 16 g, Rfl: 11  •  glycopyrrolate (ROBINUL) 1 MG tablet, Take 1 tablet by mouth 3 (Three) Times a Day As Needed (vertigo)., Disp: 60 tablet, Rfl: 11  •  metoprolol succinate XL (TOPROL-XL) 25 MG 24 hr tablet, , Disp: , Rfl:   •  montelukast (SINGULAIR) 10 MG tablet, Take 10 mg by mouth., Disp: , Rfl:   •  Multiple Vitamins-Minerals (MULTIVITAMIN WITH MINERALS) tablet tablet, Take 1 tablet by mouth Daily., Disp: , Rfl:   •  Multiple Vitamins-Minerals (OCUVITE ADULT FORMULA PO), Take  by mouth., Disp: , Rfl:   •  pregabalin (LYRICA) 100 MG capsule, 2 (Two) Times a Day., Disp: , Rfl:        Objective   Vital Signs:   /74 (BP Location: Left arm, Patient Position: Sitting, Cuff Size: Adult)   Pulse 85   Resp 16   Ht 188 cm (74\")   Wt 132 kg (290 lb)   SpO2 98%   BMI 37.23 kg/m²     Physical Exam  Constitutional:       General: He is awake.   Eyes:      Extraocular Movements: Extraocular movements intact.      Pupils: Pupils are equal, round, and reactive to light.   Neurological:      Mental Status: He is alert.      Deep Tendon Reflexes: Strength normal and reflexes are normal and symmetric.   Psychiatric:         Speech: Speech normal.        Neurological Exam  Mental Status  Awake and alert. Oriented to person, place and time. Recent and remote memory are " intact. Speech is normal. Language is fluent with no aphasia. Attention and concentration are normal. Fund of knowledge is appropriate for level of education.    Cranial Nerves  CN II: Visual fields full to confrontation.  CN III, IV, VI: Extraocular movements intact bilaterally. Pupils equal round and reactive to light bilaterally.  CN V: Facial sensation is normal.  CN VII: Full and symmetric facial movement.  CN IX, X: Palate elevates symmetrically  CN XI: Shoulder shrug strength is normal.  CN XII: Tongue midline without atrophy or fasciculations.    Motor  Normal muscle bulk throughout. Normal muscle tone. No abnormal involuntary movements. Strength is 5/5 throughout all four extremities.    Sensory  Light touch is normal in upper and lower extremities.     Reflexes  Deep tendon reflexes are 2+ and symmetric in all four extremities.    Coordination  Right: Finger-to-nose normal.Left: Finger-to-nose normal.    Gait  Casual gait is normal including stance, stride, and arm swing.      Result Review :              Narrative & Impression   EXAMINATION: MRI BRAIN WO CONTRAST- 6/16/2022 1:34 PM CDT     HISTORY: G45.9; G45.9-Transient cerebral ischemic attack, unspecified.     REPORT: Multiplanar multisequence MR imaging of the brain was performed  without contrast.     COMPARISON: Noncontrast CT of the head, CT angiogram of the head and  neck 6/15/2022.     There is no diffusion restriction to indicate acute ischemia. No  intracranial mass or mass effect is identified. The ventricles and basal  cisterns are within normal limits. There is mild diffuse cerebral  atrophy, age compatible. Gradient echo images show no evidence of  intracranial hemorrhage. The major intracranial vascular flow voids are  present. The pituitary gland, optic chiasm, brainstem and corpus  callosum are within normal limits. FLAIR images demonstrate scattered  foci of increased signal within the subcortical and periventricular  white matter  tracts compatible with mild chronic small vessel white  matter ischemic disease. There is extensive mucosal thickening  throughout the sinuses and nasal cavity compatible with chronic  pansinusitis. There is a small right mastoid effusion compatible with  mastoiditis.     IMPRESSION:  1. No evidence of acute ischemia, intracranial hemorrhage or mass  effect. Mild age compatible cortical atrophy and mild chronic small  vessel white matter ischemic changes are identified.  2. Chronic pansinusitis and evidence of right-sided mastoiditis.  This report was finalized on 06/16/2022 13:37 by Dr. Tj Ibarra MD.     Narrative & Impression   EXAMINATION:  CT ANGIOGRAM NECK-  6/15/2022 6:41 PM CDT     HISTORY: Stroke, follow up. Speech problem.     COMPARISON : No comparison study.     DLP: 444 mGy-cm. Automated dosage reduction technique was utilized to  reduce patient dosage.     TECHNIQUE: CT angio was performed of the neck with IV contrast. Coronal,  sagittal and 3-D reconstruction were performed.     VERTEBRAL ARTERIES: The vertebral arteries in the neck are patent  bilaterally with no significant plaque or stenosis. The right vertebral  artery origin is somewhat obscured due to streak artifact related to  adjacent dense venous structure. There is no plaque visualized  elsewhere.     RIGHT CAROTID ARTERY: There is no right carotid plaque or stenosis.     LEFT CAROTID ARTERY: There is no left carotid plaque or stenosis.     OTHER FINDINGS: The parotid and submandibular glands are unremarkable.  There is bilateral thyroid nodularity. No lymphadenopathy is seen in the  neck. No mass lesions are appreciated in the neck. There are  degenerative changes of the spine.     IMPRESSION:  1. NASCET criteria utilized.  2. The vertebral arteries are patent in the neck bilaterally without  plaque or stenosis.  3. The carotid arteries are patent in the neck with no significant  plaque or stenosis.  4. Bilateral thyroid nodules.  "Consider follow-up ultrasound if not  performed previously. This is marked as \"incidental findings\" in PACS  for follow-up.     The full report of this exam was immediately signed and available to the  emergency room. The patient is currently in the emergency room.  This report was finalized on 06/15/2022 19:14 by Dr. Jaime Berger MD.     Narrative & Impression   EXAMINATION:  CT ANGIOGRAM HEAD-  6/15/2022 6:41 PM CDT     HISTORY: Stroke, follow up     COMPARISON : No comparison study.     DLP: 444 mGy-cm. Automated dosage reduction technique was utilized.     TECHNIQUE: CT angio was performed of the brain with IV contrast.  Coronal, sagittal and 3-D images were reconstructed.     FINDINGS: The vertebral and basilar arteries are patent. The internal  carotid arteries are patent. The anterior, middle and posterior cerebral  arteries are patent. No aneurysms are appreciated. The visualized venous  sinuses are patent.     IMPRESSION:  CT angiogram of the brain is within normal limits  demonstrating no major branch occlusion or aneurysm.     The full report of this exam was immediately signed and available to the  emergency room. The patient is currently in the emergency room.  This report was finalized on 06/15/2022 19:09 by Dr. Jaime Berger MD.                Assessment and Plan   77-year-old with CAD, HLD, GERI, GERD who was referred for evaluation of speech issues.  Unclear etiology as he feels like this starts with his tongue feeling abnormal.  It also happens after talking for quite some time.  This sounds more like a fatigable issue.  We will send myasthenia panel.  However he has no other fatigable symptoms.  This does not sound like TIA and he has had a full TIA/stroke work-up which has been negative so far.  He does have risk factors but they are relatively well controlled.  He was on aspirin 81 and atorvastatin 80 with an LDL of 70.  He has been placed on Plavix since this started.  He continues to have " symptoms.    Plan:    1.  Myasthenia panel.  2.  Okay to transition back to aspirin.  These are not clearly vascular events and he had no significant stenosis on CTA.  He has been on Plavix for a month.        Follow Up   No follow-ups on file.  Patient was given instructions and counseling regarding his condition or for health maintenance advice. Please see specific information pulled into the AVS if appropriate.

## 2022-07-15 NOTE — PROGRESS NOTES
Chief Complaint   Patient presents with    1 Month Follow-Up     tia       HPI: Patient is here today to follow-up episodic slurred speech I had him go on Plavix in addition to his aspirin as he was about to travel and really could not completely prove that this was not a precursor to a stroke he has had evaluation and has also seen neurology now his work-up does not show stroke does not show stenosis his MRI shows no evidence of acute ischemia intracranial hemorrhage or mass there is chronic pansinusitis right-sided mastoiditis CTA head neck no aneurysm no thrombotic disease no stenosis. He is still having episodic slurred speech when he has something cold or when he is talking very long.     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     left at fibular head s/p decompression 2009       Past Surgical History:   Procedure Laterality Date    CATARACT EXTRACTION W/  INTRAOCULAR LENS IMPLANT      CHOLECYSTECTOMY  07/2005    CORONARY ANGIOPLASTY WITH STENT PLACEMENT  12/26/2018    [place] to Circumflex    JOINT REPLACEMENT Bilateral 2003    Knee replacements    KNEE ARTHROPLASTY Bilateral 2003    complete combine condylye and plateau    NERVE SURGERY      Nerve release       Family History   Problem Relation Age of Onset    COPD Mother     Rheum Arthritis Father     COPD Brother     Lung Cancer Brother        Social History     Socioeconomic History    Marital status:      Spouse name: avila    Number of children: 2    Years of education: 20    Highest education level: Not on file Occupational History    Occupation: retired teacher   Tobacco Use    Smoking status: Former     Packs/day: 1.00     Years: 12.00     Pack years: 12.00     Types: Cigarettes     Quit date:      Years since quittin.5    Smokeless tobacco: Never   Vaping Use    Vaping Use: Never used   Substance and Sexual Activity    Alcohol use: No    Drug use: No    Sexual activity: Yes     Partners: Female     Comment: wife   Other Topics Concern    Not on file   Social History Narrative    Not on file     Social Determinants of Health     Financial Resource Strain: Low Risk     Difficulty of Paying Living Expenses: Not very hard   Food Insecurity: No Food Insecurity    Worried About Running Out of Food in the Last Year: Never true    Ran Out of Food in the Last Year: Never true   Transportation Needs: Not on file   Physical Activity: Not on file   Stress: Not on file   Social Connections: Not on file   Intimate Partner Violence: Not on file   Housing Stability: Not on file       Allergies   Allergen Reactions    Ampicillin Rash       Current Outpatient Medications   Medication Sig Dispense Refill    famotidine (PEPCID) 20 MG tablet Take 1 tablet by mouth 2 times daily 180 tablet 1    metoprolol succinate (TOPROL XL) 25 MG extended release tablet TAKE 1 TABLET DAILY 90 tablet 3    fluticasone (FLONASE) 50 MCG/ACT nasal spray 1 spray by Each Nostril route daily      atorvastatin (LIPITOR) 80 MG tablet TAKE 1 TABLET NIGHTLY 90 tablet 3    aspirin 81 MG tablet Take 81 mg by mouth daily      CPAP Machine MISC by Does not apply route      Multiple Vitamins-Minerals (MULTIVITAMIN ADULT PO) Take by mouth daily       No current facility-administered medications for this visit.        Review of Systems    /60   Pulse 80   Ht 6' 3\" (1.905 m)   Wt 300 lb (136.1 kg)   SpO2 95%   BMI 37.50 kg/m²   BP Readings from Last 7 Encounters:   07/15/22 106/60   22 134/82   22 112/70   21 120/72   21 112/72 08/07/20 130/82   08/07/20 120/86     Wt Readings from Last 7 Encounters:   07/15/22 300 lb (136.1 kg)   06/16/22 300 lb (136.1 kg)   03/14/22 300 lb (136.1 kg)   09/13/21 296 lb (134.3 kg)   01/07/21 296 lb (134.3 kg)   08/07/20 295 lb (133.8 kg)   08/07/20 299 lb (135.6 kg)     BMI Readings from Last 7 Encounters:   07/15/22 37.50 kg/m²   06/16/22 37.50 kg/m²   03/14/22 37.50 kg/m²   09/13/21 37.00 kg/m²   01/07/21 37.00 kg/m²   08/07/20 36.87 kg/m²   08/07/20 38.39 kg/m²     Resp Readings from Last 7 Encounters:   07/01/19 18   12/27/18 16   07/30/18 20   07/28/17 20       Physical Exam  Constitutional:       General: He is not in acute distress. Eyes:      General: No scleral icterus. Cardiovascular:      Heart sounds: Normal heart sounds. Pulmonary:      Breath sounds: Normal breath sounds. Musculoskeletal:      Cervical back: Neck supple. Lymphadenopathy:      Cervical: No cervical adenopathy. Skin:     Findings: No rash.    Neurological:      Comments: Intermittent speech no evidence of thickening of the tongue he even had a little slurred speech tomorrow to talk to me in the office I might not have noted it but I feel like he was starting to have slurring       Results for orders placed or performed in visit on 03/07/22   PSA screening   Result Value Ref Range    PSA 1.05 0.00 - 4.00 ng/mL   Lipid Panel   Result Value Ref Range    Cholesterol, Total 134 (L) 160 - 199 mg/dL    Triglycerides 108 0 - 149 mg/dL    HDL 42 (L) 55 - 121 mg/dL    LDL Calculated 70 <100 mg/dL   TSH without Reflex   Result Value Ref Range    TSH 2.180 0.270 - 4.200 uIU/mL   Hemoglobin A1C   Result Value Ref Range    Hemoglobin A1C 5.8 4.0 - 6.0 %   Comprehensive Metabolic Panel   Result Value Ref Range    Sodium 145 136 - 145 mmol/L    Potassium 4.9 3.5 - 5.0 mmol/L    Chloride 107 98 - 111 mmol/L    CO2 28 22 - 29 mmol/L    Anion Gap 10 7 - 19 mmol/L    Glucose 100 74 - 109 mg/dL    BUN 19 8 - 23 mg/dL    Creatinine 0.7 0.5 - 1.2 mg/dL    GFR Non-African American >60 >60    GFR African American >59 >59    Calcium 9.3 8.8 - 10.2 mg/dL    Total Protein 6.5 (L) 6.6 - 8.7 g/dL    Albumin 4.0 3.5 - 5.2 g/dL    Total Bilirubin 0.7 0.2 - 1.2 mg/dL    Alkaline Phosphatase 74 40 - 130 U/L    ALT 52 (H) 5 - 41 U/L    AST 41 (H) 5 - 40 U/L   CBC   Result Value Ref Range    WBC 6.8 4.8 - 10.8 K/uL    RBC 4.93 4.70 - 6.10 M/uL    Hemoglobin 15.2 14.0 - 18.0 g/dL    Hematocrit 47.8 42.0 - 52.0 %    MCV 97.0 (H) 80.0 - 94.0 fL    MCH 30.8 27.0 - 31.0 pg    MCHC 31.8 (L) 33.0 - 37.0 g/dL    RDW 13.1 11.5 - 14.5 %    Platelets 918 707 - 678 K/uL    MPV 11.2 9.4 - 12.4 fL       ASSESSMENT/ PLAN:  1. Slurred speech  I reviewed the neurologist note Dr. Adriano Collazo saw her today I reviewed her note she is concerned this might be myasthenia gravis she reviewed things and felt like it was okay to stop Plavix and go back to aspirin therapy alone we told the patient that would be fine. She ordered lab and we will be following up it does seem like the slurred speech is fatigable and cold induced    2. Thyroid nodule  Thyroid nodule seen on the MRI going to order thyroid ultrasound referral to ENT for chronic sinusitis and mastoiditis    3. Chronic sinusitis, unspecified location  Thyroid ultrasound referral to ENT for chronic sinusitis mastoiditis on MRI  - External Referral To ENT    4. Mastoiditis of right side    - External Referral To ENT    5. Mixed hyperlipidemia  Ongoing statin therapy high-dose    6. Essential hypertension  Blood pressure control    7. Obstructive sleep apnea  Patient has a CPAP compliant with its use    8.  Coronary artery disease involving native coronary artery of native heart without angina pectoris  With underlying CAD we had great concern about PAD as a cause for TIA stroke work-up is negative and the clinical presentation with slurred speech is seeming more consistent with other medical issues continue with the management and follow-up per neurology regarding the slurred speech    Chart, medications, labs, vaccines reviewed. Keep up to date with routine care and follow up. Call with any problems or complaints. Keep up to date with routine screening recomendations and vaccines. Keep labs and ov as scheduled for September.

## 2022-07-18 ENCOUNTER — HOSPITAL ENCOUNTER (OUTPATIENT)
Dept: ULTRASOUND IMAGING | Facility: HOSPITAL | Age: 77
Discharge: HOME | End: 2022-07-18
Admitting: INTERNAL MEDICINE

## 2022-07-18 DIAGNOSIS — E04.1 THYROID NODULE: ICD-10-CM

## 2022-07-18 PROCEDURE — 76536 US EXAM OF HEAD AND NECK: CPT

## 2022-07-19 LAB — SPECIMEN STATUS: NORMAL

## 2022-07-22 ENCOUNTER — TELEPHONE (OUTPATIENT)
Dept: NEUROLOGY | Facility: CLINIC | Age: 77
End: 2022-07-22

## 2022-07-22 DIAGNOSIS — G70.00 MYASTHENIA GRAVIS WITHOUT EXACERBATION: Primary | ICD-10-CM

## 2022-07-22 LAB — ACHR MOD AB SER QL FC: POSITIVE

## 2022-07-22 RX ORDER — PYRIDOSTIGMINE BROMIDE 60 MG/1
60 TABLET ORAL 3 TIMES DAILY
Qty: 90 TABLET | Refills: 2 | Status: SHIPPED | OUTPATIENT
Start: 2022-07-22 | End: 2022-10-31 | Stop reason: SDUPTHER

## 2022-07-22 NOTE — TELEPHONE ENCOUNTER
"Provider: DR. KHAN   Caller: EDDA   Relationship to Patient: PT   Phone Number: [phone]  Reason for Call: PT SEEN IN [facility] WHERE BY STATED THAT MG TEST COULD NOT BE COMPLETED DUE TO \"Test not performed. Unable to perform test due to current   unavailability of reagents or discontinuation of test.          notified Abigail VARGAS at account 07.19.22\"    "

## 2022-07-23 LAB
STRIA MUS IGG SER QL IF: ABNORMAL
STRIATED MUSCLE IGG AB [TITER] IN SERUM: ABNORMAL {TITER}

## 2022-07-25 NOTE — TELEPHONE ENCOUNTER
spoke with the patient on Friday 7/22/22 about his test results and discuss his concerns with this.

## 2022-08-22 DIAGNOSIS — Z01.818 PRE-OPERATIVE EXAMINATION: Primary | ICD-10-CM

## 2022-08-25 ENCOUNTER — LAB (OUTPATIENT)
Dept: LAB | Facility: HOSPITAL | Age: 77
End: 2022-08-25

## 2022-08-25 DIAGNOSIS — Z01.818 PRE-OPERATIVE EXAMINATION: ICD-10-CM

## 2022-08-25 LAB — SARS-COV-2 ORF1AB RESP QL NAA+PROBE: NOT DETECTED

## 2022-08-25 PROCEDURE — U0004 COV-19 TEST NON-CDC HGH THRU: HCPCS

## 2022-08-25 PROCEDURE — C9803 HOPD COVID-19 SPEC COLLECT: HCPCS

## 2022-08-29 NOTE — PROGRESS NOTES
YOB: 1945  Location: [facility]  Location Address: [address] [place] [address], [facility], [address] [place] [address]  Location Phone: [phone]    Chief Complaint   Patient presents with   • Sinus Problem   • thyroid nodule       History of Present Illness  Axel Morris is a 77 y.o. male.  Axel Morris is here for follow up of ENT complaints. The patient has had problems with thyroid nodules and nasal congestion.  The symptoms are not localized to a particular location. The patient has had mild to moderate symptoms. The symptoms have been present for the last 3 years. There have been no identified factors that aggravate the symptoms. There have been no factors that have improved the symptoms.    He currently uses flonase as needed and well as a sinus rinse nightly.     Patient reports that the thyroid nodules were coincidentally found on a CT scan. He declines fatigue, weight loss, globus sensation, hoarseness, or dysphagia.     US Thyroid (2022 14:48)    TSH (2022 13:30 EST)    Past Medical History:   Diagnosis Date   • Abnormal ECG    • Arthritis    • Cancer (HCC)    • Coronary artery disease    • COVID-19 vaccine series completed     pfizer   • Dizziness    • GERD (gastroesophageal reflux disease)    • Hx of colonic polyps    • Hyperlipidemia    • Neuropathy    • Pneumonia    • Rheumatic fever    • Sinusitis    • Sleep apnea        Past Surgical History:   Procedure Laterality Date   • CARDIAC CATHETERIZATION     • CARDIAC SURGERY      HEART STENT   • CHOLECYSTECTOMY     • COLONOSCOPY N/A 2017    Procedure: COLONOSCOPY WITH ANESTHESIA;  Surgeon: Matthew Cagle MD;  Location: [facility] ENDOSCOPY;  Service:    • COLONOSCOPY W/ POLYPECTOMY  2012    Adenomatous polyp at 50 cm repeat exam in 5 years   • CORONARY STENT PLACEMENT     • ENDOSCOPY AND COLONOSCOPY  2009    Acute ulcerative esophagogastritis grade c, Hyperplastic polyp at 50 cm repeat colonoscopy in 3 years   • EYE SURGERY       DONNIE CATARACT REMOVAL   • FLAP HEAD/NECK Left 3/10/2021    Procedure: POSSIBLE FLAP OR GRAFT.;  Surgeon: Roberto Og MD;  Location:  [facility] OR;  Service: ENT;  Laterality: Left;   • HEAD/NECK LESION/CYST EXCISION Left 3/10/2021    Procedure: EXCISION OF ATYPICAL PIGMENTED LESION OF THE LEFT CENTRAL TEMPLE REGION WITH POSSIBLE FLAP OR GRAFT. - Left;  Surgeon: Roberto Og MD;  Location:  [facility] OR;  Service: ENT;  Laterality: Left;   • REPLACEMENT TOTAL KNEE BILATERAL     • TONSILLECTOMY         Outpatient Medications Marked as Taking for the 22 encounter (Office Visit) with Roberto Og MD   Medication Sig Dispense Refill   • aspirin 81 MG tablet Take 81 mg by mouth.     • atorvastatin (LIPITOR) 80 MG tablet Take 40 mg by mouth Daily.     • famotidine (PEPCID) 20 MG tablet Take 1 tablet by mouth 2 (Two) Times a Day.     • glycopyrrolate (ROBINUL) 1 MG tablet Take 1 tablet by mouth 3 (Three) Times a Day As Needed (vertigo). 60 tablet 11   • metoprolol succinate XL (TOPROL-XL) 25 MG 24 hr tablet      • Multiple Vitamins-Minerals (MULTIVITAMIN WITH MINERALS) tablet tablet Take 1 tablet by mouth Daily.     • Multiple Vitamins-Minerals (OCUVITE ADULT FORMULA PO) Take  by mouth.     • pregabalin (LYRICA) 100 MG capsule 2 (Two) Times a Day.     • pyridostigmine (MESTINON) 60 MG tablet Take 1 tablet by mouth 3 (Three) Times a Day. 90 tablet 2       Ampicillin and Penicillins    Family History   Problem Relation Age of Onset   • Heart failure Father    • Colon cancer Neg Hx    • Colon polyps Neg Hx        Social History     Socioeconomic History   • Marital status:    Tobacco Use   • Smoking status: Former Smoker     Types: Cigarettes     Quit date:      Years since quittin.6   • Smokeless tobacco: Never Used   Vaping Use   • Vaping Use: Never used   Substance and Sexual Activity   • Alcohol use: No   • Drug use: Never   • Sexual activity: Defer       Review of Systems    Constitutional: Negative.    HENT: Positive for congestion. Negative for sinus pressure, sinus pain, sore throat, trouble swallowing and voice change.    Respiratory: Negative.    Cardiovascular: Negative.    Gastrointestinal: Negative.    Endocrine: Negative.    Genitourinary: Negative.    Musculoskeletal: Negative.    Skin: Negative.    Neurological: Negative.    Hematological: Negative.        Vitals:    08/30/22 1116   BP: 128/69   Pulse: 57   Resp: 16   Temp: 98 °F (36.7 °C)       Body mass index is 37.75 kg/m².    Objective     Physical Exam  Vitals reviewed.   Constitutional:       Appearance: Normal appearance. He is obese.   HENT:      Head: Normocephalic and atraumatic.      Right Ear: Hearing, tympanic membrane, ear canal and external ear normal.      Left Ear: Hearing, tympanic membrane, ear canal and external ear normal.      Nose: Septal deviation present.      Mouth/Throat:      Lips: Pink.      Mouth: Mucous membranes are moist.      Pharynx: Oropharynx is clear. Uvula midline.   Neck:      Thyroid: No thyromegaly or thyroid tenderness.      Comments: Non palpable thyroid nodules noted to the right and left thyroid  Musculoskeletal:      Cervical back: Full passive range of motion without pain.   Neurological:      Mental Status: He is alert.   Psychiatric:         Behavior: Behavior is cooperative.         Assessment & Plan   Diagnoses and all orders for this visit:    1. Nasal septal deviation (Primary)  -     CT Sinus Without Contrast; Future    2. Nasal congestion  -     CT Sinus Without Contrast; Future    3. Multiple thyroid nodules  -     US Thyroid; Future    Other orders  -     azelastine (ASTELIN) 0.1 % nasal spray; 2 sprays into the nostril(s) as directed by provider 2 (Two) Times a Day. Use in each nostril as directed  Dispense: 30 mL; Refill: 11      * Surgery not found *  Orders Placed This Encounter   Procedures   • US Thyroid     Standing Status:   Future     Standing Expiration  Date:   2023     Order Specific Question:   Reason for Exam:     Answer:   Thyroid disease   • CT Sinus Without Contrast     Standing Status:   Future     Standing Expiration Date:   2023     Scheduling Instructions:      Stealth -  image guidance     Order Specific Question:   Release to patient     Answer:   Immediate     Use flonase and astelin consistently   Thyroid ultrasound in one year  Will obtain repeat sinus CT  Call with any new/worsening problems or concerns    Return in about 2 months (around 10/30/2022) for Recheck, CT.       Patient Instructions   Use flonase and astelin consistently   Thyroid ultrasound in one year  Will obtain repeat sinus CT  Call with any new/worsening problems or concerns    For the best response, use your nasal sprays every day without skipping doses. It may take several weeks before the full effect is acheived.      CONTACT INFORMATION:  The main office phone number is [phone]. For emergencies after hours and on weekends, this number will convert over to our answering service and the on call provider will answer. Please try to keep non emergent phone calls/ questions to office hours 9am-5pm Monday through Friday.      FlameStower  As an alternative, you can sign up and use the Epic MyChart system for more direct and quicker access for non emergent questions/ problems.  [employer] allows you to send messages to your doctor, view your test results, renew your prescriptions, schedule appointments, and more. To sign up, go to [employer] and click on the Sign Up Now link in the New User? box. Enter your FlameStower Activation Code exactly as it appears below along with the last four digits of your Social Security Number and your Date of Birth () to complete the sign-up process. If you do not sign up before the expiration date, you must request a new code.     FlameStower Activation Code: Activation code not generated  Current FlameStower Status: Active      If you have questions, you can email [email or web address] or call [phone] to talk to our MyChart staff. Remember, MyChart is NOT to be used for urgent needs. For medical emergencies, dial 911.     IF YOU SMOKE OR USE TOBACCO PLEASE READ THE FOLLOWING:  Why is smoking bad for me?  Smoking increases the risk of heart disease, lung disease, vascular disease, stroke, and cancer. If you smoke, STOP!        IF YOU SMOKE OR USE TOBACCO PLEASE READ THE FOLLOWING:  Why is smoking bad for me?  Smoking increases the risk of heart disease, lung disease, vascular disease, stroke, and cancer. If you smoke, STOP!     For more information:  Quit Now [place]  1-800-QUIT-NOW  [email or web address]-[email or web address]

## 2022-08-30 ENCOUNTER — OFFICE VISIT (OUTPATIENT)
Dept: OTOLARYNGOLOGY | Facility: CLINIC | Age: 77
End: 2022-08-30

## 2022-08-30 VITALS
HEART RATE: 57 BPM | SYSTOLIC BLOOD PRESSURE: 128 MMHG | TEMPERATURE: 98 F | RESPIRATION RATE: 16 BRPM | WEIGHT: 294 LBS | HEIGHT: 74 IN | DIASTOLIC BLOOD PRESSURE: 69 MMHG | BODY MASS INDEX: 37.73 KG/M2

## 2022-08-30 DIAGNOSIS — E04.2 MULTIPLE THYROID NODULES: ICD-10-CM

## 2022-08-30 DIAGNOSIS — J34.2 NASAL SEPTAL DEVIATION: Primary | ICD-10-CM

## 2022-08-30 DIAGNOSIS — R09.81 NASAL CONGESTION: ICD-10-CM

## 2022-08-30 PROCEDURE — 99213 OFFICE O/P EST LOW 20 MIN: CPT | Performed by: NURSE PRACTITIONER

## 2022-08-30 RX ORDER — AZELASTINE 1 MG/ML
2 SPRAY, METERED NASAL 2 TIMES DAILY
Qty: 30 ML | Refills: 11 | Status: SHIPPED | OUTPATIENT
Start: 2022-08-30

## 2022-08-30 NOTE — PATIENT INSTRUCTIONS
Use flonase and astelin consistently   Thyroid ultrasound in one year  Will obtain repeat sinus CT  Call with any new/worsening problems or concerns    For the best response, use your nasal sprays every day without skipping doses. It may take several weeks before the full effect is acheived.      CONTACT INFORMATION:  The main office phone number is [phone]. For emergencies after hours and on weekends, this number will convert over to our answering service and the on call provider will answer. Please try to keep non emergent phone calls/ questions to office hours 9am-5pm Monday through Friday.      HERCAMOSHOP  As an alternative, you can sign up and use the Epic MyChart system for more direct and quicker access for non emergent questions/ problems.  [employer] allows you to send messages to your doctor, view your test results, renew your prescriptions, schedule appointments, and more. To sign up, go to [employer] and click on the Sign Up Now link in the New User? box. Enter your HERCAM[employer]OP Activation Code exactly as it appears below along with the last four digits of your Social Security Number and your Date of Birth () to complete the sign-up process. If you do not sign up before the expiration date, you must request a new code.     HERCAMOSHOP Activation Code: Activation code not generated  Current HERCAMOSHOP Status: Active     If you have questions, you can email [email or web address] or call [phone] to talk to our HERCAMOSHOP staff. Remember, HERCAMOSHOP is NOT to be used for urgent needs. For medical emergencies, dial 911.     IF YOU SMOKE OR USE TOBACCO PLEASE READ THE FOLLOWING:  Why is smoking bad for me?  Smoking increases the risk of heart disease, lung disease, vascular disease, stroke, and cancer. If you smoke, STOP!        IF YOU SMOKE OR USE TOBACCO PLEASE READ THE FOLLOWING:  Why is smoking bad for me?  Smoking increases the risk of heart disease, lung disease, vascular  disease, stroke, and cancer. If you smoke, STOP!     For more information:  Quit Now [place]  1-800-QUIT-NOW  https://[email or web address].quitlogix.org/en-US/

## 2022-09-06 DIAGNOSIS — G60.9 IDIOPATHIC PERIPHERAL NEUROPATHY: ICD-10-CM

## 2022-09-08 DIAGNOSIS — I25.10 CORONARY ARTERY DISEASE INVOLVING NATIVE CORONARY ARTERY OF NATIVE HEART WITHOUT ANGINA PECTORIS: ICD-10-CM

## 2022-09-08 DIAGNOSIS — E78.2 MIXED HYPERLIPIDEMIA: ICD-10-CM

## 2022-09-08 LAB
ALBUMIN SERPL-MCNC: 3.8 G/DL (ref 3.5–5.2)
ALP BLD-CCNC: 78 U/L (ref 40–130)
ALT SERPL-CCNC: 48 U/L (ref 5–41)
ANION GAP SERPL CALCULATED.3IONS-SCNC: 9 MMOL/L (ref 7–19)
AST SERPL-CCNC: 42 U/L (ref 5–40)
BILIRUB SERPL-MCNC: 0.7 MG/DL (ref 0.2–1.2)
BUN BLDV-MCNC: 17 MG/DL (ref 8–23)
CALCIUM SERPL-MCNC: 9.7 MG/DL (ref 8.8–10.2)
CHLORIDE BLD-SCNC: 105 MMOL/L (ref 98–111)
CHOLESTEROL, TOTAL: 120 MG/DL (ref 160–199)
CO2: 28 MMOL/L (ref 22–29)
CREAT SERPL-MCNC: 0.8 MG/DL (ref 0.5–1.2)
GFR AFRICAN AMERICAN: >59
GFR NON-AFRICAN AMERICAN: >60
GLUCOSE BLD-MCNC: 97 MG/DL (ref 74–109)
HCT VFR BLD CALC: 48.2 % (ref 42–52)
HDLC SERPL-MCNC: 44 MG/DL (ref 55–121)
HEMOGLOBIN: 15.2 G/DL (ref 14–18)
LDL CHOLESTEROL CALCULATED: 56 MG/DL
MCH RBC QN AUTO: 30.6 PG (ref 27–31)
MCHC RBC AUTO-ENTMCNC: 31.5 G/DL (ref 33–37)
MCV RBC AUTO: 97.2 FL (ref 80–94)
PDW BLD-RTO: 13.2 % (ref 11.5–14.5)
PLATELET # BLD: 211 K/UL (ref 130–400)
PMV BLD AUTO: 10.8 FL (ref 9.4–12.4)
POTASSIUM SERPL-SCNC: 4.4 MMOL/L (ref 3.5–5)
RBC # BLD: 4.96 M/UL (ref 4.7–6.1)
SODIUM BLD-SCNC: 142 MMOL/L (ref 136–145)
TOTAL PROTEIN: 6.6 G/DL (ref 6.6–8.7)
TRIGL SERPL-MCNC: 102 MG/DL (ref 0–149)
TSH SERPL DL<=0.05 MIU/L-ACNC: 4.32 UIU/ML (ref 0.27–4.2)
WBC # BLD: 6.6 K/UL (ref 4.8–10.8)

## 2022-09-09 RX ORDER — PREGABALIN 100 MG/1
CAPSULE ORAL
Qty: 180 CAPSULE | Refills: 1 | Status: SHIPPED | OUTPATIENT
Start: 2022-09-09 | End: 2022-12-01

## 2022-09-09 SDOH — HEALTH STABILITY: PHYSICAL HEALTH: ON AVERAGE, HOW MANY DAYS PER WEEK DO YOU ENGAGE IN MODERATE TO STRENUOUS EXERCISE (LIKE A BRISK WALK)?: 0 DAYS

## 2022-09-09 ASSESSMENT — PATIENT HEALTH QUESTIONNAIRE - PHQ9
2. FEELING DOWN, DEPRESSED OR HOPELESS: 0
1. LITTLE INTEREST OR PLEASURE IN DOING THINGS: 0
SUM OF ALL RESPONSES TO PHQ QUESTIONS 1-9: 0
SUM OF ALL RESPONSES TO PHQ9 QUESTIONS 1 & 2: 0
SUM OF ALL RESPONSES TO PHQ QUESTIONS 1-9: 0

## 2022-09-09 ASSESSMENT — LIFESTYLE VARIABLES
HOW OFTEN DO YOU HAVE SIX OR MORE DRINKS ON ONE OCCASION: 1
HOW OFTEN DO YOU HAVE A DRINK CONTAINING ALCOHOL: 1
HOW MANY STANDARD DRINKS CONTAINING ALCOHOL DO YOU HAVE ON A TYPICAL DAY: 0
HOW MANY STANDARD DRINKS CONTAINING ALCOHOL DO YOU HAVE ON A TYPICAL DAY: PATIENT DOES NOT DRINK
HOW OFTEN DO YOU HAVE A DRINK CONTAINING ALCOHOL: NEVER

## 2022-09-15 ENCOUNTER — OFFICE VISIT (OUTPATIENT)
Dept: INTERNAL MEDICINE | Age: 77
End: 2022-09-15
Payer: MEDICARE

## 2022-09-15 VITALS
DIASTOLIC BLOOD PRESSURE: 72 MMHG | RESPIRATION RATE: 20 BRPM | OXYGEN SATURATION: 97 % | BODY MASS INDEX: 36.75 KG/M2 | HEIGHT: 75 IN | HEART RATE: 67 BPM | SYSTOLIC BLOOD PRESSURE: 116 MMHG | WEIGHT: 295.6 LBS

## 2022-09-15 DIAGNOSIS — Z00.00 MEDICARE ANNUAL WELLNESS VISIT, SUBSEQUENT: Primary | ICD-10-CM

## 2022-09-15 DIAGNOSIS — E04.1 THYROID NODULE: ICD-10-CM

## 2022-09-15 DIAGNOSIS — E78.2 MIXED HYPERLIPIDEMIA: ICD-10-CM

## 2022-09-15 DIAGNOSIS — G70.00 MYASTHENIA GRAVIS (HCC): ICD-10-CM

## 2022-09-15 DIAGNOSIS — I25.10 CORONARY ARTERY DISEASE INVOLVING NATIVE CORONARY ARTERY OF NATIVE HEART WITHOUT ANGINA PECTORIS: ICD-10-CM

## 2022-09-15 DIAGNOSIS — I10 ESSENTIAL HYPERTENSION: ICD-10-CM

## 2022-09-15 PROCEDURE — G8427 DOCREV CUR MEDS BY ELIG CLIN: HCPCS | Performed by: INTERNAL MEDICINE

## 2022-09-15 PROCEDURE — 1036F TOBACCO NON-USER: CPT | Performed by: INTERNAL MEDICINE

## 2022-09-15 PROCEDURE — 99213 OFFICE O/P EST LOW 20 MIN: CPT | Performed by: INTERNAL MEDICINE

## 2022-09-15 PROCEDURE — G0439 PPPS, SUBSEQ VISIT: HCPCS | Performed by: INTERNAL MEDICINE

## 2022-09-15 PROCEDURE — G8417 CALC BMI ABV UP PARAM F/U: HCPCS | Performed by: INTERNAL MEDICINE

## 2022-09-15 PROCEDURE — 1123F ACP DISCUSS/DSCN MKR DOCD: CPT | Performed by: INTERNAL MEDICINE

## 2022-09-15 RX ORDER — GLYCOPYRROLATE 1 MG/1
1 TABLET ORAL 3 TIMES DAILY PRN
COMMUNITY

## 2022-09-15 RX ORDER — PYRIDOSTIGMINE BROMIDE 60 MG/1
TABLET ORAL
COMMUNITY
Start: 2022-07-22

## 2022-09-15 RX ORDER — AZELASTINE 1 MG/ML
SPRAY, METERED NASAL
COMMUNITY
Start: 2022-08-30

## 2022-09-15 NOTE — PROGRESS NOTES
Chief Complaint   Patient presents with    Medicare AWV       HPI: Patient is here today for Medicare annual wellness visit and to follow-up medical issues coronary artery disease hyperlipidemia but most specifically recent diagnosis of myasthenia gravis when he eats something cold or when he talks for a long time he started to get slurred speech and thickening of his tongue but his tongue did not appear thick and no angioedema was seen work-up led him to referral to neurology who diagnosed him with myasthenia gravis based on symptoms and antibody test.  He has been placed on Mestinon and has some improvement but not completely.   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     left at fibular head s/p decompression 2009       Past Surgical History:   Procedure Laterality Date    CATARACT REMOVAL WITH IMPLANT      CHOLECYSTECTOMY  07/2005    CORONARY ANGIOPLASTY WITH STENT PLACEMENT  12/26/2018    OH to Circumflex    JOINT REPLACEMENT Bilateral 2003    Knee replacements    KNEE ARTHROPLASTY Bilateral 2003    complete combine condylye and plateau    NERVE SURGERY      Nerve release       Family History   Problem Relation Age of Onset    COPD Mother     Rheum Arthritis Father     COPD Brother     Lung Cancer Brother        Social History     Socioeconomic History    Marital status:      Spouse name: avila    Number of children: 2    Years of education: 20    Highest education level: Not on file   Occupational History    Occupation: retired teacher   Tobacco Use    Smoking status: Former     Packs/day: 1.00     Years: 12.00     Pack years: 12.00     Types: Cigarettes     Quit date: 5     Years since quittin.7    Smokeless tobacco: Never   Vaping Use    Vaping Use: Never used   Substance and Sexual Activity    Alcohol use: No    Drug use: No    Sexual activity: Yes     Partners: Female     Comment: wife   Other Topics Concern    Not on file   Social History Narrative    Not on file     Social Determinants of Health     Financial Resource Strain: Low Risk     Difficulty of Paying Living Expenses: Not very hard   Food Insecurity: No Food Insecurity    Worried About Running Out of Food in the Last Year: Never true    Ran Out of Food in the Last Year: Never true   Transportation Needs: Not on file   Physical Activity: Unknown    Days of Exercise per Week: 0 days    Minutes of Exercise per Session: Not on file   Stress: Not on file   Social Connections: Not on file   Intimate Partner Violence: Not on file   Housing Stability: Not on file       Allergies   Allergen Reactions    Ampicillin Rash       Current Outpatient Medications   Medication Sig Dispense Refill    azelastine (ASTELIN) 0.1 % nasal spray USE 2 SPRAYS IN EACH NOSTRIL TWICE DAILY      pyridostigmine (MESTINON) 60 MG tablet TAKE 1 TABLET BY MOUTH THREE TIMES DAILY      glycopyrrolate (ROBINUL) 1 MG tablet Take 1 mg by mouth 3 times daily as needed      LUTEIN PO Take by mouth daily      pregabalin (LYRICA) 100 MG capsule TAKE 1 CAPSULE TWICE A  capsule 1    famotidine (PEPCID) 20 MG tablet Take 1 tablet by mouth 2 times daily 180 tablet 1    metoprolol succinate (TOPROL XL) 25 MG extended release tablet TAKE 1 TABLET DAILY 90 tablet 3    atorvastatin (LIPITOR) 80 MG tablet TAKE 1 TABLET NIGHTLY 90 tablet 3    aspirin 81 MG tablet Take 81 mg by mouth daily      CPAP Machine MISC by Does not apply route      Multiple Vitamins-Minerals (MULTIVITAMIN ADULT PO) Take by mouth daily No current facility-administered medications for this visit. Review of Systems   Constitutional:  Negative for chills, fatigue and fever. HENT:  Negative for congestion and sinus pressure. Eyes:  Negative for discharge and redness. Respiratory:  Negative for cough and shortness of breath. Cardiovascular:  Negative for chest pain, palpitations and leg swelling. Gastrointestinal:  Negative for abdominal distention and abdominal pain. Genitourinary:  Negative for dysuria, frequency and urgency. Musculoskeletal:  Negative for arthralgias and back pain. Skin:  Negative for rash and wound. Neurological:  Positive for speech difficulty. Negative for dizziness, light-headedness and headaches. Psychiatric/Behavioral:  Negative for dysphoric mood and sleep disturbance. The patient is not nervous/anxious. /72 (Site: Left Upper Arm, Position: Sitting)   Pulse 67   Resp 20   Ht 6' 3\" (1.905 m)   Wt 295 lb 9.6 oz (134.1 kg)   SpO2 97%   BMI 36.95 kg/m²   BP Readings from Last 7 Encounters:   09/15/22 116/72   07/15/22 106/60   06/16/22 134/82   03/14/22 112/70   09/13/21 120/72   01/07/21 112/72   08/07/20 130/82     Wt Readings from Last 7 Encounters:   09/15/22 295 lb 9.6 oz (134.1 kg)   07/15/22 300 lb (136.1 kg)   06/16/22 300 lb (136.1 kg)   03/14/22 300 lb (136.1 kg)   09/13/21 296 lb (134.3 kg)   01/07/21 296 lb (134.3 kg)   08/07/20 295 lb (133.8 kg)     BMI Readings from Last 7 Encounters:   09/15/22 36.95 kg/m²   07/15/22 37.50 kg/m²   06/16/22 37.50 kg/m²   03/14/22 37.50 kg/m²   09/13/21 37.00 kg/m²   01/07/21 37.00 kg/m²   08/07/20 36.87 kg/m²     Resp Readings from Last 7 Encounters:   09/15/22 20   07/01/19 18   12/27/18 16   07/30/18 20   07/28/17 20       Physical Exam  Constitutional:       General: He is not in acute distress. Appearance: Normal appearance. He is well-developed. HENT:      Head: Normocephalic.       Right Ear: External ear normal. Tympanic membrane is not injected. Left Ear: External ear normal. Tympanic membrane is not injected. Mouth/Throat:      Pharynx: No oropharyngeal exudate. Eyes:      General: No scleral icterus. Conjunctiva/sclera: Conjunctivae normal.   Neck:      Thyroid: No thyroid mass or thyromegaly. Vascular: No carotid bruit. Cardiovascular:      Rate and Rhythm: Normal rate and regular rhythm. Heart sounds: S1 normal and S2 normal. No murmur heard. No S3 or S4 sounds. Pulmonary:      Effort: Pulmonary effort is normal. No respiratory distress. Breath sounds: Normal breath sounds. No wheezing or rales. Abdominal:      General: Bowel sounds are normal. There is no distension. Palpations: Abdomen is soft. There is no mass. Tenderness: There is no abdominal tenderness. Musculoskeletal:      Cervical back: Neck supple. Right lower leg: No edema. Left lower leg: No edema. Lymphadenopathy:      Cervical: No cervical adenopathy. Upper Body:      Right upper body: No supraclavicular adenopathy. Left upper body: No supraclavicular adenopathy. Skin:     Findings: No rash. Neurological:      Mental Status: He is alert and oriented to person, place, and time. Cranial Nerves: No cranial nerve deficit.    Psychiatric:         Mood and Affect: Mood normal.       Results for orders placed or performed in visit on 09/08/22   Lipid Panel   Result Value Ref Range    Cholesterol, Total 120 (L) 160 - 199 mg/dL    Triglycerides 102 0 - 149 mg/dL    HDL 44 (L) 55 - 121 mg/dL    LDL Calculated 56 <100 mg/dL   TSH   Result Value Ref Range    TSH 4.320 (H) 0.270 - 4.200 uIU/mL   Comprehensive Metabolic Panel   Result Value Ref Range    Sodium 142 136 - 145 mmol/L    Potassium 4.4 3.5 - 5.0 mmol/L    Chloride 105 98 - 111 mmol/L    CO2 28 22 - 29 mmol/L    Anion Gap 9 7 - 19 mmol/L    Glucose 97 74 - 109 mg/dL    BUN 17 8 - 23 mg/dL    Creatinine 0.8 0.5 - 1.2 mg/dL    GFR Non- >60 >60    GFR African American >59 >59    Calcium 9.7 8.8 - 10.2 mg/dL    Total Protein 6.6 6.6 - 8.7 g/dL    Albumin 3.8 3.5 - 5.2 g/dL    Total Bilirubin 0.7 0.2 - 1.2 mg/dL    Alkaline Phosphatase 78 40 - 130 U/L    ALT 48 (H) 5 - 41 U/L    AST 42 (H) 5 - 40 U/L   CBC   Result Value Ref Range    WBC 6.6 4.8 - 10.8 K/uL    RBC 4.96 4.70 - 6.10 M/uL    Hemoglobin 15.2 14.0 - 18.0 g/dL    Hematocrit 48.2 42.0 - 52.0 %    MCV 97.2 (H) 80.0 - 94.0 fL    MCH 30.6 27.0 - 31.0 pg    MCHC 31.5 (L) 33.0 - 37.0 g/dL    RDW 13.2 11.5 - 14.5 %    Platelets 143 735 - 853 K/uL    MPV 10.8 9.4 - 12.4 fL       ASSESSMENT/ PLAN:  1. Medicare annual wellness visit, subsequent  Chart, medications, labs, vaccines reviewed. Keep up to date with routine care and follow up. Call with any problems or complaints. Keep up to date with routine screening recomendations and vaccines. Keep up to date with psa/ colon/ other medical screening. 2. Myasthenia gravis ([facility] [address]  He was diagnosed with myasthenia gravis by neurology and placed on Mestinon but no follow-up appointment was made he has had some improvement with the Mestinon but he needs ongoing follow-up and specialty care through neurology I prefer him to keep seeing them have made a referral back to Dr. Wilber Cid  - External Referral To Neurology    3. Thyroid nodule  We need to check labs reassess follow. Has also followed with ENT in the past for multiple reasons  - Comprehensive Metabolic Panel; Future  - TSH; Future  - T4, Free; Future    4. Mixed hyperlipidemia  We recommend ongoing high-dose statin therapy  - Lipid Panel; Future    5. Coronary artery disease involving native coronary artery of native heart without angina pectoris  Continue high-dose statin therapy-pretension therapy watch blood sugar continue current anticoagulant therapy    6.  Essential hypertension  Overall good blood pressure control                      Medicare Annual Wellness Visit    Devon Can is here for Medicare AWV    Assessment & Plan   Medicare annual wellness visit, subsequent  Myasthenia gravis [facility])  -     External Referral To Neurology  Thyroid nodule  -     Comprehensive Metabolic Panel; Future  -     TSH; Future  -     T4, Free; Future  Mixed hyperlipidemia  -     Lipid Panel; Future  Coronary artery disease involving native coronary artery of native heart without angina pectoris  Essential hypertension    Recommendations for Preventive Services Due: see orders and patient instructions/AVS.  Recommended screening schedule for the next 5-10 years is provided to the patient in written form: see Patient Instructions/AVS.     Return in about 16 weeks (around 1/5/2023). Subjective   The following acute and/or chronic problems were also addressed today:      Patient's complete Health Risk Assessment and screening values have been reviewed and are found in Flowsheets. The following problems were reviewed today and where indicated follow up appointments were made and/or referrals ordered. Positive Risk Factor Screenings with Interventions:             General Health and ACP:  General  In general, how would you say your health is?: Very Good  In the past 7 days, have you experienced any of the following: New or Increased Pain, New or Increased Fatigue, Loneliness, Social Isolation, Stress or Anger?: No  Do you get the social and emotional support that you need?: Yes  Do you have a Living Will?: Yes    Advance Directives       Power of  Living Will ACP-Advance Directive ACP-Power of     Not on File Not on File Not on File Not on File        General Health Risk Interventions:  Lives with his wife supportive children.     Health Habits/Nutrition:  Physical Activity: Unknown    Days of Exercise per Week: 0 days    Minutes of Exercise per Session: Not on file     Have you lost any weight without trying in the past 3 months?: No  Body mass index: (!) 36.94  Have PO) Take by mouth daily Yes Historical Provider, MD   clopidogrel (PLAVIX) 75 MG tablet Take 1 tablet by mouth daily  Benny Freeman MD   omeprazole (PRILOSEC) 20 MG delayed release capsule TAKE 1 Asad Koroma MD       [facility] (Including outside providers/suppliers regularly involved in providing care):   Patient Care Team:  Benny Freeman MD as PCP - General (Internal Medicine)  Benny Freeman MD as PCP - [facility] Empaneled Provider  Carmen Scales MD as Consulting Physician (Gastroenterology)  Bran Gandhi MD as Consulting Physician (Ophthalmology)  Charu Boyle MD as Consulting Physician (Otolaryngology)     Reviewed and updated this visit:  Tobacco  Allergies  Meds  Med Hx  Surg Hx  Soc Hx  Fam Hx

## 2022-09-20 ENCOUNTER — HOSPITAL ENCOUNTER (OUTPATIENT)
Dept: CT IMAGING | Facility: HOSPITAL | Age: 77
Discharge: HOME | End: 2022-09-20
Admitting: NURSE PRACTITIONER

## 2022-09-20 DIAGNOSIS — J34.2 NASAL SEPTAL DEVIATION: ICD-10-CM

## 2022-09-20 DIAGNOSIS — R09.81 NASAL CONGESTION: ICD-10-CM

## 2022-09-20 PROCEDURE — 70486 CT MAXILLOFACIAL W/O DYE: CPT

## 2022-09-21 ENCOUNTER — HOSPITAL ENCOUNTER (OUTPATIENT)
Dept: PHYSICAL THERAPY | Age: 77
Setting detail: THERAPIES SERIES
Discharge: HOME | End: 2022-09-21
Payer: MEDICARE

## 2022-09-21 ENCOUNTER — TELEPHONE (OUTPATIENT)
Dept: OTOLARYNGOLOGY | Facility: CLINIC | Age: 77
End: 2022-09-21

## 2022-09-21 PROCEDURE — 97162 PT EVAL MOD COMPLEX 30 MIN: CPT

## 2022-09-21 PROCEDURE — 95992 CANALITH REPOSITIONING PROC: CPT

## 2022-09-21 NOTE — TELEPHONE ENCOUNTER
----- Message from ALEXUS Main sent at 9/21/2022  8:40 AM CDT -----  Please give patient CT results. Continue flonase and astelin daily. We will further discuss at follow up.

## 2022-09-21 NOTE — PROGRESS NOTES
Physical Therapy: Initial Evaluation    Patient: Kasandra Gillis (89 y.o. male)   Examination Date:   Plan of Care Certification ITWMSM: to 22      :  1945 ;    Confirmed: Yes MRN: [id number]  CSN: [id number]   Insurance: Payor: Mabel Rancher / Plan: [facility] SOLUTIONS / Product Type: *No Product type* /   Insurance ID: [id number] - (Medicare Managed) Secondary Insurance (if applicable):    Referring Physician: MD John Ayala   PCP: Sheila Fererra MD Visits to Date/Visits Approved:     No Show/Cancelled Appts:   /       Medical Diagnosis: Benign paroxysmal vertigo, right ear [H81.11] BPPV H81.11  Treatment Diagnosis: bppv right ant/post canal     PERTINENT MEDICAL HISTORY   Patient Assessed for Rehabilitation Services: Yes       Medical History: Chart Reviewed: Yes   Past Medical History:   Diagnosis Date    Arthritis     DVT, lower extremity, distal, chronic ([facility] [address]     Familial hypercholesterolemia 2017    Gastroesophageal reflux disease without esophagitis 2017    History of blood transfusion     Knee replacements    Hx of blood clots 2003    Post Knee replacement tx    Idiopathic peripheral neuropathy 2017    Obstructive sleep apnea 2017    Partial tear of left Achilles tendon 2017    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     left at fibular head s/p decompression      Surgical History:   Past Surgical History:   Procedure Laterality Date    CATARACT REMOVAL WITH IMPLANT      CHOLECYSTECTOMY  2005    CORONARY ANGIOPLASTY WITH STENT PLACEMENT  2018    OH to Circumflex    JOINT REPLACEMENT Bilateral     Knee replacements    KNEE ARTHROPLASTY Bilateral     complete combine condylye and plateau    NERVE SURGERY      Nerve release       Medications:   Current Outpatient Medications:     azelastine (ASTELIN) 0.1 % nasal spray, USE 2 SPRAYS IN EACH NOSTRIL TWICE DAILY, Disp: , Rfl:     pyridostigmine (MESTINON) 60 MG tablet, TAKE 1 No  Is there pressure/fullness in the ear?: No  Is there pain in the ear?: No    Vision History:  Do you wear glasses/contacts: Yes  Bifocals/progressives: Yes  Double vision: No  History of Cataracts: Yes (has had cataract surgery on both eyes)                  Functional Status         Social History:  Social History  Lives With: Spouse  Home Layout: Two level  Bathroom Equipment: Grab bars in shower                   OBJECTIVE EXAMINATION                Review of Systems:  Follows Commands: Within Functional Limits  Vital Signs  BP:  (112/68 seated, 124/68 standing)         Observations:    Forward head and shoulders          Ortho Screen  Neuro Screen:   Sensation       Decreased sensation to bilateral lower legs and le's throughout to light touch       Cervical Assessment     AROM Cervical Spine   Cervical Spine AROM : WFL  Cervical spine general AROM: ext 42 degrees, flex, rot and bilateral sb'ing wnl           Left AROM  Right AROM       wnl wnl       Left Strength  Right Strength         Strength LLE  Comment: 5/5 throughout    Strength RLE  Comment: 5/5 throughout       Vision/Vestibular Assessment  Visual/Occulomotor:   Visual/Occulomotor Assessed: Yes  Spontaneous Nystagmus: Intact  Gaze Holding Nystagmus: No  Smooth pursuits horizontal: Able to track stimulus in all quadrants without difficulty  Smooth pursuits vertical: Able to track stimulus in all quadrants without difficulty    Frenzel/Infrared Goggles   Spontaneous Nystagmus: Intact  Gaze Holding Nystagmus: No    VOR Tests:   Cancellation Horizontal Option: Intact    Positional Tests:    Modified Vertebral Artery Test: Negative  Right Tebbetts-Hallpike Test: Vertigo  Right Horizontal Roll Test: Negative  Left Horizontal Roll Test: Negative  Left Tebbetts Hallpike Test: negative   Balance Screen:   Balance  Sitting - Static: Good  Sitting - Dynamic: Good  Standing - Static: Good (slight sway with eyes closed position)  Standing - Dynamic: Good  Comments: JORDIN 22/24  Balance Screen  Sitting - Static: Good  Sitting - Dynamic: Good  Standing - Static: Good (slight sway with eyes closed position)  Standing - Dynamic: Good    Outcome Measure(s) Completed:  DHI 12% impairment       ASSESSMENT     Impression: Assessment: Patient has decreased balance and regular occurrance of dizziness with positional movements and head movements. He will benefit from skilled PT to decreased dizziness and improve balance and safety. He tolerated canalith well today and reported no increased dizziness upon leaving. Body Structures, Functions, Activity Limitations Requiring Skilled Therapeutic Intervention: Decreased functional mobility , Vestibular Impairment, Decreased posture    Statement of Medical Necessity: Physical Therapy is both indicated and medically necessary as outlined in the POC to increase the likelihood of meeting the functionally related goals stated below.         Patient's rehabilitation potential/prognosis is considered to be: Good    Factors which may impact rehabilitation potential include:  back pain         GOALS     Patient Goal(s): get rid of dizziness  Short Term Goals Completed by 2 Weeks Goal Status   Patient to have no vertigo or nystagmus with right ya hallpike     Patient to report decreased dizziness by 50%     Patient to be independent with habituation HEP                                                   Long Term Goals Completed by 4 weeks Goal Status   Patient to have report of no dizziness with rolling in bed     Patient to report decreased dizziness by 75%     Patient to demo increased function by scoring <= 5% impairment on DHI                                                    TREATMENT PLAN   Other Activities  [facility] [address] [facility] [place] [facility]        Pt. actively involved in establishing Plan of Care and Goals: Yes  Patient/ Caregiver education and instruction: Restrictions for today and tonight issued, patient had good understanding Treatment may include any combination of the following: ROM, Balance training, Functional mobility training, Endurance training, Manual Therapy - Joint Manipulation, Manual Therapy - Soft Tissue Mobilization, Safety education & training, Patient/Caregiver education & training, Home exercise program, Neuromuscular re-education, Vestibular rehab     Frequency / Duration:  Patient to be seen 2x/week for 4 weeks weeks      Eval Complexity:    Decision Making: Medium Complexity  History: Personal Factors and/or Comorbidities Impacting POC: Medium  Examination of body system(s) including body structures and functions, activity limitations, and/or participation restrictions: Medium  Clinical Presentation: Medium    PT Treatment Completed:  Exercises:      Treatment Reasoning    Exercise 1: cone weaves with ball toss  Exercise 2: figure 8's with ball toss  Exercise 3: ambulation with horizontal and vertical head movements  Exercise 4: sit to stand with 360 degree turns  Exercise 5: gaze stabilization horizontal and vertical  Exercise 6: advanced gaze stabilization horizontal and vertical  Exercise 7: imaginary target  Exercise 8: eye head coordination  Exercise 9: crom in front of fish picture  Exercise 10: static stance on foam pad eo and ec  Exercise 11: right canalith ant/post canal with 10 min seated rest 09/21/2022                          Therapy Time  Individual Time In:  1032        Individual Time Out:  1135   Minutes:  68 total 18 timed          Therapist Signature: Randy Kendrick, PT    Date: 3/11/5779     I certify that the above Therapy Services are being furnished while the patient is under my care. I agree with the treatment plan and certify that this therapy is necessary.       Physician's Signature:  ___________________________   Date:_______                                                                   Ruben Glez MD        Physician Comments: _______________________________________________    Please sign and return to [facility] PHYSICAL THERAP[facility]. Please fax to the location listed below.  Will Barriga for this referral!    [address] [facility] PHYSICAL THERAPY  [address] [address]  Dept: [phone]  Dept Fax: Dona Small: [phone]       POC NOTE

## 2022-09-22 ENCOUNTER — HOSPITAL ENCOUNTER (OUTPATIENT)
Dept: PHYSICAL THERAPY | Age: 77
Setting detail: THERAPIES SERIES
Discharge: HOME | End: 2022-09-22
Payer: MEDICARE

## 2022-09-22 PROCEDURE — 97112 NEUROMUSCULAR REEDUCATION: CPT

## 2022-09-22 PROCEDURE — 95992 CANALITH REPOSITIONING PROC: CPT

## 2022-09-22 NOTE — PROGRESS NOTES
Physical Therapy: Daily Note   Patient: Roxana Godoy (89 y.o. male)   Examination Date:   Plan of Care/Certification Expiration Date: 22    No data recorded   :  1945 # of Visits since [facility]:   2   MRN: [id number]  CSN: [id number] Start of Care Date:   2022   Insurance: Payor: Edmund Bloom / Plan: [facility] SOLUTIONS / Product Type: *No Product type* /   Insurance ID: [id number] - (Medicare Managed) Secondary Insurance (if applicable):    Referring Physician: MD Parul Walker   PCP: Belkys Bell MD Visits to Date/Visits Approved:     No Show/Cancelled Appts:   /       Medical Diagnosis: Benign paroxysmal vertigo, right ear [H81.11] BPPV H81.11  Treatment Diagnosis: bppv right ant/post canal        SUBJECTIVE EXAMINATION       Patient Comments: Subjective: Patient states he did well last night and had no dizzy episodes and reports no dizziness today. OBJECTIVE EXAMINATION   Restrictions:  No data recorded No data recorded No data recorded          TREATMENT     Exercises:      Treatment Reasoning    Exercise 1: cone weaves with ball toss 1 x 3, without ball toss 1 x 3  Exercise 2: figure 8's with ball toss 1 x 7  Exercise 3: ambulation with horizontal and vertical head movements x 120 feet ea  Exercise 4: sit to stand with 360 degree turns 1 x 3 ea  Exercise 5: gaze stabilization horizontal and vertical 1 x 10 ea  Exercise 6: advanced gaze stabilization horizontal and vertical 1 x 10 ea  Exercise 7: imaginary target 1 x 5 ea  Exercise 8: eye head coordination 1 x 10 ea  Exercise 9: crom in front of fish picture horizontal and vertical 1 x 10 ea  Exercise 10: static stance on foam pad eo and ec x 1 min ea  Exercise 11: right canalith ant/post canal with 10 min seated rest 2022                               ASSESSMENT     Assessment: Assessment: Patient did fair with tx today with only slight dizziness during horizontal head movements today.   He did have continued report of dizziness with right ya hallpike position for about 10 seconds. Performed canalith today with front loading tech and patient tolerated well. He reported no dizziness upon leaving today.   Body Structures, Functions, Activity Limitations Requiring Skilled Therapeutic Intervention: Decreased functional mobility , Vestibular Impairment, Decreased posture      No data recorded  Therapy Prognosis: Good       GOALS   Patient goals : get rid of dizziness  Short Term Goals Completed by 2 Weeks Current Status Goal Status   Patient to have no vertigo or nystagmus with right ya hallpike       Patient to report decreased dizziness by 50%       Patient to be independent with habituation HEP                                                                   Long Term Goals Completed by 4 weeks Current Status Goal Status   Patient to have report of no dizziness with rolling in bed       Patient to report decreased dizziness by 75%       Patient to demo increased function by scoring <= 5% impairment on DHI                                                                    TREATMENT PLAN   Plan Frequency: 2x/week  Plan weeks: 4 weeks  Current Treatment Recommendations: ROM, Balance training, Functional mobility training, Endurance training, Manual Therapy - Joint Manipulation, Manual Therapy - Soft Tissue Mobilization, Safety education & training, Patient/Caregiver education & training, Home exercise program, Neuromuscular re-education, Vestibular rehab           Therapy Time  Individual Time In: 0800       Individual Time Out: 9983  Minutes: 42  Timed Code Treatment Minutes: 42 Minutes     Electronically signed by Matt Starr PT  on 9/22/2022 at 8:48 AM   POC NOTE

## 2022-09-28 ENCOUNTER — HOSPITAL ENCOUNTER (OUTPATIENT)
Dept: PHYSICAL THERAPY | Age: 77
Setting detail: THERAPIES SERIES
Discharge: HOME | End: 2022-09-28
Payer: MEDICARE

## 2022-09-28 PROCEDURE — 95992 CANALITH REPOSITIONING PROC: CPT

## 2022-09-28 PROCEDURE — 97112 NEUROMUSCULAR REEDUCATION: CPT

## 2022-09-28 NOTE — PROGRESS NOTES
Physical Therapy: Daily Note   Patient: Okey Dakin (89 y.o. male)   Examination Date:   Plan of Care/Certification Expiration Date: 22    No data recorded   :  1945 # of Visits since [facility]:   3   MRN: [id number]  CSN: [id number] Start of Care Date:   2022   Insurance: [facility] / Plan: [facility] SOLUTIONS / Product Type: *No Product type* /   Insurance ID: [id number] - (Medicare Managed) Secondary Insurance (if applicable):    Referring Physician: MD Tess Eddy   PCP: Kyrie Godinez MD Visits to Date/Visits Approved: 3 / 12    No Show/Cancelled Appts:   /       Medical Diagnosis: Benign paroxysmal vertigo, right ear [H81.11] BPPV H81.11  Treatment Diagnosis: bppv right ant/post canal        SUBJECTIVE EXAMINATION       Patient Comments: Subjective: Patient states he feels he has had slightly more dizziness in the last couple of days. He says he feels dizzy with laying down primarily but says no specific side rolling causes dizziness.           OBJECTIVE EXAMINATION   Restrictions:  No data recorded No data recorded No data recorded          TREATMENT     Exercises:      Treatment Reasoning    Exercise 1: cone weaves with ball toss 1 x 6  Exercise 2: figure 8's with ball toss 1 x 7  Exercise 3: ambulation with horizontal and vertical head movements x 120 feet ea  Exercise 4: sit to stand with 360 degree turns 1 x 3 ea  Exercise 5: gaze stabilization horizontal and vertical 1 x 20 ea  Exercise 6: advanced gaze stabilization horizontal and vertical 1 x 20 ea  Exercise 7: imaginary target 1 x 5 ea  Exercise 8: eye head coordination 1 x 10 ea, ambulation with different directions: turning 360 degree circles, fast, slow, stop, go, head movements x 230 feet  Exercise 9: crom in front of fish picture horizontal and vertical 1 x 20 ea  Exercise 10: static stance on foam pad eo and ec x 1 min ea  Exercise 11: right canalith ant/post canal with 10 min seated rest 2022 ASSESSMENT     Assessment: Assessment: Patient did fair with tx today with some dizziness with ex's primarily with head movement from side to side, but dizziness only lasted a few seconds and then returned to baseline. He continues to have dizzines and upbeating nystagmus with right ya hallpike for about 5-10 seconds therefore canalith performed today. He tolerated positions fair today.   Body Structures, Functions, Activity Limitations Requiring Skilled Therapeutic Intervention: Decreased functional mobility , Vestibular Impairment, Decreased posture      No data recorded  Therapy Prognosis: Good       GOALS   Patient goals : get rid of dizziness  Short Term Goals Completed by 2 Weeks Current Status Goal Status   Patient to have no vertigo or nystagmus with right ya hallpike       Patient to report decreased dizziness by 50%       Patient to be independent with habituation HEP                                                                   Long Term Goals Completed by 4 weeks Current Status Goal Status   Patient to have report of no dizziness with rolling in bed       Patient to report decreased dizziness by 75%       Patient to demo increased function by scoring <= 5% impairment on DHI                                                                    TREATMENT PLAN   Plan Frequency: 2x/week  Plan weeks: 4 weeks  Current Treatment Recommendations: ROM, Balance training, Functional mobility training, Endurance training, Manual Therapy - Joint Manipulation, Manual Therapy - Soft Tissue Mobilization, Safety education & training, Patient/Caregiver education & training, Home exercise program, Neuromuscular re-education, Vestibular rehab           Therapy Time  Individual Time In: 9954       Individual Time Out: 1054  Minutes: 57  Timed Code Treatment Minutes: 57 Minutes     Electronically signed by Michelle Bates PT  on 9/28/2022 at 10:54 AM   POC NOTE

## 2022-09-29 ENCOUNTER — HOSPITAL ENCOUNTER (OUTPATIENT)
Dept: PHYSICAL THERAPY | Age: 77
Setting detail: THERAPIES SERIES
Discharge: HOME | End: 2022-09-29
Payer: MEDICARE

## 2022-09-29 PROCEDURE — 97112 NEUROMUSCULAR REEDUCATION: CPT

## 2022-09-29 PROCEDURE — 95992 CANALITH REPOSITIONING PROC: CPT

## 2022-09-29 ASSESSMENT — ENCOUNTER SYMPTOMS
EYE REDNESS: 0
SINUS PRESSURE: 0
COUGH: 0
BACK PAIN: 0
SHORTNESS OF BREATH: 0
ABDOMINAL PAIN: 0
EYE DISCHARGE: 0
ABDOMINAL DISTENTION: 0

## 2022-09-29 NOTE — PATIENT INSTRUCTIONS
Personalized Preventive Plan for Emma Mendoza - 9/15/2022  Medicare offers a range of preventive health benefits. Some of the tests and screenings are paid in full while other may be subject to a deductible, co-insurance, and/or copay. Some of these benefits include a comprehensive review of your medical history including lifestyle, illnesses that may run in your family, and various assessments and screenings as appropriate. After reviewing your medical record and screening and assessments performed today your provider may have ordered immunizations, labs, imaging, and/or referrals for you. A list of these orders (if applicable) as well as your Preventive Care list are included within your After Visit Summary for your review. Other Preventive Recommendations:    A preventive eye exam performed by an eye specialist is recommended every 1-2 years to screen for glaucoma; cataracts, macular degeneration, and other eye disorders. A preventive dental visit is recommended every 6 months. Try to get at least 150 minutes of exercise per week or 10,000 steps per day on a pedometer . Order or download the FREE \"Exercise & Physical Activity: Your Everyday Guide\" from [employer]. Call 5-769.436.3113 or search [employer] online. You need 2390-5856 mg of calcium and 4627-5043 IU of vitamin D per day. It is possible to meet your calcium requirement with diet alone, but a vitamin D supplement is usually necessary to meet this goal.  When exposed to the sun, use a sunscreen that protects against both UVA and UVB radiation with an SPF of 30 or greater. Reapply every 2 to 3 hours or after sweating, drying off with a towel, or swimming. Always wear a seat belt when traveling in a car. Always wear a helmet when riding a bicycle or motorcycle.

## 2022-09-29 NOTE — PROGRESS NOTES
Physical Therapy: Daily Note   Patient: Jess Sanchez (56 y.o. male)   Examination Date:   Plan of Care/Certification Expiration Date: 22    No data recorded   :  1945 # of Visits since [facility]:   4   MRN: [id number]  CSN: [id number] Start of Care Date:   2022   Insurance: Payor: Quinones Covert / Plan: [facility] SOLUTIONS / Product Type: *No Product type* /   Insurance ID: [id number] - (Medicare Managed) Secondary Insurance (if applicable):    Referring Physician: MD Leticia Walker   PCP: Jackson Quarles MD Visits to Date/Visits Approved:     No Show/Cancelled Appts:   /       Medical Diagnosis: Benign paroxysmal vertigo, right ear [H81.11] BPPV H81.11  Treatment Diagnosis: bppv right ant/post canal        SUBJECTIVE EXAMINATION       Patient Comments: Subjective: Patient states he had no dizziness yesterday and and didn't get any laying down last night, but he says he has a slight dizziness today of 1/10. He says his sinus' are bad again today with watery eyes.           OBJECTIVE EXAMINATION   Restrictions:  No data recorded No data recorded No data recorded          TREATMENT     Exercises:      Treatment Reasoning    Exercise 1: cone weaves with ball toss 1 x 6  Exercise 2: figure 8's with ball toss 1 x 7  Exercise 3: ambulation with horizontal and vertical head movements x 120 feet ea  Exercise 4: sit to stand with 360 degree turns 1 x 3 ea  Exercise 5: gaze stabilization horizontal and vertical 1 x 20 ea  Exercise 6: advanced gaze stabilization horizontal and vertical 1 x 20 ea  Exercise 7: imaginary target 1 x 5 ea  Exercise 8: eye head coordination 1 x 10 ea, ambulation with different directions: turning 360 degree circles, fast, slow, stop, go, head movements x 250 feet  Exercise 9: crom in front of fish picture horizontal and vertical 1 x 20 ea  Exercise 10: static stance on foam pad eo and ec x 1 min ea  Exercise 11: right canalith ant/post canal with 10 min seated rest 09/29/2022                             ASSESSMENT     Assessment: Assessment: Patient did fair with tx today with occassional dizziness and lob with standing activities, but tolerated all activities well and was able to recover independently. He did have very slight nystagmus in upbeating motion for about 5-10 seconds, but slight decreased nystagmus noted and reported dizziness with nystagmus. He tolerated positions well today. He reported no increase or decreased dizziness after tx.   Body Structures, Functions, Activity Limitations Requiring Skilled Therapeutic Intervention: Decreased functional mobility , Vestibular Impairment, Decreased posture      No data recorded  Therapy Prognosis: Good       GOALS   Patient goals : get rid of dizziness  Short Term Goals Completed by 2 Weeks Current Status Goal Status   Patient to have no vertigo or nystagmus with right ya hallpike       Patient to report decreased dizziness by 50%       Patient to be independent with habituation HEP                                                                   Long Term Goals Completed by 4 weeks Current Status Goal Status   Patient to have report of no dizziness with rolling in bed       Patient to report decreased dizziness by 75%       Patient to demo increased function by scoring <= 5% impairment on DHI                                                                    TREATMENT PLAN   Plan Frequency: 2x/week  Plan weeks: 4 weeks  Current Treatment Recommendations: ROM, Balance training, Functional mobility training, Endurance training, Manual Therapy - Joint Manipulation, Manual Therapy - Soft Tissue Mobilization, Safety education & training, Patient/Caregiver education & training, Home exercise program, Neuromuscular re-education, Vestibular rehab           Therapy Time  Individual Time In: 0802       Individual Time Out: 7101  Minutes: 45  Timed Code Treatment Minutes: 45 Minutes     Electronically signed by Jorge Alberto Flores PT  on 9/29/2022 at 8:42 AM   POC NOTE

## 2022-10-18 ENCOUNTER — HOSPITAL ENCOUNTER (OUTPATIENT)
Dept: PHYSICAL THERAPY | Age: 77
Setting detail: THERAPIES SERIES
Discharge: HOME | End: 2022-10-18
Payer: MEDICARE

## 2022-10-18 PROCEDURE — 97112 NEUROMUSCULAR REEDUCATION: CPT

## 2022-10-18 NOTE — PROGRESS NOTES
Physical Therapy: Daily Note   Patient: Elías Pedraza (89 y.o. male)   Examination Date:   Plan of Care/Certification Expiration Date: 22    No data recorded   :  1945 # of Visits since [facility]:   5   MRN: [id number]  CSN: [id number] Start of Care Date:   2022   Insurance: Payor: [facility] [address] / Plan: [facility] [employer] / Product Type: *No Product type* /   Insurance ID: [id number] - (Medicare Managed) Secondary Insurance (if applicable):    Referring Physician: MD Rahul Kapoor   PCP: Cici Recinos MD Visits to Date/Visits Approved:     No Show/Cancelled Appts:   /       Medical Diagnosis: Benign paroxysmal vertigo, right ear [H81.11] BPPV H81.11  Treatment Diagnosis: bppv right ant/post canal        SUBJECTIVE EXAMINATION       Patient Comments: Subjective: Patient states he has about 1/10 dizziness today but says he isn't having any spinning sensation. He says he really can't tell that his dizziness has changed any and feels the same as when he started. He says he doesn't really see that he needs much more therapy due to having ocassional minimal dizziness continue and no changes with tx.             OBJECTIVE EXAMINATION   Restrictions:  No data recorded No data recorded No data recorded          TREATMENT     Exercises:      Treatment Reasoning    Exercise 1: cone weaves with ball toss 1 x 6- not today  Exercise 2: figure 8's with ball toss 1 x 7- not today  Exercise 3: ambulation with horizontal and vertical head movements x 120 feet ea- not today  Exercise 4: sit to stand with 360 degree turns 1 x 3 ea- not today  Exercise 5: gaze stabilization horizontal and vertical 1 x 20 ea- not today  Exercise 6: advanced gaze stabilization horizontal and vertical 1 x 20 ea- not today  Exercise 7: imaginary target 1 x 5 ea- not today  Exercise 8: eye head coordination 1 x 10 ea, ambulation with different directions: turning 360 degree circles, fast, slow, stop, go, head movements x 250 feet- not today  Exercise 9: crom in front of fish picture horizontal and vertical 1 x 20 ea- not today  Exercise 10: static stance on foam pad eo and ec x 1 min ea- not today  Exercise 11: right canalith ant/post canal with 10 min seated rest 09/29/2022- not today bilateral brand daroff habituation 1 x 5 ea ( no dizziness reported)                          Pt Education: Additional Comments: D/C secondary to plateau in function       ASSESSMENT     Assessment: Assessment: Patient continues to have occassional report of intermitten dizziness but denies any spinning sensation of nausea in last week. He does still have slight dizziness for about 5 seconds with bilateral ya hallpike positions today. Due to patient not responding with any improvement from canaliths, no canalith was performed. he did perform josé luis julio habituation and did not have any dizzines and was advised to continue this ex at home. Due to no improvement with dizziness symptoms and continued symptoms with positional maneuvers I advised to him that we d/c at this time and if his dizziness with spinning resumes for him to contact MD and return to clinic for re-evaluation. He was agreeable with this plan.        No data recorded  Therapy Prognosis: Good       GOALS   Patient Goals : get rid of dizziness  Short Term Goals Completed by 2 Weeks Current Status Goal Status   Patient to have no vertigo or nystagmus with right ya hallpike 10/18/2022: Patient has dizziness reported for about 5 seconds with bilateral ya hallpike position today and no nystagmus noted at todays session, but has in past sessions Not Met   Patient to report decreased dizziness by 50% 10/18/2022: Patient reports his dizziness is about the same as it was prior to starting therapy Not Met   Patient to be independent with habituation HEP 10/18/2022: Patient has been independently performing habituation HEP and advised him to continue espcially with josé luis julio Met Long Term Goals Completed by 4 weeks Current Status Goal Status   Patient to have report of no dizziness with rolling in bed 10/18/2022: Patient reports no change with rolling in bed Not Met   Patient to report decreased dizziness by 75% 10/18/2022: Patient reports his dizziness is about the same as it was prior to starting therapy Not Met   Patient to demo increased function by scoring <= 5% impairment on [address] [place] [address] 10/18/2022: Patient scored 6% impairment on [address] [place] [address] In progress                                                                TREATMENT PLAN       Additional Comments: D/C secondary to plateau in function       Therapy Time  Individual Time In: 0910       Individual Time Out: 0930  Minutes: 20  Timed Code Treatment Minutes: 20 Minutes     Electronically signed by Lacey Lanier PT  on 10/18/2022 at 9:50 AM   POC NOTE

## 2022-10-26 NOTE — PROGRESS NOTES
YOB: 1945  Location: [facility]  Location Address: [address] [place] [address], [facility] [address], [address] [place] [address]  Location Phone: [phone]    Chief Complaint   Patient presents with   • Nose Problem     CT       History of Present Illness  Axel Morris is a 77 y.o. male.  Axel Morris is here for follow up of ENT complaints. The patient has had problems with nasal congestion, postnasal drip, rhinitis and intermittent throat clearing   Patient states symptoms have been present for the past 3 - 4 years.   He states taking antibiotics at times will lessen the drainage.   Patient completed a 10 day course of antibiotics approximately 2 months ago.   Patient states he is using astelin, but stopped flonase when he started this     Patient is taking pepcid 20 mg twice daily before breakfast and before bed       CT Sinus Without Contrast (2022 11:53)      Study Result    Narrative & Impression   EXAMINATION: CT SINUS WO CONTRAST-      2022 11:43 AM CDT     HISTORY: nasal congestion; J34.2-Deviated nasal septum; R09.81-Nasal  congestion     In order to have a CT radiation dose as low as reasonably achievable  Automated Exposure Control was utilized for adjustment of the mA and/or  KV according to patient size.     DLP in mGycm= 458     The CT scan of the paranasal sinuses performed without contrast  enhancement. Images are acquired in axial plane with subsequent  reconstruction in coronal and sagittal planes.     There is no previous similar study for comparison. Correlation made with  CT scan of the head dated 6/15/2022.     There is marked mucosal thickening involving the maxillary antra  bilaterally. There is a very minimal air in both maxillary antra. There  is erosion of the uncinate processes bilaterally which are poorly  visualized. There is complete tear blockage of the ostium and  infundibulum bilaterally. There is moderate thickening and hypertrophy  of the wall of the maxillary antra  bilaterally suggesting this to  represent a chronic process.     There is marked mucosal thickening in involving the ethmoid sinuses  bilaterally.     There is significant mucosal thickening of the sphenoid sinuses  bilaterally. There is mucous plugging of the sphenoethmoid recess  bilaterally.     There is complete opacification of the left frontal sinus. There is  significant mucosal thickening involving the right frontal sinus. There  is mucous plugging of the frontonasal recesses bilaterally.     The fovea ethmoidalis is normal. No bony erosion.     Type II olfactory fossa bilaterally noted. The cribriform plate is  intact. The olfactory recesses are normal and patent.     The orbits bilaterally appear normal. There is marked thinning of the  lamina papyracea bilaterally which appear intact.     The nasolacrimal duct bilaterally is patent opening into the inferior  meatus.     Left mastoid air cells are clear. There is right mastoid effusion. The  left eustachian tube appear patent. There is soft tissue density in the  distal right eustachian tube. Oropharyngeal soft tissues are obscured by  the extensive artifacts from the dental hardware. Nasopharyngeal soft  tissues are normal and symmetrical. Limited included and visualized  brain is unremarkable.     IMPRESSION:  1. Evidence of pansinusitis. Detail is given above.  2. The blockage of the ostiomeatal complex, nasofrontal recess and  sphenoethmoidal recesses.  3. Marked thickening and sclerosis of the wall of the maxillary antrum  suggesting this is long-standing/chronic process.  4. Right mastoid effusion.           This report was finalized on 09/20/2022 13:14 by Dr. Forrest Al MD.            Past Medical History:   Diagnosis Date   • Abnormal ECG    • Arthritis    • Cancer (HCC)    • Coronary artery disease    • COVID-19 vaccine series completed     pfizer   • Dizziness    • Fracture of nasal bones    • GERD (gastroesophageal reflux disease)    • HL  (hearing loss)    • Hx of colonic polyps    • Hyperlipidemia    • Neuropathy    • Pneumonia    • Rheumatic fever    • Sinusitis    • Sleep apnea    • Tinnitus        Past Surgical History:   Procedure Laterality Date   • ADENOIDECTOMY  1953   • CARDIAC CATHETERIZATION     • CARDIAC SURGERY      HEART STENT   • CHOLECYSTECTOMY     • COLONOSCOPY N/A 11/30/2017    Procedure: COLONOSCOPY WITH ANESTHESIA;  Surgeon: Matthew Cagle MD;  Location:  [facility];  Service:    • COLONOSCOPY W/ POLYPECTOMY  07/12/2012    Adenomatous polyp at 50 cm repeat exam in 5 years   • CORONARY STENT PLACEMENT     • ENDOSCOPY AND COLONOSCOPY  08/07/2009    Acute ulcerative esophagogastritis grade c, Hyperplastic polyp at 50 cm repeat colonoscopy in 3 years   • EYE SURGERY      DONNIE CATARACT REMOVAL   • FLAP HEAD/NECK Left 03/10/2021    Procedure: POSSIBLE FLAP OR GRAFT.;  Surgeon: Roberto Og MD;  Location:  [facility];  Service: ENT;  Laterality: Left;   • HEAD/NECK LESION/CYST EXCISION Left 03/10/2021    Procedure: EXCISION OF ATYPICAL PIGMENTED LESION OF THE LEFT CENTRAL TEMPLE REGION WITH POSSIBLE FLAP OR GRAFT. - Left;  Surgeon: Roberto Og MD;  Location:  [facility];  Service: ENT;  Laterality: Left;   • REPLACEMENT TOTAL KNEE BILATERAL     • TONSILLECTOMY         Outpatient Medications Marked as Taking for the 10/27/22 encounter (Office Visit) with Roberto Og MD   Medication Sig Dispense Refill   • aspirin 81 MG tablet Take 81 mg by mouth.     • atorvastatin (LIPITOR) 80 MG tablet Take 40 mg by mouth Daily.     • azelastine (ASTELIN) 0.1 % nasal spray 2 sprays into the nostril(s) as directed by provider 2 (Two) Times a Day. Use in each nostril as directed 30 mL 11   • famotidine (PEPCID) 20 MG tablet Take 1 tablet by mouth 2 (Two) Times a Day.     • metoprolol succinate XL (TOPROL-XL) 25 MG 24 hr tablet      • Multiple Vitamins-Minerals (MULTIVITAMIN WITH MINERALS) tablet tablet Take 1 tablet by mouth  Daily.     • Multiple Vitamins-Minerals (OCUVITE ADULT FORMULA PO) Take  by mouth.     • pregabalin (LYRICA) 100 MG capsule 2 (Two) Times a Day.     • pyridostigmine (MESTINON) 60 MG tablet Take 1 tablet by mouth 3 (Three) Times a Day. 90 tablet 2       Ampicillin and Penicillins    Family History   Problem Relation Age of Onset   • Heart failure Father    • Rheum arthritis Father    • Cancer Brother    • Colon cancer Neg Hx    • Colon polyps Neg Hx        Social History     Socioeconomic History   • Marital status:    Tobacco Use   • Smoking status: Former     Packs/day: 1.00     Years: 10.00     Pack years: 10.00     Types: Cigarettes     Quit date: 1979     Years since quittin.2   • Smokeless tobacco: Never   Vaping Use   • Vaping Use: Never used   Substance and Sexual Activity   • Alcohol use: No   • Drug use: Never   • Sexual activity: Defer       Review of Systems   Constitutional: Negative.    HENT: Positive for congestion, postnasal drip and rhinorrhea. Negative for sinus pressure.    Neurological: Negative for headaches.       Vitals:    10/27/22 0959   BP: 132/81   Pulse: 59   Resp: 16   Temp: 97.5 °F (36.4 °C)       Body mass index is 38 kg/m².    Objective     Physical Exam  Vitals reviewed.   Constitutional:       Appearance: Normal appearance. He is obese.   HENT:      Head: Normocephalic.      Right Ear: Tympanic membrane, ear canal and external ear normal.      Left Ear: Tympanic membrane, ear canal and external ear normal.      Nose: Septal deviation present.      Right Turbinates: Enlarged.      Left Turbinates: Enlarged.      Mouth/Throat:      Lips: Pink.      Mouth: Mucous membranes are moist.      Pharynx: Uvula midline.   Neurological:      Mental Status: He is alert.     Dr. Og has examined and assessed the patient and agrees with current treatment plan        Assessment & Plan   Diagnoses and all orders for this visit:    1. Nasal septal deviation (Primary)    2. Nasal  congestion    3. Nasal septal spur    4. Allergic rhinitis, unspecified seasonality, unspecified trigger    5. Multiple thyroid nodules  -     US Thyroid; Future      * Surgery not found *  Orders Placed This Encounter   Procedures   • US Thyroid     Standing Status:   Future     Standing Expiration Date:   10/27/2023     Order Specific Question:   Reason for Exam:     Answer:   Thyroid disease     Return in about 6 months (around 4/27/2023) for Recheck.     Use flonase and astelin daily as directed  Use pepcid before breakfast and before dinner   Milk/dairy elimination    Surgical vs medical management/conservative management discussed   patient wishes to maintain conservative approach at this point     Patient Instructions   For the best response, use your nasal sprays every day without skipping doses. It may take several weeks before the full effect is acheived.  Gastroesophageal Reflux Disease (Laryngopharyngeal Reflux), Adult  Gastroesophageal reflux disease (GERD) and/or Laryngopharyngeal Reflux, (LPR) happens when acid from your stomach flows up into the esophagus and/or throat and voicebox or larynx. When acid comes in contact with the these organs, the acid can cause soreness (inflammation). Over time, GERD may create small holes (ulcers) in the lining of the esophagus and may lead to the development of hoarseness, difficulty swallowing,   feeling of something stuck in the throat, increased mucous or drainage and even predispose to the development of malignancies, (cancer).    CAUSES   · Increased body weight. This puts pressure on the stomach, making acid rise from the stomach into the esophagus.  · Smoking. This increases acid production in the stomach.  · Drinking alcohol. This causes decreased pressure in the lower esophageal sphincter (valve or ring of muscle between the esophagus and stomach), allowing acid from the stomach into the esophagus.  · Late evening meals and a full stomach. This increases  pressure and acid production in the stomach.  · A malformed lower esophageal sphincter  · Diet which can include avoidance of gluten and dairy products  · Age  SYMPTOMS   · Burning pain in the lower part of the mid-chest behind the breastbone and in the mid-stomach area. This may occur twice a week or more often.  · Trouble swallowing.  · Sore throat.  · Dry cough.  · Asthma-like symptoms including chest tightness, shortness of breath, or wheezing.  · Globus sensation-something stuck in the throat/fullness  · Hoarseness  DIAGNOSIS   Your caregiver may be able to diagnose GERD based on your symptoms. In some cases, X-rays and other tests may be done to check for complications or to check the condition of your stomach and esophagus.  You may need to see another doctor.  TREATMENT   Over-the-counter or prescription medicines to help decrease acid production.   Dietary and behavioral modifications or changes may be also recommended.  HOME CARE INSTRUCTIONS   · Change the factors that you can control. Ask your caregiver for guidance concerning weight loss, quitting smoking, and alcohol consumption.  · Avoid foods and drinks that make your symptoms worse, and MAY include such as:  ¨ Caffeine or alcoholic drinks.  ¨ Chocolate.  ¨ Gluten containing foods  ¨ Dairy  ¨ Peppermint or mint flavorings.  ¨ Garlic and onions.  ¨ Spicy foods.  ¨ Citrus fruits, such as oranges, margarita, or limes.  ¨ Tomato-based foods such as sauce, chili, salsa, and pizza.  ¨ Fried and fatty foods.  · Avoid lying down for the 3 hours prior to your bedtime or prior to taking a nap.  · Eat small, frequent meals instead of large meals.  · Wear loose-fitting clothing. Do not wear anything tight around your waist that causes pressure on your stomach.  · Raise the head of your bed 6 to 8 inches with wood blocks to help you sleep. Extra pillows will not help.  · Only take over-the-counter or prescription medicines for pain, discomfort, or fever as directed  by your caregiver.  · Do not take aspirin, ibuprofen, or other nonsteroidal anti-inflammatory drugs if possible (NSAIDs).  SEEK IMMEDIATE MEDICAL CARE IF:   · You have pain in your arms, neck, jaw, teeth, or back.  · Your pain increases or changes in intensity or duration.  · You develop nausea, vomiting, or sweating (diaphoresis).  · You develop shortness of breath, or you faint.  · Your vomit is green, yellow, black, or looks like coffee grounds or blood.  · Your stool is red, bloody, or black.  These symptoms could be signs of other problems, such as heart disease, gastric bleeding, or esophageal bleeding.  MAKE SURE YOU:   · Understand these instructions.  · Will watch your condition.  · Will get help right away if you are not doing well or get worse.     This information is not intended to replace advice given to you by your physician. Make sure you discuss any questions you have with your health care provider.     Modified by Roberto Og MD, FACS 9/8/2016.  Document Released: 09/27/2006 Document Revised: 01/08/2016 Document Reviewed: 04/13/2016  [employer] Interactive Patient Education ©2016 [employer]

## 2022-10-27 ENCOUNTER — OFFICE VISIT (OUTPATIENT)
Dept: OTOLARYNGOLOGY | Facility: CLINIC | Age: 77
End: 2022-10-27

## 2022-10-27 VITALS
HEART RATE: 59 BPM | TEMPERATURE: 97.5 F | DIASTOLIC BLOOD PRESSURE: 81 MMHG | WEIGHT: 296 LBS | HEIGHT: 74 IN | BODY MASS INDEX: 37.99 KG/M2 | SYSTOLIC BLOOD PRESSURE: 132 MMHG | RESPIRATION RATE: 16 BRPM

## 2022-10-27 DIAGNOSIS — J34.2 NASAL SEPTAL DEVIATION: Primary | ICD-10-CM

## 2022-10-27 DIAGNOSIS — J30.9 ALLERGIC RHINITIS, UNSPECIFIED SEASONALITY, UNSPECIFIED TRIGGER: ICD-10-CM

## 2022-10-27 DIAGNOSIS — E04.2 MULTIPLE THYROID NODULES: ICD-10-CM

## 2022-10-27 DIAGNOSIS — J34.89 NASAL SEPTAL SPUR: ICD-10-CM

## 2022-10-27 DIAGNOSIS — R09.81 NASAL CONGESTION: ICD-10-CM

## 2022-10-27 PROCEDURE — 99213 OFFICE O/P EST LOW 20 MIN: CPT | Performed by: NURSE PRACTITIONER

## 2022-10-27 PROCEDURE — 31231 NASAL ENDOSCOPY DX: CPT | Performed by: OTOLARYNGOLOGY

## 2022-10-27 NOTE — PATIENT INSTRUCTIONS
For the best response, use your nasal sprays every day without skipping doses. It may take several weeks before the full effect is acheived.  Gastroesophageal Reflux Disease (Laryngopharyngeal Reflux), Adult  Gastroesophageal reflux disease (GERD) and/or Laryngopharyngeal Reflux, (LPR) happens when acid from your stomach flows up into the esophagus and/or throat and voicebox or larynx. When acid comes in contact with the these organs, the acid can cause soreness (inflammation). Over time, GERD may create small holes (ulcers) in the lining of the esophagus and may lead to the development of hoarseness, difficulty swallowing,   feeling of something stuck in the throat, increased mucous or drainage and even predispose to the development of malignancies, (cancer).    CAUSES   Increased body weight. This puts pressure on the stomach, making acid rise from the stomach into the esophagus.  Smoking. This increases acid production in the stomach.  Drinking alcohol. This causes decreased pressure in the lower esophageal sphincter (valve or ring of muscle between the esophagus and stomach), allowing acid from the stomach into the esophagus.  Late evening meals and a full stomach. This increases pressure and acid production in the stomach.  A malformed lower esophageal sphincter  Diet which can include avoidance of gluten and dairy products  Age  SYMPTOMS   Burning pain in the lower part of the mid-chest behind the breastbone and in the mid-stomach area. This may occur twice a week or more often.  Trouble swallowing.  Sore throat.  Dry cough.  Asthma-like symptoms including chest tightness, shortness of breath, or wheezing.  Globus sensation-something stuck in the throat/fullness  Hoarseness  DIAGNOSIS   Your caregiver may be able to diagnose GERD based on your symptoms. In some cases, X-rays and other tests may be done to check for complications or to check the condition of your stomach and esophagus.  You may need to see  another doctor.  TREATMENT   Over-the-counter or prescription medicines to help decrease acid production.   Dietary and behavioral modifications or changes may be also recommended.  HOME CARE INSTRUCTIONS   Change the factors that you can control. Ask your caregiver for guidance concerning weight loss, quitting smoking, and alcohol consumption.  Avoid foods and drinks that make your symptoms worse, and MAY include such as:  Caffeine or alcoholic drinks.  Chocolate.  Gluten containing foods  Dairy  Peppermint or mint flavorings.  Garlic and onions.  Spicy foods.  Citrus fruits, such as oranges, margarita, or limes.  Tomato-based foods such as sauce, chili, salsa, and pizza.  Fried and fatty foods.  Avoid lying down for the 3 hours prior to your bedtime or prior to taking a nap.  Eat small, frequent meals instead of large meals.  Wear loose-fitting clothing. Do not wear anything tight around your waist that causes pressure on your stomach.  Raise the head of your bed 6 to 8 inches with wood blocks to help you sleep. Extra pillows will not help.  Only take over-the-counter or prescription medicines for pain, discomfort, or fever as directed by your caregiver.  Do not take aspirin, ibuprofen, or other nonsteroidal anti-inflammatory drugs if possible (NSAIDs).  SEEK IMMEDIATE MEDICAL CARE IF:   You have pain in your arms, neck, jaw, teeth, or back.  Your pain increases or changes in intensity or duration.  You develop nausea, vomiting, or sweating (diaphoresis).  You develop shortness of breath, or you faint.  Your vomit is green, yellow, black, or looks like coffee grounds or blood.  Your stool is red, bloody, or black.  These symptoms could be signs of other problems, such as heart disease, gastric bleeding, or esophageal bleeding.  MAKE SURE YOU:   Understand these instructions.  Will watch your condition.  Will get help right away if you are not doing well or get worse.     This information is not intended to replace  advice given to you by your physician. Make sure you discuss any questions you have with your health care provider.     Modified by Roberto Og MD, FACS 9/8/2016.  Document Released: 09/27/2006 Document Revised: 01/08/2016 Document Reviewed: 04/13/2016  [employer] Interactive Patient Education ©2016 [employer]

## 2022-10-27 NOTE — PROGRESS NOTES
PROCEDURE NOTE    Axel Morris    DATE OF PROCEDURE: 10/27/2022    PROCEDURE:   Bilateral Diagnostic Rigid Nasal Endoscopy    PREPROCEDURE DIAGNOSIS:   Chronic persistent antral and ethmoid rhinosinusitis without periorbital pain or pressure    POSTPROCEDURE DIAGNOSIS:  SAME    ANESTHESIA:   None    PROCEDURE DESCRIPTION:    With the patient in the chair bilateral diagnostic rigid nasal endoscopy was performed with the Stortz 0° endoscope without difficulty.     An endoscope was passed along the left nasal floor to the nasopharynx.  It was then passed into the region of the middle meatus, middle turbinate, and the sphenoethmoid region. An identical procedure was performed on the right side.  The following findings were noted as stated below:    Findings: Both middle meatus are clear without edema or purulent discharge.  There is no significant intranasal erythema no evidence of polyposis.  There is a moderate right high sweeping septal deviation with mild lateralization of the middle turbinate and moderate bilateral inferior turbinate hypertrophy.    Condition:  Stable.  Patient tolerated procedure well.    Complications:  None  There was no significant bleeding.

## 2022-10-31 ENCOUNTER — OFFICE VISIT (OUTPATIENT)
Dept: NEUROLOGY | Facility: CLINIC | Age: 77
End: 2022-10-31

## 2022-10-31 VITALS
WEIGHT: 289 LBS | HEART RATE: 56 BPM | BODY MASS INDEX: 37.09 KG/M2 | OXYGEN SATURATION: 97 % | DIASTOLIC BLOOD PRESSURE: 72 MMHG | HEIGHT: 74 IN | SYSTOLIC BLOOD PRESSURE: 110 MMHG

## 2022-10-31 DIAGNOSIS — G70.00 MYASTHENIA GRAVIS WITHOUT EXACERBATION: Primary | ICD-10-CM

## 2022-10-31 PROCEDURE — 99214 OFFICE O/P EST MOD 30 MIN: CPT | Performed by: PSYCHIATRY & NEUROLOGY

## 2022-10-31 RX ORDER — PYRIDOSTIGMINE BROMIDE 60 MG/1
60 TABLET ORAL 3 TIMES DAILY
Qty: 270 TABLET | Refills: 1 | Status: SHIPPED | OUTPATIENT
Start: 2022-10-31 | End: 2023-04-29

## 2022-10-31 NOTE — PROGRESS NOTES
Chief Complaint    Subjective        Axel Morris presents to [facility] Neurology    History of Present Illness  77-year-old with presents for follow up. AChR abs positive. Started on mestinon. Did have some side effects, improved now. Taking BID for now, forgetting middle of the day dose.  His symptoms have significantly improved on the Mestinon.  MG ADL 1 today.        Past Medical History:   Diagnosis Date   • Abnormal ECG    • Arthritis    • Cancer (HCC)    • Coronary artery disease    • COVID-19 vaccine series completed     pfizer   • Dizziness    • Fracture of nasal bones    • GERD (gastroesophageal reflux disease)    • HL (hearing loss)    • Hx of colonic polyps    • Hyperlipidemia    • Neuropathy    • Peripheral neuropathy    • Pneumonia    • Rheumatic fever    • Sinusitis    • Sleep apnea    • Tinnitus    • Vision loss           Current Outpatient Medications:   •  aspirin 81 MG tablet, Take 81 mg by mouth., Disp: , Rfl:   •  atorvastatin (LIPITOR) 80 MG tablet, Take 40 mg by mouth Daily., Disp: , Rfl:   •  azelastine (ASTELIN) 0.1 % nasal spray, 2 sprays into the nostril(s) as directed by provider 2 (Two) Times a Day. Use in each nostril as directed, Disp: 30 mL, Rfl: 11  •  famotidine (PEPCID) 20 MG tablet, Take 1 tablet by mouth 2 (Two) Times a Day., Disp: , Rfl:   •  glycopyrrolate (ROBINUL) 1 MG tablet, Take 1 tablet by mouth 3 (Three) Times a Day As Needed (vertigo)., Disp: 60 tablet, Rfl: 11  •  metoprolol succinate XL (TOPROL-XL) 25 MG 24 hr tablet, Take 1 tablet by mouth 2 (Two) Times a Day., Disp: , Rfl:   •  Multiple Vitamins-Minerals (MULTIVITAMIN WITH MINERALS) tablet tablet, Take 1 tablet by mouth Daily., Disp: , Rfl:   •  Multiple Vitamins-Minerals (OCUVITE ADULT FORMULA PO), Take  by mouth., Disp: , Rfl:   •  pregabalin (LYRICA) 100 MG capsule, 2 (Two) Times a Day., Disp: , Rfl:   •  pyridostigmine (MESTINON) 60 MG tablet, Take 1 tablet by mouth 3 (Three) Times a Day.,  "Disp: 90 tablet, Rfl: 2       Objective   Vital Signs:   /72 (BP Location: Left arm, Patient Position: Sitting, Cuff Size: Large Adult)   Pulse 56   Ht 188 cm (74\")   Wt 131 kg (289 lb)   SpO2 97%   BMI 37.11 kg/m²     Physical Exam  Constitutional:       General: He is awake.   Eyes:      Extraocular Movements: Extraocular movements intact.   Neurological:      Mental Status: He is alert.   Psychiatric:         Speech: Speech normal.        Neurological Exam  Mental Status  Awake and alert. Speech is normal. Language is fluent with no aphasia.    Cranial Nerves  CN III, IV, VI: Extraocular movements intact bilaterally.  CN VII: Full and symmetric facial movement.    Gait  Casual gait is normal including stance, stride, and arm swing.      Result Review :                         Assessment and Plan   77-year-old with CAD, HLD, GERI, GERD who was referred for evaluation of speech issues.  His myasthenia panel came back positive.  He has been started on Prostigmin.  He is doing much better.  We will get a CT chest to eval for thymoma.    Plan:    1.  Continue Mestinon up to 3 times a day.  2.  CT chest to eval thymoma.  3.  Follow-up 3 months.        Follow Up   No follow-ups on file.  Patient was given instructions and counseling regarding his condition or for health maintenance advice. Please see specific information pulled into the AVS if appropriate.       "

## 2022-11-01 NOTE — PROGRESS NOTES
[facility]  OUTPATIENT PHYSICAL THERAPY  DISCHARGE SUMMARY    Date: 2022  Patient Name: Saroj Duggan        MRN: [id number]    ACCOUNT #: [de-identified]  : 1945  (76 y.o.)  Gender: male      Diagnosis: BPPV H81.11          Total # of Visits Approved: 12  Total # of Visits to Date: 5    Subjective   Patient states he is about the same as when he started and thinks more therapy at this time may not be necessary. Objective  Treatments received include: neuro re-ed, canalith   See objective/subjective data in goals    Assessment  Assessment: Patient continues to have occassional report of intermitten dizziness but denies any spinning sensation or nausea in last week. He does still have slight dizziness for about 5 seconds with bilateral ya hallpike positions today. Due to patient not responding with any improvement from canaliths, no canalith was performed. he did perform ordonez daroff habituation and did not have any dizzines and was advised to continue this ex at home. Due to no improvement with dizziness symptoms and continued symptoms with positional maneuvers I advised to him that we d/c at this time and if his dizziness with spinning resumes for him to contact MD and return to clinic for re-evaluation. He was agreeable with this plan.            GOALS   Patient Goals : get rid of dizziness  Short Term Goals Completed by 2 Weeks Current Status Goal Status   Patient to have no vertigo or nystagmus with right ya hallpike 10/18/2022: Patient has dizziness reported for about 5 seconds with bilateral ya hallpike position today and no nystagmus noted at todays session, but has in past sessions Not Met   Patient to report decreased dizziness by 50% 10/18/2022: Patient reports his dizziness is about the same as it was prior to starting therapy Not Met   Patient to be independent with habituation HEP 10/18/2022: Patient has been independently performing habituation HEP and advised him to continue espcially with ordonez darlesly Met                                                               Long Term Goals Completed by 4 weeks Current Status Goal Status   Patient to have report of no dizziness with rolling in bed 10/18/2022: Patient reports no change with rolling in bed Not Met   Patient to report decreased dizziness by 75% 10/18/2022: Patient reports his dizziness is about the same as it was prior to starting therapy Not Met   Patient to demo increased function by scoring <= 5% impairment on [facility] 10/18/2022: Patient scored 6% impairment on [facility] In progress                                                                TREATMENT PLAN       Additional Comments: D/C secondary to plateau in function       Electronically signed by Nicole Santana, PT on 11/1/2022 at 8:53 AM

## 2022-11-10 ENCOUNTER — TELEPHONE (OUTPATIENT)
Dept: NEUROLOGY | Facility: CLINIC | Age: 77
End: 2022-11-10

## 2022-11-10 ENCOUNTER — APPOINTMENT (OUTPATIENT)
Dept: CT IMAGING | Facility: HOSPITAL | Age: 77
End: 2022-11-10

## 2022-11-10 ENCOUNTER — HOSPITAL ENCOUNTER (OUTPATIENT)
Dept: ULTRASOUND IMAGING | Facility: HOSPITAL | Age: 77
Discharge: HOME | End: 2022-11-10

## 2022-11-10 ENCOUNTER — HOSPITAL ENCOUNTER (OUTPATIENT)
Dept: CT IMAGING | Facility: HOSPITAL | Age: 77
Discharge: HOME | End: 2022-11-10

## 2022-11-10 DIAGNOSIS — E04.2 MULTIPLE THYROID NODULES: ICD-10-CM

## 2022-11-10 DIAGNOSIS — G70.00 MYASTHENIA GRAVIS WITHOUT EXACERBATION: ICD-10-CM

## 2022-11-10 LAB — CREAT BLDA-MCNC: 0.8 MG/DL (ref 0.6–1.3)

## 2022-11-10 PROCEDURE — 25010000002 IOPAMIDOL 61 % SOLUTION: Performed by: PSYCHIATRY & NEUROLOGY

## 2022-11-10 PROCEDURE — 71260 CT THORAX DX C+: CPT

## 2022-11-10 PROCEDURE — 82565 ASSAY OF CREATININE: CPT

## 2022-11-10 RX ADMIN — IOPAMIDOL 100 ML: 612 INJECTION, SOLUTION INTRAVENOUS at 11:10

## 2022-11-10 NOTE — TELEPHONE ENCOUNTER
----- Message from Yolanda Fraga MD sent at 11/10/2022 12:06 PM CST -----  Let the patient know his CT did not show any thymoma. They did see 2 small pulmonary nodules that they thought were likely infectious or inflammatory. They recommended a follow up in 3 months. Please also notify his PCP of these results so they can follow up with repeat CT. Thanks.

## 2022-11-10 NOTE — TELEPHONE ENCOUNTER
Spoke with the patient and advised of CT results. Stated that I will forward a copy to his PCP for 3 month CT chest repeat. Patient verbalizes understanding.

## 2022-11-24 DIAGNOSIS — K21.9 GASTROESOPHAGEAL REFLUX DISEASE WITHOUT ESOPHAGITIS: ICD-10-CM

## 2022-11-28 RX ORDER — FAMOTIDINE 20 MG/1
TABLET, FILM COATED ORAL
Qty: 180 TABLET | Refills: 3 | Status: SHIPPED | OUTPATIENT
Start: 2022-11-28

## 2022-12-06 DIAGNOSIS — K21.9 GASTROESOPHAGEAL REFLUX DISEASE WITHOUT ESOPHAGITIS: ICD-10-CM

## 2022-12-06 DIAGNOSIS — G60.9 IDIOPATHIC PERIPHERAL NEUROPATHY: ICD-10-CM

## 2022-12-08 RX ORDER — PREGABALIN 100 MG/1
CAPSULE ORAL
Qty: 180 CAPSULE | Refills: 1 | Status: SHIPPED | OUTPATIENT
Start: 2022-12-08 | End: 2023-02-27

## 2022-12-08 RX ORDER — FAMOTIDINE 20 MG/1
20 TABLET, FILM COATED ORAL 2 TIMES DAILY
Qty: 180 TABLET | Refills: 3 | Status: SHIPPED | OUTPATIENT
Start: 2022-12-08

## 2023-01-05 ENCOUNTER — OFFICE VISIT (OUTPATIENT)
Dept: INTERNAL MEDICINE | Age: 78
End: 2023-01-05
Payer: MEDICARE

## 2023-01-05 VITALS
DIASTOLIC BLOOD PRESSURE: 70 MMHG | WEIGHT: 295 LBS | HEART RATE: 56 BPM | BODY MASS INDEX: 35.92 KG/M2 | HEIGHT: 76 IN | SYSTOLIC BLOOD PRESSURE: 108 MMHG | OXYGEN SATURATION: 98 %

## 2023-01-05 DIAGNOSIS — I25.10 CORONARY ARTERY DISEASE INVOLVING NATIVE CORONARY ARTERY OF NATIVE HEART WITHOUT ANGINA PECTORIS: Primary | ICD-10-CM

## 2023-01-05 DIAGNOSIS — G70.00 MYASTHENIA GRAVIS (HCC): ICD-10-CM

## 2023-01-05 DIAGNOSIS — G60.9 IDIOPATHIC PERIPHERAL NEUROPATHY: Primary | ICD-10-CM

## 2023-01-05 DIAGNOSIS — I10 ESSENTIAL HYPERTENSION: ICD-10-CM

## 2023-01-05 DIAGNOSIS — E78.2 MIXED HYPERLIPIDEMIA: ICD-10-CM

## 2023-01-05 DIAGNOSIS — R06.00 DYSPNEA, UNSPECIFIED TYPE: ICD-10-CM

## 2023-01-05 PROCEDURE — 99214 OFFICE O/P EST MOD 30 MIN: CPT | Performed by: INTERNAL MEDICINE

## 2023-01-05 PROCEDURE — G8484 FLU IMMUNIZE NO ADMIN: HCPCS | Performed by: INTERNAL MEDICINE

## 2023-01-05 PROCEDURE — G8417 CALC BMI ABV UP PARAM F/U: HCPCS | Performed by: INTERNAL MEDICINE

## 2023-01-05 PROCEDURE — 1123F ACP DISCUSS/DSCN MKR DOCD: CPT | Performed by: INTERNAL MEDICINE

## 2023-01-05 PROCEDURE — 3078F DIAST BP <80 MM HG: CPT | Performed by: INTERNAL MEDICINE

## 2023-01-05 PROCEDURE — 1036F TOBACCO NON-USER: CPT | Performed by: INTERNAL MEDICINE

## 2023-01-05 PROCEDURE — G8427 DOCREV CUR MEDS BY ELIG CLIN: HCPCS | Performed by: INTERNAL MEDICINE

## 2023-01-05 PROCEDURE — 3074F SYST BP LT 130 MM HG: CPT | Performed by: INTERNAL MEDICINE

## 2023-01-05 RX ORDER — AZELASTINE 1 MG/ML
SPRAY, METERED NASAL
Qty: 3 EACH | Refills: 3 | Status: SHIPPED | OUTPATIENT
Start: 2023-01-05

## 2023-01-05 RX ORDER — MONTELUKAST SODIUM 10 MG/1
10 TABLET ORAL
COMMUNITY
End: 2023-01-05 | Stop reason: SDUPTHER

## 2023-01-05 RX ORDER — MONTELUKAST SODIUM 10 MG/1
10 TABLET ORAL NIGHTLY
Qty: 90 TABLET | Refills: 3 | Status: SHIPPED | OUTPATIENT
Start: 2023-01-05

## 2023-01-05 ASSESSMENT — PATIENT HEALTH QUESTIONNAIRE - PHQ9
SUM OF ALL RESPONSES TO PHQ QUESTIONS 1-9: 0
SUM OF ALL RESPONSES TO PHQ9 QUESTIONS 1 & 2: 0
2. FEELING DOWN, DEPRESSED OR HOPELESS: 0
SUM OF ALL RESPONSES TO PHQ QUESTIONS 1-9: 0
1. LITTLE INTEREST OR PLEASURE IN DOING THINGS: 0
SUM OF ALL RESPONSES TO PHQ QUESTIONS 1-9: 0
SUM OF ALL RESPONSES TO PHQ QUESTIONS 1-9: 0

## 2023-01-05 NOTE — PROGRESS NOTES
Chief Complaint   Patient presents with    Follow-up       HPI: Patient is here for the follow-up of coronary artery disease myasthenia gravis and other medical issues. He still having some fatigue and notices some shortness of breath he still gets slurred speech with prolonged speech.   Complains of some recent shortness of breath    Past Medical History:   Diagnosis Date    Arthritis     DVT, lower extremity, distal, chronic (HCC)     Familial hypercholesterolemia 2017    Gastroesophageal reflux disease without esophagitis 2017    History of blood transfusion     Knee replacements    Hx of blood clots     Post Knee replacement tx    Idiopathic peripheral neuropathy 2017    Obstructive sleep apnea 2017    Partial tear of left Achilles tendon 2017    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     left at fibular head s/p decompression        Past Surgical History:   Procedure Laterality Date    CATARACT REMOVAL WITH IMPLANT      CHOLECYSTECTOMY  2005    CORONARY ANGIOPLASTY WITH STENT PLACEMENT  2018    [place] to Circumflex    JOINT REPLACEMENT Bilateral     Knee replacements    KNEE ARTHROPLASTY Bilateral     complete combine condylye and plateau    NERVE SURGERY      Nerve release       Family History   Problem Relation Age of Onset    COPD Mother     Rheum Arthritis Father     COPD Brother     Lung Cancer Brother        Social History     Socioeconomic History    Marital status:      Spouse name: avila    Number of children: 2    Years of education: 20    Highest education level: Not on file   Occupational History    Occupation: retired teacher   Tobacco Use    Smoking status: Former     Packs/day: 1.00     Years: 12.00     Pack years: 12.00     Types: Cigarettes     Quit date:      Years since quittin.0    Smokeless tobacco: Never   Vaping Use    Vaping Use: Never used   Substance and Sexual Activity    Alcohol use: No    Drug use: No    Sexual activity: Yes Partners: Female     Comment: wife   Other Topics Concern    Not on file   Social History Narrative    Not on file     Social Determinants of Health     Financial Resource Strain: Low Risk     Difficulty of Paying Living Expenses: Not very hard   Food Insecurity: No Food Insecurity    Worried About Running Out of Food in the Last Year: Never true    Ran Out of Food in the Last Year: Never true   Transportation Needs: Not on file   Physical Activity: Unknown    Days of Exercise per Week: 0 days    Minutes of Exercise per Session: Not on file   Stress: Not on file   Social Connections: Not on file   Intimate Partner Violence: Not on file   Housing Stability: Not on file       Allergies   Allergen Reactions    Ampicillin Rash    Penicillins Rash       Current Outpatient Medications   Medication Sig Dispense Refill    montelukast (SINGULAIR) 10 MG tablet Take 1 tablet by mouth nightly 90 tablet 3    azelastine (ASTELIN) 0.1 % nasal spray USE 2 SPRAYS IN EACH NOSTRIL TWICE DAILY as needed 3 each 3    pregabalin (LYRICA) 100 MG capsule TAKE 1 CAPSULE TWICE A  capsule 1    famotidine (PEPCID) 20 MG tablet Take 1 tablet by mouth 2 times daily 180 tablet 3    pyridostigmine (MESTINON) 60 MG tablet TAKE 1 TABLET BY MOUTH THREE TIMES DAILY      glycopyrrolate (ROBINUL) 1 MG tablet Take 1 mg by mouth 3 times daily as needed      LUTEIN PO Take by mouth daily      metoprolol succinate (TOPROL XL) 25 MG extended release tablet TAKE 1 TABLET DAILY 90 tablet 3    atorvastatin (LIPITOR) 80 MG tablet TAKE 1 TABLET NIGHTLY 90 tablet 3    aspirin 81 MG tablet Take 81 mg by mouth daily      CPAP Machine MISC by Does not apply route      Multiple Vitamins-Minerals (MULTIVITAMIN ADULT PO) Take by mouth daily       No current facility-administered medications for this visit.        Review of Systems    /70   Pulse 56   Ht 6' 4\" (1.93 m)   Wt 295 lb (133.8 kg)   SpO2 98%   BMI 35.91 kg/m²   BP Readings from Last 7 Encounters:   01/05/23 108/70   09/15/22 116/72   07/15/22 106/60   06/16/22 134/82   03/14/22 112/70   09/13/21 120/72   01/07/21 112/72     Wt Readings from Last 7 Encounters:   01/05/23 295 lb (133.8 kg)   09/15/22 295 lb 9.6 oz (134.1 kg)   07/15/22 300 lb (136.1 kg)   06/16/22 300 lb (136.1 kg)   03/14/22 300 lb (136.1 kg)   09/13/21 296 lb (134.3 kg)   01/07/21 296 lb (134.3 kg)     BMI Readings from Last 7 Encounters:   01/05/23 35.91 kg/m²   09/15/22 36.95 kg/m²   07/15/22 37.50 kg/m²   06/16/22 37.50 kg/m²   03/14/22 37.50 kg/m²   09/13/21 37.00 kg/m²   01/07/21 37.00 kg/m²     Resp Readings from Last 7 Encounters:   09/15/22 20   07/01/19 18   12/27/18 16   07/30/18 20   07/28/17 20       Physical Exam  Constitutional:       General: He is not in acute distress. Eyes:      General: No scleral icterus. Cardiovascular:      Heart sounds: Normal heart sounds. Pulmonary:      Breath sounds: Normal breath sounds. Musculoskeletal:      Cervical back: Neck supple. Right lower leg: Edema present. Left lower leg: Edema present. Lymphadenopathy:      Cervical: No cervical adenopathy. Skin:     Findings: No rash.    Neurological:      Comments: Slight slurring of speech   Psychiatric:         Mood and Affect: Mood normal.       Results for orders placed or performed in visit on 09/08/22   Lipid Panel   Result Value Ref Range    Cholesterol, Total 120 (L) 160 - 199 mg/dL    Triglycerides 102 0 - 149 mg/dL    HDL 44 (L) 55 - 121 mg/dL    LDL Calculated 56 <100 mg/dL   TSH   Result Value Ref Range    TSH 4.320 (H) 0.270 - 4.200 uIU/mL   Comprehensive Metabolic Panel   Result Value Ref Range    Sodium 142 136 - 145 mmol/L    Potassium 4.4 3.5 - 5.0 mmol/L    Chloride 105 98 - 111 mmol/L    CO2 28 22 - 29 mmol/L    Anion Gap 9 7 - 19 mmol/L    Glucose 97 74 - 109 mg/dL    BUN 17 8 - 23 mg/dL    Creatinine 0.8 0.5 - 1.2 mg/dL    GFR Non-African American >60 >60    GFR African American >59 >59    Calcium 9.7 8.8 - 10.2 mg/dL    Total Protein 6.6 6.6 - 8.7 g/dL    Albumin 3.8 3.5 - 5.2 g/dL    Total Bilirubin 0.7 0.2 - 1.2 mg/dL    Alkaline Phosphatase 78 40 - 130 U/L    ALT 48 (H) 5 - 41 U/L    AST 42 (H) 5 - 40 U/L   CBC   Result Value Ref Range    WBC 6.6 4.8 - 10.8 K/uL    RBC 4.96 4.70 - 6.10 M/uL    Hemoglobin 15.2 14.0 - 18.0 g/dL    Hematocrit 48.2 42.0 - 52.0 %    MCV 97.2 (H) 80.0 - 94.0 fL    MCH 30.6 27.0 - 31.0 pg    MCHC 31.5 (L) 33.0 - 37.0 g/dL    RDW 13.2 11.5 - 14.5 %    Platelets 132 848 - 892 K/uL    MPV 10.8 9.4 - 12.4 fL       ASSESSMENT/ PLAN:  1. Coronary artery disease involving native coronary artery of native heart without angina pectoris  Stable and follow up closely -- beta blocker and aspirin and high dose statin    2. Myasthenia gravis ([facility] [address]  Continue with mestinon and need to f/u with Dr. Ladonna May    3. Essential hypertension  Good bp control and follow     4. Mixed hyperlipidemia  We recommend ongoing high-dose statin therapy  - CBC; Future  - Comprehensive Metabolic Panel; Future  - TSH; Future  - Lipid Panel; Future    5. Dyspnea, unspecified type  He has a little edema and dyspnea today check a BMP follow-up  - Brain Natriuretic Peptide; Future    Chart, medications, labs, vaccines reviewed. Keep up to date with routine care and follow up. Call with any problems or complaints. Keep up to date with routine screening recomendations and vaccines.

## 2023-01-06 DIAGNOSIS — E78.2 MIXED HYPERLIPIDEMIA: ICD-10-CM

## 2023-01-06 DIAGNOSIS — E04.1 THYROID NODULE: ICD-10-CM

## 2023-01-06 DIAGNOSIS — R06.00 DYSPNEA, UNSPECIFIED TYPE: ICD-10-CM

## 2023-01-06 LAB
ALBUMIN SERPL-MCNC: 4.1 G/DL (ref 3.5–5.2)
ALP BLD-CCNC: 69 U/L (ref 40–130)
ALT SERPL-CCNC: 40 U/L (ref 5–41)
ANION GAP SERPL CALCULATED.3IONS-SCNC: 12 MMOL/L (ref 7–19)
AST SERPL-CCNC: 31 U/L (ref 5–40)
BILIRUB SERPL-MCNC: 0.6 MG/DL (ref 0.2–1.2)
BUN BLDV-MCNC: 23 MG/DL (ref 8–23)
CALCIUM SERPL-MCNC: 9.4 MG/DL (ref 8.8–10.2)
CHLORIDE BLD-SCNC: 108 MMOL/L (ref 98–111)
CHOLESTEROL, TOTAL: 120 MG/DL (ref 160–199)
CO2: 27 MMOL/L (ref 22–29)
CREAT SERPL-MCNC: 0.7 MG/DL (ref 0.5–1.2)
GFR SERPL CREATININE-BSD FRML MDRD: >60 ML/MIN/{1.73_M2}
GLUCOSE BLD-MCNC: 97 MG/DL (ref 74–109)
HCT VFR BLD CALC: 46.7 % (ref 42–52)
HDLC SERPL-MCNC: 42 MG/DL (ref 55–121)
HEMOGLOBIN: 15.2 G/DL (ref 14–18)
LDL CHOLESTEROL CALCULATED: 63 MG/DL
MCH RBC QN AUTO: 32 PG (ref 27–31)
MCHC RBC AUTO-ENTMCNC: 32.5 G/DL (ref 33–37)
MCV RBC AUTO: 98.3 FL (ref 80–94)
PDW BLD-RTO: 13 % (ref 11.5–14.5)
PLATELET # BLD: 194 K/UL (ref 130–400)
PMV BLD AUTO: 11.2 FL (ref 9.4–12.4)
POTASSIUM SERPL-SCNC: 4.7 MMOL/L (ref 3.5–5)
PRO-BNP: 55 PG/ML (ref 0–1800)
RBC # BLD: 4.75 M/UL (ref 4.7–6.1)
SODIUM BLD-SCNC: 147 MMOL/L (ref 136–145)
T4 FREE: 0.69 NG/DL (ref 0.93–1.7)
TOTAL PROTEIN: 6.7 G/DL (ref 6.6–8.7)
TRIGL SERPL-MCNC: 77 MG/DL (ref 0–149)
TSH SERPL DL<=0.05 MIU/L-ACNC: 4.25 UIU/ML (ref 0.27–4.2)
WBC # BLD: 6.7 K/UL (ref 4.8–10.8)

## 2023-01-09 DIAGNOSIS — E78.2 MIXED HYPERLIPIDEMIA: Primary | ICD-10-CM

## 2023-01-09 DIAGNOSIS — E03.8 SUBCLINICAL HYPOTHYROIDISM: ICD-10-CM

## 2023-01-16 PROBLEM — G70.00 MYASTHENIA GRAVIS (HCC): Status: ACTIVE | Noted: 2023-01-16

## 2023-01-30 ENCOUNTER — OFFICE VISIT (OUTPATIENT)
Dept: NEUROLOGY | Facility: CLINIC | Age: 78
End: 2023-01-30
Payer: MEDICARE

## 2023-01-30 VITALS
HEART RATE: 55 BPM | HEIGHT: 74 IN | BODY MASS INDEX: 37.22 KG/M2 | WEIGHT: 290 LBS | DIASTOLIC BLOOD PRESSURE: 80 MMHG | OXYGEN SATURATION: 98 % | SYSTOLIC BLOOD PRESSURE: 110 MMHG

## 2023-01-30 DIAGNOSIS — G70.00 MYASTHENIA GRAVIS WITHOUT EXACERBATION: Primary | ICD-10-CM

## 2023-01-30 PROCEDURE — 99214 OFFICE O/P EST MOD 30 MIN: CPT | Performed by: PSYCHIATRY & NEUROLOGY

## 2023-01-30 RX ORDER — MONTELUKAST SODIUM 10 MG/1
1 TABLET ORAL DAILY
COMMUNITY
Start: 2023-01-05

## 2023-02-02 ENCOUNTER — OFFICE VISIT (OUTPATIENT)
Dept: CARDIOLOGY | Facility: CLINIC | Age: 78
End: 2023-02-02
Payer: MEDICARE

## 2023-02-02 VITALS
HEIGHT: 74 IN | HEART RATE: 60 BPM | SYSTOLIC BLOOD PRESSURE: 112 MMHG | WEIGHT: 301.8 LBS | BODY MASS INDEX: 38.73 KG/M2 | DIASTOLIC BLOOD PRESSURE: 78 MMHG | OXYGEN SATURATION: 98 %

## 2023-02-02 DIAGNOSIS — I10 PRIMARY HYPERTENSION: ICD-10-CM

## 2023-02-02 DIAGNOSIS — I25.10 CORONARY ARTERY DISEASE INVOLVING NATIVE CORONARY ARTERY OF NATIVE HEART WITHOUT ANGINA PECTORIS: Primary | ICD-10-CM

## 2023-02-02 DIAGNOSIS — E78.2 MIXED HYPERLIPIDEMIA: ICD-10-CM

## 2023-02-02 PROCEDURE — 93000 ELECTROCARDIOGRAM COMPLETE: CPT | Performed by: NURSE PRACTITIONER

## 2023-02-02 PROCEDURE — 99214 OFFICE O/P EST MOD 30 MIN: CPT | Performed by: NURSE PRACTITIONER

## 2023-02-02 NOTE — PROGRESS NOTES
Subjective:     Encounter Date:02/02/2023      Patient ID: Axel Morris is a 77 y.o. male.    Chief Complaint: follow up CAD    History of Present Illness     Mr. Morris is a 77 year-old who has previously been cared for at [facility] for coronary disease including a stent to the circumflex artery on 12/26/2018. He is also on medication for hyperlipidemia, and hypertension.    He established care with Dr. Tamayo in the clinic in February 2022.  He reported that he had been having chest pain with exertion prior to having his stent placed, which resolved following the stent placement.  He denied ever having any similar symptoms since that time.  He reported some mild lower extremity edema, that would improve overnight.  He described occasional orthopnea for the previous 6 months but otherwise no dyspnea.  At that visit, Dr. Tamayo checked a 2D echocardiogram and findings were unremarkable.  See below.    Today the patient presents for yearly follow-up.  By way of review he was in the ER once in June 2022 for speech difficulty and stroke was ruled out.  It also appears he had COVID in July 2022.  Neurology notes indicate he is seropositive for myasthenia gravis.  From a cardiac standpoint, he states he is feeling well.  He denies any chest discomfort, shortness of breath, orthopnea, PND, palpitations, syncope or presyncope.  He reports compliance with his medications and good blood pressure control.  He states he does not have any limiting symptoms and has been traveling and has more upcoming travel plans.    The following portions of the patient's history were reviewed and updated as appropriate: allergies, current medications, past family history, past medical history, past social history, past surgical history and problem list.    Review of Systems   Constitutional: Negative for malaise/fatigue.   Cardiovascular: Negative for chest pain, claudication, dyspnea on exertion, leg swelling, near-syncope, orthopnea,  "palpitations, paroxysmal nocturnal dyspnea and syncope.   Respiratory: Negative for cough and shortness of breath.    Hematologic/Lymphatic: Does not bruise/bleed easily.   Musculoskeletal: Negative for falls.   Gastrointestinal: Negative for bloating.   Neurological: Negative for dizziness, light-headedness and weakness.       Allergies   Allergen Reactions   • Ampicillin Rash   • Penicillins Rash       Current Outpatient Medications:   •  aspirin 81 MG tablet, Take 81 mg by mouth., Disp: , Rfl:   •  atorvastatin (LIPITOR) 80 MG tablet, Take 40 mg by mouth Daily., Disp: , Rfl:   •  azelastine (ASTELIN) 0.1 % nasal spray, 2 sprays into the nostril(s) as directed by provider 2 (Two) Times a Day. Use in each nostril as directed, Disp: 30 mL, Rfl: 11  •  famotidine (PEPCID) 20 MG tablet, Take 1 tablet by mouth 2 (Two) Times a Day., Disp: , Rfl:   •  glycopyrrolate (ROBINUL) 1 MG tablet, Take 1 tablet by mouth 3 (Three) Times a Day As Needed (vertigo)., Disp: 60 tablet, Rfl: 11  •  metoprolol succinate XL (TOPROL-XL) 25 MG 24 hr tablet, Take 25 mg by mouth Daily., Disp: , Rfl:   •  montelukast (SINGULAIR) 10 MG tablet, Take 1 tablet by mouth Daily., Disp: , Rfl:   •  Multiple Vitamins-Minerals (MULTIVITAMIN WITH MINERALS) tablet tablet, Take 1 tablet by mouth Daily., Disp: , Rfl:   •  Multiple Vitamins-Minerals (OCUVITE ADULT FORMULA PO), Take  by mouth., Disp: , Rfl:   •  pregabalin (LYRICA) 100 MG capsule, 2 (Two) Times a Day., Disp: , Rfl:   •  pyridostigmine (MESTINON) 60 MG tablet, Take 1 tablet by mouth 3 (Three) Times a Day for 180 days. (Patient taking differently: Take 60 mg by mouth 2 (Two) Times a Day.), Disp: 270 tablet, Rfl: 1         Objective:    /78   Pulse 60   Ht 188 cm (74\")   Wt (!) 137 kg (301 lb 12.8 oz)   SpO2 98%   BMI 38.75 kg/m²        Vitals and nursing note reviewed.   Constitutional:       General: Not in acute distress.     Appearance: Well-developed and not in distress. Not " diaphoretic.   Neck:      Vascular: No JVD.   Pulmonary:      Effort: Pulmonary effort is normal. No respiratory distress.      Breath sounds: Normal breath sounds.   Cardiovascular:      Normal rate. Regular rhythm.      Murmurs: There is no murmur.   Edema:     Peripheral edema absent.   Abdominal:      Tenderness: There is no abdominal tenderness.   Skin:     General: Skin is warm and dry.   Neurological:      Mental Status: Alert and oriented to person, place, and time.         Lab Review:   Lab Results   Component Value Date    GLUCOSE 97 09/08/2022    BUN 17 09/08/2022    CREATININE 0.80 11/10/2022    EGFRIFNONA >60 09/08/2022    EGFRIFAFRI >59 09/08/2022    BCR 17.8 07/02/2022    K 4.4 09/08/2022    CO2 28 09/08/2022    CALCIUM 9.7 09/08/2022    ALBUMIN 3.8 09/08/2022    AST 42 (H) 09/08/2022    ALT 48 (H) 09/08/2022       Lab Results   Component Value Date    CHLPL 120 (L) 01/06/2023    TRIG 77 01/06/2023    HDL 42 (L) 01/06/2023    LDL 63 01/06/2023     Lab Results   Component Value Date    HGBA1C 5.8 03/07/2022             ECG 12 Lead    Date/Time: 2/2/2023 12:51 PM  Performed by: Madelaine Garces APRN  Authorized by: Madelaine Garces APRN   Comparison: compared with previous ECG from 7/2/2022  Similar to previous ECG  Rhythm: sinus bradycardia  BPM: 57  Other findings: low voltage            Results for orders placed during the hospital encounter of 03/18/22    Adult Transthoracic Echo Complete w/ Color, Spectral and Contrast if necessary per protocol    Interpretation Summary  · Left ventricular ejection fraction appears to be 66 - 70%.  · Left ventricular diastolic function is consistent with (grade I) impaired relaxation.  · Normal size and function of the right ventricle.  · No significant valvular pathology.    6/2022 4 day holter:    • A relatively benign monitor study.  • Predominant rhythm was normal sinus rhythm.  • No sustained arrhythmias.  • Occasional (1.7%) isolated PVCs.  • Patient triggered the  "device twice, reporting \"numb tongue\" on 1 occasion. None of these events correlated with any change in rate or rhythm.  • For short runs of nonsustained ventricular tachycardia, with the longest \"run\" being 9 consecutive beats at average rate 1:56 PM, lasting 3.5 seconds. Patient did not trigger the device or reported symptoms at this or any of the other short occurrences of NSVT.      Assessment:      Problem List Items Addressed This Visit        Cardiac and Vasculature    Coronary artery disease involving native coronary artery of native heart without angina pectoris - Primary    Overview     3.0 x 28 mm HENNA to LCx 12/26/18 (Venkat)           Mixed hyperlipidemia    Primary hypertension       Plan:     1. Coronary artery disease: Circumflex stenting 2018, and doing well with no recurrence of symptoms. He did have typical angina prior to that resolved after that procedure.  - Continue aspirin 81 mg daily for life without interruption.  - Continue high-intensity statin to maintain goal of LDL, and other risk factor modifications.     2. Essential hypertension: Well-controlled.  - Continue Toprol XL.     4. Mixed hyperlipidemia: Most recent LDL was reviewed today at 63.  We discussed the goal of keeping the LDL less than 70.  - Continue atorvastatin 80 mg at bedtime.    Of note, it appears he wore a 4-day Holter monitor in June 2022 as part of a work-up for possible TIA.  Findings were benign.  See above. He did have 4 short runs of asymptomatic nonsustained ventricular tachycardia.  Continue beta-blocker.  He denies any palpitations, syncope or presyncope. Normal LV function per echo.     Follow-up with Dr. Tamayo in 1 year, call sooner with symptoms or concerns.    I spent 34 minutes caring for Axel on this date of service. This time includes time spent by me in the following activities: preparing for the visit, reviewing tests, obtaining and/or reviewing a separately obtained history, performing a medically " appropriate examination and/or evaluation, counseling and educating the patient/family/caregiver and documenting information in the medical record

## 2023-02-23 ENCOUNTER — TELEPHONE (OUTPATIENT)
Dept: INTERNAL MEDICINE | Age: 78
End: 2023-02-23

## 2023-02-23 DIAGNOSIS — G60.9 IDIOPATHIC PERIPHERAL NEUROPATHY: ICD-10-CM

## 2023-02-23 DIAGNOSIS — R91.8 LUNG NODULES: Primary | ICD-10-CM

## 2023-02-23 NOTE — TELEPHONE ENCOUNTER
The Lung MD nurse called to remind us to order the 3 mo. Follow up CT scan of lungs due to nodules.   Eric Nova

## 2023-02-23 NOTE — TELEPHONE ENCOUNTER
[facility] called requesting a refill of the below medication which has been pended for you:     Requested Prescriptions     Pending Prescriptions Disp Refills    pregabalin (LYRICA) 100 MG capsule 180 capsule 1     Sig: TAKE 1 CAPSULE TWICE A DAY       Last Appointment Date: 1/5/2023  Next Appointment Date: 4/17/2023    Allergies   Allergen Reactions    Ampicillin Rash    Penicillins Rash

## 2023-02-23 NOTE — TELEPHONE ENCOUNTER
Let him know I placed an order for a chest CT at [facility] we are do the 3-month follow-up from the one he had in November.

## 2023-02-24 RX ORDER — PREGABALIN 100 MG/1
CAPSULE ORAL
Qty: 180 CAPSULE | Refills: 1 | Status: SHIPPED | OUTPATIENT
Start: 2023-02-24 | End: 2023-05-15

## 2023-03-09 ENCOUNTER — TRANSCRIBE ORDERS (OUTPATIENT)
Dept: ADMINISTRATIVE | Facility: HOSPITAL | Age: 78
End: 2023-03-09
Payer: MEDICARE

## 2023-03-09 DIAGNOSIS — R91.8 LUNG MASS: Primary | ICD-10-CM

## 2023-03-22 ENCOUNTER — HOSPITAL ENCOUNTER (OUTPATIENT)
Dept: CT IMAGING | Facility: HOSPITAL | Age: 78
Discharge: HOME | End: 2023-03-22
Admitting: INTERNAL MEDICINE
Payer: MEDICARE

## 2023-03-22 DIAGNOSIS — R91.8 LUNG MASS: ICD-10-CM

## 2023-03-22 PROCEDURE — 71250 CT THORAX DX C-: CPT

## 2023-04-04 NOTE — PROGRESS NOTES
[facility] - PODIATRY    Today's Date: 04/24/2023     Patient Name: Axel Morris  MRN: [id number]  [facility]: [id number]  PCP: Isabel Best MD  Referring Provider: Isabel Best MD    SUBJECTIVE     Chief Complaint   Patient presents with   • Establish Care     Isabel Best MD 01/11/2023 NEW PATIENT/IDIOPATHIC PERIPHERAL NEUROPATHY.- pt states saw rafa and would like to start nail/foot care- pt denies pain other than neuropathy     HPI: Axel Morris, a 78 y.o.male, comes to clinic as a(n) new patient complaining of painful toenails and complaining of neuropathy of both feet. Patient has h/o arthritis, CA, CAD, dizziness, GERD, HLD, Neuropathy, pneumonia, sleep apnea, tinnitus. Patient presents for evaluation and treatment of long, thickened, irregular toenails of both feet.  Patient states that he was previously seeing Dr. Michaud prior to his snf.  States that his toenails are long, thickened, irregular, and dystrophic and that he is unable to care for them himself at home.  Reports neuropathy from the knee down bilaterally; states he is unsure of the source of his neuropathy though he does report issues with his disks in his back and states he also has previous knee surgery that he believes is contributing to neuropathy as well.  Reports calluses to the great toes bilaterally and states that he had these since he was a kid. Denies pain in feet secondary to neuropathy. Relates previous treatment(s) including Care by Dr. Michaud. Denies any constitutional symptoms. No other pedal complaints at this time.    Past Medical History:   Diagnosis Date   • Abnormal ECG    • Arthritis    • Cancer    • Coronary artery disease    • COVID-19 vaccine series completed     pfizer   • Dizziness    • Fracture of nasal bones    • GERD (gastroesophageal reflux disease)    • HL (hearing loss)    • Hx of colonic polyps    • Hyperlipidemia    • Neuropathy    • Peripheral neuropathy    • Pneumonia    •  Rheumatic fever    • Sinusitis    • Sleep apnea    • Tinnitus    • Vision loss      Past Surgical History:   Procedure Laterality Date   • ADENOIDECTOMY     • CARDIAC CATHETERIZATION     • CARDIAC SURGERY      HEART STENT   • CHOLECYSTECTOMY     • COLONOSCOPY N/A 2017    Procedure: COLONOSCOPY WITH ANESTHESIA;  Surgeon: Matthew Cagle MD;  Location: [facility] ENDOSCOPY;  Service:    • COLONOSCOPY W/ POLYPECTOMY  2012    Adenomatous polyp at 50 cm repeat exam in 5 years   • CORONARY STENT PLACEMENT     • ENDOSCOPY AND COLONOSCOPY  2009    Acute ulcerative esophagogastritis grade c, Hyperplastic polyp at 50 cm repeat colonoscopy in 3 years   • EYE SURGERY      DONNIE CATARACT REMOVAL   • FLAP HEAD/NECK Left 03/10/2021    Procedure: POSSIBLE FLAP OR GRAFT.;  Surgeon: Roberto Og MD;  Location: [facility] OR;  Service: ENT;  Laterality: Left;   • HEAD/NECK LESION/CYST EXCISION Left 03/10/2021    Procedure: EXCISION OF ATYPICAL PIGMENTED LESION OF THE LEFT CENTRAL TEMPLE REGION WITH POSSIBLE FLAP OR GRAFT. - Left;  Surgeon: Roberto Og MD;  Location:  [facility] OR;  Service: ENT;  Laterality: Left;   • REPLACEMENT TOTAL KNEE BILATERAL     • TONSILLECTOMY       Family History   Problem Relation Age of Onset   • Heart failure Father    • Rheum arthritis Father    • Cancer Brother    • Colon cancer Neg Hx    • Colon polyps Neg Hx      Social History     Socioeconomic History   • Marital status:    Tobacco Use   • Smoking status: Former     Packs/day: 1.00     Years: 10.00     Pack years: 10.00     Types: Cigarettes     Quit date: 1978     Years since quittin.7   • Smokeless tobacco: Never   Vaping Use   • Vaping Use: Never used   Substance and Sexual Activity   • Alcohol use: No   • Drug use: Never   • Sexual activity: Defer     Allergies   Allergen Reactions   • Ampicillin Rash   • Penicillins Rash     Current Outpatient Medications   Medication Sig Dispense Refill   • aspirin 81  MG tablet Take 1 tablet by mouth.     • atorvastatin (LIPITOR) 80 MG tablet Take 40 mg by mouth Daily.     • azelastine (ASTELIN) 0.1 % nasal spray 2 sprays into the nostril(s) as directed by provider 2 (Two) Times a Day. Use in each nostril as directed 30 mL 11   • famotidine (PEPCID) 20 MG tablet Take 1 tablet by mouth 2 (Two) Times a Day.     • glycopyrrolate (ROBINUL) 1 MG tablet Take 1 tablet by mouth 3 (Three) Times a Day As Needed (vertigo). 60 tablet 11   • metoprolol succinate XL (TOPROL-XL) 25 MG 24 hr tablet Take 1 tablet by mouth Daily.     • montelukast (SINGULAIR) 10 MG tablet Take 1 tablet by mouth Daily.     • Multiple Vitamins-Minerals (MULTIVITAMIN WITH MINERALS) tablet tablet Take 1 tablet by mouth Daily.     • Multiple Vitamins-Minerals (OCUVITE ADULT FORMULA PO) Take  by mouth.     • omeprazole (priLOSEC) 20 MG capsule Take 1 capsule by mouth Daily.     • pregabalin (LYRICA) 100 MG capsule 2 (Two) Times a Day.     • pyridostigmine (MESTINON) 60 MG tablet Take 1 tablet by mouth 3 (Three) Times a Day for 180 days. (Patient taking differently: Take 1 tablet by mouth 2 (Two) Times a Day.) 270 tablet 1     No current facility-administered medications for this visit.     Review of Systems   Constitutional: Negative for chills and fever.   HENT: Negative for congestion.    Respiratory: Negative for shortness of breath.    Cardiovascular: Negative for chest pain and leg swelling.   Gastrointestinal: Negative for constipation, diarrhea, nausea and vomiting.   Musculoskeletal: Positive for arthralgias. Negative for myalgias.   Skin: Negative for wound.   Neurological: Positive for numbness.       OBJECTIVE     Vitals:    04/24/23 0905   BP: 134/76   Pulse: 55   SpO2: 95%       PHYSICAL EXAM  GEN:   Accompanied by none.     Foot/Ankle Exam    GENERAL  Appearance:  appears stated age and obese  Orientation:  AAOx3  Affect:  appropriate  Gait:  unimpaired  Assistance:  independent  Right shoe gear: casual  shoe  Left shoe gear: casual shoe    VASCULAR     Right Foot Vascularity   Dorsalis pedis:  2+  Posterior tibial:  2+  Skin temperature:  warm  Edema grading:  Trace and non-pitting  CFT:  3  Pedal hair growth:  Present  Varicosities:  mild varicosities     Left Foot Vascularity   Dorsalis pedis:  2+  Posterior tibial:  2+  Skin temperature:  warm  Edema grading:  Trace and non-pitting  CFT:  3  Pedal hair growth:  Present  Varicosities:  mild varicosities     NEUROLOGIC     Right Foot Neurologic   Light touch sensation: diminished  Vibratory sensation: diminished  Hot/Cold sensation: diminished  Protective Sensation using Posey-Nikita Monofilament:   Sites intact: 5  Sites tested: 10     Left Foot Neurologic   Light touch sensation: diminished  Vibratory sensation: diminished  Protective Sensation using Posey-Nikita Monofilament:   Sites intact: 1  Sites tested: 10    MUSCULOSKELETAL     Right Foot Musculoskeletal   Tenderness:  toenail problem    Arch:  Pes planus  Hallux valgus: Yes       Left Foot Musculoskeletal   Tenderness:  toenail problem  Arch:  Pes planus  Hallux valgus: Yes      MUSCLE STRENGTH     Right Foot Muscle Strength   Foot dorsiflexion:  5  Foot plantar flexion:  5  Foot inversion:  5  Foot eversion:  5     Left Foot Muscle Strength   Foot dorsiflexion:  5  Foot plantar flexion:  4+  Foot inversion:  5  Foot eversion:  5    RANGE OF MOTION     Right Foot Range of Motion   Foot and ankle ROM within normal limits       Left Foot Range of Motion   Foot and ankle ROM within normal limits      DERMATOLOGIC      Right Foot Dermatologic   Skin  Positive for corn and tinea.   Nails  1.  Positive for onychomycosis, abnormal thickness, subungual debris and dystrophic nail.  2.  Positive for elongated, onychomycosis, abnormal thickness, subungual debris and dystrophic nail.  3.  Positive for elongated, onychomycosis, abnormal thickness, subungual debris and dystrophic nail.  4.  Positive for  elongated, onychomycosis, abnormal thickness, subungual debris and dystrophic nail.  5.  Positive for elongated, onychomycosis, abnormal thickness, subungual debris and dystrophic nail.     Left Foot Dermatologic   Skin  Positive for corn and tinea.   Nails  1.  Positive for onychomycosis, abnormal thickness, subungual debris and dystrophic nail.  2.  Positive for elongated, onychomycosis, abnormal thickness, subungual debris and dystrophic nail.  3.  Positive for elongated, onychomycosis, abnormal thickness, subungual debris and dystrophic nail.  4.  Positive for elongated, onychomycosis, abnormally thick, subungual debris and dystrophic nail.  5.  Positive for elongated, onychomycosis, abnormally thick, subungual debris and dystrophic nail.    Image:       RADIOLOGY/NUCLEAR:  No results found.    LABORATORY/CULTURE RESULTS:      PATHOLOGY RESULTS:       ASSESSMENT/PLAN     Diagnoses and all orders for this visit:    1. Onychomycosis (Primary)    2. Thickened nail    3. Other polyneuropathy    4. Pes planus of both feet    5. Valgus deformity of both great toes    6. Tinea pedis of both feet      Comprehensive lower extremity examination and evaluation was performed.  Discussed findings and treatment plan including risks, benefits, and treatment options with patient in detail. Patient agreed with treatment plan.  After verbal consent obtained, nail(s) x10 debrided of length and thickness with nail nipper without incidence  After verbal consent obtained, calluses x2 pared utilizing dermal curette and/or scalpel without incidence  Patient may maintain nails and calluses at home utilizing emery board or pumice stone between visits as needed   Rx Lamisil 1% cream 1 application p.o. twice daily x3 weeks then as needed  An After Visit Summary was printed and given to the patient at discharge, including (if requested) any available informative/educational handouts regarding diagnosis, treatment, or medications. All  questions were answered to patient/family satisfaction. Should symptoms fail to improve or worsen they agree to call or return to clinic or to go to the Emergency Department. Discussed the importance of following up with any needed screening tests/labs/specialist appointments and any requested follow-up recommended by me today. Importance of maintaining follow-up discussed and patient accepts that missed appointments can delay diagnosis and potentially lead to worsening of conditions.  Return in about 3 months (around 7/24/2023)., or sooner if acute issues arise.    Lab Frequency Next Occurrence   Provide Patient With Instructions on NPO Status Once 03/02/2021   US Thyroid Once 08/30/2023       This document has been electronically signed by ALEXUS Johnson on April 24, 2023 09:35 CDT

## 2023-04-10 DIAGNOSIS — E78.2 MIXED HYPERLIPIDEMIA: ICD-10-CM

## 2023-04-10 DIAGNOSIS — E03.8 SUBCLINICAL HYPOTHYROIDISM: ICD-10-CM

## 2023-04-10 LAB
ALBUMIN SERPL-MCNC: 3.9 G/DL (ref 3.5–5.2)
ALP SERPL-CCNC: 76 U/L (ref 40–130)
ALT SERPL-CCNC: 44 U/L (ref 5–41)
ANION GAP SERPL CALCULATED.3IONS-SCNC: 12 MMOL/L (ref 7–19)
AST SERPL-CCNC: 36 U/L (ref 5–40)
BILIRUB SERPL-MCNC: 0.5 MG/DL (ref 0.2–1.2)
BUN SERPL-MCNC: 20 MG/DL (ref 8–23)
CALCIUM SERPL-MCNC: 9.3 MG/DL (ref 8.8–10.2)
CHLORIDE SERPL-SCNC: 109 MMOL/L (ref 98–111)
CHOLEST SERPL-MCNC: 112 MG/DL (ref 160–199)
CO2 SERPL-SCNC: 26 MMOL/L (ref 22–29)
CREAT SERPL-MCNC: 0.8 MG/DL (ref 0.5–1.2)
ERYTHROCYTE [DISTWIDTH] IN BLOOD BY AUTOMATED COUNT: 12.5 % (ref 11.5–14.5)
GLUCOSE SERPL-MCNC: 106 MG/DL (ref 74–109)
HCT VFR BLD AUTO: 46.9 % (ref 42–52)
HDLC SERPL-MCNC: 35 MG/DL (ref 55–121)
HGB BLD-MCNC: 15 G/DL (ref 14–18)
LDLC SERPL CALC-MCNC: 53 MG/DL
MCH RBC QN AUTO: 30.8 PG (ref 27–31)
MCHC RBC AUTO-ENTMCNC: 32 G/DL (ref 33–37)
MCV RBC AUTO: 96.3 FL (ref 80–94)
PLATELET # BLD AUTO: 223 K/UL (ref 130–400)
PMV BLD AUTO: 10.1 FL (ref 9.4–12.4)
POTASSIUM SERPL-SCNC: 4.8 MMOL/L (ref 3.5–5)
PROT SERPL-MCNC: 7 G/DL (ref 6.6–8.7)
RBC # BLD AUTO: 4.87 M/UL (ref 4.7–6.1)
SODIUM SERPL-SCNC: 147 MMOL/L (ref 136–145)
T4 FREE SERPL-MCNC: 0.8 NG/DL (ref 0.93–1.7)
TRIGL SERPL-MCNC: 118 MG/DL (ref 0–149)
TSH SERPL DL<=0.005 MIU/L-ACNC: 3.37 UIU/ML (ref 0.27–4.2)
WBC # BLD AUTO: 7.2 K/UL (ref 4.8–10.8)

## 2023-04-17 ENCOUNTER — OFFICE VISIT (OUTPATIENT)
Dept: INTERNAL MEDICINE | Age: 78
End: 2023-04-17
Payer: MEDICARE

## 2023-04-17 VITALS
BODY MASS INDEX: 35.68 KG/M2 | OXYGEN SATURATION: 96 % | DIASTOLIC BLOOD PRESSURE: 76 MMHG | WEIGHT: 293 LBS | HEIGHT: 76 IN | HEART RATE: 66 BPM | SYSTOLIC BLOOD PRESSURE: 122 MMHG

## 2023-04-17 DIAGNOSIS — E78.2 MIXED HYPERLIPIDEMIA: ICD-10-CM

## 2023-04-17 DIAGNOSIS — Z12.5 SCREENING FOR PROSTATE CANCER: ICD-10-CM

## 2023-04-17 DIAGNOSIS — N20.0 NEPHROLITHIASIS: ICD-10-CM

## 2023-04-17 DIAGNOSIS — I25.10 CORONARY ARTERY DISEASE INVOLVING NATIVE CORONARY ARTERY OF NATIVE HEART WITHOUT ANGINA PECTORIS: Primary | ICD-10-CM

## 2023-04-17 DIAGNOSIS — G70.00 MYASTHENIA GRAVIS (HCC): ICD-10-CM

## 2023-04-17 DIAGNOSIS — R91.1 LUNG NODULE: ICD-10-CM

## 2023-04-17 DIAGNOSIS — I10 ESSENTIAL HYPERTENSION: ICD-10-CM

## 2023-04-17 PROCEDURE — 99214 OFFICE O/P EST MOD 30 MIN: CPT | Performed by: INTERNAL MEDICINE

## 2023-04-17 PROCEDURE — 3078F DIAST BP <80 MM HG: CPT | Performed by: INTERNAL MEDICINE

## 2023-04-17 PROCEDURE — 1123F ACP DISCUSS/DSCN MKR DOCD: CPT | Performed by: INTERNAL MEDICINE

## 2023-04-17 PROCEDURE — 1036F TOBACCO NON-USER: CPT | Performed by: INTERNAL MEDICINE

## 2023-04-17 PROCEDURE — 3074F SYST BP LT 130 MM HG: CPT | Performed by: INTERNAL MEDICINE

## 2023-04-17 PROCEDURE — G8427 DOCREV CUR MEDS BY ELIG CLIN: HCPCS | Performed by: INTERNAL MEDICINE

## 2023-04-17 PROCEDURE — G8417 CALC BMI ABV UP PARAM F/U: HCPCS | Performed by: INTERNAL MEDICINE

## 2023-04-17 RX ORDER — OMEPRAZOLE 20 MG/1
20 CAPSULE, DELAYED RELEASE ORAL DAILY
COMMUNITY

## 2023-04-17 RX ORDER — MONTELUKAST SODIUM 10 MG/1
10 TABLET ORAL NIGHTLY
Qty: 90 TABLET | Refills: 3 | Status: SHIPPED | OUTPATIENT
Start: 2023-04-17

## 2023-04-17 SDOH — ECONOMIC STABILITY: HOUSING INSECURITY
IN THE LAST 12 MONTHS, WAS THERE A TIME WHEN YOU DID NOT HAVE A STEADY PLACE TO SLEEP OR SLEPT IN A SHELTER (INCLUDING NOW)?: NO

## 2023-04-17 SDOH — ECONOMIC STABILITY: FOOD INSECURITY: WITHIN THE PAST 12 MONTHS, YOU WORRIED THAT YOUR FOOD WOULD RUN OUT BEFORE YOU GOT MONEY TO BUY MORE.: NEVER TRUE

## 2023-04-17 SDOH — ECONOMIC STABILITY: FOOD INSECURITY: WITHIN THE PAST 12 MONTHS, THE FOOD YOU BOUGHT JUST DIDN'T LAST AND YOU DIDN'T HAVE MONEY TO GET MORE.: NEVER TRUE

## 2023-04-17 SDOH — ECONOMIC STABILITY: INCOME INSECURITY: HOW HARD IS IT FOR YOU TO PAY FOR THE VERY BASICS LIKE FOOD, HOUSING, MEDICAL CARE, AND HEATING?: NOT HARD AT ALL

## 2023-04-21 ENCOUNTER — TELEPHONE (OUTPATIENT)
Dept: PODIATRY | Facility: CLINIC | Age: 78
End: 2023-04-21
Payer: MEDICARE

## 2023-04-21 NOTE — TELEPHONE ENCOUNTER
Called patient regarding appt on 04/24/2023. Left message for patient to return call if any questions or concerns arise.

## 2023-04-24 ENCOUNTER — OFFICE VISIT (OUTPATIENT)
Dept: PODIATRY | Facility: CLINIC | Age: 78
End: 2023-04-24
Payer: MEDICARE

## 2023-04-24 VITALS
WEIGHT: 294 LBS | HEIGHT: 74 IN | HEART RATE: 55 BPM | SYSTOLIC BLOOD PRESSURE: 134 MMHG | OXYGEN SATURATION: 95 % | BODY MASS INDEX: 37.73 KG/M2 | DIASTOLIC BLOOD PRESSURE: 76 MMHG

## 2023-04-24 DIAGNOSIS — B35.3 TINEA PEDIS OF BOTH FEET: ICD-10-CM

## 2023-04-24 DIAGNOSIS — M20.12 VALGUS DEFORMITY OF BOTH GREAT TOES: ICD-10-CM

## 2023-04-24 DIAGNOSIS — M21.41 PES PLANUS OF BOTH FEET: ICD-10-CM

## 2023-04-24 DIAGNOSIS — B35.1 ONYCHOMYCOSIS: Primary | ICD-10-CM

## 2023-04-24 DIAGNOSIS — M21.42 PES PLANUS OF BOTH FEET: ICD-10-CM

## 2023-04-24 DIAGNOSIS — M20.11 VALGUS DEFORMITY OF BOTH GREAT TOES: ICD-10-CM

## 2023-04-24 DIAGNOSIS — G62.89 OTHER POLYNEUROPATHY: ICD-10-CM

## 2023-04-24 DIAGNOSIS — L60.2 THICKENED NAIL: ICD-10-CM

## 2023-04-24 RX ORDER — PRENATAL VIT 91/IRON/FOLIC/DHA 28-975-200
1 COMBINATION PACKAGE (EA) ORAL 2 TIMES DAILY
Qty: 42 G | Refills: 5 | Status: SHIPPED | OUTPATIENT
Start: 2023-04-24

## 2023-04-24 RX ORDER — OMEPRAZOLE 20 MG/1
20 CAPSULE, DELAYED RELEASE ORAL DAILY
COMMUNITY

## 2023-04-25 ENCOUNTER — TELEPHONE (OUTPATIENT)
Dept: UROLOGY | Facility: CLINIC | Age: 78
End: 2023-04-25

## 2023-04-25 NOTE — TELEPHONE ENCOUNTER
Provider: JAYLAN JOHNSTON  Caller: EDDA SQUIRES  Relationship to Patient: SELF    Phone Number: [phone]  Reason for Call: PT HAS AN APPT ON 5/24/23 WITH JAYLAN CHE @ 9:30.  PLEASE PLACE ORDER FOR KUB

## 2023-04-26 ENCOUNTER — HOSPITAL ENCOUNTER (OUTPATIENT)
Dept: ULTRASOUND IMAGING | Facility: HOSPITAL | Age: 78
Discharge: HOME | End: 2023-04-26
Payer: MEDICARE

## 2023-04-26 ENCOUNTER — OFFICE VISIT (OUTPATIENT)
Dept: OTOLARYNGOLOGY | Facility: CLINIC | Age: 78
End: 2023-04-26
Payer: MEDICARE

## 2023-04-26 VITALS
HEIGHT: 74 IN | HEART RATE: 59 BPM | DIASTOLIC BLOOD PRESSURE: 81 MMHG | TEMPERATURE: 98.2 F | BODY MASS INDEX: 37.73 KG/M2 | WEIGHT: 294 LBS | SYSTOLIC BLOOD PRESSURE: 140 MMHG

## 2023-04-26 DIAGNOSIS — L81.9 ATYPICAL PIGMENTED SKIN LESION: ICD-10-CM

## 2023-04-26 DIAGNOSIS — L21.9 SEBORRHEIC DERMATITIS: ICD-10-CM

## 2023-04-26 DIAGNOSIS — I10 ESSENTIAL HYPERTENSION: ICD-10-CM

## 2023-04-26 DIAGNOSIS — E04.2 MULTIPLE THYROID NODULES: Primary | ICD-10-CM

## 2023-04-26 RX ORDER — METOPROLOL SUCCINATE 25 MG/1
TABLET, EXTENDED RELEASE ORAL
Qty: 90 TABLET | Refills: 3 | Status: SHIPPED | OUTPATIENT
Start: 2023-04-26

## 2023-04-26 NOTE — PROGRESS NOTES
YOB: 1945  Location: [facility]  Location Address: [address] [place] [address], [facility], [address] [place] [address]  Location Phone: [phone]    Chief Complaint   Patient presents with   • Thyroid Problem       History of Present Illness  Axel Morris is a 78 y.o. male.  Axel Morris is here for follow up of ENT complaints. The patient has had problems with thyroid nodules   Nodules have been present for the past several years   Patient denies sore throat, difficulty swallowing, or globus sensation     He is not currently on thyroid replacement therapy     Patient is also s/p excision of atypical lesion of left temple 3/10/2021  He has had a relatively normal postoperative course   Pathology revealed solar lentigo     Tissue Pathology Exam (03/10/2021 09:45)  US Thyroid (2023 10:06)  TSH (04/10/2023 09:27 EDT)  T4, FREE (04/10/2023 09:27 EDT)       Past Medical History:   Diagnosis Date   • Abnormal ECG    • Arthritis    • Cancer    • Coronary artery disease    • COVID-19 vaccine series completed     pfizer   • Dizziness    • Fracture of nasal bones    • GERD (gastroesophageal reflux disease)    • HL (hearing loss)    • Hx of colonic polyps    • Hyperlipidemia    • Neuropathy    • Peripheral neuropathy    • Pneumonia    • Rheumatic fever    • Sinusitis    • Sleep apnea    • Tinnitus    • Vision loss        Past Surgical History:   Procedure Laterality Date   • ADENOIDECTOMY     • CARDIAC CATHETERIZATION     • CARDIAC SURGERY      HEART STENT   • CHOLECYSTECTOMY     • COLONOSCOPY N/A 2017    Procedure: COLONOSCOPY WITH ANESTHESIA;  Surgeon: Matthew Cagle MD;  Location: [facility] ENDOSCOPY;  Service:    • COLONOSCOPY W/ POLYPECTOMY  2012    Adenomatous polyp at 50 cm repeat exam in 5 years   • CORONARY STENT PLACEMENT     • ENDOSCOPY AND COLONOSCOPY  2009    Acute ulcerative esophagogastritis grade c, Hyperplastic polyp at 50 cm repeat colonoscopy in 3 years   • EYE SURGERY      DONNIE  CATARACT REMOVAL   • FLAP HEAD/NECK Left 03/10/2021    Procedure: POSSIBLE FLAP OR GRAFT.;  Surgeon: Roberto Og MD;  Location:  [facility] OR;  Service: ENT;  Laterality: Left;   • HEAD/NECK LESION/CYST EXCISION Left 03/10/2021    Procedure: EXCISION OF ATYPICAL PIGMENTED LESION OF THE LEFT CENTRAL TEMPLE REGION WITH POSSIBLE FLAP OR GRAFT. - Left;  Surgeon: Roberto Og MD;  Location:  [facility] OR;  Service: ENT;  Laterality: Left;   • REPLACEMENT TOTAL KNEE BILATERAL     • TONSILLECTOMY         Outpatient Medications Marked as Taking for the 4/26/23 encounter (Office Visit) with Abeba Medina APRN   Medication Sig Dispense Refill   • aspirin 81 MG tablet Take 1 tablet by mouth.     • atorvastatin (LIPITOR) 80 MG tablet Take 40 mg by mouth Daily.     • azelastine (ASTELIN) 0.1 % nasal spray 2 sprays into the nostril(s) as directed by provider 2 (Two) Times a Day. Use in each nostril as directed 30 mL 11   • famotidine (PEPCID) 20 MG tablet Take 1 tablet by mouth 2 (Two) Times a Day.     • glycopyrrolate (ROBINUL) 1 MG tablet Take 1 tablet by mouth 3 (Three) Times a Day As Needed (vertigo). 60 tablet 11   • metoprolol succinate XL (TOPROL-XL) 25 MG 24 hr tablet Take 1 tablet by mouth Daily.     • montelukast (SINGULAIR) 10 MG tablet Take 1 tablet by mouth Daily.     • Multiple Vitamins-Minerals (MULTIVITAMIN WITH MINERALS) tablet tablet Take 1 tablet by mouth Daily.     • Multiple Vitamins-Minerals (OCUVITE ADULT FORMULA PO) Take  by mouth.     • omeprazole (priLOSEC) 20 MG capsule Take 1 capsule by mouth Daily.     • pregabalin (LYRICA) 100 MG capsule 2 (Two) Times a Day.     • pyridostigmine (MESTINON) 60 MG tablet Take 1 tablet by mouth 3 (Three) Times a Day for 180 days. (Patient taking differently: Take 1 tablet by mouth 2 (Two) Times a Day.) 270 tablet 1   • terbinafine (lamISIL) 1 % cream Apply 1 application topically to the appropriate area as directed 2 (Two) Times a Day. 42 g 5       Ampicillin  and Penicillins    Family History   Problem Relation Age of Onset   • Heart failure Father    • Rheum arthritis Father    • Cancer Brother    • Colon cancer Neg Hx    • Colon polyps Neg Hx        Social History     Socioeconomic History   • Marital status:    Tobacco Use   • Smoking status: Former     Packs/day: 1.00     Years: 10.00     Pack years: 10.00     Types: Cigarettes     Quit date: 1978     Years since quittin.7   • Smokeless tobacco: Never   Vaping Use   • Vaping Use: Never used   Substance and Sexual Activity   • Alcohol use: No   • Drug use: Never   • Sexual activity: Defer       Review of Systems   Constitutional: Negative.    HENT: Negative for sore throat, trouble swallowing and voice change.        Vitals:    23 1003   BP: 140/81   Pulse: 59   Temp: 98.2 °F (36.8 °C)       Body mass index is 37.75 kg/m².    Objective     Physical Exam  Vitals reviewed.   Constitutional:       Appearance: He is obese.   HENT:      Head: Normocephalic.        Right Ear: Tympanic membrane, ear canal and external ear normal.      Left Ear: Tympanic membrane, ear canal and external ear normal.      Nose: Nose normal.      Mouth/Throat:      Lips: Pink.      Mouth: Mucous membranes are moist.      Pharynx: Uvula midline.   Neck:      Comments: Bilateral palpable thyroid nodules     Musculoskeletal:      Cervical back: Full passive range of motion without pain.   Neurological:      Mental Status: He is alert.         Assessment & Plan   Diagnoses and all orders for this visit:    1. Multiple thyroid nodules (Primary)  -     US Thyroid; Future    2. Seborrheic dermatitis    3. Atypical pigmented skin lesion      * Surgery not found *  Orders Placed This Encounter   Procedures   • US Thyroid     Standing Status:   Future     Standing Expiration Date:   2024     Order Specific Question:   Reason for Exam:     Answer:   Thyroid disease     Return in about 6 months (around 10/26/2023).     F/u with  repeat ultrasound   Call for new/worsening problems     Patient Instructions   The patient has a thyroid nodule, which is relatively small, and studies do not suggest a malignancy. I have recommended observation with follow-up with me for repeat ultrasound. I explained the pathology of thyroid nodules including the risks of cancer. The options of surgery were discussed, but the patient wants to pursue an observational course for now, which is reasonable. The patient wishes to continue to defer surgery at this time.

## 2023-05-08 DIAGNOSIS — G70.00 MYASTHENIA GRAVIS WITHOUT EXACERBATION: ICD-10-CM

## 2023-05-08 RX ORDER — PYRIDOSTIGMINE BROMIDE 60 MG/1
TABLET ORAL
Qty: 270 TABLET | Refills: 0 | Status: SHIPPED | OUTPATIENT
Start: 2023-05-08

## 2023-05-16 NOTE — PROGRESS NOTES
Subjective    Mr. Morris is 78 y.o. male    Chief Complaint: Nephrolithiasis     History of Present Illness  Patient was referred to was for kidney stone seen on the left side he does not have a previous history of kidney stones.  He had a CT of the chest for follow-up on a pulmonary nodule which revealed an 11 mm stone in the left renal pelvis with no obstruction.  Patient has not had any gross hematuria has not had any flank pain so he is unaware of having the stone until he was told after the results of the CT of the chest.  Not having any difficulty urinating no fever or chills.  He did get a KUB prior to his appointment today which clearly shows the stone on the left side.  There was a CT of the chest done 11/10/2022 and the stone was not seen then but I think the CT did not go as distal as the more recent CT.  His urine is clear.    The following portions of the patient's history were reviewed and updated as appropriate: allergies, current medications, past family history, past medical history, past social history, past surgical history and problem list.    Review of Systems   Gastrointestinal: Negative.    Genitourinary: Negative.    All other systems reviewed and are negative.      Current Outpatient Medications:     aspirin 81 MG tablet, Take 1 tablet by mouth., Disp: , Rfl:     atorvastatin (LIPITOR) 80 MG tablet, Take 40 mg by mouth Daily., Disp: , Rfl:     azelastine (ASTELIN) 0.1 % nasal spray, 2 sprays into the nostril(s) as directed by provider 2 (Two) Times a Day. Use in each nostril as directed, Disp: 30 mL, Rfl: 11    famotidine (PEPCID) 20 MG tablet, Take 1 tablet by mouth 2 (Two) Times a Day., Disp: , Rfl:     glycopyrrolate (ROBINUL) 1 MG tablet, Take 1 tablet by mouth 3 (Three) Times a Day As Needed (vertigo)., Disp: 60 tablet, Rfl: 11    metoprolol succinate XL (TOPROL-XL) 25 MG 24 hr tablet, Take 1 tablet by mouth Daily., Disp: , Rfl:     montelukast (SINGULAIR) 10 MG tablet, Take 1 tablet by  mouth Daily., Disp: , Rfl:     Multiple Vitamins-Minerals (MULTIVITAMIN WITH MINERALS) tablet tablet, Take 1 tablet by mouth Daily., Disp: , Rfl:     Multiple Vitamins-Minerals (OCUVITE ADULT FORMULA PO), Take  by mouth., Disp: , Rfl:     omeprazole (priLOSEC) 20 MG capsule, Take 1 capsule by mouth Daily., Disp: , Rfl:     pregabalin (LYRICA) 100 MG capsule, 2 (Two) Times a Day., Disp: , Rfl:     pyridostigmine (MESTINON) 60 MG tablet, TAKE 1 TABLET THREE TIMES A DAY, Disp: 270 tablet, Rfl: 0    terbinafine (lamISIL) 1 % cream, Apply 1 application topically to the appropriate area as directed 2 (Two) Times a Day., Disp: 42 g, Rfl: 5    Past Medical History:   Diagnosis Date    Abnormal ECG     Arthritis     Cancer     Coronary artery disease     COVID-19 vaccine series completed     pfizer    Dizziness     Fracture of nasal bones     GERD (gastroesophageal reflux disease)     HL (hearing loss)     Hx of colonic polyps     Hyperlipidemia     Neuropathy     Peripheral neuropathy     Pneumonia     Rheumatic fever     Sinusitis     Sleep apnea     Tinnitus     Vision loss        Past Surgical History:   Procedure Laterality Date    ADENOIDECTOMY  1953    CARDIAC CATHETERIZATION      CARDIAC SURGERY      HEART STENT    CHOLECYSTECTOMY      COLONOSCOPY N/A 11/30/2017    Procedure: COLONOSCOPY WITH ANESTHESIA;  Surgeon: Matthew Cagle MD;  Location: [facility] ENDOSCOPY;  Service:     COLONOSCOPY W/ POLYPECTOMY  07/12/2012    Adenomatous polyp at 50 cm repeat exam in 5 years    CORONARY STENT PLACEMENT      ENDOSCOPY AND COLONOSCOPY  08/07/2009    Acute ulcerative esophagogastritis grade c, Hyperplastic polyp at 50 cm repeat colonoscopy in 3 years    EYE SURGERY      DONNIE CATARACT REMOVAL    FLAP HEAD/NECK Left 03/10/2021    Procedure: POSSIBLE FLAP OR GRAFT.;  Surgeon: Roberto Og MD;  Location: [facility] OR;  Service: ENT;  Laterality: Left;    HEAD/NECK LESION/CYST EXCISION Left 03/10/2021    Procedure: EXCISION OF  "ATYPICAL PIGMENTED LESION OF THE LEFT CENTRAL TEMPLE REGION WITH POSSIBLE FLAP OR GRAFT. - Left;  Surgeon: Roberto Og MD;  Location:  [facility] OR;  Service: ENT;  Laterality: Left;    REPLACEMENT TOTAL KNEE BILATERAL      TONSILLECTOMY         Social History     Socioeconomic History    Marital status:    Tobacco Use    Smoking status: Former     Packs/day: 1.00     Years: 10.00     Pack years: 10.00     Types: Cigarettes     Quit date: 1978     Years since quittin.8    Smokeless tobacco: Never   Vaping Use    Vaping Use: Never used   Substance and Sexual Activity    Alcohol use: No    Drug use: Never    Sexual activity: Defer       Family History   Problem Relation Age of Onset    Heart failure Father     Rheum arthritis Father     Cancer Brother     Colon cancer Neg Hx     Colon polyps Neg Hx        Objective    Temp 98 °F (36.7 °C)   Ht 188 cm (74\")   Wt (!) 137 kg (301 lb)   BMI 38.65 kg/m²     Physical Exam  Vitals reviewed.   Constitutional:       General: He is not in acute distress.     Appearance: Normal appearance. He is not toxic-appearing.   HENT:      Head: Normocephalic and atraumatic.   Pulmonary:      Effort: Pulmonary effort is normal.   Skin:     Coloration: Skin is not pale.   Neurological:      Mental Status: He is alert and oriented to person, place, and time.   Psychiatric:         Mood and Affect: Mood normal.         Behavior: Behavior normal.           Results for orders placed or performed in visit on 23   POC Urinalysis Dipstick, Multipro    Specimen: Urine   Result Value Ref Range    Color Dark Yellow Yellow, Straw, Dark Yellow, Geraldine    Clarity, UA Clear Clear    Glucose, UA Negative Negative mg/dL    Bilirubin Negative Negative    Ketones, UA Negative Negative    Specific Gravity  1.020 1.005 - 1.030    Blood, UA Trace (A) Negative    pH, Urine 5.5 5.0 - 8.0    Protein, POC 30 mg/dL (A) Negative mg/dL    Urobilinogen, UA Normal Normal, 0.2 E.U./dL    " Nitrite, UA Negative Negative    Leukocytes Negative Negative   IPSS Questionnaire (AUA-7):  Incomplete emptying  Over the past month, how often have you had a sensation of not emptying your bladder completely after you finish?: Not at all (05/24/23 0900)  Frequency  Over the past month, how often have you had to urinate again less than two hours after you finishing urinating ?: Less than 1 time in 5 (05/24/23 0900)  Intermittency  Over the past month, how often have you found you stopped and started again several time when you urinated ?: Less than half the time (05/24/23 0900)  Urgency  Over the last month, how difficult  have you found it to postpone urination ?: Less than 1 time in 5 (05/24/23 0900)  Weak Stream  Over the past month, how often have you had a weak urinary stream ?: Less than half the time (05/24/23 0900)  Straining  Over the past month, how often have you had to push or strain to begin urination ?: Not at all (05/24/23 0900)  Nocturia  Over the past month, how many times did you most typically get up to urinate from the time you went to bed until the time you got up in the morning ?: Less than 1 time in 5 (05/24/23 0900)  Quality of life due to urinary symptoms  If you were to spend the rest of your life with your urinary condition the way it is now, how would feel about that?: Pleased (05/24/23 0900)    Scores  Total IPSS Score: 7 (05/24/23 0900)  Total Score = Symtomatic Level: Mildly symptomatic: 0-7 (05/24/23 0900)     KUB independent review    A KUB is available for me to review today.  The image is inspected for a bowel gas pattern and the general bone structure of the spine and pelvis. The kidneys are then inspected closely.  Renal outline is noted if identifiable. The kidney, collecting system, and anticipated path of the ureter are examined for calcifications including those in the true pelvis.  This film reveals:    On the right there are no calcificaitons seen in the kidney or the  expected course of the ureter. .    On the left there is a single renal stone measuring 11 mm.    Assessment and Plan    Diagnoses and all orders for this visit:    1. Kidney stone on left side (Primary)  -     Cancel: POC Urinalysis Dipstick, Multipro  -     POC Urinalysis Dipstick, Multipro  -     XR Abdomen KUB  -     XR Abdomen KUB; Future    Patient was referred for findings of a CT of the chest that was done for follow-up on a pulmonary nodule.  It shows an 11 mm left renal pelvic stone but no obstruction.  No other obvious stones are seen.  KUB clearly reveals the calcification on the left consistent with the stone seen on CT scan.  There is some calcifications in the left hemipelvis that favor phleboliths.  He has had no symptoms no gross hematuria or any urinary tract symptoms no decrease in urination no flank pain and has had no abdominal pain I discussed treating this with ESWL due to the size of the stone and I also explained that even though he is asymptomatic and is not causing obstruction this could change if it should move more distal.  However patient does not want anything done right now he states it becomes a problem he would only treat at that point.  I do want to follow him closer with a KUB in approximately 4 months to assess for any interval change in location or size.  I also explained should he develop any decreased urine output, hematuria, fever chills, flank pain or abdominal pain he needs to return here or go to the emergency department.

## 2023-05-23 ENCOUNTER — TELEPHONE (OUTPATIENT)
Dept: UROLOGY | Facility: CLINIC | Age: 78
End: 2023-05-23
Payer: MEDICARE

## 2023-05-23 NOTE — TELEPHONE ENCOUNTER
Called Patient to remind them to get KUB prior to appointment.  Left Message with Patient.  If patient calls back it is ok for the HUB to tell the pt the message.

## 2023-05-24 ENCOUNTER — HOSPITAL ENCOUNTER (OUTPATIENT)
Dept: GENERAL RADIOLOGY | Facility: HOSPITAL | Age: 78
Discharge: HOME | End: 2023-05-24
Admitting: PHYSICIAN ASSISTANT
Payer: MEDICARE

## 2023-05-24 ENCOUNTER — OFFICE VISIT (OUTPATIENT)
Dept: UROLOGY | Facility: CLINIC | Age: 78
End: 2023-05-24
Payer: MEDICARE

## 2023-05-24 VITALS — BODY MASS INDEX: 38.63 KG/M2 | HEIGHT: 74 IN | WEIGHT: 301 LBS | TEMPERATURE: 98 F

## 2023-05-24 DIAGNOSIS — N20.0 KIDNEY STONE ON LEFT SIDE: Primary | ICD-10-CM

## 2023-05-24 LAB
BILIRUB BLD-MCNC: NEGATIVE MG/DL
CLARITY, POC: CLEAR
COLOR UR: ABNORMAL
GLUCOSE UR STRIP-MCNC: NEGATIVE MG/DL
KETONES UR QL: NEGATIVE
LEUKOCYTE EST, POC: NEGATIVE
NITRITE UR-MCNC: NEGATIVE MG/ML
PH UR: 5.5 [PH] (ref 5–8)
PROT UR STRIP-MCNC: ABNORMAL MG/DL
RBC # UR STRIP: ABNORMAL /UL
SP GR UR: 1.02 (ref 1–1.03)
UROBILINOGEN UR QL: NORMAL

## 2023-05-24 PROCEDURE — 1160F RVW MEDS BY RX/DR IN RCRD: CPT | Performed by: PHYSICIAN ASSISTANT

## 2023-05-24 PROCEDURE — 74018 RADEX ABDOMEN 1 VIEW: CPT

## 2023-05-24 PROCEDURE — 99203 OFFICE O/P NEW LOW 30 MIN: CPT | Performed by: PHYSICIAN ASSISTANT

## 2023-05-24 PROCEDURE — 81001 URINALYSIS AUTO W/SCOPE: CPT | Performed by: PHYSICIAN ASSISTANT

## 2023-05-24 PROCEDURE — 1159F MED LIST DOCD IN RCRD: CPT | Performed by: PHYSICIAN ASSISTANT

## 2023-06-09 RX ORDER — ATORVASTATIN CALCIUM 80 MG/1
TABLET, FILM COATED ORAL
Qty: 90 TABLET | Refills: 3 | Status: SHIPPED | OUTPATIENT
Start: 2023-06-09

## 2023-06-15 NOTE — PROGRESS NOTES
Subjective    Mr. Morris is 78 y.o. male    Chief Complaint: Kidney Stone on left side    History of Present Illness    78-year-old male established patient in for follow-up regarding recent finding of left renal pelvic stone.  Patient presents at this time stating that shortly after the visit with [facility] patient started to experience intermittent left flank pain.  Denies any hematuria or difficulty with urination.  With a chronic history of back pain.  No prior history of kidney stones.  Stone originally found on CT of chest imaging for a pulmonary nodule.    The following portions of the patient's history were reviewed and updated as appropriate: allergies, current medications, past family history, past medical history, past social history, past surgical history and problem list.    Review of Systems   Constitutional:  Negative for chills and fever.   Gastrointestinal:  Negative for abdominal pain, anal bleeding, blood in stool, nausea and vomiting.   Genitourinary:  Positive for flank pain and urgency. Negative for dysuria and hematuria.       Current Outpatient Medications:     aspirin 81 MG tablet, Take 1 tablet by mouth., Disp: , Rfl:     atorvastatin (LIPITOR) 80 MG tablet, Take 40 mg by mouth Daily., Disp: , Rfl:     azelastine (ASTELIN) 0.1 % nasal spray, 2 sprays into the nostril(s) as directed by provider 2 (Two) Times a Day. Use in each nostril as directed, Disp: 30 mL, Rfl: 11    famotidine (PEPCID) 20 MG tablet, Take 1 tablet by mouth 2 (Two) Times a Day., Disp: , Rfl:     glycopyrrolate (ROBINUL) 1 MG tablet, Take 1 tablet by mouth 3 (Three) Times a Day As Needed (vertigo)., Disp: 60 tablet, Rfl: 11    metoprolol succinate XL (TOPROL-XL) 25 MG 24 hr tablet, Take 1 tablet by mouth Daily., Disp: , Rfl:     montelukast (SINGULAIR) 10 MG tablet, Take 1 tablet by mouth Daily., Disp: , Rfl:     Multiple Vitamins-Minerals (MULTIVITAMIN WITH MINERALS) tablet tablet, Take 1 tablet by mouth Daily., Disp: , Rfl:      Multiple Vitamins-Minerals (OCUVITE ADULT FORMULA PO), Take  by mouth., Disp: , Rfl:     omeprazole (priLOSEC) 20 MG capsule, Take 1 capsule by mouth Daily., Disp: , Rfl:     pregabalin (LYRICA) 100 MG capsule, 2 (Two) Times a Day., Disp: , Rfl:     pyridostigmine (MESTINON) 60 MG tablet, TAKE 1 TABLET THREE TIMES A DAY, Disp: 270 tablet, Rfl: 0    terbinafine (lamISIL) 1 % cream, Apply 1 application topically to the appropriate area as directed 2 (Two) Times a Day., Disp: 42 g, Rfl: 5    Past Medical History:   Diagnosis Date    Abnormal ECG     Arthritis     Cancer     Coronary artery disease     COVID-19 vaccine series completed     pfizer    Dizziness     Fracture of nasal bones     GERD (gastroesophageal reflux disease)     HL (hearing loss)     Hx of colonic polyps     Hyperlipidemia     Kidney stone on left side 6/19/2023    Neuropathy     Peripheral neuropathy     Pneumonia     Rheumatic fever     Sinusitis     Sleep apnea     Tinnitus     Vision loss        Past Surgical History:   Procedure Laterality Date    ADENOIDECTOMY  1953    CARDIAC CATHETERIZATION      CARDIAC SURGERY      HEART STENT    CHOLECYSTECTOMY      COLONOSCOPY N/A 11/30/2017    Procedure: COLONOSCOPY WITH ANESTHESIA;  Surgeon: Matthew Cagle MD;  Location: [facility] ENDOSCOPY;  Service:     COLONOSCOPY W/ POLYPECTOMY  07/12/2012    Adenomatous polyp at 50 cm repeat exam in 5 years    CORONARY STENT PLACEMENT      ENDOSCOPY AND COLONOSCOPY  08/07/2009    Acute ulcerative esophagogastritis grade c, Hyperplastic polyp at 50 cm repeat colonoscopy in 3 years    EYE SURGERY      DONNIE CATARACT REMOVAL    FLAP HEAD/NECK Left 03/10/2021    Procedure: POSSIBLE FLAP OR GRAFT.;  Surgeon: Roberto Og MD;  Location: [facility] OR;  Service: ENT;  Laterality: Left;    HEAD/NECK LESION/CYST EXCISION Left 03/10/2021    Procedure: EXCISION OF ATYPICAL PIGMENTED LESION OF THE LEFT CENTRAL TEMPLE REGION WITH POSSIBLE FLAP OR GRAFT. - Left;  Surgeon:  "Roberto Og MD;  Location: [facility] OR;  Service: ENT;  Laterality: Left;    REPLACEMENT TOTAL KNEE BILATERAL      TONSILLECTOMY         Social History     Socioeconomic History    Marital status:    Tobacco Use    Smoking status: Former     Packs/day: 1.00     Years: 10.00     Pack years: 10.00     Types: Cigarettes     Quit date: 1978     Years since quittin.9    Smokeless tobacco: Never   Vaping Use    Vaping Use: Never used   Substance and Sexual Activity    Alcohol use: No    Drug use: Never    Sexual activity: Defer       Family History   Problem Relation Age of Onset    Heart failure Father     Rheum arthritis Father     Cancer Brother     Colon cancer Neg Hx     Colon polyps Neg Hx        Objective    Temp 97.7 °F (36.5 °C)   Ht 190.5 cm (75\")   Wt 134 kg (296 lb)   BMI 37.00 kg/m²     Physical Exam  Constitutional:       Appearance: Normal appearance.   Abdominal:      Tenderness: There is no right CVA tenderness or left CVA tenderness.   Skin:     General: Skin is warm and dry.   Neurological:      Mental Status: He is alert and oriented to person, place, and time.   Psychiatric:         Mood and Affect: Mood normal.         Behavior: Behavior normal.           Results for orders placed or performed in visit on 23   POC Urinalysis Dipstick, Multipro    Specimen: Urine   Result Value Ref Range    Color Yellow Yellow, Straw, Dark Yellow, Geraldine    Clarity, UA Clear Clear    Glucose, UA Negative Negative mg/dL    Bilirubin Negative Negative    Ketones, UA Negative Negative    Specific Gravity  1.005 1.005 - 1.030    Blood, UA Negative Negative    pH, Urine 5.5 5.0 - 8.0    Protein, POC Negative Negative mg/dL    Urobilinogen, UA 0.2 E.U./dL Normal, 0.2 E.U./dL    Nitrite, UA Negative Negative    Leukocytes Negative Negative   IPSS Questionnaire (AUA-7):  Incomplete emptying  Over the past month, how often have you had a sensation of not emptying your bladder completely after you " finish?: Not at all (06/19/23 0803)  Frequency  Over the past month, how often have you had to urinate again less than two hours after you finishing urinating ?: Less than half the time (06/19/23 0803)  Intermittency  Over the past month, how often have you found you stopped and started again several time when you urinated ?: Not at all (06/19/23 0803)  Urgency  Over the last month, how difficult  have you found it to postpone urination ?: Less than half the time (06/19/23 0803)  Weak Stream  Over the past month, how often have you had a weak urinary stream ?: Not at all (06/19/23 0803)  Straining  Over the past month, how often have you had to push or strain to begin urination ?: Not at all (06/19/23 0803)  Nocturia  Over the past month, how many times did you most typically get up to urinate from the time you went to bed until the time you got up in the morning ?: Less than 1 time in 5 (06/19/23 0803)  Quality of life due to urinary symptoms  If you were to spend the rest of your life with your urinary condition the way it is now, how would feel about that?: Mixed - about equally satisfied (06/19/23 0803)    Scores  Total IPSS Score: (!) 5 (06/19/23 0803)  Total Score = Symtomatic Level: Mildly symptomatic: 0-7 (06/19/23 0803)       KUB independent review    A KUB is available for me to review today.  The image is inspected for a bowel gas pattern and the general bone structure of the spine and pelvis. The kidneys are then inspected closely.  Renal outline is noted if identifiable. The kidney, collecting system, and anticipated path of the ureter are examined for calcifications including those in the true pelvis.  This film reveals:    On the right there are no calcificaitons seen in the kidney or the expected course of the ureter. .    On the left there is a single stone measuring 12 mm.    Assessment and Plan    Diagnoses and all orders for this visit:    1. Kidney stone on left side (Primary)  -     POC  Urinalysis Dipstick, Multipro  -     Case Request; Standing  -     ECG 12 Lead; Future  -     sodium chloride 0.9 % infusion  -     CBC (No Diff); Future  -     Basic Metabolic Panel; Future  -     Urinalysis With Culture If Indicated -; Future  -     ceFAZolin (ANCEF) 3 g in sodium chloride 0.9 % 100 mL IVPB  -     Case Request    Other orders  -     Follow Anesthesia Guidelines / Protocol; Future  -     Follow Anesthesia Guidelines / Protocol; Standing  -     Verify / Perform Chlorhexidine Skin Prep; Standing  -     Verify / Perform Chlorhexidine Skin Prep if Indicated (If Not Already Completed); Standing  -     Obtain Informed Consent; Future  -     Provide NPO Instructions to Patient; Future  -     Chlorhexidine Skin Prep; Future  -     XR Abdomen KUB; Standing      78-year-old male established patient in for follow-up regarding recent finding of left renal pelvic stone.  Patient presents at this time stating that shortly after the visit with [facility] patient started to experience intermittent left flank pain.     KUB today a 12 mm stone remains at the left renal pelvis.    After lengthy discussion with patient patient would like to go ahead and get scheduled for surgical intervention by ESWL.  Patient request Dr. Fowler.    We will get patient scheduled for July 19, 2023 by Dr. Fowler for left ESWL due to a 12 mm left renal pelvic stone.  Patient was educated on the risks and benefits of the procedure as well as possible side effects.  Patient was instructed to hold any blood thinning medications including aspirin, anticoagulants, and any NSAIDs.  Patient states he is currently only taking a baby aspirin due to a history of cardiac stent.  Was told to hold for 5 to 7 days prior to surgery.  Voiced understanding and is in agreement to proceed.

## 2023-06-19 ENCOUNTER — OFFICE VISIT (OUTPATIENT)
Dept: UROLOGY | Facility: CLINIC | Age: 78
End: 2023-06-19
Payer: MEDICARE

## 2023-06-19 VITALS — BODY MASS INDEX: 36.8 KG/M2 | TEMPERATURE: 97.7 F | HEIGHT: 75 IN | WEIGHT: 296 LBS

## 2023-06-19 DIAGNOSIS — N20.0 KIDNEY STONE ON LEFT SIDE: Primary | ICD-10-CM

## 2023-06-19 LAB
BILIRUB BLD-MCNC: NEGATIVE MG/DL
CLARITY, POC: CLEAR
COLOR UR: YELLOW
GLUCOSE UR STRIP-MCNC: NEGATIVE MG/DL
KETONES UR QL: NEGATIVE
LEUKOCYTE EST, POC: NEGATIVE
NITRITE UR-MCNC: NEGATIVE MG/ML
PH UR: 5.5 [PH] (ref 5–8)
PROT UR STRIP-MCNC: NEGATIVE MG/DL
RBC # UR STRIP: NEGATIVE /UL
SP GR UR: 1 (ref 1–1.03)
UROBILINOGEN UR QL: NORMAL

## 2023-06-19 PROCEDURE — 1159F MED LIST DOCD IN RCRD: CPT

## 2023-06-19 PROCEDURE — 1160F RVW MEDS BY RX/DR IN RCRD: CPT

## 2023-06-19 PROCEDURE — 81001 URINALYSIS AUTO W/SCOPE: CPT

## 2023-06-19 PROCEDURE — 99214 OFFICE O/P EST MOD 30 MIN: CPT

## 2023-06-19 RX ORDER — SODIUM CHLORIDE 9 MG/ML
100 INJECTION, SOLUTION INTRAVENOUS CONTINUOUS
OUTPATIENT
Start: 2023-06-19

## 2023-06-28 NOTE — H&P (VIEW-ONLY)
Chief Complaint  Discuss ESWL    Subjective          Axel HERNANDEZ Alexandra presents to [facility]   Patient has left renal calculus  Patient is tolerating the stone well.  He would like to discuss therapeutic options.        Current Outpatient Medications:     aspirin 81 MG tablet, Take 1 tablet by mouth., Disp: , Rfl:     atorvastatin (LIPITOR) 80 MG tablet, Take 40 mg by mouth Daily., Disp: , Rfl:     azelastine (ASTELIN) 0.1 % nasal spray, 2 sprays into the nostril(s) as directed by provider 2 (Two) Times a Day. Use in each nostril as directed, Disp: 30 mL, Rfl: 11    famotidine (PEPCID) 20 MG tablet, Take 1 tablet by mouth 2 (Two) Times a Day., Disp: , Rfl:     glycopyrrolate (ROBINUL) 1 MG tablet, Take 1 tablet by mouth 3 (Three) Times a Day As Needed (vertigo)., Disp: 60 tablet, Rfl: 11    metoprolol succinate XL (TOPROL-XL) 25 MG 24 hr tablet, Take 1 tablet by mouth Daily., Disp: , Rfl:     montelukast (SINGULAIR) 10 MG tablet, Take 1 tablet by mouth Daily., Disp: , Rfl:     Multiple Vitamins-Minerals (MULTIVITAMIN WITH MINERALS) tablet tablet, Take 1 tablet by mouth Daily., Disp: , Rfl:     Multiple Vitamins-Minerals (OCUVITE ADULT FORMULA PO), Take  by mouth., Disp: , Rfl:     omeprazole (priLOSEC) 20 MG capsule, Take 1 capsule by mouth Daily., Disp: , Rfl:     pregabalin (LYRICA) 100 MG capsule, 2 (Two) Times a Day., Disp: , Rfl:     pyridostigmine (MESTINON) 60 MG tablet, TAKE 1 TABLET THREE TIMES A DAY, Disp: 270 tablet, Rfl: 0    terbinafine (lamISIL) 1 % cream, Apply 1 application topically to the appropriate area as directed 2 (Two) Times a Day., Disp: 42 g, Rfl: 5  Past Medical History:   Diagnosis Date    Abnormal ECG     Arthritis     Cancer     Coronary artery disease     COVID-19 vaccine series completed     pfizer    Dizziness     Fracture of nasal bones     GERD (gastroesophageal reflux disease)     HL (hearing loss)     Hx of colonic polyps     Hyperlipidemia     Kidney stone  "on left side 6/19/2023    Neuropathy     Peripheral neuropathy     Pneumonia     Rheumatic fever     Sinusitis     Sleep apnea     Tinnitus     Vision loss      Past Surgical History:   Procedure Laterality Date    ADENOIDECTOMY  1953    CARDIAC CATHETERIZATION      CARDIAC SURGERY      HEART STENT    CHOLECYSTECTOMY      COLONOSCOPY N/A 11/30/2017    Procedure: COLONOSCOPY WITH ANESTHESIA;  Surgeon: Matthew Cagle MD;  Location:  [facility];  Service:     COLONOSCOPY W/ POLYPECTOMY  07/12/2012    Adenomatous polyp at 50 cm repeat exam in 5 years    CORONARY STENT PLACEMENT      ENDOSCOPY AND COLONOSCOPY  08/07/2009    Acute ulcerative esophagogastritis grade c, Hyperplastic polyp at 50 cm repeat colonoscopy in 3 years    EYE SURGERY      DONNIE CATARACT REMOVAL    FLAP HEAD/NECK Left 03/10/2021    Procedure: POSSIBLE FLAP OR GRAFT.;  Surgeon: Roberto Og MD;  Location:  [facility] OR;  Service: ENT;  Laterality: Left;    HEAD/NECK LESION/CYST EXCISION Left 03/10/2021    Procedure: EXCISION OF ATYPICAL PIGMENTED LESION OF THE LEFT CENTRAL TEMPLE REGION WITH POSSIBLE FLAP OR GRAFT. - Left;  Surgeon: Roberto Og MD;  Location:  [facility] OR;  Service: ENT;  Laterality: Left;    REPLACEMENT TOTAL KNEE BILATERAL      TONSILLECTOMY             Review of Systems      Objective   PHYSICAL EXAM  Vital Signs:   Temp 97 øF (36.1 øC)   Ht 190.5 cm (75\")   Wt 136 kg (300 lb)   BMI 37.50 kg/mý     Physical Exam  Constitutional:   Temp:  [97 øF (36.1 øC)] 97 øF (36.1 øC)  ] Well developed, well nourished, no distress  Respiratory:   Effort unlabored; Movements symmetric  GI:   No mass or hernia noted, not distended or tender   No enlargement of spleen or liver noted  Skin:   No pallor or cyanosis; No obvious rash  Psych:   Alert, Oriented x 3         DATA  Result Review :              Results for orders placed or performed in visit on 06/19/23   POC Urinalysis Dipstick, Multipro    Specimen: Urine   Result Value " Ref Range    Color Yellow Yellow, Straw, Dark Yellow, Geraldine    Clarity, UA Clear Clear    Glucose, UA Negative Negative mg/dL    Bilirubin Negative Negative    Ketones, UA Negative Negative    Specific Gravity  1.005 1.005 - 1.030    Blood, UA Negative Negative    pH, Urine 5.5 5.0 - 8.0    Protein, POC Negative Negative mg/dL    Urobilinogen, UA 0.2 E.U./dL Normal, 0.2 E.U./dL    Nitrite, UA Negative Negative    Leukocytes Negative Negative     XR Abdomen KUB (06/19/2023 07:15)  I reviewed these images as well as report.  Patient does have a left renal calculus.  This is an oblong calculus this location appears to be in the left renal pelvis near the ureteropelvic junction.           ASSESSMENT AND PLAN          Problem List Items Addressed This Visit          Genitourinary and Reproductive     Kidney stone on left side - Primary    Overview     Added automatically from request for surgery [id number]        The patient has a Left renal calculus. Additional evaluation will include a postoperative kub to assess stone free status, stone analysis if patient able to strain for stone, and dietary assessment with potential recommendations for pharmacologic therapy as indicated. The radiographs, including size, location, and symptoms are discussed with the patient.  Options for management including ureteroscopic, open, and percutaneous approaches are explained to the patient.  The risks, benefits, and alternatives of this procedure have been discussed with the patient or the responsible party.  Mr. Morris  has chosen to proceed with ESWL due to its less invasive approach combined with its effectiveness.  Understanding of the risks including damage to the kidney (even long term), inability to render stone free, need for ancillary procedures, bleeding, infection, steinstrasse, post op pain and discomfort,  and possible arrhythmia is expressed by the patient.  Given the size and location of the stone, the patient will not need  ureteral stent placement.  The patient also understands the spontaneous passage of the stone is an option, or even following the stone size and location with serial imaging.       FOLLOW UP     No follow-ups on file.        (Please note that portions of this note were completed with a voice recognition program.)  Marcellus Fowler MD  07/10/23  16:16 CDT

## 2023-06-28 NOTE — H&P (VIEW-ONLY)
Chief Complaint  Discuss ESWL    Subjective          Axel HERNANDEZ Alexandra presents to [facility]   Patient has left renal calculus  Patient is tolerating the stone well.  He would like to discuss therapeutic options.        Current Outpatient Medications:     aspirin 81 MG tablet, Take 1 tablet by mouth., Disp: , Rfl:     atorvastatin (LIPITOR) 80 MG tablet, Take 40 mg by mouth Daily., Disp: , Rfl:     azelastine (ASTELIN) 0.1 % nasal spray, 2 sprays into the nostril(s) as directed by provider 2 (Two) Times a Day. Use in each nostril as directed, Disp: 30 mL, Rfl: 11    famotidine (PEPCID) 20 MG tablet, Take 1 tablet by mouth 2 (Two) Times a Day., Disp: , Rfl:     glycopyrrolate (ROBINUL) 1 MG tablet, Take 1 tablet by mouth 3 (Three) Times a Day As Needed (vertigo)., Disp: 60 tablet, Rfl: 11    metoprolol succinate XL (TOPROL-XL) 25 MG 24 hr tablet, Take 1 tablet by mouth Daily., Disp: , Rfl:     montelukast (SINGULAIR) 10 MG tablet, Take 1 tablet by mouth Daily., Disp: , Rfl:     Multiple Vitamins-Minerals (MULTIVITAMIN WITH MINERALS) tablet tablet, Take 1 tablet by mouth Daily., Disp: , Rfl:     Multiple Vitamins-Minerals (OCUVITE ADULT FORMULA PO), Take  by mouth., Disp: , Rfl:     omeprazole (priLOSEC) 20 MG capsule, Take 1 capsule by mouth Daily., Disp: , Rfl:     pregabalin (LYRICA) 100 MG capsule, 2 (Two) Times a Day., Disp: , Rfl:     pyridostigmine (MESTINON) 60 MG tablet, TAKE 1 TABLET THREE TIMES A DAY, Disp: 270 tablet, Rfl: 0    terbinafine (lamISIL) 1 % cream, Apply 1 application topically to the appropriate area as directed 2 (Two) Times a Day., Disp: 42 g, Rfl: 5  Past Medical History:   Diagnosis Date    Abnormal ECG     Arthritis     Cancer     Coronary artery disease     COVID-19 vaccine series completed     pfizer    Dizziness     Fracture of nasal bones     GERD (gastroesophageal reflux disease)     HL (hearing loss)     Hx of colonic polyps     Hyperlipidemia     Kidney stone  "on left side 6/19/2023    Neuropathy     Peripheral neuropathy     Pneumonia     Rheumatic fever     Sinusitis     Sleep apnea     Tinnitus     Vision loss      Past Surgical History:   Procedure Laterality Date    ADENOIDECTOMY  1953    CARDIAC CATHETERIZATION      CARDIAC SURGERY      HEART STENT    CHOLECYSTECTOMY      COLONOSCOPY N/A 11/30/2017    Procedure: COLONOSCOPY WITH ANESTHESIA;  Surgeon: Matthew Cagle MD;  Location:  [facility];  Service:     COLONOSCOPY W/ POLYPECTOMY  07/12/2012    Adenomatous polyp at 50 cm repeat exam in 5 years    CORONARY STENT PLACEMENT      ENDOSCOPY AND COLONOSCOPY  08/07/2009    Acute ulcerative esophagogastritis grade c, Hyperplastic polyp at 50 cm repeat colonoscopy in 3 years    EYE SURGERY      DONNIE CATARACT REMOVAL    FLAP HEAD/NECK Left 03/10/2021    Procedure: POSSIBLE FLAP OR GRAFT.;  Surgeon: Roberto Og MD;  Location:  [facility];  Service: ENT;  Laterality: Left;    HEAD/NECK LESION/CYST EXCISION Left 03/10/2021    Procedure: EXCISION OF ATYPICAL PIGMENTED LESION OF THE LEFT CENTRAL TEMPLE REGION WITH POSSIBLE FLAP OR GRAFT. - Left;  Surgeon: Roberto Og MD;  Location:  [facility];  Service: ENT;  Laterality: Left;    REPLACEMENT TOTAL KNEE BILATERAL      TONSILLECTOMY             Review of Systems      Objective   PHYSICAL EXAM  Vital Signs:   Temp 97 °F (36.1 °C)   Ht 190.5 cm (75\")   Wt 136 kg (300 lb)   BMI 37.50 kg/m²     Physical Exam  Constitutional:   Temp:  [97 °F (36.1 °C)] 97 °F (36.1 °C)  ] Well developed, well nourished, no distress  Respiratory:   Effort unlabored; Movements symmetric  GI:   No mass or hernia noted, not distended or tender   No enlargement of spleen or liver noted  Skin:   No pallor or cyanosis; No obvious rash  Psych:   Alert, Oriented x 3         DATA  Result Review :              Results for orders placed or performed in visit on 06/19/23   POC Urinalysis Dipstick, Multipro    Specimen: Urine   Result Value " Ref Range    Color Yellow Yellow, Straw, Dark Yellow, Geraldine    Clarity, UA Clear Clear    Glucose, UA Negative Negative mg/dL    Bilirubin Negative Negative    Ketones, UA Negative Negative    Specific Gravity  1.005 1.005 - 1.030    Blood, UA Negative Negative    pH, Urine 5.5 5.0 - 8.0    Protein, POC Negative Negative mg/dL    Urobilinogen, UA 0.2 E.U./dL Normal, 0.2 E.U./dL    Nitrite, UA Negative Negative    Leukocytes Negative Negative     XR Abdomen KUB (06/19/2023 07:15)  I reviewed these images as well as report.  Patient does have a left renal calculus.  This is an oblong calculus this location appears to be in the left renal pelvis near the ureteropelvic junction.           ASSESSMENT AND PLAN          Problem List Items Addressed This Visit          Genitourinary and Reproductive     Kidney stone on left side - Primary    Overview     Added automatically from request for surgery [id number]        The patient has a Left renal calculus. Additional evaluation will include a postoperative kub to assess stone free status, stone analysis if patient able to strain for stone, and dietary assessment with potential recommendations for pharmacologic therapy as indicated. The radiographs, including size, location, and symptoms are discussed with the patient.  Options for management including ureteroscopic, open, and percutaneous approaches are explained to the patient.  The risks, benefits, and alternatives of this procedure have been discussed with the patient or the responsible party.  Mr. Morris  has chosen to proceed with ESWL due to its less invasive approach combined with its effectiveness.  Understanding of the risks including damage to the kidney (even long term), inability to render stone free, need for ancillary procedures, bleeding, infection, steinstrasse, post op pain and discomfort,  and possible arrhythmia is expressed by the patient.  Given the size and location of the stone, the patient will not need  ureteral stent placement.  The patient also understands the spontaneous passage of the stone is an option, or even following the stone size and location with serial imaging.       FOLLOW UP     No follow-ups on file.        (Please note that portions of this note were completed with a voice recognition program.)  Marcellus Fowler MD  07/10/23  16:16 CDT

## 2023-07-19 ENCOUNTER — APPOINTMENT (OUTPATIENT)
Dept: GENERAL RADIOLOGY | Facility: HOSPITAL | Age: 78
End: 2023-07-19
Payer: MEDICARE

## 2023-07-19 ENCOUNTER — HOSPITAL ENCOUNTER (OUTPATIENT)
Facility: HOSPITAL | Age: 78
Setting detail: HOSPITAL OUTPATIENT SURGERY
Discharge: HOME | End: 2023-07-19
Attending: UROLOGY
Payer: MEDICARE

## 2023-07-19 VITALS
RESPIRATION RATE: 16 BRPM | TEMPERATURE: 98.2 F | HEART RATE: 55 BPM | DIASTOLIC BLOOD PRESSURE: 73 MMHG | OXYGEN SATURATION: 97 % | SYSTOLIC BLOOD PRESSURE: 140 MMHG

## 2023-07-19 DIAGNOSIS — N20.0 KIDNEY STONE ON LEFT SIDE: ICD-10-CM

## 2023-07-19 PROCEDURE — 74018 RADEX ABDOMEN 1 VIEW: CPT

## 2023-07-19 RX ORDER — SODIUM CHLORIDE, SODIUM LACTATE, POTASSIUM CHLORIDE, CALCIUM CHLORIDE 600; 310; 30; 20 MG/100ML; MG/100ML; MG/100ML; MG/100ML
1000 INJECTION, SOLUTION INTRAVENOUS CONTINUOUS
Status: DISCONTINUED | OUTPATIENT
Start: 2023-07-19 | End: 2023-07-19 | Stop reason: HOSPADM

## 2023-07-19 RX ORDER — SODIUM CHLORIDE 0.9 % (FLUSH) 0.9 %
3 SYRINGE (ML) INJECTION AS NEEDED
Status: DISCONTINUED | OUTPATIENT
Start: 2023-07-19 | End: 2023-07-19 | Stop reason: HOSPADM

## 2023-07-19 RX ORDER — LIDOCAINE HYDROCHLORIDE 10 MG/ML
0.5 INJECTION, SOLUTION EPIDURAL; INFILTRATION; INTRACAUDAL; PERINEURAL ONCE AS NEEDED
Status: DISCONTINUED | OUTPATIENT
Start: 2023-07-19 | End: 2023-07-19 | Stop reason: HOSPADM

## 2023-07-19 RX ORDER — SODIUM CHLORIDE 9 MG/ML
100 INJECTION, SOLUTION INTRAVENOUS CONTINUOUS
Status: DISCONTINUED | OUTPATIENT
Start: 2023-07-19 | End: 2023-07-19 | Stop reason: HOSPADM

## 2023-07-19 RX ORDER — CEFAZOLIN SODIUM IN 0.9 % NACL 3 G/100 ML
3 INTRAVENOUS SOLUTION, PIGGYBACK (ML) INTRAVENOUS ONCE
Status: DISCONTINUED | OUTPATIENT
Start: 2023-07-19 | End: 2023-07-19 | Stop reason: HOSPADM

## 2023-07-24 ENCOUNTER — OFFICE VISIT (OUTPATIENT)
Dept: NEUROLOGY | Facility: CLINIC | Age: 78
End: 2023-07-24
Payer: MEDICARE

## 2023-07-24 VITALS
WEIGHT: 294 LBS | HEART RATE: 52 BPM | DIASTOLIC BLOOD PRESSURE: 74 MMHG | SYSTOLIC BLOOD PRESSURE: 116 MMHG | HEIGHT: 75 IN | OXYGEN SATURATION: 99 % | BODY MASS INDEX: 36.56 KG/M2

## 2023-07-24 DIAGNOSIS — G70.00 MYASTHENIA GRAVIS WITHOUT EXACERBATION: Primary | ICD-10-CM

## 2023-07-24 PROBLEM — L57.0 ACTINIC KERATOSIS: Status: ACTIVE | Noted: 2019-01-29

## 2023-07-24 PROBLEM — D18.01 HEMANGIOMA OF SKIN AND SUBCUTANEOUS TISSUE: Status: ACTIVE | Noted: 2019-01-29

## 2023-07-24 PROBLEM — R47.81 SLURRED SPEECH: Status: ACTIVE | Noted: 2022-06-16

## 2023-07-24 PROBLEM — H91.90 HEARING LOSS: Status: ACTIVE | Noted: 2019-01-31

## 2023-07-24 PROBLEM — D48.5 NEOPLASM OF UNCERTAIN BEHAVIOR OF SKIN: Status: ACTIVE | Noted: 2021-01-19

## 2023-07-24 PROBLEM — H35.3190 NONEXUDATIVE AGE-RELATED MACULAR DEGENERATION: Status: ACTIVE | Noted: 2020-01-29

## 2023-07-24 PROBLEM — D22.5 MELANOCYTIC NEVI OF TRUNK: Status: ACTIVE | Noted: 2021-01-19

## 2023-07-24 PROBLEM — M19.90 ARTHRITIS: Status: ACTIVE | Noted: 2019-01-31

## 2023-07-24 PROBLEM — Z96.1 PSEUDOPHAKIA: Status: ACTIVE | Noted: 2018-05-01

## 2023-07-24 PROBLEM — G60.9 IDIOPATHIC PERIPHERAL NEUROPATHY: Chronic | Status: ACTIVE | Noted: 2017-07-27

## 2023-07-24 PROBLEM — H43.819 VITREOUS DEGENERATION: Status: ACTIVE | Noted: 2020-01-29

## 2023-07-24 PROBLEM — Z96.652 HISTORY OF TOTAL LEFT KNEE REPLACEMENT: Status: ACTIVE | Noted: 2022-10-26

## 2023-07-24 PROBLEM — Z96.1 PRESENCE OF INTRAOCULAR LENS: Status: ACTIVE | Noted: 2020-01-29

## 2023-07-24 PROBLEM — L85.3 XEROSIS CUTIS: Status: ACTIVE | Noted: 2021-01-19

## 2023-07-24 PROCEDURE — 3078F DIAST BP <80 MM HG: CPT | Performed by: PSYCHIATRY & NEUROLOGY

## 2023-07-24 PROCEDURE — 1159F MED LIST DOCD IN RCRD: CPT | Performed by: PSYCHIATRY & NEUROLOGY

## 2023-07-24 PROCEDURE — 3074F SYST BP LT 130 MM HG: CPT | Performed by: PSYCHIATRY & NEUROLOGY

## 2023-07-24 PROCEDURE — 1160F RVW MEDS BY RX/DR IN RCRD: CPT | Performed by: PSYCHIATRY & NEUROLOGY

## 2023-07-24 PROCEDURE — 99214 OFFICE O/P EST MOD 30 MIN: CPT | Performed by: PSYCHIATRY & NEUROLOGY

## 2023-07-24 RX ORDER — FLUTICASONE PROPIONATE 50 MCG
2 SPRAY, SUSPENSION (ML) NASAL DAILY
COMMUNITY

## 2023-07-24 NOTE — PROGRESS NOTES
Chief Complaint    Subjective        Axel Morris presents to [facility]    History of Present Illness  78-year-old male here for follow-up.  He has done very well since last office visit.  Currently taking Mestinon twice a day.    Past Medical History:   Diagnosis Date    Abnormal ECG     Arthritis     Coronary artery disease     COVID-19 vaccine series completed     pfizer    Dizziness     Fracture of nasal bones     GERD (gastroesophageal reflux disease)     History of transfusion     HL (hearing loss)     Hx of colonic polyps     Hyperlipidemia     Kidney stone on left side 06/19/2023    Neuropathy     Peripheral neuropathy     Pneumonia     Rheumatic fever     Sinusitis     Skin cancer     Sleep apnea     Tinnitus     Vision loss           Current Outpatient Medications:     aspirin 81 MG tablet, Take 1 tablet by mouth Daily., Disp: , Rfl:     atorvastatin (LIPITOR) 80 MG tablet, Take 40 mg by mouth Daily., Disp: , Rfl:     fluticasone (FLONASE) 50 MCG/ACT nasal spray, 2 sprays into the nostril(s) as directed by provider Daily., Disp: , Rfl:     glycopyrrolate (ROBINUL) 1 MG tablet, Take 1 tablet by mouth 3 (Three) Times a Day As Needed (vertigo)., Disp: 60 tablet, Rfl: 11    LUTEIN PO, Take 25 mg by mouth Daily., Disp: , Rfl:     metoprolol succinate XL (TOPROL-XL) 25 MG 24 hr tablet, Take 1 tablet by mouth Daily., Disp: , Rfl:     montelukast (SINGULAIR) 10 MG tablet, Take 1 tablet by mouth Daily., Disp: , Rfl:     Multiple Vitamins-Minerals (MULTIVITAMIN WITH MINERALS) tablet tablet, Take 1 tablet by mouth Daily., Disp: , Rfl:     Multiple Vitamins-Minerals (OCUVITE ADULT FORMULA PO), Take  by mouth., Disp: , Rfl:     omeprazole (priLOSEC) 20 MG capsule, Take 1 capsule by mouth Daily., Disp: , Rfl:     pregabalin (LYRICA) 100 MG capsule, 2 (Two) Times a Day., Disp: , Rfl:     pyridostigmine (MESTINON) 60 MG tablet, TAKE 1 TABLET THREE TIMES A DAY (Patient taking differently: Take  "1 tablet by mouth 2 (Two) Times a Day.), Disp: 270 tablet, Rfl: 0       Objective   Vital Signs:   Ht 190.5 cm (75\")   Wt 133 kg (294 lb)   BMI 36.75 kg/m²     Physical Exam  Constitutional:       General: He is awake.   Eyes:      Extraocular Movements: Extraocular movements intact.   Neurological:      Mental Status: He is alert.   Psychiatric:         Speech: Speech normal.      Neurological Exam  Mental Status  Awake and alert. Speech is normal. Language is fluent with no aphasia.    Cranial Nerves  CN III, IV, VI: Extraocular movements intact bilaterally.  CN VII: Full and symmetric facial movement.    Gait  Casual gait is normal including stance, stride, and arm swing.    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 He has been started on mestinon.  He is doing much better. CT Chest negative for thymoma.     Plan:    1.  Continue Mestinon up to 3 times a day.  2.  Follow up 6 months.  Call sooner with any issues.        Follow Up   No follow-ups on file.  Patient was given instructions and counseling regarding his condition or for health maintenance advice. Please see specific information pulled into the AVS if appropriate.       "

## 2023-08-01 ENCOUNTER — TELEPHONE (OUTPATIENT)
Dept: UROLOGY | Facility: CLINIC | Age: 78
End: 2023-08-01
Payer: MEDICARE

## 2023-08-02 ENCOUNTER — ANESTHESIA EVENT (OUTPATIENT)
Dept: PERIOP | Facility: HOSPITAL | Age: 78
End: 2023-08-02
Payer: MEDICARE

## 2023-08-02 ENCOUNTER — APPOINTMENT (OUTPATIENT)
Dept: GENERAL RADIOLOGY | Facility: HOSPITAL | Age: 78
End: 2023-08-02
Payer: MEDICARE

## 2023-08-02 ENCOUNTER — HOSPITAL ENCOUNTER (OUTPATIENT)
Facility: HOSPITAL | Age: 78
Setting detail: HOSPITAL OUTPATIENT SURGERY
Discharge: HOME | End: 2023-08-02
Attending: UROLOGY | Admitting: UROLOGY
Payer: MEDICARE

## 2023-08-02 ENCOUNTER — ANESTHESIA (OUTPATIENT)
Dept: PERIOP | Facility: HOSPITAL | Age: 78
End: 2023-08-02
Payer: MEDICARE

## 2023-08-02 VITALS
SYSTOLIC BLOOD PRESSURE: 141 MMHG | RESPIRATION RATE: 18 BRPM | DIASTOLIC BLOOD PRESSURE: 81 MMHG | HEART RATE: 58 BPM | OXYGEN SATURATION: 98 % | TEMPERATURE: 97.3 F

## 2023-08-02 DIAGNOSIS — N20.0 KIDNEY STONE ON LEFT SIDE: Primary | ICD-10-CM

## 2023-08-02 PROCEDURE — 25010000002 ONDANSETRON PER 1 MG: Performed by: NURSE ANESTHETIST, CERTIFIED REGISTERED

## 2023-08-02 PROCEDURE — 25010000002 FENTANYL CITRATE (PF) 50 MCG/ML SOLUTION: Performed by: NURSE ANESTHETIST, CERTIFIED REGISTERED

## 2023-08-02 PROCEDURE — 50590 FRAGMENTING OF KIDNEY STONE: CPT | Performed by: UROLOGY

## 2023-08-02 PROCEDURE — 74018 RADEX ABDOMEN 1 VIEW: CPT

## 2023-08-02 PROCEDURE — 25010000002 LEVOFLOXACIN PER 250 MG: Performed by: UROLOGY

## 2023-08-02 PROCEDURE — 25010000002 PROPOFOL 10 MG/ML EMULSION: Performed by: NURSE ANESTHETIST, CERTIFIED REGISTERED

## 2023-08-02 RX ORDER — FENTANYL CITRATE 50 UG/ML
INJECTION, SOLUTION INTRAMUSCULAR; INTRAVENOUS AS NEEDED
Status: DISCONTINUED | OUTPATIENT
Start: 2023-08-02 | End: 2023-08-02 | Stop reason: SURG

## 2023-08-02 RX ORDER — LABETALOL HYDROCHLORIDE 5 MG/ML
5 INJECTION, SOLUTION INTRAVENOUS
Status: DISCONTINUED | OUTPATIENT
Start: 2023-08-02 | End: 2023-08-02 | Stop reason: HOSPADM

## 2023-08-02 RX ORDER — LEVOFLOXACIN 5 MG/ML
500 INJECTION, SOLUTION INTRAVENOUS ONCE
Status: COMPLETED | OUTPATIENT
Start: 2023-08-02 | End: 2023-08-02

## 2023-08-02 RX ORDER — ROCURONIUM BROMIDE 10 MG/ML
INJECTION, SOLUTION INTRAVENOUS AS NEEDED
Status: DISCONTINUED | OUTPATIENT
Start: 2023-08-02 | End: 2023-08-02 | Stop reason: SURG

## 2023-08-02 RX ORDER — SODIUM CHLORIDE 0.9 % (FLUSH) 0.9 %
3 SYRINGE (ML) INJECTION AS NEEDED
Status: DISCONTINUED | OUTPATIENT
Start: 2023-08-02 | End: 2023-08-02 | Stop reason: HOSPADM

## 2023-08-02 RX ORDER — TAMSULOSIN HYDROCHLORIDE 0.4 MG/1
1 CAPSULE ORAL DAILY
Qty: 30 CAPSULE | Refills: 0 | Status: SHIPPED | OUTPATIENT
Start: 2023-08-02 | End: 2023-08-03 | Stop reason: SDUPTHER

## 2023-08-02 RX ORDER — ONDANSETRON 2 MG/ML
4 INJECTION INTRAMUSCULAR; INTRAVENOUS ONCE AS NEEDED
Status: DISCONTINUED | OUTPATIENT
Start: 2023-08-02 | End: 2023-08-02 | Stop reason: HOSPADM

## 2023-08-02 RX ORDER — ONDANSETRON 2 MG/ML
INJECTION INTRAMUSCULAR; INTRAVENOUS AS NEEDED
Status: DISCONTINUED | OUTPATIENT
Start: 2023-08-02 | End: 2023-08-02 | Stop reason: SURG

## 2023-08-02 RX ORDER — FLUMAZENIL 0.1 MG/ML
0.2 INJECTION INTRAVENOUS AS NEEDED
Status: DISCONTINUED | OUTPATIENT
Start: 2023-08-02 | End: 2023-08-02 | Stop reason: HOSPADM

## 2023-08-02 RX ORDER — ONDANSETRON 4 MG/1
4 TABLET, FILM COATED ORAL ONCE AS NEEDED
Status: DISCONTINUED | OUTPATIENT
Start: 2023-08-02 | End: 2023-08-02 | Stop reason: HOSPADM

## 2023-08-02 RX ORDER — DROPERIDOL 2.5 MG/ML
0.62 INJECTION, SOLUTION INTRAMUSCULAR; INTRAVENOUS ONCE AS NEEDED
Status: DISCONTINUED | OUTPATIENT
Start: 2023-08-02 | End: 2023-08-02 | Stop reason: HOSPADM

## 2023-08-02 RX ORDER — SODIUM CHLORIDE 9 MG/ML
40 INJECTION, SOLUTION INTRAVENOUS AS NEEDED
Status: DISCONTINUED | OUTPATIENT
Start: 2023-08-02 | End: 2023-08-02 | Stop reason: HOSPADM

## 2023-08-02 RX ORDER — HYDROCODONE BITARTRATE AND ACETAMINOPHEN 5; 325 MG/1; MG/1
1 TABLET ORAL ONCE AS NEEDED
Status: DISCONTINUED | OUTPATIENT
Start: 2023-08-02 | End: 2023-08-02 | Stop reason: HOSPADM

## 2023-08-02 RX ORDER — SODIUM CHLORIDE 0.9 % (FLUSH) 0.9 %
3 SYRINGE (ML) INJECTION EVERY 12 HOURS SCHEDULED
Status: DISCONTINUED | OUTPATIENT
Start: 2023-08-02 | End: 2023-08-02 | Stop reason: HOSPADM

## 2023-08-02 RX ORDER — LIDOCAINE HYDROCHLORIDE 10 MG/ML
0.5 INJECTION, SOLUTION EPIDURAL; INFILTRATION; INTRACAUDAL; PERINEURAL ONCE AS NEEDED
Status: DISCONTINUED | OUTPATIENT
Start: 2023-08-02 | End: 2023-08-02 | Stop reason: HOSPADM

## 2023-08-02 RX ORDER — FENTANYL CITRATE 50 UG/ML
25 INJECTION, SOLUTION INTRAMUSCULAR; INTRAVENOUS
Status: DISCONTINUED | OUTPATIENT
Start: 2023-08-02 | End: 2023-08-02 | Stop reason: HOSPADM

## 2023-08-02 RX ORDER — LIDOCAINE HYDROCHLORIDE 20 MG/ML
INJECTION, SOLUTION EPIDURAL; INFILTRATION; INTRACAUDAL; PERINEURAL AS NEEDED
Status: DISCONTINUED | OUTPATIENT
Start: 2023-08-02 | End: 2023-08-02 | Stop reason: SURG

## 2023-08-02 RX ORDER — HYDROCODONE BITARTRATE AND ACETAMINOPHEN 10; 325 MG/1; MG/1
1 TABLET ORAL EVERY 4 HOURS PRN
Status: DISCONTINUED | OUTPATIENT
Start: 2023-08-02 | End: 2023-08-02 | Stop reason: HOSPADM

## 2023-08-02 RX ORDER — SODIUM CHLORIDE, SODIUM LACTATE, POTASSIUM CHLORIDE, CALCIUM CHLORIDE 600; 310; 30; 20 MG/100ML; MG/100ML; MG/100ML; MG/100ML
100 INJECTION, SOLUTION INTRAVENOUS CONTINUOUS
Status: DISCONTINUED | OUTPATIENT
Start: 2023-08-02 | End: 2023-08-02 | Stop reason: HOSPADM

## 2023-08-02 RX ORDER — SODIUM CHLORIDE, SODIUM LACTATE, POTASSIUM CHLORIDE, CALCIUM CHLORIDE 600; 310; 30; 20 MG/100ML; MG/100ML; MG/100ML; MG/100ML
1000 INJECTION, SOLUTION INTRAVENOUS CONTINUOUS
Status: DISCONTINUED | OUTPATIENT
Start: 2023-08-02 | End: 2023-08-02 | Stop reason: HOSPADM

## 2023-08-02 RX ORDER — PROPOFOL 10 MG/ML
VIAL (ML) INTRAVENOUS AS NEEDED
Status: DISCONTINUED | OUTPATIENT
Start: 2023-08-02 | End: 2023-08-02 | Stop reason: SURG

## 2023-08-02 RX ORDER — HYDROCODONE BITARTRATE AND ACETAMINOPHEN 5; 325 MG/1; MG/1
1 TABLET ORAL EVERY 6 HOURS PRN
Qty: 12 TABLET | Refills: 0 | Status: SHIPPED | OUTPATIENT
Start: 2023-08-02 | End: 2023-08-05

## 2023-08-02 RX ORDER — NALOXONE HCL 0.4 MG/ML
0.4 VIAL (ML) INJECTION AS NEEDED
Status: DISCONTINUED | OUTPATIENT
Start: 2023-08-02 | End: 2023-08-02 | Stop reason: HOSPADM

## 2023-08-02 RX ORDER — SODIUM CHLORIDE 0.9 % (FLUSH) 0.9 %
3-10 SYRINGE (ML) INJECTION AS NEEDED
Status: DISCONTINUED | OUTPATIENT
Start: 2023-08-02 | End: 2023-08-02 | Stop reason: HOSPADM

## 2023-08-02 RX ADMIN — ONDANSETRON 4 MG: 2 INJECTION INTRAMUSCULAR; INTRAVENOUS at 10:58

## 2023-08-02 RX ADMIN — LEVOFLOXACIN 500 MG: 5 INJECTION, SOLUTION INTRAVENOUS at 09:30

## 2023-08-02 RX ADMIN — LIDOCAINE HYDROCHLORIDE 100 MG: 20 INJECTION, SOLUTION EPIDURAL; INFILTRATION; INTRACAUDAL; PERINEURAL at 10:32

## 2023-08-02 RX ADMIN — SODIUM CHLORIDE, POTASSIUM CHLORIDE, SODIUM LACTATE AND CALCIUM CHLORIDE 1000 ML: 600; 310; 30; 20 INJECTION, SOLUTION INTRAVENOUS at 07:48

## 2023-08-02 RX ADMIN — PROPOFOL INJECTABLE EMULSION 150 MG: 10 INJECTION, EMULSION INTRAVENOUS at 10:33

## 2023-08-02 RX ADMIN — ROCURONIUM BROMIDE 25 MG: 10 INJECTION, SOLUTION INTRAVENOUS at 10:33

## 2023-08-02 RX ADMIN — FENTANYL CITRATE 100 MCG: 50 INJECTION, SOLUTION INTRAMUSCULAR; INTRAVENOUS at 10:32

## 2023-08-02 NOTE — OP NOTE
OP NOTE  Axel Morris    Date of Procedure: 8/2/2023    Pre-op Diagnosis:   Kidney stone on left side [N20.0]    Post-op Diagnosis:     Post-Op Diagnosis Codes:     * Kidney stone on left side [N20.0]    Procedure/CPTr Codes:      Procedure(s):  LEFT RENAL EXTRACORPOREAL SHOCKWAVE LITHOTRIPSY    Surgeon(s):  Marcellus Fowler MD    Anesthesia: General    Staff:   Circulator: Judy Baum RN  Scrub Person: Jannet Lee; Misty Solo    Indications:   Left renal calculus too large to pass.   After discussion of options, patient has given informed consent to undergo left ESWL.  All risks, benefits, and alternatives were discussed.    Operative Report: The patient is identified. The KUB is reviewed. After answering any questions, patient was brought to the operating room and placed on the patient table of the [facility].  General endotracheal anesthesia was administered.  Preoperative KUB was reviewed.   An [facility] c-arm fluoroscope was used to identify the stone.    The shock-head is brought into position.  This stone is brought into the F2 plane for focus.  Treatment was initiated.  We gradually increased the energy level from 1 to 7.  This stone was treated at a rate of 90.  We paused for 2 minutes after 300 shocks to reduce the risk of renal damage.  The stone was periodically checked to make sure that we were on it and getting good breakup.  We checked at 700, 1000, 1500, 2000, and 2500 shocks.  The stone did appear to have good breakup.  I felt it was unnecessary to place a ureteral stent.  At the conclusion of 3000 shocks, the patient was awakened from anesthesia and transferred to the recovery room in satisfactory condition.      Estimated Blood Loss: <30 mL    Specimens:                None      Drains: * No LDAs found *    Complications: none  Plan: Repeat KUB in a few weeks. Home today if no evidence of infection or significant bleeding.     Marcellus Fowler MD     Date: 8/2/2023  Time: 11:40 CDT

## 2023-08-03 DIAGNOSIS — N20.0 KIDNEY STONE ON LEFT SIDE: ICD-10-CM

## 2023-08-03 RX ORDER — TAMSULOSIN HYDROCHLORIDE 0.4 MG/1
1 CAPSULE ORAL DAILY
Qty: 30 CAPSULE | Refills: 0 | Status: SHIPPED | OUTPATIENT
Start: 2023-08-03

## 2023-08-07 DIAGNOSIS — G70.00 MYASTHENIA GRAVIS WITHOUT EXACERBATION: ICD-10-CM

## 2023-08-07 RX ORDER — PYRIDOSTIGMINE BROMIDE 60 MG/1
TABLET ORAL
Qty: 270 TABLET | Refills: 3 | Status: SHIPPED | OUTPATIENT
Start: 2023-08-07

## 2023-08-07 NOTE — PROGRESS NOTES
[facility] - PODIATRY    Today's Date: 08/17/2023     Patient Name: Axel Morris  MRN: [id number]  CSN: [id number]  PCP: Isabel Best MD  Referring Provider: No ref. provider found    SUBJECTIVE     Chief Complaint   Patient presents with    Follow-up     Isabel Best MD 01/11/2023 3 MTH FU-pt states he is here today for nail/foot care-no new issues with his feet/neuropathy-pt denies new pain      HPI: Axel Morris, a 78 y.o.male, comes to clinic as a(n) established patient complaining of painful toenails and complaining of neuropathy of both feet . Patient has h/o arthritis, CA, CAD, dizziness, GERD, HLD, Neuropathy, pneumonia, sleep apnea, tinnitus .  States that his toenails are long, thickened, irregular, and dystrophic and that he is unable to care for them himself at home.  Reports neuropathy from the knee down bilaterally; states he is unsure of the source of his neuropathy though he does report issues with his disks in his back and states he also has previous knee surgery that he believes is contributing to neuropathy as well.  Reports calluses to the great toes has returned.  Denies pain in feet secondary to neuropathy . Relates previous treatment(s) including Care by Dr. Michaud . Denies any constitutional symptoms. No other pedal complaints at this time.    Past Medical History:   Diagnosis Date    Abnormal ECG     Arthritis     Callus     Coronary artery disease     COVID-19 vaccine series completed     pfizer    Dizziness     Fallen arches     Fracture of nasal bones     GERD (gastroesophageal reflux disease)     History of transfusion     HL (hearing loss)     Hx of colonic polyps     Hyperlipidemia     Kidney stone on left side 06/19/2023    Neuropathy     Peripheral neuropathy     Pneumonia     Rheumatic fever     Sinusitis     Skin cancer     Sleep apnea     Tinnitus     Verruca     Vision loss      Past Surgical History:   Procedure Laterality Date    ADENOIDECTOMY  1953     CARDIAC CATHETERIZATION      CARDIAC SURGERY      HEART STENT    CHOLECYSTECTOMY      COLONOSCOPY N/A 2017    Procedure: COLONOSCOPY WITH ANESTHESIA;  Surgeon: Matthew Cagle MD;  Location: [facility] ENDOSCOPY;  Service:     COLONOSCOPY W/ POLYPECTOMY  2012    Adenomatous polyp at 50 cm repeat exam in 5 years    CORONARY STENT PLACEMENT      ENDOSCOPY AND COLONOSCOPY  2009    Acute ulcerative esophagogastritis grade c, Hyperplastic polyp at 50 cm repeat colonoscopy in 3 years    EXTRACORPOREAL SHOCK WAVE LITHOTRIPSY (ESWL) Left 2023    Procedure: EXTRACORPOREAL SHOCKWAVE LITHOTRIPSY;  Surgeon: Marcellus Fowler MD;  Location: [facility] OR;  Service: Urology;  Laterality: Left;    EYE SURGERY      DONNIE CATARACT REMOVAL    FLAP HEAD/NECK Left 03/10/2021    Procedure: POSSIBLE FLAP OR GRAFT.;  Surgeon: Roberto Og MD;  Location: [facility] OR;  Service: ENT;  Laterality: Left;    HEAD/NECK LESION/CYST EXCISION Left 03/10/2021    Procedure: EXCISION OF ATYPICAL PIGMENTED LESION OF THE LEFT CENTRAL TEMPLE REGION WITH POSSIBLE FLAP OR GRAFT. - Left;  Surgeon: Roberto Og MD;  Location: [facility] OR;  Service: ENT;  Laterality: Left;    REPLACEMENT TOTAL KNEE BILATERAL      TONSILLECTOMY       Family History   Problem Relation Age of Onset    Heart failure Father     Rheum arthritis Father     Cancer Brother     Colon cancer Neg Hx     Colon polyps Neg Hx      Social History     Socioeconomic History    Marital status:    Tobacco Use    Smoking status: Former     Packs/day: 1.00     Years: 10.00     Pack years: 10.00     Types: Cigarettes     Quit date: 1978     Years since quittin.0     Passive exposure: Never    Smokeless tobacco: Never   Vaping Use    Vaping Use: Never used   Substance and Sexual Activity    Alcohol use: Never    Drug use: Never    Sexual activity: Defer     Allergies   Allergen Reactions    Ampicillin Rash     Current Outpatient Medications   Medication Sig  Dispense Refill    aspirin 81 MG tablet Take 1 tablet by mouth Daily.      atorvastatin (LIPITOR) 80 MG tablet Take 0.5 tablets by mouth Daily.      cefdinir (OMNICEF) 300 MG capsule Take 1 capsule by mouth.      fluticasone (FLONASE) 50 MCG/ACT nasal spray 2 sprays into the nostril(s) as directed by provider Daily.      glycopyrrolate (ROBINUL) 1 MG tablet Take 1 tablet by mouth 3 (Three) Times a Day As Needed (vertigo). 60 tablet 11    LUTEIN PO Take 25 mg by mouth Daily.      metoprolol succinate XL (TOPROL-XL) 25 MG 24 hr tablet Take 1 tablet by mouth Daily.      montelukast (SINGULAIR) 10 MG tablet Take 1 tablet by mouth Daily.      Multiple Vitamins-Minerals (MULTIVITAMIN WITH MINERALS) tablet tablet Take 1 tablet by mouth Daily.      Multiple Vitamins-Minerals (OCUVITE ADULT FORMULA PO) Take  by mouth.      omeprazole (priLOSEC) 20 MG capsule Take 1 capsule by mouth Daily.      pregabalin (LYRICA) 100 MG capsule 2 (Two) Times a Day.      pyridostigmine (MESTINON) 60 MG tablet TAKE 1 TABLET THREE TIMES A  tablet 3    tamsulosin (FLOMAX) 0.4 MG capsule 24 hr capsule Take 1 capsule by mouth Daily. To help pass stone FRAGMENTS 30 capsule 0     No current facility-administered medications for this visit.     Review of Systems   Constitutional:  Negative for chills and fever.   HENT:  Negative for congestion.    Respiratory:  Negative for shortness of breath.    Cardiovascular:  Negative for chest pain and leg swelling.   Gastrointestinal:  Negative for constipation, diarrhea, nausea and vomiting.   Musculoskeletal:  Positive for arthralgias. Negative for myalgias.   Skin:  Negative for wound.   Neurological:  Positive for numbness.     OBJECTIVE     Vitals:    08/17/23 0926   BP: 118/70   Pulse: 52   SpO2: 99%       PHYSICAL EXAM  GEN:   Accompanied by none.     Foot/Ankle Exam    GENERAL  Appearance:  appears stated age and obese  Orientation:  AAOx3  Affect:  appropriate  Gait:  unimpaired  Assistance:   independent  Right shoe gear: casual shoe  Left shoe gear: casual shoe    VASCULAR     Right Foot Vascularity   Dorsalis pedis:  2+  Posterior tibial:  2+  Skin temperature:  warm  Edema grading:  Trace and non-pitting  CFT:  3  Pedal hair growth:  Present  Varicosities:  mild varicosities     Left Foot Vascularity   Dorsalis pedis:  2+  Posterior tibial:  2+  Skin temperature:  warm  Edema grading:  Trace and non-pitting  CFT:  3  Pedal hair growth:  Present  Varicosities:  mild varicosities     NEUROLOGIC     Right Foot Neurologic   Light touch sensation: diminished  Vibratory sensation: diminished  Hot/Cold sensation: diminished  Protective Sensation using [place] Monofilament:   Sites intact: 5  Sites tested: 10     Left Foot Neurologic   Light touch sensation: diminished  Vibratory sensation: diminished  Protective Sensation using [place] Monofilament:   Sites intact: 1  Sites tested: 10    MUSCULOSKELETAL     Right Foot Musculoskeletal   Tenderness:  toenail problem    Arch:  Pes planus  Hallux valgus: Yes       Left Foot Musculoskeletal   Tenderness:  toenail problem  Arch:  Pes planus  Hallux valgus: Yes      MUSCLE STRENGTH     Right Foot Muscle Strength   Foot dorsiflexion:  5  Foot plantar flexion:  5  Foot inversion:  5  Foot eversion:  5     Left Foot Muscle Strength   Foot dorsiflexion:  5  Foot plantar flexion:  4+  Foot inversion:  5  Foot eversion:  5    RANGE OF MOTION     Right Foot Range of Motion   Foot and ankle ROM within normal limits       Left Foot Range of Motion   Foot and ankle ROM within normal limits      DERMATOLOGIC      Right Foot Dermatologic   Skin  Positive for corn and tinea.   Nails  1.  Positive for onychomycosis, abnormal thickness, subungual debris and dystrophic nail.  2.  Positive for elongated, onychomycosis, abnormal thickness, subungual debris and dystrophic nail.  3.  Positive for elongated, onychomycosis, abnormal thickness, subungual debris and  dystrophic nail.  4.  Positive for elongated, onychomycosis, abnormal thickness, subungual debris and dystrophic nail.  5.  Positive for elongated, onychomycosis, abnormal thickness, subungual debris and dystrophic nail.     Left Foot Dermatologic   Skin  Positive for corn and tinea.   Nails  1.  Positive for onychomycosis, abnormal thickness, subungual debris and dystrophic nail.  2.  Positive for elongated, onychomycosis, abnormal thickness, subungual debris and dystrophic nail.  3.  Positive for elongated, onychomycosis, abnormal thickness, subungual debris and dystrophic nail.  4.  Positive for elongated, onychomycosis, abnormally thick, subungual debris and dystrophic nail.  5.  Positive for elongated, onychomycosis, abnormally thick, subungual debris and dystrophic nail.    Image:     RADIOLOGY/NUCLEAR:  XR Abdomen KUB    Result Date: 8/2/2023  Narrative: HISTORY: Urolithiasis  KUB: Frontal view the abdomen obtained.  COMPARISON: 07/19/2023  FINDINGS: There is a stable 10 mm left intrarenal stone projecting over the mid portion of the left kidney. Similar small rounded inferior pelvic calcifications favoring phleboliths. No new pathologic calcifications identified. Nonobstructive bowel gas pattern. Mild left convexity lumbar curvature.      Impression: 1. Stable 10 mm left mid intrarenal stone. This report was finalized on 08/02/2023 07:48 by Dr. Crista Barriga MD.    XR Abdomen KUB    Result Date: 7/19/2023  Narrative: EXAMINATION: XR ABDOMEN KUB- 7/19/2023 8:31 AM CDT  HISTORY: NEPHROLITHIASIS.  REPORT: Supine imaging of the abdomen was performed.  COMPARISON: KUB 06/19/2023.  The bowel gas pattern is normal. There is an oval 11 mm nephrolithiasis at the left mid kidney as before. No right-sided nephrolithiasis or definite ureterolithiasis. 3 small left pelvic phleboliths noted and there are one or 2 right pelvic phlebolith. Mild levoscoliosis of lumbar spine with associated degenerative endplate spurring.       Impression: Stable 11 mm nephrolithiasis in the left mid kidney. This report was finalized on 07/19/2023 08:32 by Dr. Tj Ibarra MD.     LABORATORY/CULTURE RESULTS:      PATHOLOGY RESULTS:       ASSESSMENT/PLAN     Diagnoses and all orders for this visit:    1. Onychomycosis (Primary)    2. Foot callus    3. Other polyneuropathy    4. Pes planus of both feet    5. Valgus deformity of both great toes      Comprehensive lower extremity examination and evaluation was performed.  Discussed findings and treatment plan including risks, benefits, and treatment options with patient in detail. Patient agreed with treatment plan.  After verbal consent obtained, nail(s) x10 debrided of length and thickness with nail nipper without incidence  After verbal consent obtained, calluses x2 pared utilizing dermal curette and/or scalpel without incidence  Patient may maintain nails and calluses at home utilizing emery board or pumice stone between visits as needed   An After Visit Summary was printed and given to the patient at discharge, including (if requested) any available informative/educational handouts regarding diagnosis, treatment, or medications. All questions were answered to patient/family satisfaction. Should symptoms fail to improve or worsen they agree to call or return to clinic or to go to the Emergency Department. Discussed the importance of following up with any needed screening tests/labs/specialist appointments and any requested follow-up recommended by me today. Importance of maintaining follow-up discussed and patient accepts that missed appointments can delay diagnosis and potentially lead to worsening of conditions.  Return in about 3 months (around 11/17/2023)., or sooner if acute issues arise.    Lab Frequency Next Occurrence   Provide Patient With Instructions on NPO Status Once 03/02/2021   US Thyroid Once 08/30/2023       This document has been electronically signed by AELXUS Johnson on  August 17, 2023 12:13 CDT

## 2023-08-14 NOTE — PROGRESS NOTES
"UROLOGY  POD # 21    Procedure: Left Renal ESWL    Symptoms: No flank pain. A little hematuria.       Current Outpatient Medications:     aspirin 81 MG tablet, Take 1 tablet by mouth Daily., Disp: , Rfl:     atorvastatin (LIPITOR) 80 MG tablet, Take 0.5 tablets by mouth Daily., Disp: , Rfl:     cefdinir (OMNICEF) 300 MG capsule, Take 1 capsule by mouth., Disp: , Rfl:     fluticasone (FLONASE) 50 MCG/ACT nasal spray, 2 sprays into the nostril(s) as directed by provider Daily., Disp: , Rfl:     glycopyrrolate (ROBINUL) 1 MG tablet, Take 1 tablet by mouth 3 (Three) Times a Day As Needed (vertigo)., Disp: 60 tablet, Rfl: 11    LUTEIN PO, Take 25 mg by mouth Daily., Disp: , Rfl:     metoprolol succinate XL (TOPROL-XL) 25 MG 24 hr tablet, Take 1 tablet by mouth Daily., Disp: , Rfl:     montelukast (SINGULAIR) 10 MG tablet, Take 1 tablet by mouth Daily., Disp: , Rfl:     Multiple Vitamins-Minerals (MULTIVITAMIN WITH MINERALS) tablet tablet, Take 1 tablet by mouth Daily., Disp: , Rfl:     Multiple Vitamins-Minerals (OCUVITE ADULT FORMULA PO), Take  by mouth., Disp: , Rfl:     omeprazole (priLOSEC) 20 MG capsule, Take 1 capsule by mouth Daily., Disp: , Rfl:     pregabalin (LYRICA) 100 MG capsule, 2 (Two) Times a Day., Disp: , Rfl:     pyridostigmine (MESTINON) 60 MG tablet, TAKE 1 TABLET THREE TIMES A DAY, Disp: 270 tablet, Rfl: 3    tamsulosin (FLOMAX) 0.4 MG capsule 24 hr capsule, Take 1 capsule by mouth Daily. To help pass stone FRAGMENTS, Disp: 30 capsule, Rfl: 0    Temp 96.7 øF (35.9 øC)   Ht 188 cm (74\")   Wt 135 kg (297 lb)   BMI 38.13 kg/mý           Data: XR abdomen kub (08/17/2023 11:40)  Stone broke up. Three visible fragments     ASSESSMENT AND PLAN          Problem List Items Addressed This Visit          Genitourinary and Reproductive     Kidney stone on left side - Primary    Overview     Added automatically from request for surgery [id number]         Relevant Orders    POC Urinalysis Dipstick, Multipro " (Completed)    XR abdomen kub (Completed)    XR Abdomen KUB       Return in about 6 months (around 2/24/2024) for KUB before visit.      Marcellus Fowler MD  8/24/2023  09:10 CDT

## 2023-08-16 ENCOUNTER — OFFICE VISIT (OUTPATIENT)
Dept: INTERNAL MEDICINE | Age: 78
End: 2023-08-16
Payer: MEDICARE

## 2023-08-16 ENCOUNTER — TELEPHONE (OUTPATIENT)
Dept: OTOLARYNGOLOGY | Facility: CLINIC | Age: 78
End: 2023-08-16
Payer: MEDICARE

## 2023-08-16 ENCOUNTER — TELEPHONE (OUTPATIENT)
Dept: PODIATRY | Facility: CLINIC | Age: 78
End: 2023-08-16
Payer: MEDICARE

## 2023-08-16 VITALS
TEMPERATURE: 97.9 F | BODY MASS INDEX: 35.91 KG/M2 | OXYGEN SATURATION: 95 % | WEIGHT: 295 LBS | DIASTOLIC BLOOD PRESSURE: 80 MMHG | HEART RATE: 70 BPM | SYSTOLIC BLOOD PRESSURE: 130 MMHG

## 2023-08-16 DIAGNOSIS — H66.91 RIGHT ACUTE OTITIS MEDIA: Primary | ICD-10-CM

## 2023-08-16 PROCEDURE — G8417 CALC BMI ABV UP PARAM F/U: HCPCS | Performed by: NURSE PRACTITIONER

## 2023-08-16 PROCEDURE — 99213 OFFICE O/P EST LOW 20 MIN: CPT | Performed by: NURSE PRACTITIONER

## 2023-08-16 PROCEDURE — 1123F ACP DISCUSS/DSCN MKR DOCD: CPT | Performed by: NURSE PRACTITIONER

## 2023-08-16 PROCEDURE — G8427 DOCREV CUR MEDS BY ELIG CLIN: HCPCS | Performed by: NURSE PRACTITIONER

## 2023-08-16 PROCEDURE — 3079F DIAST BP 80-89 MM HG: CPT | Performed by: NURSE PRACTITIONER

## 2023-08-16 PROCEDURE — 1036F TOBACCO NON-USER: CPT | Performed by: NURSE PRACTITIONER

## 2023-08-16 PROCEDURE — 3075F SYST BP GE 130 - 139MM HG: CPT | Performed by: NURSE PRACTITIONER

## 2023-08-16 RX ORDER — CEFDINIR 300 MG/1
300 CAPSULE ORAL 2 TIMES DAILY
Qty: 20 CAPSULE | Refills: 0 | Status: SHIPPED | OUTPATIENT
Start: 2023-08-16 | End: 2023-08-26

## 2023-08-16 RX ORDER — TAMSULOSIN HYDROCHLORIDE 0.4 MG/1
0.4 CAPSULE ORAL DAILY
COMMUNITY
Start: 2023-08-03

## 2023-08-16 NOTE — PROGRESS NOTES
understanding. There are no Patient Instructions on file for this visit.       Electronically signed by LATASHA Martinez on 8/16/2023 at 2:41 PM

## 2023-08-16 NOTE — TELEPHONE ENCOUNTER
Caller: PT  Relationship to Patient: SELF    Pharmacy: [facility].     Phone Number: [phone]    Reason for Call: RECEIVED A CALL FROM PT. C/O RIGHT EAR PAIN, WOKE UP IN THE MIDDLE NIGHT WITH RIGHT CONSTANT EAR PAIN, CAN NOT HEAR OUT OF THE RIGHT EAR. ASKING IF THERE IS SOMETHING HE COULD TAKE OR BE PRESCRIBED FOR THESE SX ADVISED THE PT TO FOLLOW UP WITH PCP FIRST TO SEE IF THEY CAN TREAT HIM FOR SX. OFFICE PLEASE CALL PT WITH THESE REGARDS.THANK YOU.         What therapies/medications have you tried: TOOK IBUPROFEN

## 2023-08-17 ENCOUNTER — OFFICE VISIT (OUTPATIENT)
Dept: PODIATRY | Facility: CLINIC | Age: 78
End: 2023-08-17
Payer: MEDICARE

## 2023-08-17 VITALS
HEIGHT: 75 IN | DIASTOLIC BLOOD PRESSURE: 70 MMHG | HEART RATE: 52 BPM | BODY MASS INDEX: 36.43 KG/M2 | SYSTOLIC BLOOD PRESSURE: 118 MMHG | WEIGHT: 293 LBS | OXYGEN SATURATION: 99 %

## 2023-08-17 DIAGNOSIS — M21.41 PES PLANUS OF BOTH FEET: ICD-10-CM

## 2023-08-17 DIAGNOSIS — M20.12 VALGUS DEFORMITY OF BOTH GREAT TOES: ICD-10-CM

## 2023-08-17 DIAGNOSIS — B35.1 ONYCHOMYCOSIS: Primary | ICD-10-CM

## 2023-08-17 DIAGNOSIS — M21.42 PES PLANUS OF BOTH FEET: ICD-10-CM

## 2023-08-17 DIAGNOSIS — G60.9 IDIOPATHIC PERIPHERAL NEUROPATHY: ICD-10-CM

## 2023-08-17 DIAGNOSIS — G62.89 OTHER POLYNEUROPATHY: ICD-10-CM

## 2023-08-17 DIAGNOSIS — M20.11 VALGUS DEFORMITY OF BOTH GREAT TOES: ICD-10-CM

## 2023-08-17 DIAGNOSIS — L84 FOOT CALLUS: ICD-10-CM

## 2023-08-17 RX ORDER — CEFDINIR 300 MG/1
300 CAPSULE ORAL
COMMUNITY
Start: 2023-08-16 | End: 2023-08-27

## 2023-08-18 RX ORDER — PREGABALIN 100 MG/1
CAPSULE ORAL
Qty: 180 CAPSULE | Refills: 2 | Status: SHIPPED | OUTPATIENT
Start: 2023-08-18 | End: 2023-11-08

## 2023-08-23 ENCOUNTER — TELEPHONE (OUTPATIENT)
Dept: UROLOGY | Facility: CLINIC | Age: 78
End: 2023-08-23
Payer: MEDICARE

## 2023-08-23 NOTE — TELEPHONE ENCOUNTER
Called Patient to remind them to get KUB prior to appointment.  Spoke with Patient.  If patient calls back it is ok for the HUB to tell the pt the message.

## 2023-08-24 ENCOUNTER — TELEPHONE (OUTPATIENT)
Dept: INTERNAL MEDICINE | Age: 78
End: 2023-08-24

## 2023-08-24 ENCOUNTER — HOSPITAL ENCOUNTER (OUTPATIENT)
Dept: GENERAL RADIOLOGY | Facility: HOSPITAL | Age: 78
Discharge: HOME | End: 2023-08-24
Admitting: UROLOGY
Payer: MEDICARE

## 2023-08-24 ENCOUNTER — OFFICE VISIT (OUTPATIENT)
Dept: UROLOGY | Facility: CLINIC | Age: 78
End: 2023-08-24
Payer: MEDICARE

## 2023-08-24 VITALS — HEIGHT: 74 IN | TEMPERATURE: 96.7 F | WEIGHT: 297 LBS | BODY MASS INDEX: 38.12 KG/M2

## 2023-08-24 DIAGNOSIS — N20.0 KIDNEY STONE ON LEFT SIDE: ICD-10-CM

## 2023-08-24 DIAGNOSIS — N20.0 KIDNEY STONE ON LEFT SIDE: Primary | ICD-10-CM

## 2023-08-24 DIAGNOSIS — H81.11 BPPV (BENIGN PAROXYSMAL POSITIONAL VERTIGO), RIGHT: ICD-10-CM

## 2023-08-24 DIAGNOSIS — R42 DIZZINESS: ICD-10-CM

## 2023-08-24 LAB
BILIRUB BLD-MCNC: NEGATIVE MG/DL
CLARITY, POC: CLEAR
COLOR UR: YELLOW
GLUCOSE UR STRIP-MCNC: NEGATIVE MG/DL
KETONES UR QL: NEGATIVE
LEUKOCYTE EST, POC: NEGATIVE
NITRITE UR-MCNC: NEGATIVE MG/ML
PH UR: 6 [PH] (ref 5–8)
PROT UR STRIP-MCNC: NEGATIVE MG/DL
RBC # UR STRIP: ABNORMAL /UL
SP GR UR: 1.01 (ref 1–1.03)
UROBILINOGEN UR QL: ABNORMAL

## 2023-08-24 PROCEDURE — 74018 RADEX ABDOMEN 1 VIEW: CPT

## 2023-08-24 RX ORDER — GLYCOPYRROLATE 1 MG/1
1 TABLET ORAL 3 TIMES DAILY PRN
Qty: 60 TABLET | Refills: 5 | Status: SHIPPED | OUTPATIENT
Start: 2023-08-24

## 2023-08-24 NOTE — TELEPHONE ENCOUNTER
Him and his wife are traveling to [facility] in 2 weeks and he wants to know if they need Paxlovid to keep on hand in case they get COVID?

## 2023-08-25 ENCOUNTER — TRANSCRIBE ORDERS (OUTPATIENT)
Dept: ADMINISTRATIVE | Facility: HOSPITAL | Age: 78
End: 2023-08-25
Payer: MEDICARE

## 2023-08-25 DIAGNOSIS — R91.1 NODULE OF LEFT LUNG: Primary | ICD-10-CM

## 2023-08-29 NOTE — TELEPHONE ENCOUNTER
See the message on his wife- I sent in medicine- if test for covid and takes paxlovid need to stop lipitor while on it

## 2023-09-20 ENCOUNTER — HOSPITAL ENCOUNTER (OUTPATIENT)
Dept: CT IMAGING | Facility: HOSPITAL | Age: 78
Discharge: HOME | End: 2023-09-20
Admitting: INTERNAL MEDICINE
Payer: MEDICARE

## 2023-09-20 DIAGNOSIS — R91.1 NODULE OF LEFT LUNG: ICD-10-CM

## 2023-09-20 PROCEDURE — 71250 CT THORAX DX C-: CPT

## 2023-09-25 ENCOUNTER — HOSPITAL ENCOUNTER (OUTPATIENT)
Dept: GENERAL RADIOLOGY | Facility: HOSPITAL | Age: 78
Discharge: HOME | End: 2023-09-25
Admitting: UROLOGY
Payer: MEDICARE

## 2023-09-25 DIAGNOSIS — N20.0 KIDNEY STONE ON LEFT SIDE: ICD-10-CM

## 2023-09-25 PROCEDURE — 74018 RADEX ABDOMEN 1 VIEW: CPT

## 2023-10-13 PROBLEM — H18.20 CHANDLER SYNDROME: Status: ACTIVE | Noted: 2023-06-02

## 2023-10-13 PROBLEM — H91.90 HEARING LOSS: Status: ACTIVE | Noted: 2019-01-31

## 2023-10-13 PROBLEM — H40.50X0 CHANDLER SYNDROME: Status: ACTIVE | Noted: 2023-06-02

## 2023-10-13 PROBLEM — Z96.652 HISTORY OF TOTAL LEFT KNEE REPLACEMENT: Status: ACTIVE | Noted: 2022-10-26

## 2023-10-13 PROBLEM — D48.5 NEOPLASM OF UNCERTAIN BEHAVIOR OF SKIN: Status: ACTIVE | Noted: 2021-01-19

## 2023-10-13 PROBLEM — Z96.651 HISTORY OF TOTAL RIGHT KNEE REPLACEMENT: Status: ACTIVE | Noted: 2022-10-26

## 2023-10-13 PROBLEM — L57.0 ACTINIC KERATOSIS: Status: ACTIVE | Noted: 2019-01-29

## 2023-10-13 PROBLEM — N20.0 KIDNEY STONE ON LEFT SIDE: Status: ACTIVE | Noted: 2023-06-19

## 2023-10-13 PROBLEM — H21.269 CHANDLER SYNDROME: Status: ACTIVE | Noted: 2023-06-02

## 2023-10-13 PROBLEM — M19.90 ARTHRITIS: Status: ACTIVE | Noted: 2019-01-31

## 2023-10-17 ENCOUNTER — OFFICE VISIT (OUTPATIENT)
Dept: INTERNAL MEDICINE | Age: 78
End: 2023-10-17
Payer: MEDICARE

## 2023-10-17 VITALS
DIASTOLIC BLOOD PRESSURE: 76 MMHG | OXYGEN SATURATION: 96 % | BODY MASS INDEX: 35.07 KG/M2 | HEIGHT: 76 IN | SYSTOLIC BLOOD PRESSURE: 122 MMHG | HEART RATE: 54 BPM | WEIGHT: 288 LBS

## 2023-10-17 DIAGNOSIS — G60.9 IDIOPATHIC PERIPHERAL NEUROPATHY: Chronic | ICD-10-CM

## 2023-10-17 DIAGNOSIS — G47.33 OBSTRUCTIVE SLEEP APNEA: Chronic | ICD-10-CM

## 2023-10-17 DIAGNOSIS — G70.00 MYASTHENIA GRAVIS (HCC): ICD-10-CM

## 2023-10-17 DIAGNOSIS — E78.2 MIXED HYPERLIPIDEMIA: ICD-10-CM

## 2023-10-17 DIAGNOSIS — Z12.5 SCREENING FOR PROSTATE CANCER: ICD-10-CM

## 2023-10-17 DIAGNOSIS — I10 ESSENTIAL HYPERTENSION: ICD-10-CM

## 2023-10-17 DIAGNOSIS — Z00.00 MEDICARE ANNUAL WELLNESS VISIT, SUBSEQUENT: Primary | ICD-10-CM

## 2023-10-17 DIAGNOSIS — I25.10 CORONARY ARTERY DISEASE INVOLVING NATIVE CORONARY ARTERY OF NATIVE HEART WITHOUT ANGINA PECTORIS: ICD-10-CM

## 2023-10-17 DIAGNOSIS — Z23 INFLUENZA VACCINE NEEDED: ICD-10-CM

## 2023-10-17 DIAGNOSIS — J32.4 CHRONIC PANSINUSITIS: ICD-10-CM

## 2023-10-17 PROBLEM — D48.9 NEOPLASM OF UNCERTAIN BEHAVIOR: Status: RESOLVED | Noted: 2022-01-13 | Resolved: 2023-10-17

## 2023-10-17 PROCEDURE — G0008 ADMIN INFLUENZA VIRUS VAC: HCPCS | Performed by: INTERNAL MEDICINE

## 2023-10-17 PROCEDURE — G8484 FLU IMMUNIZE NO ADMIN: HCPCS | Performed by: INTERNAL MEDICINE

## 2023-10-17 PROCEDURE — 3074F SYST BP LT 130 MM HG: CPT | Performed by: INTERNAL MEDICINE

## 2023-10-17 PROCEDURE — 90694 VACC AIIV4 NO PRSRV 0.5ML IM: CPT | Performed by: INTERNAL MEDICINE

## 2023-10-17 PROCEDURE — G0439 PPPS, SUBSEQ VISIT: HCPCS | Performed by: INTERNAL MEDICINE

## 2023-10-17 PROCEDURE — 1123F ACP DISCUSS/DSCN MKR DOCD: CPT | Performed by: INTERNAL MEDICINE

## 2023-10-17 PROCEDURE — 3078F DIAST BP <80 MM HG: CPT | Performed by: INTERNAL MEDICINE

## 2023-10-17 ASSESSMENT — ENCOUNTER SYMPTOMS
SINUS PRESSURE: 0
EYE REDNESS: 0
COUGH: 0
ABDOMINAL DISTENTION: 0
SHORTNESS OF BREATH: 0
BACK PAIN: 0
EYE DISCHARGE: 0
ABDOMINAL PAIN: 0

## 2023-10-17 ASSESSMENT — PATIENT HEALTH QUESTIONNAIRE - PHQ9
1. LITTLE INTEREST OR PLEASURE IN DOING THINGS: 0
2. FEELING DOWN, DEPRESSED OR HOPELESS: 0
SUM OF ALL RESPONSES TO PHQ QUESTIONS 1-9: 0
SUM OF ALL RESPONSES TO PHQ9 QUESTIONS 1 & 2: 0
SUM OF ALL RESPONSES TO PHQ QUESTIONS 1-9: 0

## 2023-10-17 ASSESSMENT — LIFESTYLE VARIABLES
HOW OFTEN DO YOU HAVE A DRINK CONTAINING ALCOHOL: NEVER
HOW MANY STANDARD DRINKS CONTAINING ALCOHOL DO YOU HAVE ON A TYPICAL DAY: PATIENT DOES NOT DRINK

## 2023-10-17 NOTE — PROGRESS NOTES
Provider, MD Sai       CareTeam (Including outside providers/suppliers regularly involved in providing care):   Patient Care Team:  Lynn Street MD as PCP - General (Internal Medicine)  Lynn Street MD as PCP - Empaneled Provider  Rafia Dumont MD as Consulting Physician (Gastroenterology)  Messi Chaparro MD as Consulting Physician (Ophthalmology)  Prabha John MD as Consulting Physician (Otolaryngology)     Reviewed and updated this visit:  Allergies  Meds

## 2023-10-17 NOTE — PATIENT INSTRUCTIONS
lifestyle, illnesses that may run in your family, and various assessments and screenings as appropriate. After reviewing your medical record and screening and assessments performed today your provider may have ordered immunizations, labs, imaging, and/or referrals for you. A list of these orders (if applicable) as well as your Preventive Care list are included within your After Visit Summary for your review. Other Preventive Recommendations:    A preventive eye exam performed by an eye specialist is recommended every 1-2 years to screen for glaucoma; cataracts, macular degeneration, and other eye disorders. A preventive dental visit is recommended every 6 months. Try to get at least 150 minutes of exercise per week or 10,000 steps per day on a pedometer . Order or download the FREE \"Exercise & Physical Activity: Your Everyday Guide\" from [employer]. Call 5-192.447.7395 or search [employer] online. You need 9975-5536 mg of calcium and 2600-2959 IU of vitamin D per day. It is possible to meet your calcium requirement with diet alone, but a vitamin D supplement is usually necessary to meet this goal.  When exposed to the sun, use a sunscreen that protects against both UVA and UVB radiation with an SPF of 30 or greater. Reapply every 2 to 3 hours or after sweating, drying off with a towel, or swimming. Always wear a seat belt when traveling in a car. Always wear a helmet when riding a bicycle or motorcycle.

## 2023-10-18 ENCOUNTER — TELEPHONE (OUTPATIENT)
Dept: OTOLARYNGOLOGY | Facility: CLINIC | Age: 78
End: 2023-10-18

## 2023-10-18 ENCOUNTER — OFFICE VISIT (OUTPATIENT)
Dept: OTOLARYNGOLOGY | Facility: CLINIC | Age: 78
End: 2023-10-18
Payer: MEDICARE

## 2023-10-18 VITALS
BODY MASS INDEX: 36.85 KG/M2 | WEIGHT: 287 LBS | TEMPERATURE: 98 F | SYSTOLIC BLOOD PRESSURE: 101 MMHG | HEART RATE: 61 BPM | DIASTOLIC BLOOD PRESSURE: 60 MMHG

## 2023-10-18 DIAGNOSIS — E04.2 MULTIPLE THYROID NODULES: Primary | ICD-10-CM

## 2023-10-18 RX ORDER — LEVOTHYROXINE SODIUM 25 MCG
25 TABLET ORAL DAILY
Qty: 90 TABLET | Refills: 1 | Status: SHIPPED | OUTPATIENT
Start: 2023-10-18 | End: 2023-11-17

## 2023-10-18 RX ORDER — LEVOTHYROXINE SODIUM 25 MCG
25 TABLET ORAL DAILY
Qty: 30 TABLET | Refills: 3 | Status: SHIPPED | OUTPATIENT
Start: 2023-10-18 | End: 2023-10-18 | Stop reason: SDUPTHER

## 2023-10-18 NOTE — TELEPHONE ENCOUNTER
[facility] received a fax that requires your attention. The document has been indexed to the patient’s chart for your review.      Reason for sending: DRUG CHANGE REQUEST, REQUIRES PROVIDER REVIEW AND RESPONSE    Documents Description: EXT MED REC-[facility]-10.18.23    Name of Sender: [facility]    Date Indexed: 10.18.23    Notes (if needed):

## 2023-10-18 NOTE — PROGRESS NOTES
YOB: 1945  Location: [facility]  Location Address: [address] [place] [address], [facility], [address] [place] [address]  Location Phone: [phone]    Chief Complaint   Patient presents with    Thyroid Problem       History of Present Illness  Axel Morris is a 78 y.o. male.  Axel Morris is here for follow up of ENT complaints. The patient has had problems with thyroid nodules   Nodules have been present for the pats several years   He denies sore throat, difficulty swallowing or globus sensation   He is not currently on thyroid replacement therapy     Patient is also s/p excision of atypical lesion of left temple 3/2021  Pathology revealed solar lentigo       TSH (10/16/2023 10:02 EDT)    US Thyroid (10/18/2023 09:26)     Past Medical History:   Diagnosis Date    Abnormal ECG     Arthritis     Callus     Coronary artery disease     COVID-19 vaccine series completed     pfizer    Dizziness     Fallen arches     Fracture of nasal bones     GERD (gastroesophageal reflux disease)     History of transfusion     HL (hearing loss)     Hx of colonic polyps     Hyperlipidemia     Kidney stone on left side 2023    Neuropathy     Peripheral neuropathy     Pneumonia     Rheumatic fever     Sinusitis     Skin cancer     Sleep apnea     Tinnitus     Verruca     Vision loss        Past Surgical History:   Procedure Laterality Date    ADENOIDECTOMY      CARDIAC CATHETERIZATION      CARDIAC SURGERY      HEART STENT    CHOLECYSTECTOMY      COLONOSCOPY N/A 2017    Procedure: COLONOSCOPY WITH ANESTHESIA;  Surgeon: Matthew Cagle MD;  Location: [facility] ENDOSCOPY;  Service:     COLONOSCOPY W/ POLYPECTOMY  2012    Adenomatous polyp at 50 cm repeat exam in 5 years    CORONARY STENT PLACEMENT      ENDOSCOPY AND COLONOSCOPY  2009    Acute ulcerative esophagogastritis grade c, Hyperplastic polyp at 50 cm repeat colonoscopy in 3 years    EXTRACORPOREAL SHOCK WAVE LITHOTRIPSY (ESWL) Left 2023    Procedure:  EXTRACORPOREAL SHOCKWAVE LITHOTRIPSY;  Surgeon: Marcellus Fowler MD;  Location: [facility] OR;  Service: Urology;  Laterality: Left;    EYE SURGERY      DONNIE CATARACT REMOVAL    FLAP HEAD/NECK Left 03/10/2021    Procedure: POSSIBLE FLAP OR GRAFT.;  Surgeon: Roberto Og MD;  Location:  [facility] OR;  Service: ENT;  Laterality: Left;    HEAD/NECK LESION/CYST EXCISION Left 03/10/2021    Procedure: EXCISION OF ATYPICAL PIGMENTED LESION OF THE LEFT CENTRAL TEMPLE REGION WITH POSSIBLE FLAP OR GRAFT. - Left;  Surgeon: Roberto Og MD;  Location:  [facility] OR;  Service: ENT;  Laterality: Left;    REPLACEMENT TOTAL KNEE BILATERAL      TONSILLECTOMY         Outpatient Medications Marked as Taking for the 10/18/23 encounter (Office Visit) with Abeba Medina APRN   Medication Sig Dispense Refill    aspirin 81 MG tablet Take 1 tablet by mouth Daily.      atorvastatin (LIPITOR) 80 MG tablet Take 0.5 tablets by mouth Daily.      fluticasone (FLONASE) 50 MCG/ACT nasal spray 2 sprays into the nostril(s) as directed by provider Daily.      glycopyrrolate (ROBINUL) 1 MG tablet Take 1 tablet by mouth 3 (Three) Times a Day As Needed (vertigo). 60 tablet 5    LUTEIN PO Take 25 mg by mouth Daily.      metoprolol succinate XL (TOPROL-XL) 25 MG 24 hr tablet Take 1 tablet by mouth Daily.      montelukast (SINGULAIR) 10 MG tablet Take 1 tablet by mouth Daily.      Multiple Vitamins-Minerals (MULTIVITAMIN WITH MINERALS) tablet tablet Take 1 tablet by mouth Daily.      Multiple Vitamins-Minerals (OCUVITE ADULT FORMULA PO) Take  by mouth.      omeprazole (priLOSEC) 20 MG capsule Take 1 capsule by mouth Daily.      pregabalin (LYRICA) 100 MG capsule 2 (Two) Times a Day.      Synthroid 25 MCG tablet Take 1 tablet by mouth Daily for 30 days. 90 tablet 1       Ampicillin    Family History   Problem Relation Age of Onset    Heart failure Father     Rheum arthritis Father     Cancer Brother     Colon cancer Neg Hx     Colon polyps Neg Hx         Social History     Socioeconomic History    Marital status:    Tobacco Use    Smoking status: Former     Packs/day: 1.00     Years: 10.00     Additional pack years: 0.00     Total pack years: 10.00     Types: Cigarettes     Quit date: 1978     Years since quittin.2     Passive exposure: Never    Smokeless tobacco: Never   Vaping Use    Vaping Use: Never used   Substance and Sexual Activity    Alcohol use: Never    Drug use: Never    Sexual activity: Defer       Review of Systems   Constitutional: Negative.    HENT:  Negative for sore throat, trouble swallowing and voice change.        Vitals:    10/18/23 0939   BP: 101/60   Pulse: 61   Temp: 98 °F (36.7 °C)       Body mass index is 36.85 kg/m².    Objective     Physical Exam  Vitals reviewed.   Constitutional:       Appearance: He is obese.   HENT:      Head: Normocephalic.      Right Ear: Tympanic membrane, ear canal and external ear normal.      Left Ear: Tympanic membrane, ear canal and external ear normal.      Nose: Nose normal.      Mouth/Throat:      Lips: Pink.      Mouth: Mucous membranes are moist.      Pharynx: Uvula midline.   Neck:      Comments: Mild thyromegaly   Palpable bilateral thyroid nodules     Musculoskeletal:      Cervical back: Full passive range of motion without pain.   Neurological:      Mental Status: He is alert.         Assessment & Plan   Diagnoses and all orders for this visit:    1. Multiple thyroid nodules (Primary)  -     TSH; Future  -     US Thyroid; Future    Other orders  -     Discontinue: Synthroid 25 MCG tablet; Take 1 tablet by mouth Daily for 30 days.  Dispense: 30 tablet; Refill: 3  -     Synthroid 25 MCG tablet; Take 1 tablet by mouth Daily for 30 days.  Dispense: 90 tablet; Refill: 1      * Surgery not found *  Orders Placed This Encounter   Procedures    US Thyroid     Standing Status:   Future     Standing Expiration Date:   10/18/2024     Order Specific Question:   Reason for Exam:     Answer:    Thyroid disease     Order Specific Question:   Release to patient     Answer:   Routine Release [[id number]]    TSH     Standing Status:   Future     Standing Expiration Date:   10/18/2024     Order Specific Question:   Release to patient     Answer:   Routine Release [[id number]]     Return in about 6 months (around 4/18/2024) for Recheck.     Will start low dose of synthroid due to ongoing hypothyroid   Recheck labs in 4 weeks   Call for new/worsening symptoms   Ultrasound in 6 months     Patient Instructions   The patient has a thyroid nodule, which is relatively small, and studies do not suggest a malignancy. I have recommended observation with follow-up with me for repeat ultrasound. I explained the pathology of thyroid nodules including the risks of cancer. The options of surgery were discussed, but the patient wants to pursue an observational course for now, which is reasonable. The patient wishes to continue to defer surgery at this time.

## 2023-10-18 NOTE — TELEPHONE ENCOUNTER
The [facility] received a fax that requires your attention. The document has been indexed to the patient’s chart for your review.      Reason for sending: FOR PROVIDER REVIEW    Documents Description: RX PRE-AUTH REQUEST    Name of Sender: [facility]    Date Indexed: 10/18/2023    Notes (if needed):

## 2023-10-24 ENCOUNTER — TELEPHONE (OUTPATIENT)
Dept: OTOLARYNGOLOGY | Facility: CLINIC | Age: 78
End: 2023-10-24
Payer: MEDICARE

## 2023-10-24 RX ORDER — LEVOTHYROXINE SODIUM 0.03 MG/1
25 TABLET ORAL
Qty: 90 TABLET | Refills: 1 | Status: SHIPPED | OUTPATIENT
Start: 2023-10-24

## 2023-12-20 NOTE — PROGRESS NOTES
[facility] - PODIATRY    Today's Date: 12/22/2023     Patient Name: Axel Morris  MRN: [id number]  CSN: [id number]  PCP: Isabel Best MD  Referring Provider: No ref. provider found    SUBJECTIVE     Chief Complaint   Patient presents with    Follow-up     Isabel Best MD 10/17/2023 3 MTH FU. Patient states he is here for nail care. Patient has no complaints or concerns at this time.     HPI: Axel Morris, a 78 y.o.male, comes to clinic as a(n) established patient complaining of painful toenails and complaining of neuropathy of both feet . Patient has h/o arthritis, CA, CAD, dizziness, GERD, HLD, Neuropathy, pneumonia, sleep apnea, tinnitus .  States that his toenails are long, thickened, irregular, and dystrophic and that he is unable to care for them himself at home.  Reports neuropathy from the knee down bilaterally; states he is unsure of the source of his neuropathy though he does report issues with his disks in his back and states he also has previous knee surgery that he believes is contributing to neuropathy as well.  Reports calluses to the great toes has returned.  Denies pain in feet secondary to neuropathy . Relates previous treatment(s) including foot care by podiatry . Denies any constitutional symptoms. No other pedal complaints at this time.    Past Medical History:   Diagnosis Date    Abnormal ECG     Arthritis     Callus     Coronary artery disease     COVID-19 vaccine series completed     pfizer    Difficulty walking     Dizziness     Fallen arches     Fracture of nasal bones     GERD (gastroesophageal reflux disease)     History of transfusion     HL (hearing loss)     Hx of colonic polyps     Hyperlipidemia     Idiopathic peripheral neuropathy 07/27/2017    Kidney stone on left side 06/19/2023    Neuropathy     Peripheral neuropathy     Pneumonia     Primary hypertension 12/23/2021    Rheumatic fever     Sinusitis     Skin cancer     Sleep apnea     Tinnitus     Verruca      Vision loss      Past Surgical History:   Procedure Laterality Date    ADENOIDECTOMY      CARDIAC CATHETERIZATION      CARDIAC SURGERY      HEART STENT    CHOLECYSTECTOMY      COLONOSCOPY N/A 2017    Procedure: COLONOSCOPY WITH ANESTHESIA;  Surgeon: Matthew Cagle MD;  Location:  [facility] ENDOSCOPY;  Service:     COLONOSCOPY W/ POLYPECTOMY  2012    Adenomatous polyp at 50 cm repeat exam in 5 years    CORONARY STENT PLACEMENT      ENDOSCOPY AND COLONOSCOPY  2009    Acute ulcerative esophagogastritis grade c, Hyperplastic polyp at 50 cm repeat colonoscopy in 3 years    EXTRACORPOREAL SHOCK WAVE LITHOTRIPSY (ESWL) Left 2023    Procedure: EXTRACORPOREAL SHOCKWAVE LITHOTRIPSY;  Surgeon: Marcellus Fowler MD;  Location: [facility] OR;  Service: Urology;  Laterality: Left;    EYE SURGERY      DONNIE CATARACT REMOVAL    FLAP HEAD/NECK Left 03/10/2021    Procedure: POSSIBLE FLAP OR GRAFT.;  Surgeon: Roberto Og MD;  Location:  [facility] OR;  Service: ENT;  Laterality: Left;    HEAD/NECK LESION/CYST EXCISION Left 03/10/2021    Procedure: EXCISION OF ATYPICAL PIGMENTED LESION OF THE LEFT CENTRAL TEMPLE REGION WITH POSSIBLE FLAP OR GRAFT. - Left;  Surgeon: Roberto Og MD;  Location:  [facility] OR;  Service: ENT;  Laterality: Left;    REPLACEMENT TOTAL KNEE BILATERAL      TONSILLECTOMY       Family History   Problem Relation Age of Onset    Heart failure Father     Rheum arthritis Father     Cancer Brother     Colon cancer Neg Hx     Colon polyps Neg Hx      Social History     Socioeconomic History    Marital status:    Tobacco Use    Smoking status: Former     Packs/day: 1.00     Years: 10.00     Additional pack years: 0.00     Total pack years: 10.00     Types: Cigarettes     Quit date: 1978     Years since quittin.4     Passive exposure: Never    Smokeless tobacco: Never   Vaping Use    Vaping Use: Never used   Substance and Sexual Activity    Alcohol use: Never    Drug use: Never     Sexual activity: Defer     Allergies   Allergen Reactions    Ampicillin Rash     Current Outpatient Medications   Medication Sig Dispense Refill    aspirin 81 MG tablet Take 1 tablet by mouth Daily.      atorvastatin (LIPITOR) 80 MG tablet Take 0.5 tablets by mouth Daily.      fluticasone (FLONASE) 50 MCG/ACT nasal spray 2 sprays into the nostril(s) as directed by provider Daily.      glycopyrrolate (ROBINUL) 1 MG tablet Take 1 tablet by mouth 3 (Three) Times a Day As Needed (vertigo). 60 tablet 5    levothyroxine (SYNTHROID, LEVOTHROID) 25 MCG tablet Take 1 tablet by mouth Every Morning. 90 tablet 1    LUTEIN PO Take 25 mg by mouth Daily.      metoprolol succinate XL (TOPROL-XL) 25 MG 24 hr tablet Take 1 tablet by mouth Daily.      montelukast (SINGULAIR) 10 MG tablet Take 1 tablet by mouth Daily.      Multiple Vitamins-Minerals (MULTIVITAMIN WITH MINERALS) tablet tablet Take 1 tablet by mouth Daily.      omeprazole (priLOSEC) 20 MG capsule Take 1 capsule by mouth Daily.      pregabalin (LYRICA) 100 MG capsule 2 (Two) Times a Day.      pyridostigmine (MESTINON) 60 MG tablet TAKE 1 TABLET THREE TIMES A  tablet 3    Multiple Vitamins-Minerals (OCUVITE ADULT FORMULA PO) Take  by mouth.      tamsulosin (FLOMAX) 0.4 MG capsule 24 hr capsule Take 1 capsule by mouth Daily. To help pass stone FRAGMENTS 30 capsule 0     No current facility-administered medications for this visit.     Review of Systems   Constitutional:  Negative for chills and fever.   HENT:  Negative for congestion.    Respiratory:  Negative for shortness of breath.    Cardiovascular:  Negative for chest pain and leg swelling.   Gastrointestinal:  Negative for constipation, diarrhea, nausea and vomiting.   Musculoskeletal:  Positive for arthralgias. Negative for myalgias.   Skin:  Negative for wound.   Neurological:  Positive for numbness.       OBJECTIVE     Vitals:    12/22/23 0947   BP: 134/78   Pulse: 60   SpO2: 98%         PHYSICAL  EXAM  GEN:   Accompanied by none.     Foot/Ankle Exam    GENERAL  Appearance:  appears stated age and obese  Orientation:  AAOx3  Affect:  appropriate  Gait:  unimpaired  Assistance:  independent  Right shoe gear: casual shoe  Left shoe gear: casual shoe    VASCULAR     Right Foot Vascularity   Dorsalis pedis:  2+  Posterior tibial:  2+  Skin temperature:  warm  Edema grading:  Trace and non-pitting  CFT:  3  Pedal hair growth:  Present  Varicosities:  mild varicosities     Left Foot Vascularity   Dorsalis pedis:  2+  Posterior tibial:  2+  Skin temperature:  warm  Edema grading:  Trace and non-pitting  CFT:  3  Pedal hair growth:  Present  Varicosities:  mild varicosities     NEUROLOGIC     Right Foot Neurologic   Light touch sensation: diminished  Vibratory sensation: diminished  Hot/Cold sensation: diminished  Protective Sensation using [place] Monofilament:   Sites intact: 5  Sites tested: 10     Left Foot Neurologic   Light touch sensation: diminished  Vibratory sensation: diminished  Protective Sensation using [place] Monofilament:   Sites intact: 1  Sites tested: 10    MUSCULOSKELETAL     Right Foot Musculoskeletal   Tenderness:  toenail problem    Arch:  Pes planus  Hallux valgus: Yes       Left Foot Musculoskeletal   Tenderness:  toenail problem  Arch:  Pes planus  Hallux valgus: Yes      MUSCLE STRENGTH     Right Foot Muscle Strength   Foot dorsiflexion:  5  Foot plantar flexion:  5  Foot inversion:  5  Foot eversion:  5     Left Foot Muscle Strength   Foot dorsiflexion:  5  Foot plantar flexion:  4+  Foot inversion:  5  Foot eversion:  5    RANGE OF MOTION     Right Foot Range of Motion   Foot and ankle ROM within normal limits       Left Foot Range of Motion   Foot and ankle ROM within normal limits      DERMATOLOGIC      Right Foot Dermatologic   Skin  Positive for corn and tinea.   Nails  1.  Positive for onychomycosis, abnormal thickness, subungual debris and dystrophic nail.  2.   Positive for elongated, onychomycosis, abnormal thickness, subungual debris and dystrophic nail.  3.  Positive for elongated, onychomycosis, abnormal thickness, subungual debris and dystrophic nail.  4.  Positive for elongated, onychomycosis, abnormal thickness, subungual debris and dystrophic nail.  5.  Positive for elongated, onychomycosis, abnormal thickness, subungual debris and dystrophic nail.     Left Foot Dermatologic   Skin  Positive for corn and tinea.   Nails  1.  Positive for onychomycosis, abnormal thickness, subungual debris and dystrophic nail.  2.  Positive for elongated, onychomycosis, abnormal thickness, subungual debris and dystrophic nail.  3.  Positive for elongated, onychomycosis, abnormal thickness, subungual debris and dystrophic nail.  4.  Positive for elongated, onychomycosis, abnormally thick, subungual debris and dystrophic nail.  5.  Positive for elongated, onychomycosis, abnormally thick, subungual debris and dystrophic nail.    Image:       RADIOLOGY/NUCLEAR:  No results found.    LABORATORY/CULTURE RESULTS:      PATHOLOGY RESULTS:       ASSESSMENT/PLAN     Diagnoses and all orders for this visit:    1. Onychomycosis (Primary)    2. Other polyneuropathy    3. Pes planus of both feet    4. Valgus deformity of both great toes    5. Foot callus    6. BMI 38.0-38.9,adult        Comprehensive lower extremity examination and evaluation was performed.  Discussed findings and treatment plan including risks, benefits, and treatment options with patient in detail. Patient agreed with treatment plan.  After verbal consent obtained, nail(s) x10 debrided of length and thickness with nail nipper without incidence  After verbal consent obtained, calluses x2 pared utilizing dermal curette and/or scalpel without incidence  Patient may maintain nails and calluses at home utilizing emery board or pumice stone between visits as needed   Moisturize lesions on regular basis.  An After Visit Summary was  printed and given to the patient at discharge, including (if requested) any available informative/educational handouts regarding diagnosis, treatment, or medications. All questions were answered to patient/family satisfaction. Should symptoms fail to improve or worsen they agree to call or return to clinic or to go to the Emergency Department. Discussed the importance of following up with any needed screening tests/labs/specialist appointments and any requested follow-up recommended by me today. Importance of maintaining follow-up discussed and patient accepts that missed appointments can delay diagnosis and potentially lead to worsening of conditions.  Return in about 3 months (around 3/22/2024) for Follow-up with Podiatry APRN, Schedule [facility]., or sooner if acute issues arise.    Lab Frequency Next Occurrence   Provide Patient With Instructions on NPO Status Once 03/02/2021   US Thyroid Once 08/30/2023       This document has been electronically signed by Lv Wilkerson DPM on December 22, 2023 10:11 CST

## 2023-12-21 ENCOUNTER — TELEPHONE (OUTPATIENT)
Dept: VASCULAR SURGERY | Facility: CLINIC | Age: 78
End: 2023-12-21
Payer: MEDICARE

## 2023-12-22 ENCOUNTER — OFFICE VISIT (OUTPATIENT)
Dept: PODIATRY | Facility: CLINIC | Age: 78
End: 2023-12-22
Payer: MEDICARE

## 2023-12-22 VITALS
HEART RATE: 60 BPM | DIASTOLIC BLOOD PRESSURE: 78 MMHG | HEIGHT: 74 IN | WEIGHT: 297 LBS | BODY MASS INDEX: 38.12 KG/M2 | OXYGEN SATURATION: 98 % | SYSTOLIC BLOOD PRESSURE: 134 MMHG

## 2023-12-22 DIAGNOSIS — B35.1 ONYCHOMYCOSIS: Primary | ICD-10-CM

## 2023-12-22 DIAGNOSIS — M21.41 PES PLANUS OF BOTH FEET: ICD-10-CM

## 2023-12-22 DIAGNOSIS — M20.12 VALGUS DEFORMITY OF BOTH GREAT TOES: ICD-10-CM

## 2023-12-22 DIAGNOSIS — L84 FOOT CALLUS: ICD-10-CM

## 2023-12-22 DIAGNOSIS — M21.42 PES PLANUS OF BOTH FEET: ICD-10-CM

## 2023-12-22 DIAGNOSIS — M20.11 VALGUS DEFORMITY OF BOTH GREAT TOES: ICD-10-CM

## 2023-12-22 DIAGNOSIS — G62.89 OTHER POLYNEUROPATHY: ICD-10-CM

## 2024-01-24 ENCOUNTER — TELEPHONE (OUTPATIENT)
Dept: NEUROLOGY | Facility: CLINIC | Age: 79
End: 2024-01-24
Payer: MEDICARE

## 2024-01-25 ENCOUNTER — OFFICE VISIT (OUTPATIENT)
Dept: NEUROLOGY | Facility: CLINIC | Age: 79
End: 2024-01-25
Payer: MEDICARE

## 2024-01-25 VITALS
HEIGHT: 74 IN | SYSTOLIC BLOOD PRESSURE: 110 MMHG | DIASTOLIC BLOOD PRESSURE: 72 MMHG | WEIGHT: 295 LBS | BODY MASS INDEX: 37.86 KG/M2 | RESPIRATION RATE: 18 BRPM | HEART RATE: 62 BPM

## 2024-01-25 DIAGNOSIS — G70.00 MYASTHENIA GRAVIS WITHOUT EXACERBATION: Primary | ICD-10-CM

## 2024-01-25 PROCEDURE — 3074F SYST BP LT 130 MM HG: CPT | Performed by: PSYCHIATRY & NEUROLOGY

## 2024-01-25 PROCEDURE — 99214 OFFICE O/P EST MOD 30 MIN: CPT | Performed by: PSYCHIATRY & NEUROLOGY

## 2024-01-25 PROCEDURE — 3078F DIAST BP <80 MM HG: CPT | Performed by: PSYCHIATRY & NEUROLOGY

## 2024-01-25 PROCEDURE — 1159F MED LIST DOCD IN RCRD: CPT | Performed by: PSYCHIATRY & NEUROLOGY

## 2024-01-25 PROCEDURE — 1160F RVW MEDS BY RX/DR IN RCRD: CPT | Performed by: PSYCHIATRY & NEUROLOGY

## 2024-01-25 NOTE — PROGRESS NOTES
Chief Complaint    Subjective        Axel Morris presents to [facility]    History of Present Illness  78-year-old male here for follow-up.  He has done very well since last office visit.  Currently taking Mestinon twice a day.      Past Medical History:   Diagnosis Date   • Abnormal ECG    • Arthritis    • Callus    • Coronary artery disease    • COVID-19 vaccine series completed     pfizer   • Difficulty walking    • Dizziness    • Fallen arches    • Fracture of nasal bones    • GERD (gastroesophageal reflux disease)    • History of transfusion    • HL (hearing loss)    • Hx of colonic polyps    • Hyperlipidemia    • Idiopathic peripheral neuropathy 07/27/2017   • Kidney stone on left side 06/19/2023   • Neuropathy    • Peripheral neuropathy    • Pneumonia    • Primary hypertension 12/23/2021   • Rheumatic fever    • Sinusitis    • Skin cancer    • Sleep apnea    • Tinnitus    • Verruca    • Vision loss           Current Outpatient Medications:   •  aspirin 81 MG tablet, Take 1 tablet by mouth Daily., Disp: , Rfl:   •  atorvastatin (LIPITOR) 80 MG tablet, Take 0.5 tablets by mouth Daily., Disp: , Rfl:   •  fluticasone (FLONASE) 50 MCG/ACT nasal spray, 2 sprays into the nostril(s) as directed by provider Daily., Disp: , Rfl:   •  glycopyrrolate (ROBINUL) 1 MG tablet, Take 1 tablet by mouth 3 (Three) Times a Day As Needed (vertigo)., Disp: 60 tablet, Rfl: 5  •  levothyroxine (SYNTHROID, LEVOTHROID) 25 MCG tablet, Take 1 tablet by mouth Every Morning., Disp: 90 tablet, Rfl: 1  •  LUTEIN PO, Take 25 mg by mouth Daily., Disp: , Rfl:   •  metoprolol succinate XL (TOPROL-XL) 25 MG 24 hr tablet, Take 1 tablet by mouth Daily., Disp: , Rfl:   •  montelukast (SINGULAIR) 10 MG tablet, Take 1 tablet by mouth Daily., Disp: , Rfl:   •  Multiple Vitamins-Minerals (MULTIVITAMIN WITH MINERALS) tablet tablet, Take 1 tablet by mouth Daily., Disp: , Rfl:   •  Multiple Vitamins-Minerals (OCUVITE ADULT  "FORMULA PO), Take  by mouth., Disp: , Rfl:   •  omeprazole (priLOSEC) 20 MG capsule, Take 1 capsule by mouth Daily., Disp: , Rfl:   •  pregabalin (LYRICA) 100 MG capsule, 2 (Two) Times a Day., Disp: , Rfl:   •  pyridostigmine (MESTINON) 60 MG tablet, TAKE 1 TABLET THREE TIMES A DAY, Disp: 270 tablet, Rfl: 3  •  tamsulosin (FLOMAX) 0.4 MG capsule 24 hr capsule, Take 1 capsule by mouth Daily. To help pass stone FRAGMENTS, Disp: 30 capsule, Rfl: 0       Objective   Vital Signs:   /72 (BP Location: Left arm, Patient Position: Sitting)   Pulse 62   Resp 18   Ht 188 cm (74\")   Wt 134 kg (295 lb)   BMI 37.88 kg/m²     Physical Exam  Constitutional:       General: He is awake.   Eyes:      Extraocular Movements: Extraocular movements intact.   Neurological:      Mental Status: He is alert.   Psychiatric:         Speech: Speech normal.      Neurological Exam  Mental Status  Awake and alert. Speech is normal. Language is fluent with no aphasia.    Cranial Nerves  CN III, IV, VI: Extraocular movements intact bilaterally.  CN VII: Full and symmetric facial movement.    Gait  Casual gait is normal including stance, stride, and arm swing.      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 He has been started on mestinon.  He is doing much better. CT Chest negative for thymoma. MGADL 1.     Plan:    1.  Continue Mestinon up to 3 times a day.  2.  Follow up 6 months.  Call sooner with any issues.        Follow Up   No follow-ups on file.  Patient was given instructions and counseling regarding his condition or for health maintenance advice. Please see specific information pulled into the AVS if appropriate.       "

## 2024-02-12 NOTE — PROGRESS NOTES
Chief Complaint  Kidney Stones    Subjective          Axel Morris presents to [facility] UROLOGY   Patient returns today for follow-up of stone disease.  He had no flank pain or significant gross hematuria.  His most recent procedure was:    08/2023: Left Renal ESWL        Current Outpatient Medications:     aspirin 81 MG tablet, Take 1 tablet by mouth Daily., Disp: , Rfl:     atorvastatin (LIPITOR) 80 MG tablet, Take 1 tablet by mouth Daily., Disp: 90 tablet, Rfl: 3    fluticasone (FLONASE) 50 MCG/ACT nasal spray, 2 sprays into the nostril(s) as directed by provider Daily., Disp: , Rfl:     glycopyrrolate (ROBINUL) 1 MG tablet, Take 1 tablet by mouth 3 (Three) Times a Day As Needed (vertigo)., Disp: 60 tablet, Rfl: 5    levothyroxine (SYNTHROID, LEVOTHROID) 25 MCG tablet, Take 1 tablet by mouth Every Morning., Disp: 90 tablet, Rfl: 1    LUTEIN PO, Take 25 mg by mouth Daily., Disp: , Rfl:     metoprolol succinate XL (TOPROL-XL) 25 MG 24 hr tablet, Take 1 tablet by mouth Daily., Disp: , Rfl:     montelukast (SINGULAIR) 10 MG tablet, Take 1 tablet by mouth Daily., Disp: , Rfl:     Multiple Vitamins-Minerals (MULTIVITAMIN WITH MINERALS) tablet tablet, Take 1 tablet by mouth Daily., Disp: , Rfl:     Multiple Vitamins-Minerals (OCUVITE ADULT FORMULA PO), Take  by mouth., Disp: , Rfl:     omeprazole (priLOSEC) 20 MG capsule, Take 1 capsule by mouth Daily., Disp: , Rfl:     pregabalin (LYRICA) 100 MG capsule, 2 (Two) Times a Day., Disp: , Rfl:     pyridostigmine (MESTINON) 60 MG tablet, TAKE 1 TABLET THREE TIMES A DAY (Patient taking differently: 2 (Two) Times a Day.), Disp: 270 tablet, Rfl: 3  Past Medical History:   Diagnosis Date    Abnormal ECG     Arthritis     Callus     Coronary artery disease     COVID-19 vaccine series completed     pfizer    Difficulty walking     Dizziness     Fallen arches     Fracture of nasal bones     GERD (gastroesophageal reflux disease)     History of transfusion      "HL (hearing loss)     Hx of colonic polyps     Hyperlipidemia     Idiopathic peripheral neuropathy 07/27/2017    Kidney stone on left side 06/19/2023    Neuropathy     Peripheral neuropathy     Pneumonia     Primary hypertension 12/23/2021    Rheumatic fever     Sinusitis     Skin cancer     Sleep apnea     Tinnitus     Verruca     Vision loss      Past Surgical History:   Procedure Laterality Date    ADENOIDECTOMY  1953    CARDIAC CATHETERIZATION      CARDIAC SURGERY      HEART STENT    CHOLECYSTECTOMY      COLONOSCOPY N/A 11/30/2017    Procedure: COLONOSCOPY WITH ANESTHESIA;  Surgeon: Matthew Cagle MD;  Location: [facility] ENDOSCOPY;  Service:     COLONOSCOPY W/ POLYPECTOMY  07/12/2012    Adenomatous polyp at 50 cm repeat exam in 5 years    CORONARY STENT PLACEMENT      ENDOSCOPY AND COLONOSCOPY  08/07/2009    Acute ulcerative esophagogastritis grade c, Hyperplastic polyp at 50 cm repeat colonoscopy in 3 years    EXTRACORPOREAL SHOCK WAVE LITHOTRIPSY (ESWL) Left 08/02/2023    Procedure: EXTRACORPOREAL SHOCKWAVE LITHOTRIPSY;  Surgeon: Marcellus Fowler MD;  Location: [facility] OR;  Service: Urology;  Laterality: Left;    EYE SURGERY      DONNIE CATARACT REMOVAL    FLAP HEAD/NECK Left 03/10/2021    Procedure: POSSIBLE FLAP OR GRAFT.;  Surgeon: Roberto Og MD;  Location:  [facility] OR;  Service: ENT;  Laterality: Left;    HEAD/NECK LESION/CYST EXCISION Left 03/10/2021    Procedure: EXCISION OF ATYPICAL PIGMENTED LESION OF THE LEFT CENTRAL TEMPLE REGION WITH POSSIBLE FLAP OR GRAFT. - Left;  Surgeon: Roberto Og MD;  Location: [facility] OR;  Service: ENT;  Laterality: Left;    REPLACEMENT TOTAL KNEE BILATERAL      TONSILLECTOMY             Review of Systems      Objective   PHYSICAL EXAM  Vital Signs:   Temp 97.2 °F (36.2 °C)   Ht 188 cm (74\")   Wt 133 kg (294 lb)   BMI 37.75 kg/m²     Physical Exam      DATA  Result Review :              Results for orders placed or performed in visit on 02/27/24   POC " Urinalysis Dipstick, Multipro    Specimen: Urine   Result Value Ref Range    Color Yellow Yellow, Straw, Dark Yellow, Geraldine    Clarity, UA Clear Clear    Glucose, UA Negative Negative mg/dL    Bilirubin Negative Negative    Ketones, UA Negative Negative    Specific Gravity  1.025 1.005 - 1.030    Blood, UA Trace (A) Negative    pH, Urine 5.5 5.0 - 8.0    Protein, POC Negative Negative mg/dL    Urobilinogen, UA 0.2 E.U./dL Normal, 0.2 E.U./dL    Nitrite, UA Negative Negative    Leukocytes Negative Negative     XR Abdomen KUB (02/27/2024 08:10)   Reviewed report and the images.  3 smaller lower pole stones. Present since ESWL /DLS         ASSESSMENT AND PLAN          Problem List Items Addressed This Visit          Genitourinary and Reproductive     Kidney stone on left side - Primary    Overview     Added automatically from request for surgery [id number]         Relevant Orders    POC Urinalysis Dipstick, Multipro (Completed)    XR abdomen kub (Completed)   Patient is asymptomatic from the stones.  They are in the lower pole so I do not think ESWL would be very effective at getting him stone free.  We briefly discussed ureteroscopy.  Since he is asymptomatic we will just to repeat his imaging in a year.          FOLLOW UP     Return in about 1 year (around 2/27/2025) for KUB before visit.        (Please note that portions of this note were completed with a voice recognition program.)  Marcellus Fowler MD  02/27/24  08:45 CST

## 2024-02-22 ENCOUNTER — OFFICE VISIT (OUTPATIENT)
Dept: CARDIOLOGY | Facility: CLINIC | Age: 79
End: 2024-02-22
Payer: MEDICARE

## 2024-02-22 VITALS
OXYGEN SATURATION: 98 % | WEIGHT: 291 LBS | HEART RATE: 59 BPM | BODY MASS INDEX: 37.35 KG/M2 | HEIGHT: 74 IN | SYSTOLIC BLOOD PRESSURE: 140 MMHG | DIASTOLIC BLOOD PRESSURE: 80 MMHG

## 2024-02-22 DIAGNOSIS — I10 PRIMARY HYPERTENSION: ICD-10-CM

## 2024-02-22 DIAGNOSIS — I25.10 CORONARY ARTERY DISEASE INVOLVING NATIVE CORONARY ARTERY OF NATIVE HEART WITHOUT ANGINA PECTORIS: Primary | ICD-10-CM

## 2024-02-22 DIAGNOSIS — E78.2 MIXED HYPERLIPIDEMIA: ICD-10-CM

## 2024-02-22 PROCEDURE — 99214 OFFICE O/P EST MOD 30 MIN: CPT | Performed by: INTERNAL MEDICINE

## 2024-02-22 PROCEDURE — 1160F RVW MEDS BY RX/DR IN RCRD: CPT | Performed by: INTERNAL MEDICINE

## 2024-02-22 PROCEDURE — 3077F SYST BP >= 140 MM HG: CPT | Performed by: INTERNAL MEDICINE

## 2024-02-22 PROCEDURE — 93000 ELECTROCARDIOGRAM COMPLETE: CPT | Performed by: INTERNAL MEDICINE

## 2024-02-22 PROCEDURE — 1159F MED LIST DOCD IN RCRD: CPT | Performed by: INTERNAL MEDICINE

## 2024-02-22 PROCEDURE — 3079F DIAST BP 80-89 MM HG: CPT | Performed by: INTERNAL MEDICINE

## 2024-02-22 RX ORDER — ATORVASTATIN CALCIUM 80 MG/1
80 TABLET, FILM COATED ORAL DAILY
Qty: 90 TABLET | Refills: 3 | Status: SHIPPED | OUTPATIENT
Start: 2024-02-22

## 2024-02-22 NOTE — PROGRESS NOTES
Subjective:     Encounter Date:02/22/2024      Patient ID: Axel Morris is a 79 y.o. male.    Chief Complaint: Follow-up coronary disease.     History of Present Illness    Mr. Morris was last seen here on 02/02/2023. By way of review, he had a circumflex stent placed by Dr. Robledo in 2018, which resolved symptoms of rather typical angina he was having beforehand, and have not recurred since. When he was last seen here a year ago, he was doing well. No changes were made.     He states that he was a little winded this winter while climbing stairs or walking for a longer period of time.  He mentions that it is not noticeably different and believes it is correlated to getting olde. He reports that he is about 50 to 80 pounds overweight. He does not become nauseated, clammy, or sweaty upon exertional movement. He reports that he is planning to go to [place] in 06/[place]24 while visiting [place].     He states that he has started cutting back this week on his eating habits and has lost about 7 pounds in a week. He loses the first 10 to 15 pounds pretty quick. He does not count his calories. He reports that he does not check his blood pressure at home, but it is usually about 125 or 130 systolic. He notes that he has been speaking to his primary doctor about his elevated cholesterol levels and was advised to increase to 80 mg for his cholesterol medication.     The following portions of the patient's history were reviewed and updated as appropriate: allergies, current medications, past family history, past medical history, past social history, past surgical history, and problem list.    Review of Systems   Constitutional: Positive for weight loss (intentional with portion control.). Negative for malaise/fatigue.   Cardiovascular:  Positive for dyspnea on exertion. Negative for chest pain, claudication, leg swelling, near-syncope, orthopnea, palpitations, paroxysmal nocturnal dyspnea and syncope.   Respiratory:   Positive for shortness of breath.    Hematologic/Lymphatic: Does not bruise/bleed easily.         Current Outpatient Medications:     aspirin 81 MG tablet, Take 1 tablet by mouth Daily., Disp: , Rfl:     atorvastatin (LIPITOR) 80 MG tablet, Take 1 tablet by mouth Daily., Disp: 90 tablet, Rfl: 3    fluticasone (FLONASE) 50 MCG/ACT nasal spray, 2 sprays into the nostril(s) as directed by provider Daily., Disp: , Rfl:     glycopyrrolate (ROBINUL) 1 MG tablet, Take 1 tablet by mouth 3 (Three) Times a Day As Needed (vertigo)., Disp: 60 tablet, Rfl: 5    levothyroxine (SYNTHROID, LEVOTHROID) 25 MCG tablet, Take 1 tablet by mouth Every Morning., Disp: 90 tablet, Rfl: 1    LUTEIN PO, Take 25 mg by mouth Daily., Disp: , Rfl:     metoprolol succinate XL (TOPROL-XL) 25 MG 24 hr tablet, Take 1 tablet by mouth Daily., Disp: , Rfl:     montelukast (SINGULAIR) 10 MG tablet, Take 1 tablet by mouth Daily., Disp: , Rfl:     Multiple Vitamins-Minerals (MULTIVITAMIN WITH MINERALS) tablet tablet, Take 1 tablet by mouth Daily., Disp: , Rfl:     Multiple Vitamins-Minerals (OCUVITE ADULT FORMULA PO), Take  by mouth., Disp: , Rfl:     omeprazole (priLOSEC) 20 MG capsule, Take 1 capsule by mouth Daily., Disp: , Rfl:     pregabalin (LYRICA) 100 MG capsule, 2 (Two) Times a Day., Disp: , Rfl:     pyridostigmine (MESTINON) 60 MG tablet, TAKE 1 TABLET THREE TIMES A DAY (Patient taking differently: 2 (Two) Times a Day.), Disp: 270 tablet, Rfl: 3       Objective:      Vitals:    02/22/24 1356   BP: 140/80   Pulse: 59   SpO2: 98%     Vitals and nursing note reviewed.   Constitutional:       General: Not in acute distress.     Appearance: Not in distress.   Neck:      Vascular: No JVD or JVR. JVD normal.   Pulmonary:      Effort: Pulmonary effort is normal.      Breath sounds: Normal breath sounds.   Cardiovascular:      Normal rate. Regular rhythm.      Murmurs: There is no murmur.      No gallop.  No rub.   Pulses:     Intact distal pulses.    Edema:     Peripheral edema absent.   Skin:     General: Skin is warm and dry.   Neurological:      Mental Status: Alert, oriented to person, place, and time and oriented to person, place and time.     Lab Review:         ECG 12 Lead    Date/Time: 2/22/2024 2:10 PM  Performed by: Evangelist Tamayo MD    Authorized by: Evangelist Tamayo MD  Comparison: compared with previous ECG from 7/12/2023  Comparison to previous ECG: No significant change.   Rhythm: sinus bradycardia  BPM: 59  Other findings: poor R wave progression  Clinical impression comment: Borderline ECG.  Comments: Left axis deviation.        Assessment/Plan:     Problem List Items Addressed This Visit (all established and stable)         Cardiac and Vasculature    Coronary artery disease involving native coronary artery of native heart without angina pectoris - Primary    Overview     3.0 x 28 mm HENNA to LCx 12/26/18 (Robledo)           Mixed hyperlipidemia    Relevant Medications    atorvastatin (LIPITOR) 80 MG tablet    Primary hypertension         Recommendations/plans:    All issues are stable except for his LDL, which is above goal at 82 mg/dL. Therefore, I have advised that he increase atorvastatin, from a half tablet of the 80 mg of atorvastatin to a full tablet to achieve a dose of 80 mg daily. Goal LDL is less than 70 mg/dL, option less than 55 mg/dL.     I encouraged him to continue to increase his activity level and work on weight loss efforts. I suggested he download the NOZA cydney and conduct a full daily food diary to assess caloric intake and look for ways to create a 500-calorie deficit per day to achieve a 3500-calorie deficit per week.     Otherwise, continue aspirin without interruption and other therapies for risk factor control. We will plan on a 1-year follow-up unless he notices any sort of limiting exertional symptoms as he increases his workload, in which case I would have a low threshold to order a stress  test.          Transcribed from ambient dictation for Evangelist Tamayo MD by Celi Hale.  02/22/24   16:13 CST    Patient or patient representative verbalized consent to the visit recording.    I Evangelist Tamayo MD have personally performed the services described in this document as scribed by the above individual, and it is both accurate and complete.   I have edited each component as needed.    Evangelist Tamayo MD  2/24/2024  12:16 CST

## 2024-02-26 ENCOUNTER — TELEPHONE (OUTPATIENT)
Dept: UROLOGY | Facility: CLINIC | Age: 79
End: 2024-02-26
Payer: MEDICARE

## 2024-02-27 ENCOUNTER — OFFICE VISIT (OUTPATIENT)
Dept: UROLOGY | Facility: CLINIC | Age: 79
End: 2024-02-27
Payer: MEDICARE

## 2024-02-27 ENCOUNTER — HOSPITAL ENCOUNTER (OUTPATIENT)
Dept: GENERAL RADIOLOGY | Facility: HOSPITAL | Age: 79
Discharge: HOME | End: 2024-02-27
Admitting: UROLOGY
Payer: MEDICARE

## 2024-02-27 VITALS — BODY MASS INDEX: 37.73 KG/M2 | HEIGHT: 74 IN | TEMPERATURE: 97.2 F | WEIGHT: 294 LBS

## 2024-02-27 DIAGNOSIS — N20.0 KIDNEY STONE ON LEFT SIDE: ICD-10-CM

## 2024-02-27 DIAGNOSIS — N20.0 KIDNEY STONE ON LEFT SIDE: Primary | ICD-10-CM

## 2024-02-27 LAB
BILIRUB BLD-MCNC: NEGATIVE MG/DL
CLARITY, POC: CLEAR
COLOR UR: YELLOW
GLUCOSE UR STRIP-MCNC: NEGATIVE MG/DL
KETONES UR QL: NEGATIVE
LEUKOCYTE EST, POC: NEGATIVE
NITRITE UR-MCNC: NEGATIVE MG/ML
PH UR: 5.5 [PH] (ref 5–8)
PROT UR STRIP-MCNC: NEGATIVE MG/DL
RBC # UR STRIP: ABNORMAL /UL
SP GR UR: 1.02 (ref 1–1.03)
UROBILINOGEN UR QL: ABNORMAL

## 2024-02-27 PROCEDURE — 74018 RADEX ABDOMEN 1 VIEW: CPT

## 2024-02-27 PROCEDURE — 1159F MED LIST DOCD IN RCRD: CPT | Performed by: UROLOGY

## 2024-02-27 PROCEDURE — 1160F RVW MEDS BY RX/DR IN RCRD: CPT | Performed by: UROLOGY

## 2024-02-27 PROCEDURE — 99213 OFFICE O/P EST LOW 20 MIN: CPT | Performed by: UROLOGY

## 2024-02-27 PROCEDURE — 81003 URINALYSIS AUTO W/O SCOPE: CPT | Performed by: UROLOGY

## 2024-03-13 NOTE — PROGRESS NOTES
[facility] - PODIATRY    Today's Date: 03/22/2024     Patient Name: Axel Morris  MRN: [id number]  CSN: [id number]  PCP: Isabel Best MD  Referring Provider: No ref. provider found    SUBJECTIVE     Chief Complaint   Patient presents with    Follow-up     Isabel Best MD 10/17/2023 3 MTH FU- pt states feet doing ok other than neuropathy- pt denies pain other than neuropathy     HPI: Axel Morris, a 79 y.o.male, comes to clinic as a(n) established patient complaining of painful toenails and complaining of neuropathy of both feet . Patient has h/o arthritis, CA, CAD, dizziness, GERD, HLD, Neuropathy, pneumonia, sleep apnea, tinnitus .  States that his toenails are long, thickened, irregular, and dystrophic and that he is unable to care for them himself at home.  Reports neuropathy from the knee down bilaterally; states he is unsure of the source of his neuropathy though he does report issues with his disks in his back and states he also has previous knee surgery that he believes is contributing to neuropathy as well.  Reports calluses to the great toes has returned.  Denies pain in feet secondary to neuropathy . Relates previous treatment(s) including foot care by podiatry . Denies any constitutional symptoms. No other pedal complaints at this time.    Past Medical History:   Diagnosis Date    Abnormal ECG     Arthritis     Callus     Coronary artery disease     COVID-19 vaccine series completed     pfizer    Difficulty walking     Dizziness     Fallen arches     Fracture of nasal bones     GERD (gastroesophageal reflux disease)     History of transfusion     HL (hearing loss)     Hx of colonic polyps     Hyperlipidemia     Idiopathic peripheral neuropathy 07/27/2017    Kidney stone on left side 06/19/2023    Neuropathy     Peripheral neuropathy     Pneumonia     Primary hypertension 12/23/2021    Rheumatic fever     Sinusitis     Skin cancer     Sleep apnea     Tinnitus     Verruca     Vision  loss      Past Surgical History:   Procedure Laterality Date    ADENOIDECTOMY      CARDIAC CATHETERIZATION      CARDIAC SURGERY      HEART STENT    CHOLECYSTECTOMY      COLONOSCOPY N/A 2017    Procedure: COLONOSCOPY WITH ANESTHESIA;  Surgeon: Matthew Cagle MD;  Location:  [facility] ENDOSCOPY;  Service:     COLONOSCOPY W/ POLYPECTOMY  2012    Adenomatous polyp at 50 cm repeat exam in 5 years    CORONARY STENT PLACEMENT      ENDOSCOPY AND COLONOSCOPY  2009    Acute ulcerative esophagogastritis grade c, Hyperplastic polyp at 50 cm repeat colonoscopy in 3 years    EXTRACORPOREAL SHOCK WAVE LITHOTRIPSY (ESWL) Left 2023    Procedure: EXTRACORPOREAL SHOCKWAVE LITHOTRIPSY;  Surgeon: Marcellus Fowler MD;  Location:  [facility] OR;  Service: Urology;  Laterality: Left;    EYE SURGERY      DONNIE CATARACT REMOVAL    FLAP HEAD/NECK Left 03/10/2021    Procedure: POSSIBLE FLAP OR GRAFT.;  Surgeon: Roberto Og MD;  Location:  [facility] OR;  Service: ENT;  Laterality: Left;    HEAD/NECK LESION/CYST EXCISION Left 03/10/2021    Procedure: EXCISION OF ATYPICAL PIGMENTED LESION OF THE LEFT CENTRAL TEMPLE REGION WITH POSSIBLE FLAP OR GRAFT. - Left;  Surgeon: Roberto Og MD;  Location:  [facility] OR;  Service: ENT;  Laterality: Left;    REPLACEMENT TOTAL KNEE BILATERAL      TONSILLECTOMY       Family History   Problem Relation Age of Onset    Heart failure Father     Rheum arthritis Father     Cancer Brother     Colon cancer Neg Hx     Colon polyps Neg Hx      Social History     Socioeconomic History    Marital status:    Tobacco Use    Smoking status: Former     Current packs/day: 0.00     Average packs/day: 1 pack/day for 10.0 years (10.0 ttl pk-yrs)     Types: Cigarettes     Start date: 1968     Quit date: 1978     Years since quittin.6     Passive exposure: Never    Smokeless tobacco: Never   Vaping Use    Vaping status: Never Used   Substance and Sexual Activity    Alcohol use: Never     Drug use: Never    Sexual activity: Defer     Allergies   Allergen Reactions    Ampicillin Rash     Current Outpatient Medications   Medication Sig Dispense Refill    aspirin 81 MG tablet Take 1 tablet by mouth Daily.      atorvastatin (LIPITOR) 80 MG tablet Take 1 tablet by mouth Daily. 90 tablet 3    fluticasone (FLONASE) 50 MCG/ACT nasal spray 2 sprays into the nostril(s) as directed by provider Daily.      glycopyrrolate (ROBINUL) 1 MG tablet Take 1 tablet by mouth 3 (Three) Times a Day As Needed (vertigo). 60 tablet 5    levothyroxine (SYNTHROID, LEVOTHROID) 25 MCG tablet Take 1 tablet by mouth Every Morning. 90 tablet 1    LUTEIN PO Take 25 mg by mouth Daily.      metoprolol succinate XL (TOPROL-XL) 25 MG 24 hr tablet Take 1 tablet by mouth Daily.      montelukast (SINGULAIR) 10 MG tablet Take 1 tablet by mouth Daily.      Multiple Vitamins-Minerals (MULTIVITAMIN WITH MINERALS) tablet tablet Take 1 tablet by mouth Daily.      Multiple Vitamins-Minerals (OCUVITE ADULT FORMULA PO) Take  by mouth.      omeprazole (priLOSEC) 20 MG capsule Take 1 capsule by mouth Daily.      pregabalin (LYRICA) 100 MG capsule 2 (Two) Times a Day.      pyridostigmine (MESTINON) 60 MG tablet TAKE 1 TABLET THREE TIMES A DAY (Patient taking differently: 2 (Two) Times a Day.) 270 tablet 3     No current facility-administered medications for this visit.     Review of Systems   Constitutional:  Negative for chills and fever.   HENT:  Negative for congestion.    Respiratory:  Negative for shortness of breath.    Cardiovascular:  Negative for chest pain and leg swelling.   Gastrointestinal:  Negative for constipation, diarrhea, nausea and vomiting.   Musculoskeletal:  Positive for arthralgias. Negative for myalgias.   Skin:  Negative for wound.   Neurological:  Positive for numbness.       OBJECTIVE     Vitals:    03/22/24 0942   BP: 124/66   Pulse: 58   SpO2: 97%         PHYSICAL EXAM  GEN:   Accompanied by none.     Foot/Ankle  Exam    GENERAL  Appearance:  appears stated age and obese  Orientation:  AAOx3  Affect:  appropriate  Gait:  unimpaired  Assistance:  independent  Right shoe gear: casual shoe  Left shoe gear: casual shoe    VASCULAR     Right Foot Vascularity   Dorsalis pedis:  2+  Posterior tibial:  2+  Skin temperature:  warm  Edema grading:  Trace and non-pitting  CFT:  3  Pedal hair growth:  Present  Varicosities:  mild varicosities     Left Foot Vascularity   Dorsalis pedis:  2+  Posterior tibial:  2+  Skin temperature:  warm  Edema grading:  Trace and non-pitting  CFT:  3  Pedal hair growth:  Present  Varicosities:  mild varicosities     NEUROLOGIC     Right Foot Neurologic   Light touch sensation: diminished  Vibratory sensation: diminished  Hot/Cold sensation: diminished  Protective Sensation using [place]-Nikita Monofilament:   Sites intact: 5  Sites tested: 10     Left Foot Neurologic   Light touch sensation: diminished  Vibratory sensation: diminished  Protective Sensation using [place]-Nikita Monofilament:   Sites intact: 1  Sites tested: 10    MUSCULOSKELETAL     Right Foot Musculoskeletal   Tenderness:  toenail problem    Arch:  Pes planus  Hallux valgus: Yes       Left Foot Musculoskeletal   Tenderness:  toenail problem  Arch:  Pes planus  Hallux valgus: Yes      MUSCLE STRENGTH     Right Foot Muscle Strength   Foot dorsiflexion:  5  Foot plantar flexion:  5  Foot inversion:  5  Foot eversion:  5     Left Foot Muscle Strength   Foot dorsiflexion:  5  Foot plantar flexion:  4+  Foot inversion:  5  Foot eversion:  5    RANGE OF MOTION     Right Foot Range of Motion   Foot and ankle ROM within normal limits       Left Foot Range of Motion   Foot and ankle ROM within normal limits      DERMATOLOGIC      Right Foot Dermatologic   Skin  Positive for corn and tinea.   Nails  1.  Positive for onychomycosis, abnormal thickness, subungual debris and dystrophic nail.  2.  Positive for elongated, onychomycosis, abnormal  thickness, subungual debris and dystrophic nail.  3.  Positive for elongated, onychomycosis, abnormal thickness, subungual debris and dystrophic nail.  4.  Positive for elongated, onychomycosis, abnormal thickness, subungual debris and dystrophic nail.  5.  Positive for elongated, onychomycosis, abnormal thickness, subungual debris and dystrophic nail.     Left Foot Dermatologic   Skin  Positive for corn and tinea.   Nails  1.  Positive for onychomycosis, abnormal thickness, subungual debris and dystrophic nail.  2.  Positive for elongated, onychomycosis, abnormal thickness, subungual debris and dystrophic nail.  3.  Positive for elongated, onychomycosis, abnormal thickness, subungual debris and dystrophic nail.  4.  Positive for elongated, onychomycosis, abnormally thick, subungual debris and dystrophic nail.  5.  Positive for elongated, onychomycosis, abnormally thick, subungual debris and dystrophic nail.    Image:       RADIOLOGY/NUCLEAR:  XR Abdomen KUB    Result Date: 2/27/2024  Narrative: EXAM: XR ABDOMEN KUB-  DATE: 2/27/2024 7:06 AM  HISTORY: kidney stone; N20.0-Calculus of kidney   COMPARISON: 9/25/2023.  TECHNIQUE:  Supine view of the abdomen. 1 images.  FINDINGS:  Limited evaluation for free air on supine imaging. Nonobstructive supine bowel gas pattern. Moderate amount of colonic gas and stool.  There are are at least 3 calcifications projecting over the LEFT kidney, largest measures 7 mm, favor renal calculi.  Degenerative changes in the spine with levocurvature. No acute or suspicious bony finding.       Impression: 1. LEFT renal calculi, largest measuring 7 mm.  This report was signed and finalized on 2/27/2024 8:23 AM by Dr Pilar Pickens MD.       LABORATORY/CULTURE RESULTS:      PATHOLOGY RESULTS:       ASSESSMENT/PLAN     Diagnoses and all orders for this visit:    1. Onychomycosis (Primary)    2. Other polyneuropathy    3. Pes planus of both feet    4. Valgus deformity of both great toes    5.  Foot callus    6. BMI 37.0-37.9, adult          Comprehensive lower extremity examination and evaluation was performed.  Discussed findings and treatment plan including risks, benefits, and treatment options with patient in detail. Patient agreed with treatment plan.  After verbal consent obtained, nail(s) x10 debrided of length and thickness with nail nipper without incidence  After verbal consent obtained, calluses x2 pared utilizing dermal curette and/or scalpel without incidence  Patient may maintain nails and calluses at home utilizing emery board or pumice stone between visits as needed   Moisturize lesions on regular basis.  Class 2 Severe Obesity (BMI >=35 and <=39.9). Obesity-related health conditions include the following: none. Obesity is improving with lifestyle modifications. BMI is is above average; BMI management plan is completed. We discussed portion control and increasing exercise.  An After Visit Summary was printed and given to the patient at discharge, including (if requested) any available informative/educational handouts regarding diagnosis, treatment, or medications. All questions were answered to patient/family satisfaction. Should symptoms fail to improve or worsen they agree to call or return to clinic or to go to the Emergency Department. Discussed the importance of following up with any needed screening tests/labs/specialist appointments and any requested follow-up recommended by me today. Importance of maintaining follow-up discussed and patient accepts that missed appointments can delay diagnosis and potentially lead to worsening of conditions.  Return in about 3 months (around 6/22/2024) for Follow-up with Podiatry APRN, Schedule [facility]., or sooner if acute issues arise.    Lab Frequency Next Occurrence   Provide Patient With Instructions on NPO Status Once 03/02/2021   US Thyroid Once 08/30/2023       This document has been electronically signed by Lv Wilkerson DPM on  March 22, 2024 09:58 CDT

## 2024-03-19 RX ORDER — MONTELUKAST SODIUM 10 MG/1
10 TABLET ORAL NIGHTLY
Qty: 90 TABLET | Refills: 3 | Status: SHIPPED | OUTPATIENT
Start: 2024-03-19

## 2024-03-21 ENCOUNTER — TELEPHONE (OUTPATIENT)
Dept: PODIATRY | Facility: CLINIC | Age: 79
End: 2024-03-21
Payer: MEDICARE

## 2024-03-22 ENCOUNTER — OFFICE VISIT (OUTPATIENT)
Dept: PODIATRY | Facility: CLINIC | Age: 79
End: 2024-03-22
Payer: MEDICARE

## 2024-03-22 VITALS
BODY MASS INDEX: 37.47 KG/M2 | HEART RATE: 58 BPM | DIASTOLIC BLOOD PRESSURE: 66 MMHG | WEIGHT: 292 LBS | OXYGEN SATURATION: 97 % | SYSTOLIC BLOOD PRESSURE: 124 MMHG | HEIGHT: 74 IN

## 2024-03-22 DIAGNOSIS — M21.42 PES PLANUS OF BOTH FEET: ICD-10-CM

## 2024-03-22 DIAGNOSIS — G60.9 IDIOPATHIC PERIPHERAL NEUROPATHY: ICD-10-CM

## 2024-03-22 DIAGNOSIS — L84 FOOT CALLUS: ICD-10-CM

## 2024-03-22 DIAGNOSIS — B35.1 ONYCHOMYCOSIS: Primary | ICD-10-CM

## 2024-03-22 DIAGNOSIS — G62.89 OTHER POLYNEUROPATHY: ICD-10-CM

## 2024-03-22 DIAGNOSIS — M20.12 VALGUS DEFORMITY OF BOTH GREAT TOES: ICD-10-CM

## 2024-03-22 DIAGNOSIS — M21.41 PES PLANUS OF BOTH FEET: ICD-10-CM

## 2024-03-22 DIAGNOSIS — M20.11 VALGUS DEFORMITY OF BOTH GREAT TOES: ICD-10-CM

## 2024-03-22 NOTE — TELEPHONE ENCOUNTER
Justin Ann called to request a refill on his medication.      Last office visit : 10/17/2023   Next office visit : 4/18/2024     Last UDS: No results found for: \"LABAMPH\", \"LABBARB\", \"LABBENZ\", \"BUPRENUR\", \"COCAIMETSCRU\", \"GABAPENTIN\", \"MDMA\", \"METAMPU\", \"OPIATESCREENURINE\", \"OXTCOSU\", \"PHENCYCLIDINESCREENURINE\", \"PROPOXYPHENE\", \"THCSCREENUR\", \"TRICYUR\"    Last César: 10/13/23  Medication Contract: 10/17/23     Requested Prescriptions     Pending Prescriptions Disp Refills    pregabalin (LYRICA) 100 MG capsule 180 capsule 2     Sig: Take 1 capsule by mouth 2 times daily for 82 days.         Please approve or refuse this medication.   Jovita Gusman MA

## 2024-03-25 RX ORDER — PREGABALIN 100 MG/1
100 CAPSULE ORAL 2 TIMES DAILY
Qty: 180 CAPSULE | Refills: 1 | Status: SHIPPED | OUTPATIENT
Start: 2024-03-25 | End: 2024-03-26 | Stop reason: SDUPTHER

## 2024-03-26 DIAGNOSIS — G60.9 IDIOPATHIC PERIPHERAL NEUROPATHY: ICD-10-CM

## 2024-03-26 RX ORDER — PREGABALIN 100 MG/1
100 CAPSULE ORAL 2 TIMES DAILY
Qty: 180 CAPSULE | Refills: 1 | Status: SHIPPED | OUTPATIENT
Start: 2024-03-26 | End: 2024-09-22

## 2024-03-29 RX ORDER — LEVOTHYROXINE SODIUM 0.03 MG/1
25 TABLET ORAL EVERY MORNING
Qty: 90 TABLET | Refills: 3 | Status: SHIPPED | OUTPATIENT
Start: 2024-03-29

## 2024-04-10 ENCOUNTER — TELEPHONE (OUTPATIENT)
Dept: OTOLARYNGOLOGY | Facility: CLINIC | Age: 79
End: 2024-04-10
Payer: MEDICARE

## 2024-04-10 ENCOUNTER — OFFICE VISIT (OUTPATIENT)
Dept: OTOLARYNGOLOGY | Facility: CLINIC | Age: 79
End: 2024-04-10
Payer: MEDICARE

## 2024-04-10 ENCOUNTER — LAB (OUTPATIENT)
Dept: LAB | Facility: HOSPITAL | Age: 79
End: 2024-04-10
Payer: MEDICARE

## 2024-04-10 VITALS
TEMPERATURE: 98.2 F | DIASTOLIC BLOOD PRESSURE: 77 MMHG | BODY MASS INDEX: 37.73 KG/M2 | HEART RATE: 69 BPM | WEIGHT: 294 LBS | HEIGHT: 74 IN | SYSTOLIC BLOOD PRESSURE: 115 MMHG

## 2024-04-10 DIAGNOSIS — E04.2 MULTIPLE THYROID NODULES: ICD-10-CM

## 2024-04-10 DIAGNOSIS — L98.9 SKIN LESION: ICD-10-CM

## 2024-04-10 DIAGNOSIS — E04.2 MULTIPLE THYROID NODULES: Primary | ICD-10-CM

## 2024-04-10 LAB — TSH SERPL DL<=0.05 MIU/L-ACNC: 2.51 UIU/ML (ref 0.27–4.2)

## 2024-04-10 PROCEDURE — 36415 COLL VENOUS BLD VENIPUNCTURE: CPT

## 2024-04-10 PROCEDURE — 84443 ASSAY THYROID STIM HORMONE: CPT

## 2024-04-10 NOTE — PROGRESS NOTES
YOB: 1945  Location: [facility]  Location Address: [address] [place] [address], [facility], [address] [place] [address]  Location Phone: [phone]    Chief Complaint   Patient presents with    Thyroid Problem       History of Present Illness  Axel Morris is a 79 y.o. male.  Axel Morris is here for follow up of ENT complaints. The patient has had problems with thyroid nodules   Nodules have been present for the past several years.   Patient denies difficulty swallowing, sore throat or globus sensation   He is not currently on thyroid replacement therapy   He is also s/p excision of atypical lesion of left temple 3/2021 pathology demonstrated solar lentigo   He is followed by dermatology       TSH (04/10/2024 11:16)    US Thyroid (04/10/2024 11:42)   Past Medical History:   Diagnosis Date    Abnormal ECG     Arthritis     Callus     Coronary artery disease     COVID-19 vaccine series completed     pfizer    Difficulty walking     Dizziness     Fallen arches     Fracture of nasal bones     GERD (gastroesophageal reflux disease)     History of transfusion     HL (hearing loss)     Hx of colonic polyps     Hyperlipidemia     Idiopathic peripheral neuropathy 2017    Kidney stone on left side 2023    Neuropathy     Peripheral neuropathy     Pneumonia     Primary hypertension 2021    Rheumatic fever     Sinusitis     Skin cancer     Sleep apnea     Tinnitus     Verruca     Vision loss        Past Surgical History:   Procedure Laterality Date    ADENOIDECTOMY      CARDIAC CATHETERIZATION      CARDIAC SURGERY      HEART STENT    CHOLECYSTECTOMY      COLONOSCOPY N/A 2017    Procedure: COLONOSCOPY WITH ANESTHESIA;  Surgeon: Matthew Cagle MD;  Location: [facility] ENDOSCOPY;  Service:     COLONOSCOPY W/ POLYPECTOMY  2012    Adenomatous polyp at 50 cm repeat exam in 5 years    CORONARY STENT PLACEMENT      ENDOSCOPY AND COLONOSCOPY  2009    Acute ulcerative esophagogastritis grade c,  Hyperplastic polyp at 50 cm repeat colonoscopy in 3 years    EXTRACORPOREAL SHOCK WAVE LITHOTRIPSY (ESWL) Left 08/02/2023    Procedure: EXTRACORPOREAL SHOCKWAVE LITHOTRIPSY;  Surgeon: Marcellus Fowler MD;  Location:  [facility] OR;  Service: Urology;  Laterality: Left;    EYE SURGERY      DONNIE CATARACT REMOVAL    FLAP HEAD/NECK Left 03/10/2021    Procedure: POSSIBLE FLAP OR GRAFT.;  Surgeon: Roberto Og MD;  Location:  [facility] OR;  Service: ENT;  Laterality: Left;    HEAD/NECK LESION/CYST EXCISION Left 03/10/2021    Procedure: EXCISION OF ATYPICAL PIGMENTED LESION OF THE LEFT CENTRAL TEMPLE REGION WITH POSSIBLE FLAP OR GRAFT. - Left;  Surgeon: Roberto Og MD;  Location:  [facility] OR;  Service: ENT;  Laterality: Left;    REPLACEMENT TOTAL KNEE BILATERAL      TONSILLECTOMY         Outpatient Medications Marked as Taking for the 4/10/24 encounter (Office Visit) with Abeba Medina APRN   Medication Sig Dispense Refill    aspirin 81 MG tablet Take 1 tablet by mouth Daily. Dr. Tamayo      atorvastatin (LIPITOR) 80 MG tablet Take 1 tablet by mouth Daily. 90 tablet 3    fluticasone (FLONASE) 50 MCG/ACT nasal spray 2 sprays into the nostril(s) as directed by provider Daily.      glycopyrrolate (ROBINUL) 1 MG tablet Take 1 tablet by mouth 3 (Three) Times a Day As Needed (vertigo). 60 tablet 5    levothyroxine (SYNTHROID, LEVOTHROID) 25 MCG tablet TAKE 1 TABLET EVERY MORNING 90 tablet 3    LUTEIN PO Take 25 mg by mouth Daily.      metoprolol succinate XL (TOPROL-XL) 25 MG 24 hr tablet Take 1 tablet by mouth Daily.      montelukast (SINGULAIR) 10 MG tablet Take 1 tablet by mouth Daily.      Multiple Vitamins-Minerals (MULTIVITAMIN WITH MINERALS) tablet tablet Take 1 tablet by mouth Daily.      Multiple Vitamins-Minerals (OCUVITE ADULT FORMULA PO) Take  by mouth.      omeprazole (priLOSEC) 20 MG capsule Take 1 capsule by mouth Daily.      pregabalin (LYRICA) 100 MG capsule 2 (Two) Times a Day.      pyridostigmine  (MESTINON) 60 MG tablet TAKE 1 TABLET THREE TIMES A DAY (Patient taking differently: 2 (Two) Times a Day.) 270 tablet 3       Ampicillin    Family History   Problem Relation Age of Onset    Heart failure Father     Rheum arthritis Father     Cancer Brother     Colon cancer Neg Hx     Colon polyps Neg Hx        Social History     Socioeconomic History    Marital status:    Tobacco Use    Smoking status: Former     Current packs/day: 0.00     Average packs/day: 1 pack/day for 10.0 years (10.0 ttl pk-yrs)     Types: Cigarettes     Start date: 1968     Quit date: 1978     Years since quittin.7     Passive exposure: Never    Smokeless tobacco: Never   Vaping Use    Vaping status: Never Used   Substance and Sexual Activity    Alcohol use: Never    Drug use: Never    Sexual activity: Defer       Review of Systems   Constitutional: Negative.    HENT: Negative.         Vitals:    04/10/24 1144   BP: 115/77   Pulse: 69   Temp: 98.2 °F (36.8 °C)       Body mass index is 37.75 kg/m².    Objective     Physical Exam  Vitals reviewed.   Constitutional:       Appearance: He is obese.   HENT:      Head: Normocephalic.      Right Ear: Tympanic membrane, ear canal and external ear normal.      Left Ear: Tympanic membrane, ear canal and external ear normal.      Nose: Nose normal.      Mouth/Throat:      Lips: Pink.      Mouth: Mucous membranes are moist.      Pharynx: Uvula midline.   Neck:      Comments: Mild thyromegaly   Palpable bilateral thyroid nodules     Musculoskeletal:      Cervical back: Full passive range of motion without pain.   Neurological:      Mental Status: He is alert.         Assessment & Plan   Diagnoses and all orders for this visit:    1. Multiple thyroid nodules (Primary)  -     US Thyroid; Future    2. Skin lesion      * Surgery not found *  Orders Placed This Encounter   Procedures    US Thyroid     Standing Status:   Future     Standing Expiration Date:   4/10/2025     Order Specific  Question:   Reason for Exam:     Answer:   Thyroid disease     Order Specific Question:   Release to patient     Answer:   Routine Release [[id number]]     No follow-ups on file.     Repeat thyroid ultrasound in one year   Call for new/worsening concerns     Patient Instructions   The patient has a thyroid nodule, which is relatively small, and studies do not suggest a malignancy. I have recommended observation with follow-up with me for repeat ultrasound. I explained the pathology of thyroid nodules including the risks of cancer.

## 2024-04-10 NOTE — TELEPHONE ENCOUNTER
Patient notified  ----- Message from ALEXUS Pacheco sent at 4/10/2024 12:40 PM CDT -----  Normal

## 2024-04-10 NOTE — PATIENT INSTRUCTIONS
The patient has a thyroid nodule, which is relatively small, and studies do not suggest a malignancy. I have recommended observation with follow-up with me for repeat ultrasound. I explained the pathology of thyroid nodules including the risks of cancer.

## 2024-04-16 ENCOUNTER — TELEPHONE (OUTPATIENT)
Dept: OTOLARYNGOLOGY | Facility: CLINIC | Age: 79
End: 2024-04-16
Payer: MEDICARE

## 2024-04-16 NOTE — TELEPHONE ENCOUNTER
----- Message from ALEXUS Pacheco sent at 4/16/2024  4:00 PM CDT -----  Radiology is recommending a biopsy of one of the nodules that has changed in consistency somewhat since last exam  Although it hasn't changed in size   I do not see record where this was biopsied in the past

## 2024-04-18 ENCOUNTER — TELEPHONE (OUTPATIENT)
Dept: OTOLARYNGOLOGY | Facility: CLINIC | Age: 79
End: 2024-04-18
Payer: MEDICARE

## 2024-04-18 DIAGNOSIS — E04.2 MULTIPLE THYROID NODULES: Primary | ICD-10-CM

## 2024-04-22 DIAGNOSIS — I10 ESSENTIAL HYPERTENSION: ICD-10-CM

## 2024-04-22 RX ORDER — METOPROLOL SUCCINATE 25 MG/1
TABLET, EXTENDED RELEASE ORAL
Qty: 90 TABLET | Refills: 3 | Status: SHIPPED | OUTPATIENT
Start: 2024-04-22

## 2024-04-29 ENCOUNTER — HOSPITAL ENCOUNTER (OUTPATIENT)
Dept: ULTRASOUND IMAGING | Facility: HOSPITAL | Age: 79
Discharge: HOME | End: 2024-04-29
Admitting: NURSE PRACTITIONER
Payer: MEDICARE

## 2024-04-29 DIAGNOSIS — E04.2 MULTIPLE THYROID NODULES: ICD-10-CM

## 2024-04-29 PROCEDURE — 88112 CYTOPATH CELL ENHANCE TECH: CPT | Performed by: NURSE PRACTITIONER

## 2024-04-29 PROCEDURE — 88177 CYTP FNA EVAL EA ADDL: CPT | Performed by: NURSE PRACTITIONER

## 2024-04-29 PROCEDURE — 88172 CYTP DX EVAL FNA 1ST EA SITE: CPT | Performed by: NURSE PRACTITIONER

## 2024-04-29 PROCEDURE — 76942 ECHO GUIDE FOR BIOPSY: CPT

## 2024-04-29 RX ORDER — LIDOCAINE HYDROCHLORIDE 10 MG/ML
5 INJECTION, SOLUTION INFILTRATION; PERINEURAL ONCE
Status: DISPENSED | OUTPATIENT
Start: 2024-04-29

## 2024-05-02 ENCOUNTER — TELEPHONE (OUTPATIENT)
Dept: OTOLARYNGOLOGY | Facility: CLINIC | Age: 79
End: 2024-05-02
Payer: MEDICARE

## 2024-05-02 LAB
BEAKER LAB AP INTRAOPERATIVE CONSULTATION: NORMAL
CYTO UR: NORMAL
LAB AP CASE REPORT: NORMAL
LAB AP DIAGNOSIS COMMENT: NORMAL
Lab: NORMAL
PATH REPORT.FINAL DX SPEC: NORMAL
PATH REPORT.GROSS SPEC: NORMAL

## 2024-05-02 NOTE — TELEPHONE ENCOUNTER
Patient notified    ----- Message from Rylee BERRIOS sent at 5/2/2024  4:15 PM CDT -----  Benign   Can continue to follow with serial ultrasounds

## 2024-05-06 ENCOUNTER — OFFICE VISIT (OUTPATIENT)
Dept: INTERNAL MEDICINE | Age: 79
End: 2024-05-06
Payer: MEDICARE

## 2024-05-06 VITALS
SYSTOLIC BLOOD PRESSURE: 130 MMHG | WEIGHT: 295 LBS | BODY MASS INDEX: 35.92 KG/M2 | DIASTOLIC BLOOD PRESSURE: 76 MMHG | OXYGEN SATURATION: 97 % | HEIGHT: 76 IN | HEART RATE: 66 BPM

## 2024-05-06 DIAGNOSIS — Z12.5 SCREENING FOR PROSTATE CANCER: ICD-10-CM

## 2024-05-06 DIAGNOSIS — E78.2 MIXED HYPERLIPIDEMIA: ICD-10-CM

## 2024-05-06 DIAGNOSIS — I25.10 CORONARY ARTERY DISEASE INVOLVING NATIVE CORONARY ARTERY OF NATIVE HEART WITHOUT ANGINA PECTORIS: Primary | ICD-10-CM

## 2024-05-06 DIAGNOSIS — G47.33 OBSTRUCTIVE SLEEP APNEA: Chronic | ICD-10-CM

## 2024-05-06 DIAGNOSIS — G60.9 IDIOPATHIC PERIPHERAL NEUROPATHY: Chronic | ICD-10-CM

## 2024-05-06 DIAGNOSIS — G70.00 MYASTHENIA GRAVIS (HCC): ICD-10-CM

## 2024-05-06 DIAGNOSIS — Z13.1 SCREENING FOR DIABETES MELLITUS: ICD-10-CM

## 2024-05-06 DIAGNOSIS — J32.4 CHRONIC PANSINUSITIS: ICD-10-CM

## 2024-05-06 DIAGNOSIS — E04.1 THYROID NODULE: ICD-10-CM

## 2024-05-06 LAB
ALBUMIN SERPL-MCNC: 3.8 G/DL (ref 3.5–5.2)
ALP SERPL-CCNC: 88 U/L (ref 40–130)
ALT SERPL-CCNC: 62 U/L (ref 5–41)
ANION GAP SERPL CALCULATED.3IONS-SCNC: 12 MMOL/L (ref 7–19)
AST SERPL-CCNC: 49 U/L (ref 5–40)
BILIRUB SERPL-MCNC: 0.8 MG/DL (ref 0.2–1.2)
BUN SERPL-MCNC: 15 MG/DL (ref 8–23)
CALCIUM SERPL-MCNC: 9 MG/DL (ref 8.8–10.2)
CHLORIDE SERPL-SCNC: 107 MMOL/L (ref 98–111)
CHOLEST SERPL-MCNC: 115 MG/DL (ref 160–199)
CO2 SERPL-SCNC: 25 MMOL/L (ref 22–29)
CREAT SERPL-MCNC: 0.8 MG/DL (ref 0.5–1.2)
ERYTHROCYTE [DISTWIDTH] IN BLOOD BY AUTOMATED COUNT: 13.2 % (ref 11.5–14.5)
GLUCOSE SERPL-MCNC: 105 MG/DL (ref 74–109)
HCT VFR BLD AUTO: 45.9 % (ref 42–52)
HDLC SERPL-MCNC: 40 MG/DL (ref 55–121)
HGB BLD-MCNC: 14.8 G/DL (ref 14–18)
LDLC SERPL CALC-MCNC: 48 MG/DL
MCH RBC QN AUTO: 31.1 PG (ref 27–31)
MCHC RBC AUTO-ENTMCNC: 32.2 G/DL (ref 33–37)
MCV RBC AUTO: 96.4 FL (ref 80–94)
PLATELET # BLD AUTO: 189 K/UL (ref 130–400)
PMV BLD AUTO: 11.1 FL (ref 9.4–12.4)
POTASSIUM SERPL-SCNC: 4.4 MMOL/L (ref 3.5–5)
PROT SERPL-MCNC: 6.5 G/DL (ref 6.6–8.7)
PSA SERPL-MCNC: 1.7 NG/ML (ref 0–4)
RBC # BLD AUTO: 4.76 M/UL (ref 4.7–6.1)
SODIUM SERPL-SCNC: 144 MMOL/L (ref 136–145)
TRIGL SERPL-MCNC: 137 MG/DL (ref 0–149)
TSH SERPL DL<=0.005 MIU/L-ACNC: 4.83 UIU/ML (ref 0.27–4.2)
WBC # BLD AUTO: 8.2 K/UL (ref 4.8–10.8)

## 2024-05-06 PROCEDURE — G8427 DOCREV CUR MEDS BY ELIG CLIN: HCPCS | Performed by: INTERNAL MEDICINE

## 2024-05-06 PROCEDURE — 1036F TOBACCO NON-USER: CPT | Performed by: INTERNAL MEDICINE

## 2024-05-06 PROCEDURE — 99214 OFFICE O/P EST MOD 30 MIN: CPT | Performed by: INTERNAL MEDICINE

## 2024-05-06 PROCEDURE — 1123F ACP DISCUSS/DSCN MKR DOCD: CPT | Performed by: INTERNAL MEDICINE

## 2024-05-06 PROCEDURE — 3075F SYST BP GE 130 - 139MM HG: CPT | Performed by: INTERNAL MEDICINE

## 2024-05-06 PROCEDURE — G8417 CALC BMI ABV UP PARAM F/U: HCPCS | Performed by: INTERNAL MEDICINE

## 2024-05-06 PROCEDURE — 3078F DIAST BP <80 MM HG: CPT | Performed by: INTERNAL MEDICINE

## 2024-05-06 RX ORDER — LEVOTHYROXINE SODIUM 0.03 MG/1
25 TABLET ORAL EVERY MORNING
COMMUNITY
Start: 2024-03-29

## 2024-05-06 RX ORDER — AZELASTINE 1 MG/ML
SPRAY, METERED NASAL
Qty: 3 EACH | Refills: 3 | Status: SHIPPED | OUTPATIENT
Start: 2024-05-06

## 2024-05-06 SDOH — ECONOMIC STABILITY: FOOD INSECURITY: WITHIN THE PAST 12 MONTHS, YOU WORRIED THAT YOUR FOOD WOULD RUN OUT BEFORE YOU GOT MONEY TO BUY MORE.: NEVER TRUE

## 2024-05-06 SDOH — ECONOMIC STABILITY: FOOD INSECURITY: WITHIN THE PAST 12 MONTHS, THE FOOD YOU BOUGHT JUST DIDN'T LAST AND YOU DIDN'T HAVE MONEY TO GET MORE.: NEVER TRUE

## 2024-05-06 SDOH — ECONOMIC STABILITY: INCOME INSECURITY: HOW HARD IS IT FOR YOU TO PAY FOR THE VERY BASICS LIKE FOOD, HOUSING, MEDICAL CARE, AND HEATING?: NOT HARD AT ALL

## 2024-05-06 ASSESSMENT — PATIENT HEALTH QUESTIONNAIRE - PHQ9
SUM OF ALL RESPONSES TO PHQ QUESTIONS 1-9: 0
SUM OF ALL RESPONSES TO PHQ QUESTIONS 1-9: 0
SUM OF ALL RESPONSES TO PHQ9 QUESTIONS 1 & 2: 0
SUM OF ALL RESPONSES TO PHQ QUESTIONS 1-9: 0
1. LITTLE INTEREST OR PLEASURE IN DOING THINGS: NOT AT ALL
SUM OF ALL RESPONSES TO PHQ QUESTIONS 1-9: 0
2. FEELING DOWN, DEPRESSED OR HOPELESS: NOT AT ALL

## 2024-05-06 NOTE — PROGRESS NOTES
Chief Complaint   Patient presents with    6 Month Follow-Up    Coronary Artery Disease       HPI: She is here today to follow-up coronary artery disease and other medical problems.  In general he is doing well he denies chest pain dyspnea pain still bothered by his neuropathy but stable myasthenia gravis still an issue bothers him most with cold substances and sometimes fatigue exacerbates.  Overall better.    Past Medical History:   Diagnosis Date    Arthritis     DVT, lower extremity, distal, chronic (HCC)     Familial hypercholesterolemia 2017    Gastroesophageal reflux disease without esophagitis 2017    History of blood transfusion     Knee replacements    Hx of blood clots     Post Knee replacement tx    Idiopathic peripheral neuropathy 2017    Obstructive sleep apnea 2017    Partial tear of left Achilles tendon 2017    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     left at fibular head s/p decompression        Past Surgical History:   Procedure Laterality Date    CATARACT REMOVAL WITH IMPLANT      CHOLECYSTECTOMY  2005    CORONARY ANGIOPLASTY WITH STENT PLACEMENT  2018    OH to Circumflex    JOINT REPLACEMENT Bilateral     Knee replacements    KNEE ARTHROPLASTY Bilateral     complete combine condylye and plateau    NERVE SURGERY      Nerve release       Family History   Problem Relation Age of Onset    COPD Mother     Rheum Arthritis Father     COPD Brother     Lung Cancer Brother        Social History     Socioeconomic History    Marital status:      Spouse name: avila    Number of children: 2    Years of education: 20    Highest education level: Not on file   Occupational History    Occupation: retired teacher   Tobacco Use    Smoking status: Former     Current packs/day: 0.00     Average packs/day: 1 pack/day for 12.0 years (12.0 ttl pk-yrs)     Types: Cigarettes     Start date:      Quit date:      Years since quittin.4    Smokeless tobacco:

## 2024-05-14 ENCOUNTER — TELEPHONE (OUTPATIENT)
Dept: INTERVENTIONAL RADIOLOGY/VASCULAR | Facility: HOSPITAL | Age: 79
End: 2024-05-14

## 2024-05-16 RX ORDER — METHYLPREDNISOLONE 4 MG/1
TABLET ORAL
Qty: 1 KIT | Refills: 0 | Status: SHIPPED | OUTPATIENT
Start: 2024-05-16 | End: 2024-05-22

## 2024-05-29 ENCOUNTER — TELEPHONE (OUTPATIENT)
Dept: INTERVENTIONAL RADIOLOGY/VASCULAR | Facility: HOSPITAL | Age: 79
End: 2024-05-29

## 2024-06-06 DIAGNOSIS — G60.9 IDIOPATHIC PERIPHERAL NEUROPATHY: ICD-10-CM

## 2024-06-06 RX ORDER — PREGABALIN 100 MG/1
100 CAPSULE ORAL 2 TIMES DAILY
Qty: 180 CAPSULE | Refills: 1 | Status: SHIPPED | OUTPATIENT
Start: 2024-06-06 | End: 2024-12-03

## 2024-06-25 NOTE — PROGRESS NOTES
[facility] - PODIATRY    Today's Date: 07/01/2024     Patient Name: Axel Morris  MRN: [id number]  CSN: [id number]  PCP: Isabel Best MD  Referring Provider: No ref. provider found    SUBJECTIVE     Chief Complaint   Patient presents with    Follow-up     Isabel Best MD 05/06/2024 FOLLOW UP-pt states he is here today for nail/foot care/has pain due to neuropathy-denies pain today     HPI: Axel Morris, a 79 y.o.male, comes to clinic as a(n) established patient complaining of painful toenails and complaining of neuropathy of both feet . Patient has h/o arthritis, CA, CAD, dizziness, GERD, HLD, Neuropathy, pneumonia, sleep apnea, tinnitus .  Today he reports his toenails are long, thickened, irregular, and dystrophic and that he is unable to care for them himself at home.  He does states that he has neuropathy from the knee down bilaterally; states he is unsure of the source of his neuropathy though he does report issues with his disks in his back and states he also has previous knee surgery that he believes is contributing to neuropathy as well.  Calluses have returned to the great toes.  Denies pain in feet secondary to neuropathy . Relates previous treatment(s) including foot care by podiatry . Denies any constitutional symptoms. No other pedal complaints at this time.    Past Medical History:   Diagnosis Date    Abnormal ECG     Arthritis     Callus     Coronary artery disease     COVID-19 vaccine series completed     pfizer    Difficulty walking     Dizziness     Fallen arches     Fracture of nasal bones     GERD (gastroesophageal reflux disease)     History of transfusion     HL (hearing loss)     Hx of colonic polyps     Hyperlipidemia     Idiopathic peripheral neuropathy 07/27/2017    Kidney stone on left side 06/19/2023    Neuropathy     Peripheral neuropathy     Pneumonia     Primary hypertension 12/23/2021    Rheumatic fever     Sinusitis     Skin cancer     Sleep apnea     Tinnitus      Verruca     Vision loss      Past Surgical History:   Procedure Laterality Date    ADENOIDECTOMY      CARDIAC CATHETERIZATION      CARDIAC SURGERY      HEART STENT    CHOLECYSTECTOMY      COLONOSCOPY N/A 2017    Procedure: COLONOSCOPY WITH ANESTHESIA;  Surgeon: Matthew Cagle MD;  Location:  [facility] ENDOSCOPY;  Service:     COLONOSCOPY W/ POLYPECTOMY  2012    Adenomatous polyp at 50 cm repeat exam in 5 years    CORONARY STENT PLACEMENT      ENDOSCOPY AND COLONOSCOPY  2009    Acute ulcerative esophagogastritis grade c, Hyperplastic polyp at 50 cm repeat colonoscopy in 3 years    EXTRACORPOREAL SHOCK WAVE LITHOTRIPSY (ESWL) Left 2023    Procedure: EXTRACORPOREAL SHOCKWAVE LITHOTRIPSY;  Surgeon: Marcellus Fowler MD;  Location:  [facility] OR;  Service: Urology;  Laterality: Left;    EYE SURGERY      DONNIE CATARACT REMOVAL    FLAP HEAD/NECK Left 03/10/2021    Procedure: POSSIBLE FLAP OR GRAFT.;  Surgeon: Roberto Og MD;  Location:  [facility] OR;  Service: ENT;  Laterality: Left;    HEAD/NECK LESION/CYST EXCISION Left 03/10/2021    Procedure: EXCISION OF ATYPICAL PIGMENTED LESION OF THE LEFT CENTRAL TEMPLE REGION WITH POSSIBLE FLAP OR GRAFT. - Left;  Surgeon: Roberto Og MD;  Location:  [facility] OR;  Service: ENT;  Laterality: Left;    REPLACEMENT TOTAL KNEE BILATERAL      TONSILLECTOMY       Family History   Problem Relation Age of Onset    Heart failure Father     Rheum arthritis Father     Cancer Brother     Colon cancer Neg Hx     Colon polyps Neg Hx      Social History     Socioeconomic History    Marital status:    Tobacco Use    Smoking status: Former     Current packs/day: 0.00     Average packs/day: 1 pack/day for 10.0 years (10.0 ttl pk-yrs)     Types: Cigarettes     Start date: 1968     Quit date: 1978     Years since quittin.9     Passive exposure: Never    Smokeless tobacco: Never   Vaping Use    Vaping status: Never Used   Substance and Sexual Activity     Alcohol use: Never    Drug use: Never    Sexual activity: Defer     Allergies   Allergen Reactions    Ampicillin Rash     Current Outpatient Medications   Medication Sig Dispense Refill    aspirin 81 MG tablet Take 1 tablet by mouth Daily. Dr. Tamayo      atorvastatin (LIPITOR) 80 MG tablet Take 1 tablet by mouth Daily. 90 tablet 3    fluticasone (FLONASE) 50 MCG/ACT nasal spray 2 sprays into the nostril(s) as directed by provider Daily.      glycopyrrolate (ROBINUL) 1 MG tablet Take 1 tablet by mouth 3 (Three) Times a Day As Needed (vertigo). 60 tablet 5    levothyroxine (SYNTHROID, LEVOTHROID) 25 MCG tablet TAKE 1 TABLET EVERY MORNING 90 tablet 3    LUTEIN PO Take 25 mg by mouth Daily.      metoprolol succinate XL (TOPROL-XL) 25 MG 24 hr tablet Take 1 tablet by mouth Daily.      montelukast (SINGULAIR) 10 MG tablet Take 1 tablet by mouth Daily.      Multiple Vitamins-Minerals (MULTIVITAMIN WITH MINERALS) tablet tablet Take 1 tablet by mouth Daily.      Multiple Vitamins-Minerals (OCUVITE ADULT FORMULA PO) Take  by mouth.      omeprazole (priLOSEC) 20 MG capsule Take 1 capsule by mouth Daily.      pregabalin (LYRICA) 100 MG capsule 2 (Two) Times a Day.      pyridostigmine (MESTINON) 60 MG tablet TAKE 1 TABLET THREE TIMES A DAY (Patient taking differently: 2 (Two) Times a Day.) 270 tablet 3     Current Facility-Administered Medications   Medication Dose Route Frequency Provider Last Rate Last Admin    lidocaine (XYLOCAINE) 1 % injection 5 mL  5 mL Infiltration Once Arturo Baum MD         Review of Systems   Constitutional:  Negative for chills and fever.   HENT:  Negative for congestion.    Respiratory:  Negative for shortness of breath.    Cardiovascular:  Negative for chest pain and leg swelling.   Gastrointestinal:  Negative for constipation, diarrhea, nausea and vomiting.   Musculoskeletal:  Positive for arthralgias. Negative for myalgias.   Skin:  Negative for wound.        Thickened, elongated,  irregular toenails. Calluses.   Neurological:  Positive for numbness. Negative for dizziness and weakness.   Hematological:  Bruises/bleeds easily.   Psychiatric/Behavioral:  Negative for agitation, behavioral problems and confusion.        OBJECTIVE     Vitals:    07/01/24 0901   BP: 118/78   Pulse: 50   SpO2: 95%           PHYSICAL EXAM  GEN:   Accompanied by none.     Foot/Ankle Exam    GENERAL  Appearance:  appears stated age and obese  Orientation:  AAOx3  Affect:  appropriate  Gait:  unimpaired  Assistance:  independent  Right shoe gear: casual shoe  Left shoe gear: casual shoe    VASCULAR     Right Foot Vascularity   Dorsalis pedis:  2+  Posterior tibial:  2+  Skin temperature:  warm  Edema grading:  Trace and non-pitting  CFT:  3  Pedal hair growth:  Present  Varicosities:  mild varicosities     Left Foot Vascularity   Dorsalis pedis:  2+  Posterior tibial:  2+  Skin temperature:  warm  Edema grading:  Trace and non-pitting  CFT:  3  Pedal hair growth:  Present  Varicosities:  mild varicosities     NEUROLOGIC     Right Foot Neurologic   Light touch sensation: diminished  Vibratory sensation: diminished  Hot/Cold sensation: diminished  Protective Sensation using [place] Monofilament:   Sites intact: 5  Sites tested: 10     Left Foot Neurologic   Light touch sensation: diminished  Vibratory sensation: diminished  Protective Sensation using [place] Monofilament:   Sites intact: 1  Sites tested: 10    MUSCULOSKELETAL     Right Foot Musculoskeletal   Tenderness:  toenail problem    Arch:  Pes planus  Hallux valgus: Yes       Left Foot Musculoskeletal   Tenderness:  toenail problem  Arch:  Pes planus  Hallux valgus: Yes      MUSCLE STRENGTH     Right Foot Muscle Strength   Foot dorsiflexion:  5  Foot plantar flexion:  5  Foot inversion:  5  Foot eversion:  5     Left Foot Muscle Strength   Foot dorsiflexion:  5  Foot plantar flexion:  4+  Foot inversion:  5  Foot eversion:  5    RANGE OF  MOTION     Right Foot Range of Motion   Foot and ankle ROM within normal limits       Left Foot Range of Motion   Foot and ankle ROM within normal limits      DERMATOLOGIC      Right Foot Dermatologic   Skin  Positive for corn and tinea.   Nails  1.  Positive for onychomycosis, abnormal thickness, subungual debris and dystrophic nail.  2.  Positive for elongated, onychomycosis, abnormal thickness, subungual debris and dystrophic nail.  3.  Positive for elongated, onychomycosis, abnormal thickness, subungual debris and dystrophic nail.  4.  Positive for elongated, onychomycosis, abnormal thickness, subungual debris and dystrophic nail.  5.  Positive for elongated, onychomycosis, abnormal thickness, subungual debris and dystrophic nail.     Left Foot Dermatologic   Skin  Positive for corn and tinea.   Nails  1.  Positive for onychomycosis, abnormal thickness, subungual debris and dystrophic nail.  2.  Positive for elongated, onychomycosis, abnormal thickness, subungual debris and dystrophic nail.  3.  Positive for elongated, onychomycosis, abnormal thickness, subungual debris and dystrophic nail.  4.  Positive for elongated, onychomycosis, abnormally thick, subungual debris and dystrophic nail.  5.  Positive for elongated, onychomycosis, abnormally thick, subungual debris and dystrophic nail.    Image:       RADIOLOGY/NUCLEAR:  No results found.    LABORATORY/CULTURE RESULTS:      PATHOLOGY RESULTS:       ASSESSMENT/PLAN     Diagnoses and all orders for this visit:    1. Onychomycosis (Primary)    2. Other polyneuropathy    3. Pes planus of both feet    4. Valgus deformity of both great toes    5. BMI 37.0-37.9, adult    6. Foot callus            Comprehensive lower extremity examination and evaluation was performed.  Discussed findings and treatment plan including risks, benefits, and treatment options with patient in detail. Patient agreed with treatment plan.  After verbal consent obtained, nail(s) x10 debrided of  length and thickness with nail nipper without incidence  After verbal consent obtained, calluses x3 pared utilizing dermal curette and/or scalpel without incidence  Patient may maintain nails and calluses at home utilizing emery board or pumice stone between visits as needed   Moisturize callused lesions on regular basis.      Class 2 Severe Obesity (BMI >=35 and <=39.9). Obesity-related health conditions include the following: none. Obesity is improving with lifestyle modifications. BMI is is above average; BMI management plan is completed. We discussed portion control and increasing exercise.  An After Visit Summary was printed and given to the patient at discharge, including (if requested) any available informative/educational handouts regarding diagnosis, treatment, or medications. All questions were answered to patient/family satisfaction. Should symptoms fail to improve or worsen they agree to call or return to clinic or to go to the Emergency Department. Discussed the importance of following up with any needed screening tests/labs/specialist appointments and any requested follow-up recommended by me today. Importance of maintaining follow-up discussed and patient accepts that missed appointments can delay diagnosis and potentially lead to worsening of conditions.  Return in about 3 months (around 10/1/2024) for Follow-up with ALEXUS, [facility] or sooner if acute issues arise.        This document has been electronically signed by ALEXUS Cruz on July 1, 2024 09:27 CDT

## 2024-06-28 ENCOUNTER — TELEPHONE (OUTPATIENT)
Age: 79
End: 2024-06-28
Payer: MEDICARE

## 2024-06-28 NOTE — TELEPHONE ENCOUNTER
Hub to relay  .Spoke with patient regarding appt on 07/01/2024. Patient confirmed date and time off appt.

## 2024-07-01 ENCOUNTER — OFFICE VISIT (OUTPATIENT)
Age: 79
End: 2024-07-01
Payer: MEDICARE

## 2024-07-01 VITALS
DIASTOLIC BLOOD PRESSURE: 78 MMHG | OXYGEN SATURATION: 95 % | HEART RATE: 50 BPM | BODY MASS INDEX: 37.09 KG/M2 | WEIGHT: 289 LBS | HEIGHT: 74 IN | SYSTOLIC BLOOD PRESSURE: 118 MMHG

## 2024-07-01 DIAGNOSIS — M20.11 VALGUS DEFORMITY OF BOTH GREAT TOES: ICD-10-CM

## 2024-07-01 DIAGNOSIS — M20.12 VALGUS DEFORMITY OF BOTH GREAT TOES: ICD-10-CM

## 2024-07-01 DIAGNOSIS — B35.1 ONYCHOMYCOSIS: Primary | ICD-10-CM

## 2024-07-01 DIAGNOSIS — L84 FOOT CALLUS: ICD-10-CM

## 2024-07-01 DIAGNOSIS — G62.89 OTHER POLYNEUROPATHY: ICD-10-CM

## 2024-07-01 DIAGNOSIS — M21.41 PES PLANUS OF BOTH FEET: ICD-10-CM

## 2024-07-01 DIAGNOSIS — M21.42 PES PLANUS OF BOTH FEET: ICD-10-CM

## 2024-07-01 PROCEDURE — 3074F SYST BP LT 130 MM HG: CPT

## 2024-07-01 PROCEDURE — 11056 PARNG/CUTG B9 HYPRKR LES 2-4: CPT

## 2024-07-01 PROCEDURE — 1160F RVW MEDS BY RX/DR IN RCRD: CPT

## 2024-07-01 PROCEDURE — 11721 DEBRIDE NAIL 6 OR MORE: CPT

## 2024-07-01 PROCEDURE — 1159F MED LIST DOCD IN RCRD: CPT

## 2024-07-01 PROCEDURE — 3078F DIAST BP <80 MM HG: CPT

## 2024-07-31 DIAGNOSIS — G70.00 MYASTHENIA GRAVIS WITHOUT EXACERBATION: ICD-10-CM

## 2024-08-05 RX ORDER — PYRIDOSTIGMINE BROMIDE 60 MG/1
60 TABLET ORAL 3 TIMES DAILY
Qty: 270 TABLET | Refills: 0 | Status: SHIPPED | OUTPATIENT
Start: 2024-08-05

## 2024-08-07 NOTE — PROGRESS NOTES
"Chief Complaint    Subjective        Axel Morris presents to [facility]    History of Present Illness  79-year-old male here for follow-up.  Doing well on Mestinon.             Current Outpatient Medications:     aspirin 81 MG tablet, Take 1 tablet by mouth Daily. Dr. Tamayo, Disp: , Rfl:     atorvastatin (LIPITOR) 80 MG tablet, Take 1 tablet by mouth Daily., Disp: 90 tablet, Rfl: 3    fluticasone (FLONASE) 50 MCG/ACT nasal spray, 2 sprays into the nostril(s) as directed by provider Daily., Disp: , Rfl:     glycopyrrolate (ROBINUL) 1 MG tablet, Take 1 tablet by mouth 3 (Three) Times a Day As Needed (vertigo)., Disp: 60 tablet, Rfl: 5    levothyroxine (SYNTHROID, LEVOTHROID) 25 MCG tablet, TAKE 1 TABLET EVERY MORNING, Disp: 90 tablet, Rfl: 3    LUTEIN PO, Take 25 mg by mouth Daily., Disp: , Rfl:     metoprolol succinate XL (TOPROL-XL) 25 MG 24 hr tablet, Take 1 tablet by mouth Daily., Disp: , Rfl:     montelukast (SINGULAIR) 10 MG tablet, Take 1 tablet by mouth Daily., Disp: , Rfl:     Multiple Vitamins-Minerals (MULTIVITAMIN WITH MINERALS) tablet tablet, Take 1 tablet by mouth Daily., Disp: , Rfl:     Multiple Vitamins-Minerals (OCUVITE ADULT FORMULA PO), Take  by mouth., Disp: , Rfl:     omeprazole (priLOSEC) 20 MG capsule, Take 1 capsule by mouth Daily., Disp: , Rfl:     pregabalin (LYRICA) 100 MG capsule, Take 1 capsule by mouth 2 (Two) Times a Day., Disp: , Rfl:     pyridostigmine (MESTINON) 60 MG tablet, Take 1 tablet by mouth 3 (Three) Times a Day., Disp: 270 tablet, Rfl: 0    Current Facility-Administered Medications:     lidocaine (XYLOCAINE) 1 % injection 5 mL, 5 mL, Infiltration, Once, Arturo Baum MD       Objective   Vital Signs:   Ht 188 cm (74\")   Wt 130 kg (287 lb)   BMI 36.85 kg/m²     Physical Exam  Constitutional:       General: He is awake.   Eyes:      Extraocular Movements: Extraocular movements intact.   Neurological:      Mental Status: He is alert. "   Psychiatric:         Speech: Speech normal.      Neurological Exam  Mental Status  Awake and alert. Speech is normal. Language is fluent with no aphasia.    Cranial Nerves  CN III, IV, VI: Extraocular movements intact bilaterally.  CN VII: Full and symmetric facial movement.    Gait  Casual gait is normal including stance, stride, and arm swing.      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  Has only had issues with his speech which are fatigable.  Doing well on Mestinon.  CT Chest negative for thymoma. MGADL 0.     Plan:    1.  Continue Mestinon up to 3 times a day. Can try to take less since symptoms are mostly resolved.   2.  Follow up 6 months.  Call sooner with any issues.        Follow Up   No follow-ups on file.  Patient was given instructions and counseling regarding his condition or for health maintenance advice. Please see specific information pulled into the AVS if appropriate.

## 2024-08-08 ENCOUNTER — OFFICE VISIT (OUTPATIENT)
Dept: NEUROLOGY | Facility: CLINIC | Age: 79
End: 2024-08-08
Payer: MEDICARE

## 2024-08-08 VITALS
HEART RATE: 50 BPM | SYSTOLIC BLOOD PRESSURE: 120 MMHG | BODY MASS INDEX: 36.83 KG/M2 | DIASTOLIC BLOOD PRESSURE: 80 MMHG | WEIGHT: 287 LBS | OXYGEN SATURATION: 98 % | HEIGHT: 74 IN

## 2024-08-08 DIAGNOSIS — G70.00 MYASTHENIA GRAVIS WITHOUT EXACERBATION: Primary | ICD-10-CM

## 2024-08-08 PROCEDURE — 3074F SYST BP LT 130 MM HG: CPT | Performed by: PSYCHIATRY & NEUROLOGY

## 2024-08-08 PROCEDURE — 3079F DIAST BP 80-89 MM HG: CPT | Performed by: PSYCHIATRY & NEUROLOGY

## 2024-08-08 PROCEDURE — 1160F RVW MEDS BY RX/DR IN RCRD: CPT | Performed by: PSYCHIATRY & NEUROLOGY

## 2024-08-08 PROCEDURE — 99214 OFFICE O/P EST MOD 30 MIN: CPT | Performed by: PSYCHIATRY & NEUROLOGY

## 2024-08-08 PROCEDURE — 1159F MED LIST DOCD IN RCRD: CPT | Performed by: PSYCHIATRY & NEUROLOGY

## 2024-08-19 ENCOUNTER — ANESTHESIA EVENT (OUTPATIENT)
Dept: OPERATING ROOM | Age: 79
DRG: 468 | End: 2024-08-19
Payer: MEDICARE

## 2024-08-19 NOTE — DISCHARGE INSTRUCTIONS
[facility] [place] [facility]  Dr. Derian Pemberton      Knee Patella Revision  Discharge Instructions     To prevent blood clots, you have been placed on the following medication:  Aspirin 81 mg twice a day for four weeks    Surgical Site Care:  Showering is permitted on post op day 2 - Thursday  No submersion in a bath, swimming pool, whirlpool, etc    Physical Therapy:  You were given a prescription for outpatient therapy.  Please schedule with [facility]  if convenient by calling 1 [phone]. Otherwise, schedule with a therapist closer to your home  Work on range of motion.    Goal for your first office visit is for you to fully straighten and bend your knee to at least a right angle   Don't walk for exercise (it will make you more sore)  Weight Bearing Status:  Full weight bearing with a walker     Pain Medications  You were given a prescription to fill at your pharmacy  Wean off pain medications as you deem appropriate as long as pain is under control  Take tylenol instead of the prescribed pain medicine as your pain improves    Cold packs                                                                                                               FEVER .5 or less        May be used as necessary                                                                    Take Tylenol x 2                                                                                                            Deep breath x 10   Please take a stool softener such as dulcolax or colace daily                                 Cough, cough, cough                                                                                                                        Recheck in 1 - 11/2                                                                                                                                            hours  Do not drive until surgeon releases you  DO NOT SMOKE, VAPE OR CHEW!!!    Contact office if  Increased

## 2024-08-19 NOTE — DISCHARGE INSTR - DIET
Good nutrition is important when healing from an illness, injury, or surgery.  Follow any nutrition recommendations given to you during your hospital stay.   If you were given an oral nutrition supplement while in the hospital, continue to take this supplement at home.  You can take it with meals, in-between meals, and/or before bedtime. These supplements can be purchased at most local grocery stores, pharmacies, and chain [employer]-stores.   If you have any questions about your diet or nutrition, call the hospital and ask for the dietitian.            Low carb / High protein

## 2024-08-20 ENCOUNTER — ANESTHESIA (OUTPATIENT)
Dept: OPERATING ROOM | Age: 79
DRG: 468 | End: 2024-08-20
Payer: MEDICARE

## 2024-08-20 ENCOUNTER — HOSPITAL ENCOUNTER (INPATIENT)
Age: 79
LOS: 1 days | Discharge: HOME | DRG: 468 | End: 2024-08-20
Attending: ORTHOPAEDIC SURGERY | Admitting: ORTHOPAEDIC SURGERY
Payer: MEDICARE

## 2024-08-20 VITALS
WEIGHT: 286 LBS | OXYGEN SATURATION: 94 % | HEART RATE: 74 BPM | TEMPERATURE: 97 F | BODY MASS INDEX: 35.56 KG/M2 | SYSTOLIC BLOOD PRESSURE: 126 MMHG | DIASTOLIC BLOOD PRESSURE: 69 MMHG | RESPIRATION RATE: 16 BRPM | HEIGHT: 75 IN

## 2024-08-20 DIAGNOSIS — Z96.651 PAIN DUE TO TOTAL RIGHT KNEE REPLACEMENT, SUBSEQUENT ENCOUNTER: Primary | ICD-10-CM

## 2024-08-20 DIAGNOSIS — T84.84XD PAIN DUE TO TOTAL RIGHT KNEE REPLACEMENT, SUBSEQUENT ENCOUNTER: Primary | ICD-10-CM

## 2024-08-20 DIAGNOSIS — Z96.653 PRESENCE OF ARTIFICIAL KNEE JOINT, BILATERAL: ICD-10-CM

## 2024-08-20 LAB
ABO/RH: NORMAL
ANTIBODY SCREEN: NORMAL
KOH PREP SPEC: NORMAL

## 2024-08-20 PROCEDURE — 6360000002 HC RX W HCPCS: Performed by: ANESTHESIOLOGY

## 2024-08-20 PROCEDURE — 0SUV09Z SUPPLEMENT RIGHT KNEE JOINT, TIBIAL SURFACE WITH LINER, OPEN APPROACH: ICD-10-PCS | Performed by: ORTHOPAEDIC SURGERY

## 2024-08-20 PROCEDURE — 86850 RBC ANTIBODY SCREEN: CPT

## 2024-08-20 PROCEDURE — 7100000000 HC PACU RECOVERY - FIRST 15 MIN: Performed by: ORTHOPAEDIC SURGERY

## 2024-08-20 PROCEDURE — 87176 TISSUE HOMOGENIZATION CULTR: CPT

## 2024-08-20 PROCEDURE — 6360000002 HC RX W HCPCS: Performed by: NURSE ANESTHETIST, CERTIFIED REGISTERED

## 2024-08-20 PROCEDURE — 3700000001 HC ADD 15 MINUTES (ANESTHESIA): Performed by: ORTHOPAEDIC SURGERY

## 2024-08-20 PROCEDURE — 2580000003 HC RX 258: Performed by: ORTHOPAEDIC SURGERY

## 2024-08-20 PROCEDURE — 6360000002 HC RX W HCPCS: Performed by: ORTHOPAEDIC SURGERY

## 2024-08-20 PROCEDURE — 87070 CULTURE OTHR SPECIMN AEROBIC: CPT

## 2024-08-20 PROCEDURE — 64447 NJX AA&/STRD FEMORAL NRV IMG: CPT | Performed by: NURSE ANESTHETIST, CERTIFIED REGISTERED

## 2024-08-20 PROCEDURE — 3600000005 HC SURGERY LEVEL 5 BASE: Performed by: ORTHOPAEDIC SURGERY

## 2024-08-20 PROCEDURE — 36415 COLL VENOUS BLD VENIPUNCTURE: CPT

## 2024-08-20 PROCEDURE — 7100000011 HC PHASE II RECOVERY - ADDTL 15 MIN: Performed by: ORTHOPAEDIC SURGERY

## 2024-08-20 PROCEDURE — 0SPC0JZ REMOVAL OF SYNTHETIC SUBSTITUTE FROM RIGHT KNEE JOINT, OPEN APPROACH: ICD-10-PCS | Performed by: ORTHOPAEDIC SURGERY

## 2024-08-20 PROCEDURE — 3700000000 HC ANESTHESIA ATTENDED CARE: Performed by: ORTHOPAEDIC SURGERY

## 2024-08-20 PROCEDURE — 2580000003 HC RX 258: Performed by: ANESTHESIOLOGY

## 2024-08-20 PROCEDURE — 2780000010 HC IMPLANT OTHER: Performed by: ORTHOPAEDIC SURGERY

## 2024-08-20 PROCEDURE — 6370000000 HC RX 637 (ALT 250 FOR IP): Performed by: ORTHOPAEDIC SURGERY

## 2024-08-20 PROCEDURE — 2500000003 HC RX 250 WO HCPCS: Performed by: NURSE ANESTHETIST, CERTIFIED REGISTERED

## 2024-08-20 PROCEDURE — 2500000003 HC RX 250 WO HCPCS: Performed by: ANESTHESIOLOGY

## 2024-08-20 PROCEDURE — C1713 ANCHOR/SCREW BN/BN,TIS/BN: HCPCS | Performed by: ORTHOPAEDIC SURGERY

## 2024-08-20 PROCEDURE — 0SRC0J9 REPLACEMENT OF RIGHT KNEE JOINT WITH SYNTHETIC SUBSTITUTE, CEMENTED, OPEN APPROACH: ICD-10-PCS | Performed by: ORTHOPAEDIC SURGERY

## 2024-08-20 PROCEDURE — 87102 FUNGUS ISOLATION CULTURE: CPT

## 2024-08-20 PROCEDURE — 86901 BLOOD TYPING SEROLOGIC RH(D): CPT

## 2024-08-20 PROCEDURE — 2500000003 HC RX 250 WO HCPCS: Performed by: ORTHOPAEDIC SURGERY

## 2024-08-20 PROCEDURE — 87075 CULTR BACTERIA EXCEPT BLOOD: CPT

## 2024-08-20 PROCEDURE — 0SPC09Z REMOVAL OF LINER FROM RIGHT KNEE JOINT, OPEN APPROACH: ICD-10-PCS | Performed by: ORTHOPAEDIC SURGERY

## 2024-08-20 PROCEDURE — 1200000000 HC SEMI PRIVATE

## 2024-08-20 PROCEDURE — 3600000015 HC SURGERY LEVEL 5 ADDTL 15MIN: Performed by: ORTHOPAEDIC SURGERY

## 2024-08-20 PROCEDURE — 7100000010 HC PHASE II RECOVERY - FIRST 15 MIN: Performed by: ORTHOPAEDIC SURGERY

## 2024-08-20 PROCEDURE — 86900 BLOOD TYPING SEROLOGIC ABO: CPT

## 2024-08-20 PROCEDURE — 87205 SMEAR GRAM STAIN: CPT

## 2024-08-20 PROCEDURE — 2709999900 HC NON-CHARGEABLE SUPPLY: Performed by: ORTHOPAEDIC SURGERY

## 2024-08-20 PROCEDURE — A4217 STERILE WATER/SALINE, 500 ML: HCPCS | Performed by: ORTHOPAEDIC SURGERY

## 2024-08-20 PROCEDURE — C1776 JOINT DEVICE (IMPLANTABLE): HCPCS | Performed by: ORTHOPAEDIC SURGERY

## 2024-08-20 PROCEDURE — 7100000001 HC PACU RECOVERY - ADDTL 15 MIN: Performed by: ORTHOPAEDIC SURGERY

## 2024-08-20 DEVICE — IMPL KNEE PSN ALL POLY PAT 38MM: Type: IMPLANTABLE DEVICE | Site: KNEE | Status: FUNCTIONAL

## 2024-08-20 DEVICE — PALACOS® R IS A FAST-CURING, RADIOPAQUE, POLY(METHYL METHACRYLATE)-BASED BONE CEMENT.PALACOS ® R CONTAINS THE X-RAY CONTRAST MEDIUM ZIRCONIUM DIOXIDE. TO IMPROVE VISIBILITY IN THE SURGICAL FIELD PALACOS ® R HAS BEEN COLOURED WITH CHLOROPHYLL (E141). THE BONE CEMENT IS PREPARED DIRECTLY BEFORE USE BY MIXING A POLYMER POWDER COMPONENT WITH A LIQUID MONOMER COMPONENT. A DUCTILE DOUGH FORMS WHICH CURES WITHIN A FEW MINUTES.
Type: IMPLANTABLE DEVICE | Site: KNEE | Status: FUNCTIONAL
Brand: PALACOS®

## 2024-08-20 RX ORDER — METOCLOPRAMIDE HYDROCHLORIDE 5 MG/ML
10 INJECTION INTRAMUSCULAR; INTRAVENOUS
Status: DISCONTINUED | OUTPATIENT
Start: 2024-08-20 | End: 2024-08-20 | Stop reason: HOSPADM

## 2024-08-20 RX ORDER — FAMOTIDINE 20 MG/1
20 TABLET, FILM COATED ORAL ONCE
Status: DISCONTINUED | OUTPATIENT
Start: 2024-08-20 | End: 2024-08-20 | Stop reason: HOSPADM

## 2024-08-20 RX ORDER — ROPIVACAINE HYDROCHLORIDE 5 MG/ML
30 INJECTION, SOLUTION EPIDURAL; INFILTRATION; PERINEURAL ONCE
Status: DISCONTINUED | OUTPATIENT
Start: 2024-08-20 | End: 2024-08-20 | Stop reason: HOSPADM

## 2024-08-20 RX ORDER — AZITHROMYCIN 250 MG/1
250 TABLET, FILM COATED ORAL DAILY
COMMUNITY

## 2024-08-20 RX ORDER — OXYCODONE HCL 10 MG/1
10 TABLET, FILM COATED, EXTENDED RELEASE ORAL ONCE
Status: COMPLETED | OUTPATIENT
Start: 2024-08-20 | End: 2024-08-20

## 2024-08-20 RX ORDER — TRANEXAMIC ACID 650 MG/1
1950 TABLET ORAL ONCE
Status: COMPLETED | OUTPATIENT
Start: 2024-08-20 | End: 2024-08-20

## 2024-08-20 RX ORDER — LIDOCAINE HYDROCHLORIDE 10 MG/ML
INJECTION, SOLUTION INFILTRATION; PERINEURAL PRN
Status: DISCONTINUED | OUTPATIENT
Start: 2024-08-20 | End: 2024-08-20 | Stop reason: SDUPTHER

## 2024-08-20 RX ORDER — EPHEDRINE SULFATE/0.9% NACL/PF 25 MG/5 ML
SYRINGE (ML) INTRAVENOUS PRN
Status: DISCONTINUED | OUTPATIENT
Start: 2024-08-20 | End: 2024-08-20 | Stop reason: SDUPTHER

## 2024-08-20 RX ORDER — MIDAZOLAM HYDROCHLORIDE 2 MG/2ML
2 INJECTION, SOLUTION INTRAMUSCULAR; INTRAVENOUS
Status: COMPLETED | OUTPATIENT
Start: 2024-08-20 | End: 2024-08-20

## 2024-08-20 RX ORDER — ACETAMINOPHEN 160 MG
2000 TABLET,DISINTEGRATING ORAL DAILY
COMMUNITY

## 2024-08-20 RX ORDER — CELECOXIB 200 MG/1
200 CAPSULE ORAL ONCE
Status: COMPLETED | OUTPATIENT
Start: 2024-08-20 | End: 2024-08-20

## 2024-08-20 RX ORDER — ROCURONIUM BROMIDE 10 MG/ML
INJECTION, SOLUTION INTRAVENOUS PRN
Status: DISCONTINUED | OUTPATIENT
Start: 2024-08-20 | End: 2024-08-20 | Stop reason: SDUPTHER

## 2024-08-20 RX ORDER — ONDANSETRON 2 MG/ML
INJECTION INTRAMUSCULAR; INTRAVENOUS PRN
Status: DISCONTINUED | OUTPATIENT
Start: 2024-08-20 | End: 2024-08-20 | Stop reason: SDUPTHER

## 2024-08-20 RX ORDER — HYDROMORPHONE HYDROCHLORIDE 1 MG/ML
0.25 INJECTION, SOLUTION INTRAMUSCULAR; INTRAVENOUS; SUBCUTANEOUS EVERY 5 MIN PRN
Status: DISCONTINUED | OUTPATIENT
Start: 2024-08-20 | End: 2024-08-20 | Stop reason: HOSPADM

## 2024-08-20 RX ORDER — PROPOFOL 10 MG/ML
INJECTION, EMULSION INTRAVENOUS PRN
Status: DISCONTINUED | OUTPATIENT
Start: 2024-08-20 | End: 2024-08-20 | Stop reason: SDUPTHER

## 2024-08-20 RX ORDER — SODIUM CHLORIDE, SODIUM LACTATE, POTASSIUM CHLORIDE, CALCIUM CHLORIDE 600; 310; 30; 20 MG/100ML; MG/100ML; MG/100ML; MG/100ML
INJECTION, SOLUTION INTRAVENOUS CONTINUOUS
Status: DISCONTINUED | OUTPATIENT
Start: 2024-08-20 | End: 2024-08-20 | Stop reason: HOSPADM

## 2024-08-20 RX ORDER — ROPIVACAINE HYDROCHLORIDE 5 MG/ML
INJECTION, SOLUTION EPIDURAL; INFILTRATION; PERINEURAL
Status: COMPLETED | OUTPATIENT
Start: 2024-08-20 | End: 2024-08-20

## 2024-08-20 RX ORDER — IBUPROFEN 400 MG/1
400 TABLET, FILM COATED ORAL EVERY 8 HOURS PRN
Qty: 30 TABLET | Refills: 0 | Status: SHIPPED | OUTPATIENT
Start: 2024-08-20

## 2024-08-20 RX ORDER — LIDOCAINE HYDROCHLORIDE 10 MG/ML
1 INJECTION, SOLUTION EPIDURAL; INFILTRATION; INTRACAUDAL; PERINEURAL
Status: DISCONTINUED | OUTPATIENT
Start: 2024-08-20 | End: 2024-08-20 | Stop reason: HOSPADM

## 2024-08-20 RX ORDER — SODIUM CHLORIDE 0.9 % (FLUSH) 0.9 %
5-40 SYRINGE (ML) INJECTION EVERY 12 HOURS SCHEDULED
Status: DISCONTINUED | OUTPATIENT
Start: 2024-08-20 | End: 2024-08-20 | Stop reason: HOSPADM

## 2024-08-20 RX ORDER — SODIUM CHLORIDE 9 MG/ML
INJECTION, SOLUTION INTRAVENOUS PRN
Status: DISCONTINUED | OUTPATIENT
Start: 2024-08-20 | End: 2024-08-20 | Stop reason: HOSPADM

## 2024-08-20 RX ORDER — DIPHENHYDRAMINE HYDROCHLORIDE 50 MG/ML
12.5 INJECTION INTRAMUSCULAR; INTRAVENOUS
Status: DISCONTINUED | OUTPATIENT
Start: 2024-08-20 | End: 2024-08-20 | Stop reason: HOSPADM

## 2024-08-20 RX ORDER — OXYCODONE HYDROCHLORIDE 5 MG/1
5 TABLET ORAL EVERY 4 HOURS PRN
Qty: 50 TABLET | Refills: 0 | Status: SHIPPED | OUTPATIENT
Start: 2024-08-20 | End: 2024-08-28

## 2024-08-20 RX ORDER — SCOLOPAMINE TRANSDERMAL SYSTEM 1 MG/1
1 PATCH, EXTENDED RELEASE TRANSDERMAL
Status: DISCONTINUED | OUTPATIENT
Start: 2024-08-20 | End: 2024-08-20 | Stop reason: HOSPADM

## 2024-08-20 RX ORDER — DEXAMETHASONE SODIUM PHOSPHATE 10 MG/ML
10 INJECTION, SOLUTION INTRAMUSCULAR; INTRAVENOUS ONCE
Status: COMPLETED | OUTPATIENT
Start: 2024-08-20 | End: 2024-08-20

## 2024-08-20 RX ORDER — NALOXONE HYDROCHLORIDE 0.4 MG/ML
INJECTION, SOLUTION INTRAMUSCULAR; INTRAVENOUS; SUBCUTANEOUS PRN
Status: DISCONTINUED | OUTPATIENT
Start: 2024-08-20 | End: 2024-08-20 | Stop reason: HOSPADM

## 2024-08-20 RX ORDER — SODIUM CHLORIDE 0.9 % (FLUSH) 0.9 %
5-40 SYRINGE (ML) INJECTION PRN
Status: DISCONTINUED | OUTPATIENT
Start: 2024-08-20 | End: 2024-08-20 | Stop reason: HOSPADM

## 2024-08-20 RX ORDER — VANCOMYCIN HYDROCHLORIDE 1 G/20ML
INJECTION, POWDER, LYOPHILIZED, FOR SOLUTION INTRAVENOUS PRN
Status: DISCONTINUED | OUTPATIENT
Start: 2024-08-20 | End: 2024-08-20 | Stop reason: ALTCHOICE

## 2024-08-20 RX ORDER — ASCORBIC ACID 500 MG
1000 TABLET ORAL DAILY
COMMUNITY

## 2024-08-20 RX ORDER — CLOPIDOGREL BISULFATE 75 MG/1
75 TABLET ORAL DAILY
Qty: 30 TABLET | Refills: 3 | Status: SHIPPED | OUTPATIENT
Start: 2024-08-20

## 2024-08-20 RX ORDER — ASPIRIN 81 MG/1
81 TABLET, CHEWABLE ORAL 2 TIMES DAILY
Qty: 60 TABLET | Refills: 0 | Status: SHIPPED | OUTPATIENT
Start: 2024-08-20

## 2024-08-20 RX ORDER — ACETAMINOPHEN 500 MG
1000 TABLET ORAL ONCE
Status: COMPLETED | OUTPATIENT
Start: 2024-08-20 | End: 2024-08-20

## 2024-08-20 RX ORDER — GLYCOPYRROLATE 0.2 MG/ML
INJECTION INTRAMUSCULAR; INTRAVENOUS PRN
Status: DISCONTINUED | OUTPATIENT
Start: 2024-08-20 | End: 2024-08-20 | Stop reason: SDUPTHER

## 2024-08-20 RX ORDER — HYDROMORPHONE HYDROCHLORIDE 1 MG/ML
0.5 INJECTION, SOLUTION INTRAMUSCULAR; INTRAVENOUS; SUBCUTANEOUS EVERY 5 MIN PRN
Status: DISCONTINUED | OUTPATIENT
Start: 2024-08-20 | End: 2024-08-20 | Stop reason: HOSPADM

## 2024-08-20 RX ORDER — LIDOCAINE HYDROCHLORIDE 10 MG/ML
INJECTION, SOLUTION INFILTRATION; PERINEURAL
Status: COMPLETED | OUTPATIENT
Start: 2024-08-20 | End: 2024-08-20

## 2024-08-20 RX ORDER — FENTANYL CITRATE 50 UG/ML
INJECTION, SOLUTION INTRAMUSCULAR; INTRAVENOUS PRN
Status: DISCONTINUED | OUTPATIENT
Start: 2024-08-20 | End: 2024-08-20 | Stop reason: SDUPTHER

## 2024-08-20 RX ADMIN — SODIUM CHLORIDE, SODIUM LACTATE, POTASSIUM CHLORIDE, AND CALCIUM CHLORIDE: 600; 310; 30; 20 INJECTION, SOLUTION INTRAVENOUS at 13:46

## 2024-08-20 RX ADMIN — PHENYLEPHRINE HYDROCHLORIDE 100 MCG: 10 INJECTION INTRAVENOUS at 13:15

## 2024-08-20 RX ADMIN — SODIUM CHLORIDE, SODIUM LACTATE, POTASSIUM CHLORIDE, AND CALCIUM CHLORIDE: 600; 310; 30; 20 INJECTION, SOLUTION INTRAVENOUS at 10:26

## 2024-08-20 RX ADMIN — LIDOCAINE HYDROCHLORIDE 50 MG: 10 INJECTION, SOLUTION INFILTRATION; PERINEURAL at 12:46

## 2024-08-20 RX ADMIN — ROCURONIUM BROMIDE 50 MG: 10 INJECTION, SOLUTION INTRAVENOUS at 12:46

## 2024-08-20 RX ADMIN — FENTANYL CITRATE 50 MCG: 0.05 INJECTION, SOLUTION INTRAMUSCULAR; INTRAVENOUS at 12:44

## 2024-08-20 RX ADMIN — CEFAZOLIN 3000 MG: 2 INJECTION, POWDER, FOR SOLUTION INTRAMUSCULAR; INTRAVENOUS at 12:50

## 2024-08-20 RX ADMIN — LIDOCAINE HYDROCHLORIDE 1 ML: 10 INJECTION, SOLUTION INFILTRATION; PERINEURAL at 12:23

## 2024-08-20 RX ADMIN — CELECOXIB 200 MG: 200 CAPSULE ORAL at 10:31

## 2024-08-20 RX ADMIN — HYDROMORPHONE HYDROCHLORIDE 1 MG: 1 INJECTION, SOLUTION INTRAMUSCULAR; INTRAVENOUS; SUBCUTANEOUS at 13:24

## 2024-08-20 RX ADMIN — ONDANSETRON 4 MG: 2 INJECTION INTRAMUSCULAR; INTRAVENOUS at 14:13

## 2024-08-20 RX ADMIN — ACETAMINOPHEN 1000 MG: 500 TABLET ORAL at 10:31

## 2024-08-20 RX ADMIN — ROPIVACAINE HYDROCHLORIDE 20 ML: 5 INJECTION, SOLUTION EPIDURAL; INFILTRATION; PERINEURAL at 12:23

## 2024-08-20 RX ADMIN — EPHEDRINE SULFATE 5 MG: 5 INJECTION INTRAVENOUS at 13:15

## 2024-08-20 RX ADMIN — TRANEXAMIC ACID 1950 MG: 650 TABLET ORAL at 10:30

## 2024-08-20 RX ADMIN — OXYCODONE HYDROCHLORIDE 10 MG: 10 TABLET, FILM COATED, EXTENDED RELEASE ORAL at 10:31

## 2024-08-20 RX ADMIN — DEXAMETHASONE SODIUM PHOSPHATE 10 MG: 10 INJECTION, SOLUTION INTRAMUSCULAR; INTRAVENOUS at 12:52

## 2024-08-20 RX ADMIN — SUGAMMADEX 300 MG: 100 INJECTION, SOLUTION INTRAVENOUS at 14:24

## 2024-08-20 RX ADMIN — PROPOFOL 150 MG: 10 INJECTION, EMULSION INTRAVENOUS at 12:46

## 2024-08-20 RX ADMIN — GLYCOPYRROLATE 0.2 MG: 0.2 INJECTION, SOLUTION INTRAMUSCULAR; INTRAVENOUS at 13:07

## 2024-08-20 RX ADMIN — MIDAZOLAM 2 MG: 1 INJECTION INTRAMUSCULAR; INTRAVENOUS at 12:10

## 2024-08-20 RX ADMIN — FENTANYL CITRATE 50 MCG: 0.05 INJECTION, SOLUTION INTRAMUSCULAR; INTRAVENOUS at 13:45

## 2024-08-20 RX ADMIN — EPHEDRINE SULFATE 5 MG: 5 INJECTION INTRAVENOUS at 13:01

## 2024-08-20 ASSESSMENT — PAIN DESCRIPTION - DESCRIPTORS
DESCRIPTORS: ACHING

## 2024-08-20 ASSESSMENT — PAIN SCALES - GENERAL
PAINLEVEL_OUTOF10: 3
PAINLEVEL_OUTOF10: 3

## 2024-08-20 ASSESSMENT — PAIN - FUNCTIONAL ASSESSMENT
PAIN_FUNCTIONAL_ASSESSMENT: 0-10
PAIN_FUNCTIONAL_ASSESSMENT: 0-10

## 2024-08-20 ASSESSMENT — PAIN DESCRIPTION - LOCATION
LOCATION: KNEE
LOCATION: KNEE

## 2024-08-20 NOTE — ANESTHESIA PRE PROCEDURE
Social History     Tobacco Use    Smoking status: Former     Current packs/day: 0.00     Average packs/day: 1 pack/day for 11.6 years (11.6 ttl pk-yrs)     Types: Cigarettes     Start date:      Quit date: 1978     Years since quittin.0    Smokeless tobacco: Never   Substance Use Topics    Alcohol use: No                                Counseling given: Not Answered      Vital Signs (Current):   Vitals:    24 1025   BP: (!) 141/75   Pulse: 56   Resp: 18   Temp: 97.4 °F (36.3 °C)   TempSrc: Temporal   SpO2: 93%   Weight: 129.7 kg (286 lb)   Height: 1.905 m (6' 3\")                                              BP Readings from Last 3 Encounters:   24 (!) 141/75   24 130/76   10/17/23 122/76       NPO Status: Time of last liquid consumption: 0000                        Time of last solid consumption: 0000                        Date of last liquid consumption: 24                        Date of last solid food consumption: 24    BMI:   Wt Readings from Last 3 Encounters:   24 129.7 kg (286 lb)   24 131.5 kg (290 lb)   24 133.8 kg (295 lb)     Body mass index is 35.75 kg/m².    CBC:   Lab Results   Component Value Date/Time    WBC 6.9 2024 10:25 AM    RBC 4.75 2024 10:25 AM    HGB 14.9 2024 10:25 AM    HCT 46.5 2024 10:25 AM    MCV 97.9 2024 10:25 AM    RDW 13.0 2024 10:25 AM     2024 10:25 AM       CMP:   Lab Results   Component Value Date/Time     2024 10:25 AM    K 4.5 2024 10:25 AM     2024 10:25 AM    CO2 30 2024 10:25 AM    BUN 17 2024 10:25 AM    CREATININE 0.7 2024 10:25 AM    GFRAA >59 2022 09:35 AM    LABGLOM >90 2024 10:25 AM    LABGLOM >60 10/16/2023 09:02 AM    GLUCOSE 101 2024 10:25 AM    CALCIUM 9.0 2024 10:25 AM    BILITOT 0.8 2024 07:55 AM    ALKPHOS 88 2024 07:55 AM    AST 49 2024 07:55 AM    ALT 62

## 2024-08-20 NOTE — PROGRESS NOTES
CLINICAL PHARMACY NOTE: MEDS TO BEDS    Total # of Prescriptions Filled: 4   The following medications were delivered to the patient:  Discharge Medication List as of 8/20/2024  3:10 PM        START taking these medications    Details   oxyCODONE (ROXICODONE) 5 MG immediate release tablet Take 1 tablet by mouth every 4 hours as needed for Pain for up to 8 days. Max Daily Amount: 30 mg, Disp-50 tablet, R-0Normal      aspirin (ASPIRIN CHILDRENS) 81 MG chewable tablet Take 1 tablet by mouth in the morning and at bedtime, Disp-60 tablet, R-0Normal      ibuprofen (ADVIL;MOTRIN) 400 MG tablet Take 1 tablet by mouth every 8 hours as needed for Pain, Disp-30 tablet, R-0Normal         Clopidogrel 75 mg      Additional Documentation:    Handed scripts to patients family at pharmacy counter. Paid with card.

## 2024-08-20 NOTE — H&P
[facility] Pre-Operative History and Physical    Patient Name: Safia  : 1945        Chief Complaint: Right knee pain  History of Present Illness:   This patient has had ongoing anterior pain for several weeks/months that has been unresponsive to conservative care which has included injection, therapy, activity modification and presents now for surgery.  Patient is status post right knee arthroplasty several years ago.  Patient fell in early July and aggravated the knee.    Past Medical History:       Diagnosis Date    Arthritis     CAD (coronary artery disease)     sees dr. khan    DVT, lower extremity, distal, chronic (HCC)     Familial hypercholesterolemia 2017    Gastroesophageal reflux disease without esophagitis 2017    History of blood transfusion     Knee replacements    Hx of blood clots     Post Knee replacement tx    Idiopathic peripheral neuropathy 2017    Knee pain     Myasthenia gravis (HCC)     sees dr. 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 Authorizing Provider   Cholecalciferol (VITAMIN D3) 50 MCG (2000) CAPS Take 1 capsule by mouth daily   Yes Sai Doherty MD   vitamin C (ASCORBIC ACID) 500 MG tablet Take 2 tablets by mouth daily   Yes Sai Doherty MD   BIOTIN PO Take 1 tablet by mouth daily   Yes Chas  Requested medication(s) are due for refill today: Yes  Patient has already received a courtesy refill: No  Other reason request has been forwarded to provider: MD Sai   azithromycin (ZITHROMAX) 250 MG tablet Take 1 tablet by mouth daily \"Dental work 8/19/2024\"  \"tooth, left upper fell out\"   Yes Sai Doherty MD   atorvastatin (LIPITOR) 80 MG tablet Take 1 tablet by mouth at bedtime Indications: High Amount of Fats in the Blood   Yes Sai Doherty MD   pregabalin (LYRICA) 100 MG capsule Take 1 capsule by mouth 2 times daily for 180 days. Max Daily Amount: 200 mg 6/6/24 12/3/24 Yes Jennifer Richards MD   levothyroxine (SYNTHROID) 25 MCG tablet Take 1 tablet by mouth every morning 3/29/24  Yes Sai Doherty MD   metoprolol succinate (TOPROL XL) 25 MG extended release tablet TAKE 1 TABLET DAILY  Patient taking differently: Take 1 tablet by mouth daily TAKE 1 TABLET DAILY 4/22/24  Yes Jennifer Richards MD   montelukast (SINGULAIR) 10 MG tablet TAKE 1 TABLET NIGHTLY 3/19/24  Yes Jennifer iRchards MD   omeprazole (PRILOSEC) 20 MG delayed release capsule Take 1 capsule by mouth daily   Yes Sai Doherty MD   pyridostigmine (MESTINON) 60 MG tablet Take 1 tablet by mouth 2 times daily 7/22/22  Yes Sai Doherty MD   LUTEIN PO Take 1 capsule by mouth daily   Yes Sai Doherty MD   aspirin 81 MG tablet Take 1 tablet by mouth daily   Yes Sai Doherty MD   Multiple Vitamins-Minerals (MULTIVITAMIN ADULT PO) Take 1 tablet by mouth daily   Yes Sai Doherty MD   fluticasone (FLONASE) 50 MCG/ACT nasal spray 1 spray by Each Nostril route daily as needed for Rhinitis    Sai Doherty MD   azelastine (ASTELIN) 0.1 % nasal spray USE 2 SPRAYS IN EACH NOSTRIL TWICE DAILY as needed  Patient taking differently: 2 sprays by Nasal route 2 times daily USE 2 SPRAYS IN EACH NOSTRIL TWICE DAILY as needed 5/6/24   Jennifer Richards MD   glycopyrrolate (ROBINUL) 1 MG tablet Take 1 tablet by mouth 2 times daily    Sai Doherty MD   clopidogrel (PLAVIX) 75 MG tablet Take 1 tablet by mouth daily 6/16/22 7/15/22  Jennifer Richards MD  cruciate retaining knee arthroplasty in good position.  Patellar button is a little bit tilted.  LABORATORY:    CBC :    Lab Results   Component Value Date/Time    WBC 6.9 08/06/2024 10:25 AM    HGB 14.9 08/06/2024 10:25 AM    HCT 46.5 08/06/2024 10:25 AM     08/06/2024 10:25 AM     BMP:   Lab Results   Component Value Date/Time     08/06/2024 10:25 AM    K 4.5 08/06/2024 10:25 AM     08/06/2024 10:25 AM    CO2 30 08/06/2024 10:25 AM    BUN 17 08/06/2024 10:25 AM    CREATININE 0.7 08/06/2024 10:25 AM    CALCIUM 9.0 08/06/2024 10:25 AM    GFRAA >59 09/08/2022 09:35 AM    LABGLOM >90 08/06/2024 10:25 AM    LABGLOM >60 10/16/2023 09:02 AM    GLUCOSE 101 08/06/2024 10:25 AM     PT/INR:    Lab Results   Component Value Date/Time    PROTIME 13.3 08/06/2024 10:25 AM    INR 1.04 08/06/2024 10:25 AM     U/A: No results found for: \"NITRITE\", \"WBCUA\", \"RBCUA\", \"BACTERIA\"  HgBA1c:  No components found for: \"HGBA1C\"    IMPRESSION: Painful right knee arthroplasty due to patellofemoral articulation malposition patellar button with exposed lateral patellar facet.. Most recent office note reviewed and there has been no change in health status.       PLAN: Revision of painful patella after right knee replacement.  May need to change the poly on the tibial side.  I explained to the patient/family the patient's diagnosis and operative procedure in detail. They said they understood basically what was wrong and how I planned to fix it.  They understand the expected recovery and the risks which include excessive bleeding, infection, reaction to anesthesia, nerve injury, stiffness, fracture, deformity and dislocation.  They then signed an operative consent form.      Provider: SAKSHI TEJADA MD  Date: 8/20/2024

## 2024-08-20 NOTE — ANESTHESIA POSTPROCEDURE EVALUATION
Department of Anesthesiology  Postprocedure Note    Patient: Justin Ann  MRN: [id number]  YOB: 1945  Date of evaluation: 8/20/2024    Procedure Summary       Date: 08/20/24 Room / Location: [address]    Anesthesia Start: 1239 Anesthesia Stop: 1446    Procedure: RIGHT COMPLEX PATELLA REVISION (Right) Diagnosis:       Presence of artificial knee joint, bilateral      (Presence of artificial knee joint, bilateral [Z96.653])    Surgeons: Rg Jenkins MD Responsible Provider: Shey Kauffman APRN - CRNA    Anesthesia Type: general, regional ASA Status: 3            Anesthesia Type: No value filed.    Natalee Phase I: Natalee Score: 9    Natalee Phase II:      Anesthesia Post Evaluation    Patient location during evaluation: PACU  Patient participation: complete - patient participated  Level of consciousness: sleepy but conscious  Pain score: 0  Airway patency: patent  Nausea & Vomiting: no vomiting and no nausea  Cardiovascular status: blood pressure returned to baseline  Respiratory status: acceptable and face mask  Hydration status: stable  Pain management: adequate    No notable events documented.

## 2024-08-20 NOTE — PROGRESS NOTES
PATIENT IS A/OX4, ABLE TO LIFT BOTH LEGS AT THE SAME TIME, SKIN P/W/D, WILL AMBULATE WITH WALKER AND GAIT AND PREPARE FOR DISCHARGE HOME

## 2024-08-20 NOTE — PROGRESS NOTES
Message left for staff to arrange home health with physical therapy per dr. Jenkins and patient request - office to call patient tomorrow with arrangements

## 2024-08-20 NOTE — OP NOTE
OPERATIVE NOTE    Patient:  Justin Ann    Date:  8/20/2024    Medical Record Number:  [id number]    Primary Pre-Operative Diagnosis: Painful right knee arthroplasty due to malposition patellar button and flexion contracture    Primary Post-Operative Diagnosis:  Same    Procedure: Right knee patellar revision posterior capsular release and poly change from a standard 12 mm thick poly to a deep dish conforming plus poly 12 mm thickness  Implants:   Implant Name Type Inv. Item Serial No.  Lot No. LRB No. Used Action   profix conforming plus articular insert    [employer]- [id number] Right 1 Implanted   IMPL KNEE PSN ALL POLY PAT 38MM - [id number] Knee IMPL KNEE PSN ALL POLY PAT 38MM  [employer]MM [id number] Right 1 Implanted   CEMENT BONE 40GM HI VISC PALACOS R - [id number]  CEMENT BONE 40GM HI VISC PALACOS R  [facility]- [id number] Right 1 Implanted       Assistant: Marty Recinos      Anesthesia: General    Estimated Blood Loss:  Minimal    Tourniquet Time: 40 minutes    Specimens: 3 cultures    Complications:  None    Findings:  As above      Procedure:  Patient was brought into the operating room and general endotracheal anesthetic was placed.  The extremity was prepped with chlorhexidine alcohol and sterilely draped.  The right lower extremity was exsanguinated with an Ace wrap and tourniquet inflated to 300 mmHg.  Patient had about a 7 degree flexion contracture.  The old anterior incision was incised with a scalpel.  Generous fasciocutaneous flaps developed with the Bovie.  Mid parapatellar arthrotomy was made.  Medial capsule elevated off the medial tibia.  Lateral gutter was released.  The patella was everted.  Tell her button was in inset button quite small.  Half of the lateral patella was still exposed.  Overall thickness was about 27 mm.  Went ahead and resected the patella's to get all 4 corners  thickness at 14 mm.  Sized to a 38 patella.  We used the Elan oval  patella.  Drilled for the lug holes placed the appropriate trial.  Overall thickness was now 27 mm.  Checked our stability of the knee.  The knee opened up about 5 or 6 mm to varus stress in flexion extension.  It was stable to valgus stress throughout a motion arc.  He had a negative posterior drawer.  There was a 12 mm poly on the CR Pro fix knee.  Poly was removed.  Posterior capsule release was done with 1/2 inch curved osteotome off the back of the femur.  10 mm poly was placed initially the knee placed in extension with gentle pressure and you could feel the palpable lysis of adhesions as the knee straightened out and actually went into a little bit of hyperextension.  We settled on the 12 mm poly.  It was snapped in place.  The knee went just to full extension now.  There was no longer flexion fracture.  The knee was stable to varus valgus stress.  It was stable to AP and AP drawer.  Patella tracked well.  The actual patellar button was then cemented using standard cementing techniques.  The surface of the patella was cleaned up with a high-speed bur and a drill bit first.  Once cement was dry and excess cement and been removed the tourniquet was released.  The knee was irrigated and dilute Betadine solution.  Tissue was injected with quarter percent Naropin  Knee was closed in flexion with #2 max braid interrupted sutures.  Subcu closed 2-0 Vicryl.  Skin closed with 3-0 Vicryl and Prineo.  Sterile dressings applied.  Patient was awakened extubated and transferred to cover him in stable condition.  Plan be for weight-bear as tolerated range of motion as tolerated.          Electronically signed by SAKSHI TJEADA MD on 8/20/2024 at 2:23 PM

## 2024-08-20 NOTE — ANESTHESIA PROCEDURE NOTES
Peripheral Block    Patient location during procedure: holding area  Reason for block: post-op pain management  Start time: 8/20/2024 12:23 PM  Staffing  Performed: anesthesiologist   Anesthesiologist: Jovita Burnett MD  Performed by: Jovita Burnett MD  Authorized by: Jovita Burnett MD    Preanesthetic Checklist  Completed: patient identified, IV checked, site marked, risks and benefits discussed, surgical/procedural consents, equipment checked, pre-op evaluation, timeout performed, anesthesia consent given, oxygen available and monitors applied/VS acknowledged  Peripheral Block   Patient position: supine  Prep: ChloraPrep  Provider prep: mask and sterile gloves  Patient monitoring: cardiac monitor, continuous pulse ox, frequent blood pressure checks and IV access  Block type: Femoral  Adductor canal  Laterality: right  Injection technique: single-shot  Guidance: ultrasound guided  Local infiltration: ropivacaine  Infiltration strength: 0.5 %  Local infiltration: ropivacaine  Dose: 20 mL    Needle   Needle type: combined needle/nerve stimulator   Needle gauge: 22 G  Needle localization: ultrasound guidance  Needle length: 10 cm  Assessment   Injection assessment: negative aspiration for heme, no paresthesia on injection and local visualized surrounding nerve on ultrasound  Paresthesia pain: none  Slow fractionated injection: yes  Hemodynamics: stable  Outcomes: patient tolerated procedure well and uncomplicated    Additional Notes  Under ultrasound (\"US\") guidance, a 22 gauge needle was inserted and placed in close proximity to the femoral nerve. Ultrasound was also used to visualize the spread of the anesthetic in close proximity to the nerve being blocked. The nerve appeared anatomically normal, and there were no abnormal pathological findings. A permanent image was recorded.     20 mL 0.5% Ropivacaine injected  Medications Administered  lidocaine injection 1% - Subcuticular   1 mL - 8/20/2024 12:23:00

## 2024-08-23 ENCOUNTER — TELEPHONE (OUTPATIENT)
Dept: OTOLARYNGOLOGY | Facility: CLINIC | Age: 79
End: 2024-08-23

## 2024-08-25 LAB
BACTERIA SPEC AEROBE CULT: NORMAL
BACTERIA SPEC ANAEROBE CULT: NORMAL
GRAM STN SPEC: NORMAL

## 2024-08-26 ENCOUNTER — TELEPHONE (OUTPATIENT)
Dept: INTERNAL MEDICINE | Age: 79
End: 2024-08-26

## 2024-08-26 DIAGNOSIS — I77.810 DILATION OF THORACIC AORTA (HCC): ICD-10-CM

## 2024-08-26 DIAGNOSIS — R91.1 LUNG NODULE: Primary | ICD-10-CM

## 2024-08-26 NOTE — TELEPHONE ENCOUNTER
He wants to make sure that it is ok for him to continue to take the Glycopyrrolate twice daily because it has helped his vertigo and has cleared up his sinuses.    He also got a letter from [facility] about needing to follow up on a pulmonary nodule and wants to know if this needs to be scheduled?

## 2024-08-28 RX ORDER — GLYCOPYRROLATE 1 MG/1
1 TABLET ORAL 2 TIMES DAILY
Qty: 180 TABLET | Refills: 1 | Status: SHIPPED | OUTPATIENT
Start: 2024-08-28

## 2024-08-28 NOTE — TELEPHONE ENCOUNTER
Ok to still take the glycopyrrolate-- also I put a referral to get CT at [facility]- please print and fax - it is due next month

## 2024-09-03 ENCOUNTER — TRANSCRIBE ORDERS (OUTPATIENT)
Dept: ADMINISTRATIVE | Facility: HOSPITAL | Age: 79
End: 2024-09-03
Payer: MEDICARE

## 2024-09-03 DIAGNOSIS — I77.810 DILATION OF THORACIC AORTA: ICD-10-CM

## 2024-09-03 DIAGNOSIS — R91.1 LUNG NODULE: Primary | ICD-10-CM

## 2024-09-03 LAB
FUNGUS SPEC CULT: NORMAL
KOH PREP SPEC: NORMAL

## 2024-09-10 LAB
FUNGUS SPEC CULT: NORMAL
KOH PREP SPEC: NORMAL

## 2024-09-17 LAB
FUNGUS SPEC CULT: NORMAL
KOH PREP SPEC: NORMAL

## 2024-09-18 ENCOUNTER — HOSPITAL ENCOUNTER (OUTPATIENT)
Dept: CT IMAGING | Facility: HOSPITAL | Age: 79
Discharge: HOME | End: 2024-09-18
Admitting: INTERNAL MEDICINE
Payer: MEDICARE

## 2024-09-18 DIAGNOSIS — I77.810 DILATION OF THORACIC AORTA: ICD-10-CM

## 2024-09-18 DIAGNOSIS — R91.1 LUNG NODULE: ICD-10-CM

## 2024-09-18 PROCEDURE — 71250 CT THORAX DX C-: CPT

## 2024-09-20 DIAGNOSIS — G60.9 IDIOPATHIC PERIPHERAL NEUROPATHY: ICD-10-CM

## 2024-09-20 RX ORDER — PREGABALIN 100 MG/1
100 CAPSULE ORAL 2 TIMES DAILY
Qty: 180 CAPSULE | Refills: 1 | Status: SHIPPED | OUTPATIENT
Start: 2024-09-20 | End: 2025-03-19

## 2024-09-24 LAB
FUNGUS SPEC CULT: NORMAL
KOH PREP SPEC: NORMAL

## 2024-10-14 NOTE — PROGRESS NOTES
[facility] - PODIATRY    Today's Date: 10/16/2024     Patient Name: Axel Morris  MRN: [id number]  CSN: [id number]  PCP: Isabel Best MD  Referring Provider: No ref. provider found    SUBJECTIVE     Chief Complaint   Patient presents with    Follow-up     Isabel Best MD 05/06/2024 3 month fu for non diabetic nail care- pt states feet doing ok- pt denies pain      HPI: Axel Morris, a 79 y.o.male, comes to clinic as a(n) established patient complaining of painful toenails and complaining of neuropathy of both feet . Patient has h/o arthritis, CA, CAD, dizziness, GERD, HLD, Neuropathy, pneumonia, sleep apnea, tinnitus .  Reports his toenails are long, thickened, irregular, and dystrophic and that he is unable to care for them himself at home.  He does states that he has neuropathy from the knee down bilaterally; states he is unsure of the source of his neuropathy though he does report issues with his disks in his back and states he also has previous knee surgery that he believes is contributing to neuropathy as well.  Calluses have returned to the great toes.  Denies pain in feet secondary to neuropathy . Relates previous treatment(s) including foot care by podiatry . Denies any constitutional symptoms. No other pedal complaints at this time.    Past Medical History:   Diagnosis Date    Abnormal ECG     Arthritis     Callus     Coronary artery disease     COVID-19 vaccine series completed     pfizer    Difficulty walking     Dizziness     Fallen arches     Fracture of nasal bones     GERD (gastroesophageal reflux disease)     History of transfusion     HL (hearing loss)     Hx of colonic polyps     Hyperlipidemia     Idiopathic peripheral neuropathy 07/27/2017    Kidney stone on left side 06/19/2023    Neuropathy     Peripheral neuropathy     Pneumonia     Primary hypertension 12/23/2021    Rheumatic fever     Sinusitis     Skin cancer     Sleep apnea     Tinnitus     Verruca     Vision loss       Past Surgical History:   Procedure Laterality Date    ADENOIDECTOMY      CARDIAC CATHETERIZATION      CARDIAC SURGERY      HEART STENT    CHOLECYSTECTOMY      COLONOSCOPY N/A 2017    Procedure: COLONOSCOPY WITH ANESTHESIA;  Surgeon: Matthew Cagle MD;  Location:  [facility];  Service:     COLONOSCOPY W/ POLYPECTOMY  2012    Adenomatous polyp at 50 cm repeat exam in 5 years    CORONARY STENT PLACEMENT      ENDOSCOPY AND COLONOSCOPY  2009    Acute ulcerative esophagogastritis grade c, Hyperplastic polyp at 50 cm repeat colonoscopy in 3 years    EXTRACORPOREAL SHOCK WAVE LITHOTRIPSY (ESWL) Left 2023    Procedure: EXTRACORPOREAL SHOCKWAVE LITHOTRIPSY;  Surgeon: Marcellus Fowler MD;  Location:  [facility];  Service: Urology;  Laterality: Left;    EYE SURGERY      DONNIE CATARACT REMOVAL    FLAP HEAD/NECK Left 03/10/2021    Procedure: POSSIBLE FLAP OR GRAFT.;  Surgeon: Roberto Og MD;  Location:  [facility] OR;  Service: ENT;  Laterality: Left;    HEAD/NECK LESION/CYST EXCISION Left 03/10/2021    Procedure: EXCISION OF ATYPICAL PIGMENTED LESION OF THE LEFT CENTRAL TEMPLE REGION WITH POSSIBLE FLAP OR GRAFT. - Left;  Surgeon: Roberto Og MD;  Location:  [facility];  Service: ENT;  Laterality: Left;    REPLACEMENT TOTAL KNEE BILATERAL      TONSILLECTOMY       Family History   Problem Relation Age of Onset    Heart failure Father     Rheum arthritis Father     Cancer Brother     Colon cancer Neg Hx     Colon polyps Neg Hx      Social History     Socioeconomic History    Marital status:    Tobacco Use    Smoking status: Former     Current packs/day: 0.00     Average packs/day: 1 pack/day for 10.0 years (10.0 ttl pk-yrs)     Types: Cigarettes     Start date: 1968     Quit date: 1978     Years since quittin.2     Passive exposure: Never    Smokeless tobacco: Never   Vaping Use    Vaping status: Never Used   Substance and Sexual Activity    Alcohol use: Never     Drug use: Never    Sexual activity: Defer     Allergies   Allergen Reactions    Ampicillin Rash     Current Outpatient Medications   Medication Sig Dispense Refill    aspirin 81 MG tablet Take 1 tablet by mouth Daily. Dr. Tamayo      atorvastatin (LIPITOR) 80 MG tablet Take 1 tablet by mouth Daily. 90 tablet 3    fluticasone (FLONASE) 50 MCG/ACT nasal spray Administer 2 sprays into the nostril(s) as directed by provider Daily.      glycopyrrolate (ROBINUL) 1 MG tablet Take 1 tablet by mouth 3 (Three) Times a Day As Needed (vertigo). 60 tablet 5    levothyroxine (SYNTHROID, LEVOTHROID) 25 MCG tablet TAKE 1 TABLET EVERY MORNING 90 tablet 3    LUTEIN PO Take 25 mg by mouth Daily.      metoprolol succinate XL (TOPROL-XL) 25 MG 24 hr tablet Take 1 tablet by mouth Daily.      montelukast (SINGULAIR) 10 MG tablet Take 1 tablet by mouth Daily.      Multiple Vitamins-Minerals (MULTIVITAMIN WITH MINERALS) tablet tablet Take 1 tablet by mouth Daily.      Multiple Vitamins-Minerals (OCUVITE ADULT FORMULA PO) Take  by mouth.      omeprazole (priLOSEC) 20 MG capsule Take 1 capsule by mouth Daily.      pregabalin (LYRICA) 100 MG capsule Take 1 capsule by mouth 2 (Two) Times a Day.      pyridostigmine (MESTINON) 60 MG tablet Take 1 tablet by mouth 3 (Three) Times a Day. 270 tablet 0     Current Facility-Administered Medications   Medication Dose Route Frequency Provider Last Rate Last Admin    lidocaine (XYLOCAINE) 1 % injection 5 mL  5 mL Infiltration Once Arturo Baum MD         Review of Systems   Constitutional:  Negative for chills and fever.   HENT:  Negative for congestion.    Respiratory:  Negative for shortness of breath.    Cardiovascular:  Negative for chest pain and leg swelling.   Gastrointestinal:  Negative for constipation, diarrhea, nausea and vomiting.   Musculoskeletal:  Positive for arthralgias. Negative for myalgias.   Skin:  Negative for wound.        Thickened, elongated, irregular toenails. Calluses.    Neurological:  Positive for numbness. Negative for dizziness and weakness.   Hematological:  Bruises/bleeds easily.   Psychiatric/Behavioral:  Negative for agitation, behavioral problems and confusion.        OBJECTIVE     Vitals:    10/16/24 0903   BP: 142/86   Pulse: 70   SpO2: 99%       PHYSICAL EXAM  GEN:   Accompanied by none.     Foot/Ankle Exam    GENERAL  Appearance:  appears stated age and obese  Orientation:  AAOx3  Affect:  appropriate  Gait:  unimpaired  Assistance:  independent  Right shoe gear: casual shoe  Left shoe gear: casual shoe    VASCULAR     Right Foot Vascularity   Dorsalis pedis:  2+  Posterior tibial:  2+  Skin temperature:  warm  Edema grading:  Trace and non-pitting  CFT:  3  Pedal hair growth:  Present  Varicosities:  mild varicosities     Left Foot Vascularity   Dorsalis pedis:  2+  Posterior tibial:  2+  Skin temperature:  warm  Edema grading:  Trace and non-pitting  CFT:  3  Pedal hair growth:  Present  Varicosities:  mild varicosities     NEUROLOGIC     Right Foot Neurologic   Light touch sensation: diminished  Vibratory sensation: diminished  Hot/Cold sensation: diminished  Protective Sensation using [place] Monofilament:   Sites intact: 5  Sites tested: 10     Left Foot Neurologic   Light touch sensation: diminished  Vibratory sensation: diminished  Protective Sensation using [place] Monofilament:   Sites intact: 1  Sites tested: 10    MUSCULOSKELETAL     Right Foot Musculoskeletal   Ecchymosis:  none  Tenderness:  toenail problem    Arch:  Pes planus  Hallux valgus: Yes       Left Foot Musculoskeletal   Ecchymosis:  none  Tenderness:  toenail problem  Arch:  Pes planus  Hallux valgus: Yes      MUSCLE STRENGTH     Right Foot Muscle Strength   Foot dorsiflexion:  5  Foot plantar flexion:  5  Foot inversion:  5  Foot eversion:  5     Left Foot Muscle Strength   Foot dorsiflexion:  5  Foot plantar flexion:  4+  Foot inversion:  5  Foot eversion:  5    RANGE OF  MOTION     Right Foot Range of Motion   Foot and ankle ROM within normal limits       Left Foot Range of Motion   Foot and ankle ROM within normal limits      DERMATOLOGIC      Right Foot Dermatologic   Skin  Positive for corn and tinea.   Nails  1.  Positive for onychomycosis, abnormal thickness, subungual debris and dystrophic nail.  2.  Positive for elongated, onychomycosis, abnormal thickness, subungual debris and dystrophic nail.  3.  Positive for elongated, onychomycosis, abnormal thickness, subungual debris and dystrophic nail.  4.  Positive for elongated, onychomycosis, abnormal thickness, subungual debris and dystrophic nail.  5.  Positive for elongated, onychomycosis, abnormal thickness, subungual debris and dystrophic nail.     Left Foot Dermatologic   Skin  Positive for corn and tinea.   Nails  1.  Positive for onychomycosis, abnormal thickness, subungual debris and dystrophic nail.  2.  Positive for elongated, onychomycosis, abnormal thickness, subungual debris and dystrophic nail.  3.  Positive for elongated, onychomycosis, abnormal thickness, subungual debris and dystrophic nail.  4.  Positive for elongated, onychomycosis, abnormally thick, subungual debris and dystrophic nail.  5.  Positive for elongated, onychomycosis, abnormally thick, subungual debris and dystrophic nail.    Image:       RADIOLOGY/NUCLEAR:  CT Chest Without Contrast Diagnostic    Result Date: 9/18/2024  Narrative: EXAM: CT CHEST WO CONTRAST DIAGNOSTIC-  DATE: 9/18/2024 1:12 PM  HISTORY: R91.1; R91.1-Solitary pulmonary nodule; I77.810-Thoracic aortic ectasia   COMPARISON: 9/20/2023.  DOSE LENGTH PRODUCT: 684.66 mGy.cm  Automatic exposure control was utilized to make radiation dose as low as reasonably achievable.  TECHNIQUE: Unenhanced CT images obtained with multiplanar reformats.  FINDINGS: AIRWAYS/PULMONARY PARENCHYMA: The central airways are midline and patent. No pleural effusion or pneumothorax.  LEFT lower lobe 7 mm  pulmonary nodule on axial series 3, image 94, previously similar on 9/20/2023 and 11/10/2022.  No new suspicious pulmonary finding.  VASCULATURE: Limited evaluation of the vasculature without intravenous contrast. Ascending thoracic aorta measures 4.1 cm, similar compared to prior. Borderline caliber pulmonary artery.  CARDIAC: Normal heart size with heavily calcified coronary arteries verses stent material. No pericardial effusion.   MEDIASTINUM: Esophagus has normal course, caliber and wall thickness. There is no mediastinal or hilar lymphadenopathy by CT size criteria.  EXTRATHORACIC: RIGHT thyroid with a 1.9 cm nodule, which appears similar compared to 9/20/2023 and previously evaluated on prior ultrasound 4/10/2024. The extrathoracic soft tissues are within normal limits.  INCLUDED UPPER ABDOMEN: Gallbladder not demonstrated. Circumscribed low-density lesion in the liver, similar compared to 10/11/2022. Duodenal diverticulum. Normal appearance of the pancreas, spleen, adrenal glands. 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 Nonobstructing LEFT renal calculus.  OSSEOUS: No acute or suspicious bony finding.       Impression: 1. Similar LEFT lower lobe 7 mm pulmonary nodule compared as far back as 11/10/2022. 2. Heavily calcified coronary arteries versus stent material. 3. Ascending thoracic aorta measures 4.1 cm. 4. Similar noncalcified 13 mm probable splenic artery aneurysm. 5. Nonobstructing LEFT renal calculus.  This report was signed and finalized on 9/18/2024 3:45 PM by Dr Pilar Pickens MD.       LABORATORY/CULTURE RESULTS:      PATHOLOGY RESULTS:       ASSESSMENT/PLAN     Diagnoses and all orders for this visit:    1. Onychomycosis (Primary)    2. Other polyneuropathy    3. Pes planus of both feet    4. Valgus deformity of both great toes    5. Foot callus              Comprehensive lower extremity examination and evaluation was performed.  Discussed findings and treatment plan including risks,  benefits, and treatment options with patient in detail. Patient agreed with treatment plan.  After verbal consent obtained, nail(s) x10 debrided of length and thickness with nail nipper without incidence  After verbal consent obtained, calluses x2 pared utilizing dermal curette and/or scalpel without incidence  Patient may maintain nails and calluses at home utilizing emery board or pumice stone between visits as needed   Moisturize callused lesions on regular basis.  An After Visit Summary was printed and given to the patient at discharge, including (if requested) any available informative/educational handouts regarding diagnosis, treatment, or medications. All questions were answered to patient/family satisfaction. Should symptoms fail to improve or worsen they agree to call or return to clinic or to go to the Emergency Department. Discussed the importance of following up with any needed screening tests/labs/specialist appointments and any requested follow-up recommended by me today. Importance of maintaining follow-up discussed and patient accepts that missed appointments can delay diagnosis and potentially lead to worsening of conditions.  Return in about 3 months (around 1/16/2025) for Follow-up with Podiatry ALEXUS, Schedule Foot Care Clinic., or sooner if acute issues arise.        This document has been electronically signed by ALEXUS Bautista on October 16, 2024 12:29 CDT

## 2024-10-16 ENCOUNTER — PATIENT ROUNDING (BHMG ONLY) (OUTPATIENT)
Age: 79
End: 2024-10-16
Payer: MEDICARE

## 2024-10-16 ENCOUNTER — OFFICE VISIT (OUTPATIENT)
Age: 79
End: 2024-10-16
Payer: MEDICARE

## 2024-10-16 VITALS
BODY MASS INDEX: 36.83 KG/M2 | OXYGEN SATURATION: 99 % | DIASTOLIC BLOOD PRESSURE: 86 MMHG | HEART RATE: 70 BPM | HEIGHT: 74 IN | SYSTOLIC BLOOD PRESSURE: 142 MMHG | WEIGHT: 287 LBS

## 2024-10-16 DIAGNOSIS — M20.12 VALGUS DEFORMITY OF BOTH GREAT TOES: ICD-10-CM

## 2024-10-16 DIAGNOSIS — B35.1 ONYCHOMYCOSIS: Primary | ICD-10-CM

## 2024-10-16 DIAGNOSIS — M21.42 PES PLANUS OF BOTH FEET: ICD-10-CM

## 2024-10-16 DIAGNOSIS — M21.41 PES PLANUS OF BOTH FEET: ICD-10-CM

## 2024-10-16 DIAGNOSIS — L84 FOOT CALLUS: ICD-10-CM

## 2024-10-16 DIAGNOSIS — M20.11 VALGUS DEFORMITY OF BOTH GREAT TOES: ICD-10-CM

## 2024-10-16 DIAGNOSIS — G62.89 OTHER POLYNEUROPATHY: ICD-10-CM

## 2024-10-16 NOTE — PROGRESS NOTES
October 16, 2024    Hello, may I speak with Axel Morris?    My name is Ameena    I am  with [facility]  [address]   [facility] [address] [phone].    Before we get started may I verify your date of birth? 1945    I am calling to officially welcome you to our practice and ask about your recent visit. Is this a good time to talk? yes    Tell me about your visit with us. What things went well?  It went ok       We're always looking for ways to make our patients' experiences even better. Do you have recommendations on ways we may improve?  no    Overall were you satisfied with your first visit to our practice? yes       I appreciate you taking the time to speak with me today. Is there anything else I can do for you? no      Thank you, and have a great day.

## 2024-10-29 DIAGNOSIS — G70.00 MYASTHENIA GRAVIS WITHOUT EXACERBATION: ICD-10-CM

## 2024-10-29 RX ORDER — PYRIDOSTIGMINE BROMIDE 60 MG/1
60 TABLET ORAL 3 TIMES DAILY
Qty: 270 TABLET | Refills: 1 | Status: SHIPPED | OUTPATIENT
Start: 2024-10-29

## 2024-11-04 DIAGNOSIS — E78.2 MIXED HYPERLIPIDEMIA: ICD-10-CM

## 2024-11-04 DIAGNOSIS — Z13.1 SCREENING FOR DIABETES MELLITUS: ICD-10-CM

## 2024-11-04 LAB
ALBUMIN SERPL-MCNC: 3.9 G/DL (ref 3.5–5.2)
ALP SERPL-CCNC: 103 U/L (ref 40–129)
ALT SERPL-CCNC: 31 U/L (ref 5–41)
ANION GAP SERPL CALCULATED.3IONS-SCNC: 9 MMOL/L (ref 7–19)
AST SERPL-CCNC: 29 U/L (ref 5–40)
BILIRUB SERPL-MCNC: 0.7 MG/DL (ref 0.2–1.2)
BUN SERPL-MCNC: 19 MG/DL (ref 8–23)
CALCIUM SERPL-MCNC: 9 MG/DL (ref 8.8–10.2)
CHLORIDE SERPL-SCNC: 105 MMOL/L (ref 98–111)
CHOLEST SERPL-MCNC: 109 MG/DL (ref 0–199)
CO2 SERPL-SCNC: 29 MMOL/L (ref 22–29)
CREAT SERPL-MCNC: 0.7 MG/DL (ref 0.7–1.2)
ERYTHROCYTE [DISTWIDTH] IN BLOOD BY AUTOMATED COUNT: 13.1 % (ref 11.5–14.5)
GLUCOSE SERPL-MCNC: 102 MG/DL (ref 70–99)
HBA1C MFR BLD: 5.7 % (ref 4–5.6)
HCT VFR BLD AUTO: 45.2 % (ref 42–52)
HDLC SERPL-MCNC: 35 MG/DL (ref 40–60)
HGB BLD-MCNC: 13.9 G/DL (ref 14–18)
LDLC SERPL CALC-MCNC: 50 MG/DL
MCH RBC QN AUTO: 29.8 PG (ref 27–31)
MCHC RBC AUTO-ENTMCNC: 30.8 G/DL (ref 33–37)
MCV RBC AUTO: 97 FL (ref 80–94)
PLATELET # BLD AUTO: 205 K/UL (ref 130–400)
PMV BLD AUTO: 10.7 FL (ref 9.4–12.4)
POTASSIUM SERPL-SCNC: 4.7 MMOL/L (ref 3.5–5)
PROT SERPL-MCNC: 6.6 G/DL (ref 6.4–8.3)
RBC # BLD AUTO: 4.66 M/UL (ref 4.7–6.1)
SODIUM SERPL-SCNC: 143 MMOL/L (ref 136–145)
TRIGL SERPL-MCNC: 120 MG/DL (ref 0–149)
TSH SERPL DL<=0.005 MIU/L-ACNC: 2.49 UIU/ML (ref 0.27–4.2)
WBC # BLD AUTO: 6 K/UL (ref 4.8–10.8)

## 2024-11-07 ENCOUNTER — OFFICE VISIT (OUTPATIENT)
Dept: INTERNAL MEDICINE | Age: 79
End: 2024-11-07

## 2024-11-07 VITALS
HEIGHT: 75 IN | OXYGEN SATURATION: 99 % | BODY MASS INDEX: 35.19 KG/M2 | HEART RATE: 53 BPM | WEIGHT: 283 LBS | DIASTOLIC BLOOD PRESSURE: 78 MMHG | SYSTOLIC BLOOD PRESSURE: 122 MMHG

## 2024-11-07 DIAGNOSIS — E78.2 MIXED HYPERLIPIDEMIA: ICD-10-CM

## 2024-11-07 DIAGNOSIS — G47.33 OBSTRUCTIVE SLEEP APNEA: Chronic | ICD-10-CM

## 2024-11-07 DIAGNOSIS — G70.00 MYASTHENIA GRAVIS (HCC): ICD-10-CM

## 2024-11-07 DIAGNOSIS — Z23 INFLUENZA VACCINE NEEDED: ICD-10-CM

## 2024-11-07 DIAGNOSIS — I25.10 CORONARY ARTERY DISEASE INVOLVING NATIVE CORONARY ARTERY OF NATIVE HEART WITHOUT ANGINA PECTORIS: ICD-10-CM

## 2024-11-07 DIAGNOSIS — I10 ESSENTIAL HYPERTENSION: ICD-10-CM

## 2024-11-07 DIAGNOSIS — Z00.00 MEDICARE ANNUAL WELLNESS VISIT, SUBSEQUENT: Primary | ICD-10-CM

## 2024-11-07 DIAGNOSIS — Z13.1 SCREENING FOR DIABETES MELLITUS: ICD-10-CM

## 2024-11-07 DIAGNOSIS — Z12.5 SCREENING FOR MALIGNANT NEOPLASM OF PROSTATE: ICD-10-CM

## 2024-11-07 RX ORDER — TRIAMCINOLONE ACETONIDE 1 MG/G
CREAM TOPICAL
Qty: 28.4 G | Refills: 0 | Status: SHIPPED | OUTPATIENT
Start: 2024-11-07

## 2024-11-07 ASSESSMENT — LIFESTYLE VARIABLES
HOW MANY STANDARD DRINKS CONTAINING ALCOHOL DO YOU HAVE ON A TYPICAL DAY: PATIENT DOES NOT DRINK
HOW OFTEN DO YOU HAVE A DRINK CONTAINING ALCOHOL: NEVER

## 2024-11-07 ASSESSMENT — PATIENT HEALTH QUESTIONNAIRE - PHQ9
SUM OF ALL RESPONSES TO PHQ QUESTIONS 1-9: 0
SUM OF ALL RESPONSES TO PHQ QUESTIONS 1-9: 0
1. LITTLE INTEREST OR PLEASURE IN DOING THINGS: NOT AT ALL
SUM OF ALL RESPONSES TO PHQ QUESTIONS 1-9: 0
2. FEELING DOWN, DEPRESSED OR HOPELESS: NOT AT ALL
SUM OF ALL RESPONSES TO PHQ9 QUESTIONS 1 & 2: 0
SUM OF ALL RESPONSES TO PHQ QUESTIONS 1-9: 0

## 2024-11-14 PROBLEM — J01.41 ACUTE RECURRENT PANSINUSITIS: Status: RESOLVED | Noted: 2020-12-01 | Resolved: 2024-11-14

## 2024-11-14 PROBLEM — H18.20 CHANDLER SYNDROME: Status: RESOLVED | Noted: 2023-06-02 | Resolved: 2024-11-14

## 2024-11-14 PROBLEM — H21.269 CHANDLER SYNDROME: Status: RESOLVED | Noted: 2023-06-02 | Resolved: 2024-11-14

## 2024-11-14 PROBLEM — H40.50X0 CHANDLER SYNDROME: Status: RESOLVED | Noted: 2023-06-02 | Resolved: 2024-11-14

## 2024-11-14 PROBLEM — E66.01 CLASS 2 SEVERE OBESITY DUE TO EXCESS CALORIES WITH SERIOUS COMORBIDITY AND BODY MASS INDEX (BMI) OF 35.0 TO 35.9 IN ADULT: Status: ACTIVE | Noted: 2020-08-07

## 2024-11-14 ASSESSMENT — ENCOUNTER SYMPTOMS
ABDOMINAL DISTENTION: 0
COUGH: 0
SINUS PRESSURE: 0
BACK PAIN: 0
EYE REDNESS: 0
EYE DISCHARGE: 0
ABDOMINAL PAIN: 0
SHORTNESS OF BREATH: 0

## 2024-11-15 NOTE — PATIENT INSTRUCTIONS
heart disease. This lifestyle includes eating healthy, being active, staying at a weight that's healthy for you, and not smoking or using tobacco. It also includes taking medicines as directed, managing other health conditions, and trying to get a healthy amount of sleep.  Follow-up care is a key part of your treatment and safety. Be sure to make and go to all appointments, and call your doctor if you are having problems. It's also a good idea to know your test results and keep a list of the medicines you take.  How can you care for yourself at home?  Diet    Use less salt when you cook and eat. This helps lower your blood pressure. Taste food before salting. Add only a little salt when you think you need it. With time, your taste buds will adjust to less salt.     Eat fewer snack items, fast foods, canned soups, and other high-salt, high-fat, processed foods.     Read food labels and try to avoid saturated and trans fats. They increase your risk of heart disease by raising cholesterol levels.     Limit the amount of solid fat--butter, margarine, and shortening--you eat. Use olive, peanut, or canola oil when you cook. Bake, broil, and steam foods instead of frying them.     Eat a variety of fruit and vegetables every day. Dark green, deep orange, red, or yellow fruits and vegetables are especially good for you. Examples include spinach, carrots, peaches, and berries.     Foods high in fiber can reduce your cholesterol and provide important vitamins and minerals. High-fiber foods include whole-grain cereals and breads, oatmeal, beans, brown rice, citrus fruits, and apples.     Eat lean proteins. Heart-healthy proteins include seafood, lean meats and poultry, eggs, beans, peas, nuts, seeds, and soy products.     Limit drinks and foods with added sugar. These include candy, desserts, and soda pop.   Heart-healthy lifestyle    If your doctor recommends it, get more exercise. For many people, walking is a good choice. Or

## 2024-11-25 ENCOUNTER — OFFICE VISIT (OUTPATIENT)
Age: 79
End: 2024-11-25
Payer: MEDICARE

## 2024-11-25 VITALS — WEIGHT: 284 LBS | BODY MASS INDEX: 36.45 KG/M2 | HEIGHT: 74 IN

## 2024-11-25 DIAGNOSIS — Z96.651 S/P REVISION OF TOTAL KNEE, RIGHT: ICD-10-CM

## 2024-11-25 PROCEDURE — 99213 OFFICE O/P EST LOW 20 MIN: CPT | Performed by: ORTHOPAEDIC SURGERY

## 2024-11-25 PROCEDURE — G8427 DOCREV CUR MEDS BY ELIG CLIN: HCPCS | Performed by: ORTHOPAEDIC SURGERY

## 2024-11-25 PROCEDURE — 1123F ACP DISCUSS/DSCN MKR DOCD: CPT | Performed by: ORTHOPAEDIC SURGERY

## 2024-11-25 PROCEDURE — G8417 CALC BMI ABV UP PARAM F/U: HCPCS | Performed by: ORTHOPAEDIC SURGERY

## 2024-11-25 PROCEDURE — 1036F TOBACCO NON-USER: CPT | Performed by: ORTHOPAEDIC SURGERY

## 2024-11-25 PROCEDURE — G8482 FLU IMMUNIZE ORDER/ADMIN: HCPCS | Performed by: ORTHOPAEDIC SURGERY

## 2024-11-25 PROCEDURE — 1159F MED LIST DOCD IN RCRD: CPT | Performed by: ORTHOPAEDIC SURGERY

## 2024-11-25 NOTE — PROGRESS NOTES
[facility]  [facility]  [address]  [facility] [address] [phone]         Justin Ann (: 1945) is a 79 y.o. male, patient, here for evaluation of the following chief complaint(s): Knee Pain (Right ( revision)/SX: 24)  .         Patient's PCP: Jennifer Richards MD     Patient's Last Appointment in this Department was on Visit date not found      Subjective:     Chief Complaint   Patient presents with    Knee Pain     Right ( revision)  SX: 24        Patient presents in follow-up after right knee patellar revision and poly change and posterior capsule release 2024.  Old friend from [place].  He tells me the knee pain has moved from the lateral knee to the inferior pole of the patella.  He has trouble going downstairs.  Trouble getting up and down.  Cannot really tell the surgeries helped.  Patient has a bridge being fixed and the bottom bottom part of his left jaw.  Asked that we call in some antibiotics for that.            Medications  Current Outpatient Medications   Medication Sig Dispense Refill    triamcinolone (KENALOG) 0.1 % cream Apply topically 2 times daily prn itching 28.4 g 0    omeprazole (PRILOSEC) 20 MG delayed release capsule Take 1 capsule by mouth daily 90 capsule 3    pregabalin (LYRICA) 100 MG capsule Take 1 capsule by mouth 2 times daily for 180 days. Max Daily Amount: 200 mg 180 capsule 1    glycopyrrolate (ROBINUL) 1 MG tablet Take 1 tablet by mouth 2 times daily 180 tablet 1    Cholecalciferol (VITAMIN D3) 50 MCG ( UT) CAPS Take 1 capsule by mouth daily      vitamin C (ASCORBIC ACID) 500 MG tablet Take 2 tablets by mouth daily      BIOTIN PO Take 1 tablet by mouth daily      ibuprofen (ADVIL;MOTRIN) 400 MG tablet Take 1 tablet by mouth every 8 hours as needed for Pain 30 tablet 0    clopidogrel (PLAVIX) 75 MG tablet Take 1 tablet by mouth daily 30 tablet 3    atorvastatin (LIPITOR)

## 2024-11-27 RX ORDER — AMOXICILLIN 500 MG/1
500 CAPSULE ORAL PRN
Qty: 4 CAPSULE | Refills: 0 | Status: SHIPPED | OUTPATIENT
Start: 2024-11-27 | End: 2024-11-27 | Stop reason: CLARIF

## 2024-11-29 RX ORDER — CLINDAMYCIN HYDROCHLORIDE 300 MG/1
300 CAPSULE ORAL ONCE
Qty: 1 CAPSULE | Refills: 0 | Status: SHIPPED | OUTPATIENT
Start: 2024-11-29 | End: 2024-11-29

## 2024-12-02 ENCOUNTER — TELEPHONE (OUTPATIENT)
Dept: INTERNAL MEDICINE | Age: 79
End: 2024-12-02

## 2024-12-02 RX ORDER — AZITHROMYCIN 250 MG/1
TABLET, FILM COATED ORAL
Qty: 6 TABLET | Refills: 0 | Status: SHIPPED | OUTPATIENT
Start: 2024-12-02 | End: 2024-12-12

## 2024-12-18 DIAGNOSIS — G60.9 IDIOPATHIC PERIPHERAL NEUROPATHY: ICD-10-CM

## 2024-12-18 RX ORDER — PREGABALIN 100 MG/1
100 CAPSULE ORAL 2 TIMES DAILY
Qty: 180 CAPSULE | Refills: 0 | Status: SHIPPED | OUTPATIENT
Start: 2024-12-18 | End: 2025-06-16

## 2024-12-31 NOTE — PROGRESS NOTES
[facility] - PODIATRY    Today's Date: 01/06/2025     Patient Name: Axel Morris  MRN: [id number]  CSN: [id number]  PCP: Isabel Best MD  Referring Provider: No ref. provider found    SUBJECTIVE     Chief Complaint   Patient presents with    Follow-up     Isabel Best MD 12/02/24-3 [facility] F/U [facility]-pt states he is here today for nail/foot care/pt has neuropathy-pt denies pain      HPI: Axel Morris, a 79 y.o.male, comes to clinic as a(n) established patient complaining of toenail/callus issues and complaining of neuropathy of both feet . Patient has h/o arthritis, CA, CAD, dizziness, GERD, HLD, Neuropathy, pneumonia, sleep apnea, tinnitus .  Patient presents with toenails that are long, thickened, irregular, and dystrophic and that he is unable to care for them himself at home.  He does states that he has neuropathy from the knee down bilaterally; states he is unsure of the source of his neuropathy though he does report issues with his disks in his back and states he also has previous knee surgery that he believes is contributing to neuropathy as well.  Calluses have returned to the great toes.  Reports dryness to bilateral feet with flaking. Denies pain in feet secondary to neuropathy . Relates previous treatment(s) including foot care by podiatry .  Continues Plavix daily.  Denies any constitutional symptoms. No other pedal complaints at this time.    Past Medical History:   Diagnosis Date    Abnormal ECG     Arthritis     Callus     Coronary artery disease     COVID-19 vaccine series completed     pfizer    Difficulty walking     Dizziness     Fallen arches     Fracture of nasal bones     GERD (gastroesophageal reflux disease)     History of transfusion     HL (hearing loss)     Hx of colonic polyps     Hyperlipidemia     Idiopathic peripheral neuropathy 07/27/2017    Kidney stone on left side 06/19/2023    Neuropathy     Peripheral neuropathy     Pneumonia     Primary  hypertension 2021    Rheumatic fever     Sinusitis     Skin cancer     Sleep apnea     Tinnitus     Verruca     Vision loss      Past Surgical History:   Procedure Laterality Date    ADENOIDECTOMY      CARDIAC CATHETERIZATION      CARDIAC SURGERY      HEART STENT    CHOLECYSTECTOMY      COLONOSCOPY N/A 2017    Procedure: COLONOSCOPY WITH ANESTHESIA;  Surgeon: Matthew Cagle MD;  Location:  [facility];  Service:     COLONOSCOPY W/ POLYPECTOMY  2012    Adenomatous polyp at 50 cm repeat exam in 5 years    CORONARY STENT PLACEMENT      ENDOSCOPY AND COLONOSCOPY  2009    Acute ulcerative esophagogastritis grade c, Hyperplastic polyp at 50 cm repeat colonoscopy in 3 years    EXTRACORPOREAL SHOCK WAVE LITHOTRIPSY (ESWL) Left 2023    Procedure: EXTRACORPOREAL SHOCKWAVE LITHOTRIPSY;  Surgeon: Marcellus Fowler MD;  Location:  [facility] OR;  Service: Urology;  Laterality: Left;    EYE SURGERY      DONNIE CATARACT REMOVAL    FLAP HEAD/NECK Left 03/10/2021    Procedure: POSSIBLE FLAP OR GRAFT.;  Surgeon: Roberto Og MD;  Location:  [facility] OR;  Service: ENT;  Laterality: Left;    HEAD/NECK LESION/CYST EXCISION Left 03/10/2021    Procedure: EXCISION OF ATYPICAL PIGMENTED LESION OF THE LEFT CENTRAL TEMPLE REGION WITH POSSIBLE FLAP OR GRAFT. - Left;  Surgeon: Roberto Og MD;  Location:  [facility] OR;  Service: ENT;  Laterality: Left;    REPLACEMENT TOTAL KNEE BILATERAL      TONSILLECTOMY       Family History   Problem Relation Age of Onset    Heart failure Father     Rheum arthritis Father     Cancer Brother     Colon cancer Neg Hx     Colon polyps Neg Hx      Social History     Socioeconomic History    Marital status:    Tobacco Use    Smoking status: Former     Current packs/day: 0.00     Average packs/day: 1 pack/day for 10.0 years (10.0 ttl pk-yrs)     Types: Cigarettes     Start date: 1968     Quit date: 1978     Years since quittin.4     Passive exposure: Never     Smokeless tobacco: Never   Vaping Use    Vaping status: Never Used   Substance and Sexual Activity    Alcohol use: Never    Drug use: Never    Sexual activity: Defer     Allergies   Allergen Reactions    Ampicillin Rash     Current Outpatient Medications   Medication Sig Dispense Refill    aspirin 81 MG tablet Take 1 tablet by mouth Daily. Dr. Tamayo      atorvastatin (LIPITOR) 80 MG tablet Take 1 tablet by mouth Daily. 90 tablet 3    clopidogrel (PLAVIX) 75 MG tablet Take 1 tablet by mouth Daily.      glycopyrrolate (ROBINUL) 1 MG tablet Take 1 tablet by mouth 3 (Three) Times a Day As Needed (vertigo). 60 tablet 5    levothyroxine (SYNTHROID, LEVOTHROID) 25 MCG tablet TAKE 1 TABLET EVERY MORNING 90 tablet 3    LUTEIN PO Take 25 mg by mouth Daily.      metoprolol succinate XL (TOPROL-XL) 25 MG 24 hr tablet Take 1 tablet by mouth Daily.      montelukast (SINGULAIR) 10 MG tablet Take 1 tablet by mouth Daily.      Multiple Vitamins-Minerals (MULTIVITAMIN WITH MINERALS) tablet tablet Take 1 tablet by mouth Daily.      Multiple Vitamins-Minerals (OCUVITE ADULT FORMULA PO) Take  by mouth.      omeprazole (priLOSEC) 20 MG capsule Take 1 capsule by mouth Daily.      pregabalin (LYRICA) 100 MG capsule Take 1 capsule by mouth 2 (Two) Times a Day.      pyridostigmine (MESTINON) 60 MG tablet TAKE 1 TABLET THREE TIMES A  tablet 1    fluticasone (FLONASE) 50 MCG/ACT nasal spray Administer 2 sprays into the nostril(s) as directed by provider Daily. (Patient not taking: Reported on 1/6/2025)       Current Facility-Administered Medications   Medication Dose Route Frequency Provider Last Rate Last Admin    lidocaine (XYLOCAINE) 1 % injection 5 mL  5 mL Infiltration Once Arturo Baum MD         Review of Systems   Constitutional:  Negative for chills and fever.   HENT:  Negative for congestion.    Respiratory:  Negative for shortness of breath.    Cardiovascular:  Negative for chest pain and leg swelling.    Gastrointestinal:  Negative for constipation, diarrhea, nausea and vomiting.   Musculoskeletal:  Positive for arthralgias. Negative for myalgias.   Skin:  Negative for wound.        Thickened, elongated, irregular toenails. Calluses.   Neurological:  Positive for numbness. Negative for dizziness and weakness.   Hematological:  Bruises/bleeds easily.   Psychiatric/Behavioral:  Negative for agitation, behavioral problems and confusion.        OBJECTIVE     Vitals:    01/06/25 1124   BP: 124/88   Pulse: 67   SpO2: 96%       PHYSICAL EXAM  GEN:   Accompanied by none.     Foot/Ankle Exam    GENERAL  Appearance:  appears stated age and obese  Orientation:  AAOx3  Affect:  appropriate  Gait:  unimpaired  Assistance:  independent  Right shoe gear: casual shoe  Left shoe gear: casual shoe    VASCULAR     Right Foot Vascularity   Dorsalis pedis:  2+  Posterior tibial:  2+  Skin temperature:  warm  Edema grading:  Trace and non-pitting  CFT:  3  Pedal hair growth:  Present  Varicosities:  mild varicosities     Left Foot Vascularity   Dorsalis pedis:  2+  Posterior tibial:  2+  Skin temperature:  warm  Edema grading:  Trace and non-pitting  CFT:  3  Pedal hair growth:  Present  Varicosities:  mild varicosities     NEUROLOGIC     Right Foot Neurologic   Light touch sensation: diminished  Vibratory sensation: diminished  Hot/Cold sensation: diminished  Protective Sensation using [place] Monofilament:   Sites intact: 5  Sites tested: 10     Left Foot Neurologic   Light touch sensation: diminished  Vibratory sensation: diminished  Protective Sensation using [place] Monofilament:   Sites intact: 1  Sites tested: 10    MUSCULOSKELETAL     Right Foot Musculoskeletal   Ecchymosis:  none  Tenderness:  toenail problem    Arch:  Pes planus  Hallux valgus: Yes       Left Foot Musculoskeletal   Ecchymosis:  none  Tenderness:  toenail problem  Arch:  Pes planus  Hallux valgus: Yes      MUSCLE STRENGTH     Right Foot  Muscle Strength   Foot dorsiflexion:  5  Foot plantar flexion:  5  Foot inversion:  5  Foot eversion:  5     Left Foot Muscle Strength   Foot dorsiflexion:  5  Foot plantar flexion:  4+  Foot inversion:  5  Foot eversion:  5    RANGE OF MOTION     Right Foot Range of Motion   Foot and ankle ROM within normal limits       Left Foot Range of Motion   Foot and ankle ROM within normal limits      DERMATOLOGIC      Right Foot Dermatologic   Skin  Positive for corn and dryness.   Nails  1.  Positive for onychomycosis, abnormal thickness, subungual debris and dystrophic nail.  2.  Positive for elongated, onychomycosis, abnormal thickness, subungual debris and dystrophic nail.  3.  Positive for elongated, onychomycosis, abnormal thickness, subungual debris and dystrophic nail.  4.  Positive for elongated, onychomycosis, abnormal thickness, subungual debris and dystrophic nail.  5.  Positive for elongated, onychomycosis, abnormal thickness, subungual debris and dystrophic nail.     Left Foot Dermatologic   Skin  Positive for corn and dryness.   Nails  1.  Positive for onychomycosis, abnormal thickness, subungual debris and dystrophic nail.  2.  Positive for elongated, onychomycosis, abnormal thickness, subungual debris and dystrophic nail.  3.  Positive for elongated, onychomycosis, abnormal thickness, subungual debris and dystrophic nail.  4.  Positive for elongated, onychomycosis, abnormally thick, subungual debris and dystrophic nail.  5.  Positive for elongated, onychomycosis, abnormally thick, subungual debris and dystrophic nail.    Image:       RADIOLOGY/NUCLEAR:  No results found.    LABORATORY/CULTURE RESULTS:      PATHOLOGY RESULTS:       ASSESSMENT/PLAN     Diagnoses and all orders for this visit:    1. Onychomycosis (Primary)    2. Other polyneuropathy    3. Xerosis cutis    4. Pes planus of both feet    5. Valgus deformity of both great toes    6. Foot callus    7. Long term current use of  anticoagulant      Comprehensive lower extremity examination and evaluation was performed.  Discussed findings and treatment plan including risks, benefits, and treatment options with patient in detail. Patient agreed with treatment plan.  Discussed with patient importance of using moisturizer to feet regularly.  Patient encouraged to try CeraVe Lotion or Vaseline.  Discussed importance of keeping interdigital area dry and not putting cream between toes. Educated that cracks in the skin can often lead to open sores or wounds.    After verbal consent obtained, nail(s) x10 debrided of length and thickness with nail nipper without incidence  Patient may maintain nails and calluses at home utilizing emery board or pumice stone between visits as needed   Remain conservative with foot deformities.  An After Visit Summary was printed and given to the patient at discharge, including (if requested) any available informative/educational handouts regarding diagnosis, treatment, or medications. All questions were answered to patient/family satisfaction. Should symptoms fail to improve or worsen they agree to call or return to clinic or to go to the Emergency Department. Discussed the importance of following up with any needed screening tests/labs/specialist appointments and any requested follow-up recommended by me today. Importance of maintaining follow-up discussed and patient accepts that missed appointments can delay diagnosis and potentially lead to worsening of conditions.  I spent 21 minutes caring for Axel on this date of service. This time includes time spent by me in the following activities: preparing for the visit, performing a medically appropriate examination and/or evaluation, counseling and educating the patient/family/caregiver, and documenting information in the medical record   Return in about 3 months (around 4/6/2025) for Schedule [facility], With Jenna ZEPEDA, or sooner if acute issues  arise.        This document has been electronically signed by ALEXUS Bautista on January 6, 2025 11:46 CST

## 2025-01-02 ENCOUNTER — TELEPHONE (OUTPATIENT)
Dept: UROLOGY | Facility: CLINIC | Age: 80
End: 2025-01-02
Payer: MEDICARE

## 2025-01-02 DIAGNOSIS — N20.0 KIDNEY STONE ON LEFT SIDE: Primary | ICD-10-CM

## 2025-01-02 NOTE — TELEPHONE ENCOUNTER
Caller: Axel Morris    Relationship: Self    Best call back number: [phone]    What orders are you requesting (i.e. lab or imaging): SUSAN    In what timeframe would the patient need to come in: PT IS SCHEDULED FOR 3/4/25    Where will you receive your lab/imaging services: [facility]    Additional notes:

## 2025-01-05 ENCOUNTER — TELEPHONE (OUTPATIENT)
Age: 80
End: 2025-01-05
Payer: MEDICARE

## 2025-01-06 ENCOUNTER — OFFICE VISIT (OUTPATIENT)
Age: 80
End: 2025-01-06
Payer: MEDICARE

## 2025-01-06 VITALS
OXYGEN SATURATION: 96 % | HEART RATE: 67 BPM | DIASTOLIC BLOOD PRESSURE: 88 MMHG | WEIGHT: 273 LBS | SYSTOLIC BLOOD PRESSURE: 124 MMHG | BODY MASS INDEX: 35.04 KG/M2 | HEIGHT: 74 IN

## 2025-01-06 DIAGNOSIS — G62.89 OTHER POLYNEUROPATHY: ICD-10-CM

## 2025-01-06 DIAGNOSIS — M20.11 VALGUS DEFORMITY OF BOTH GREAT TOES: ICD-10-CM

## 2025-01-06 DIAGNOSIS — Z79.01 LONG TERM CURRENT USE OF ANTICOAGULANT: ICD-10-CM

## 2025-01-06 DIAGNOSIS — M21.41 PES PLANUS OF BOTH FEET: ICD-10-CM

## 2025-01-06 DIAGNOSIS — L85.3 XEROSIS CUTIS: ICD-10-CM

## 2025-01-06 DIAGNOSIS — B35.1 ONYCHOMYCOSIS: Primary | ICD-10-CM

## 2025-01-06 DIAGNOSIS — M21.42 PES PLANUS OF BOTH FEET: ICD-10-CM

## 2025-01-06 DIAGNOSIS — M20.12 VALGUS DEFORMITY OF BOTH GREAT TOES: ICD-10-CM

## 2025-01-06 DIAGNOSIS — L84 FOOT CALLUS: ICD-10-CM

## 2025-01-06 PROCEDURE — 3079F DIAST BP 80-89 MM HG: CPT | Performed by: NURSE PRACTITIONER

## 2025-01-06 PROCEDURE — 3074F SYST BP LT 130 MM HG: CPT | Performed by: NURSE PRACTITIONER

## 2025-01-06 PROCEDURE — 1160F RVW MEDS BY RX/DR IN RCRD: CPT | Performed by: NURSE PRACTITIONER

## 2025-01-06 PROCEDURE — 11721 DEBRIDE NAIL 6 OR MORE: CPT | Performed by: NURSE PRACTITIONER

## 2025-01-06 PROCEDURE — 1159F MED LIST DOCD IN RCRD: CPT | Performed by: NURSE PRACTITIONER

## 2025-01-06 PROCEDURE — 99213 OFFICE O/P EST LOW 20 MIN: CPT | Performed by: NURSE PRACTITIONER

## 2025-01-06 RX ORDER — CLOPIDOGREL BISULFATE 75 MG/1
1 TABLET ORAL DAILY
COMMUNITY
Start: 2024-08-20

## 2025-02-03 RX ORDER — GLYCOPYRROLATE 1 MG/1
1 TABLET ORAL 2 TIMES DAILY
Qty: 180 TABLET | Refills: 3 | Status: SHIPPED | OUTPATIENT
Start: 2025-02-03

## 2025-02-05 DIAGNOSIS — G60.9 IDIOPATHIC PERIPHERAL NEUROPATHY: ICD-10-CM

## 2025-02-05 RX ORDER — PREGABALIN 100 MG/1
100 CAPSULE ORAL 2 TIMES DAILY
Qty: 180 CAPSULE | Refills: 0 | OUTPATIENT
Start: 2025-02-05 | End: 2025-05-06

## 2025-02-05 NOTE — PROGRESS NOTES
Chief Complaint    Subjective        Axel Morris presents to [facility] Neurology    History of Present Illness  79-year-old male here for follow-up.  Doing well on Mestinon.             Current Outpatient Medications:   •  aspirin 81 MG tablet, Take 1 tablet by mouth Daily. Dr. Tamayo, Disp: , Rfl:   •  atorvastatin (LIPITOR) 80 MG tablet, Take 1 tablet by mouth Daily., Disp: 90 tablet, Rfl: 3  •  clopidogrel (PLAVIX) 75 MG tablet, Take 1 tablet by mouth Daily., Disp: , Rfl:   •  fluticasone (FLONASE) 50 MCG/ACT nasal spray, Administer 2 sprays into the nostril(s) as directed by provider Daily. (Patient not taking: Reported on 1/6/2025), Disp: , Rfl:   •  glycopyrrolate (ROBINUL) 1 MG tablet, Take 1 tablet by mouth 3 (Three) Times a Day As Needed (vertigo)., Disp: 60 tablet, Rfl: 5  •  levothyroxine (SYNTHROID, LEVOTHROID) 25 MCG tablet, TAKE 1 TABLET EVERY MORNING, Disp: 90 tablet, Rfl: 3  •  LUTEIN PO, Take 25 mg by mouth Daily., Disp: , Rfl:   •  metoprolol succinate XL (TOPROL-XL) 25 MG 24 hr tablet, Take 1 tablet by mouth Daily., Disp: , Rfl:   •  montelukast (SINGULAIR) 10 MG tablet, Take 1 tablet by mouth Daily., Disp: , Rfl:   •  Multiple Vitamins-Minerals (MULTIVITAMIN WITH MINERALS) tablet tablet, Take 1 tablet by mouth Daily., Disp: , Rfl:   •  Multiple Vitamins-Minerals (OCUVITE ADULT FORMULA PO), Take  by mouth., Disp: , Rfl:   •  omeprazole (priLOSEC) 20 MG capsule, Take 1 capsule by mouth Daily., Disp: , Rfl:   •  pregabalin (LYRICA) 100 MG capsule, Take 1 capsule by mouth 2 (Two) Times a Day., Disp: , Rfl:   •  pyridostigmine (MESTINON) 60 MG tablet, TAKE 1 TABLET THREE TIMES A DAY, Disp: 270 tablet, Rfl: 1    Current Facility-Administered Medications:   •  lidocaine (XYLOCAINE) 1 % injection 5 mL, 5 mL, Infiltration, Once, Arturo Baum MD       Objective   Vital Signs:   There were no vitals taken for this visit.    Physical Exam  Constitutional:       General: He is  awake.   Eyes:      Extraocular Movements: Extraocular movements intact.   Neurological:      Mental Status: He is alert.   Psychiatric:         Speech: Speech normal.      Neurological Exam  Mental Status  Awake and alert. Speech is normal. Language is fluent with no aphasia.    Cranial Nerves  CN III, IV, VI: Extraocular movements intact bilaterally.  CN VII: Full and symmetric facial movement.    Gait  Casual gait is normal including stance, stride, and arm swing.      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  Has only had issues with his speech which are fatigable.  Doing well on Mestinon.  CT Chest negative for thymoma. MGADL 0-1.     Plan:    1.  Continue Mestinon up to 3 times a day. Taking BID currently.  2.  Follow up 1 year.         Follow Up   No follow-ups on file.  Patient was given instructions and counseling regarding his condition or for health maintenance advice. Please see specific information pulled into the AVS if appropriate.

## 2025-02-06 ENCOUNTER — OFFICE VISIT (OUTPATIENT)
Dept: NEUROLOGY | Facility: CLINIC | Age: 80
End: 2025-02-06
Payer: MEDICARE

## 2025-02-06 VITALS
WEIGHT: 268 LBS | OXYGEN SATURATION: 98 % | DIASTOLIC BLOOD PRESSURE: 78 MMHG | SYSTOLIC BLOOD PRESSURE: 138 MMHG | BODY MASS INDEX: 34.39 KG/M2 | HEART RATE: 68 BPM | HEIGHT: 74 IN

## 2025-02-06 DIAGNOSIS — G70.00 MYASTHENIA GRAVIS WITHOUT EXACERBATION: Primary | ICD-10-CM

## 2025-02-06 PROCEDURE — 99214 OFFICE O/P EST MOD 30 MIN: CPT | Performed by: PSYCHIATRY & NEUROLOGY

## 2025-02-06 PROCEDURE — 1160F RVW MEDS BY RX/DR IN RCRD: CPT | Performed by: PSYCHIATRY & NEUROLOGY

## 2025-02-06 PROCEDURE — 3078F DIAST BP <80 MM HG: CPT | Performed by: PSYCHIATRY & NEUROLOGY

## 2025-02-06 PROCEDURE — 3075F SYST BP GE 130 - 139MM HG: CPT | Performed by: PSYCHIATRY & NEUROLOGY

## 2025-02-06 PROCEDURE — 1159F MED LIST DOCD IN RCRD: CPT | Performed by: PSYCHIATRY & NEUROLOGY

## 2025-02-06 RX ORDER — AZELASTINE 1 MG/ML
2 SPRAY, METERED NASAL AS NEEDED
COMMUNITY

## 2025-02-24 NOTE — PROGRESS NOTES
Chief Complaint  Kidney Stones     Subjective          Axel Morris presents to [facility] UROLOGY   Patient has history of kidney stones  Had ESWL 08/2023.  Since last visit no colic.             Current Outpatient Medications:     aspirin 81 MG tablet, Take 1 tablet by mouth Daily. Dr. Tamayo, Disp: , Rfl:     atorvastatin (LIPITOR) 80 MG tablet, Take 1 tablet by mouth Daily., Disp: 90 tablet, Rfl: 3    azelastine (ASTELIN) 0.1 % nasal spray, Administer 2 sprays into the nostril(s) as directed by provider As Needed for Rhinitis. Use in each nostril as directed, Disp: , Rfl:     glycopyrrolate (ROBINUL) 1 MG tablet, Take 1 tablet by mouth 3 (Three) Times a Day As Needed (vertigo)., Disp: 60 tablet, Rfl: 5    levothyroxine (SYNTHROID, LEVOTHROID) 25 MCG tablet, TAKE 1 TABLET EVERY MORNING, Disp: 90 tablet, Rfl: 3    LUTEIN PO, Take 25 mg by mouth Daily., Disp: , Rfl:     metoprolol succinate XL (TOPROL-XL) 25 MG 24 hr tablet, Take 1 tablet by mouth Daily., Disp: , Rfl:     montelukast (SINGULAIR) 10 MG tablet, Take 1 tablet by mouth Daily., Disp: , Rfl:     Multiple Vitamins-Minerals (MULTIVITAMIN WITH MINERALS) tablet tablet, Take 1 tablet by mouth Daily., Disp: , Rfl:     Multiple Vitamins-Minerals (OCUVITE ADULT FORMULA PO), Take  by mouth., Disp: , Rfl:     omeprazole (priLOSEC) 20 MG capsule, Take 1 capsule by mouth Daily., Disp: , Rfl:     pregabalin (LYRICA) 100 MG capsule, Take 1 capsule by mouth 2 (Two) Times a Day., Disp: , Rfl:     pyridostigmine (MESTINON) 60 MG tablet, TAKE 1 TABLET THREE TIMES A DAY, Disp: 270 tablet, Rfl: 1    Current Facility-Administered Medications:     lidocaine (XYLOCAINE) 1 % injection 5 mL, 5 mL, Infiltration, Once, Arturo Baum MD  Past Medical History:   Diagnosis Date    Abnormal ECG     Arthritis     Callus     Coronary artery disease     COVID-19 vaccine series completed     pfizer    Difficulty walking     Dizziness     Fallen arches      "Fracture of nasal bones     GERD (gastroesophageal reflux disease)     History of transfusion     HL (hearing loss)     Hx of colonic polyps     Hyperlipidemia     Idiopathic peripheral neuropathy 07/27/2017    Kidney stone on left side 06/19/2023    Neuropathy     Peripheral neuropathy     Pneumonia     Primary hypertension 12/23/2021    Rheumatic fever     Sinusitis     Skin cancer     Sleep apnea     Tinnitus     Verruca     Vision loss      Past Surgical History:   Procedure Laterality Date    ADENOIDECTOMY  1953    CARDIAC CATHETERIZATION      CARDIAC SURGERY      HEART STENT    CHOLECYSTECTOMY      COLONOSCOPY N/A 11/30/2017    Procedure: COLONOSCOPY WITH ANESTHESIA;  Surgeon: Matthew Cagle MD;  Location:  [facility];  Service:     COLONOSCOPY W/ POLYPECTOMY  07/12/2012    Adenomatous polyp at 50 cm repeat exam in 5 years    CORONARY STENT PLACEMENT      ENDOSCOPY AND COLONOSCOPY  08/07/2009    Acute ulcerative esophagogastritis grade c, Hyperplastic polyp at 50 cm repeat colonoscopy in 3 years    EXTRACORPOREAL SHOCK WAVE LITHOTRIPSY (ESWL) Left 08/02/2023    Procedure: EXTRACORPOREAL SHOCKWAVE LITHOTRIPSY;  Surgeon: Marcellus Fwoler MD;  [facility]: [facility] OR;  Service: Urology;  Laterality: Left;    EYE SURGERY      DONNIE CATARACT REMOVAL    FLAP HEAD/NECK Left 03/10/2021    Procedure: POSSIBLE FLAP OR GRAFT.;  Surgeon: Roberto Og MD;  Location:  [facility] OR;  Service: ENT;  Laterality: Left;    HEAD/NECK LESION/CYST EXCISION Left 03/10/2021    Procedure: EXCISION OF ATYPICAL PIGMENTED LESION OF THE LEFT CENTRAL TEMPLE REGION WITH POSSIBLE FLAP OR GRAFT. - Left;  Surgeon: Roberto Og MD;  Location: [facility] OR;  Service: ENT;  Laterality: Left;    REPLACEMENT TOTAL KNEE BILATERAL      TONSILLECTOMY             Review of Systems      Objective   PHYSICAL EXAM  Vital Signs:   Temp 97.4 °F (36.3 °C)   Ht 190.5 cm (75\")   Wt 123 kg (272 lb)   BMI 34.00 kg/m²     Physical " Exam      DATA  Result Review :              Results for orders placed or performed in visit on 03/04/25   POC Urinalysis Dipstick, Multipro    Collection Time: 03/04/25 10:06 AM    Specimen: Urine   Result Value Ref Range    Color Yellow Yellow, Straw, Dark Yellow, Geraldine    Clarity, UA Clear Clear    Glucose, UA Negative Negative mg/dL    Bilirubin Negative Negative    Ketones, UA Negative Negative    Specific Gravity  1.030 1.005 - 1.030    Blood, UA Negative Negative    pH, Urine 5.5 5.0 - 8.0    Protein, POC Negative Negative mg/dL    Urobilinogen, UA 0.2 E.U./dL Normal, 0.2 E.U./dL    Nitrite, UA Negative Negative    Leukocytes Negative Negative       XR Abdomen KUB (01/02/2025 13:50)     FINDINGS:     Multiple left lower pole renal calculi measuring up to 9 mm. No definite  right-sided calculi. A few pelvic phleboliths are present.  Nonobstructive bowel gas pattern. No mass effect. Mild levocurvature and  degenerative change in the lumbar spine.     IMPRESSION:  Multiple left lower pole renal calculi measuring up to 9 mm.      This report was signed and finalized on 3/4/2025 9:53 AM by Dr. Arturo Baum MD.      Reviewed report and the images.  No change in stone burden  /DLS         ASSESSMENT AND PLAN          Problem List Items Addressed This Visit          Genitourinary and Reproductive     Kidney stone on left side - Primary    Overview     Added automatically from request for surgery [id number]         Relevant Orders    POC Urinalysis Dipstick, Multipro (Completed)     The patient has been diagnosed with renal urolithiasis without an absolute indication for intervention. Location is described as  left. The patient's options for therapy are discussed based on the size, location, stone burden, and symptoms.  The decision has been made to follow these stones radiographically without treatment.  The patient understands the risks associated with the conservative approach, but this is a reasonable treatment  plan.  The need to contact me for hematuria, fever, recurrent UTI's, flank pain or change in ideology is explained.         FOLLOW UP     Return in about 18 months (around 9/4/2026).    I have managed this patient for approximately 20 months for recurrent kidney stones which will require ongoing management to include follow-up, possible medication adjustments and other therapies.      (Please note that portions of this note were completed with a voice recognition program.)  Marcellus Fowler MD  03/04/25  10:33 CST

## 2025-03-03 ENCOUNTER — TELEPHONE (OUTPATIENT)
Dept: UROLOGY | Facility: CLINIC | Age: 80
End: 2025-03-03
Payer: MEDICARE

## 2025-03-04 ENCOUNTER — HOSPITAL ENCOUNTER (OUTPATIENT)
Dept: GENERAL RADIOLOGY | Facility: HOSPITAL | Age: 80
Discharge: HOME | End: 2025-03-04
Admitting: UROLOGY
Payer: MEDICARE

## 2025-03-04 ENCOUNTER — OFFICE VISIT (OUTPATIENT)
Dept: UROLOGY | Facility: CLINIC | Age: 80
End: 2025-03-04
Payer: MEDICARE

## 2025-03-04 VITALS — BODY MASS INDEX: 33.82 KG/M2 | TEMPERATURE: 97.4 F | HEIGHT: 75 IN | WEIGHT: 272 LBS

## 2025-03-04 DIAGNOSIS — N20.0 KIDNEY STONE ON LEFT SIDE: Primary | ICD-10-CM

## 2025-03-04 DIAGNOSIS — N20.0 KIDNEY STONE ON LEFT SIDE: ICD-10-CM

## 2025-03-04 LAB
BILIRUB BLD-MCNC: NEGATIVE MG/DL
CLARITY, POC: CLEAR
COLOR UR: YELLOW
GLUCOSE UR STRIP-MCNC: NEGATIVE MG/DL
KETONES UR QL: NEGATIVE
LEUKOCYTE EST, POC: NEGATIVE
NITRITE UR-MCNC: NEGATIVE MG/ML
PH UR: 5.5 [PH] (ref 5–8)
PROT UR STRIP-MCNC: NEGATIVE MG/DL
RBC # UR STRIP: NEGATIVE /UL
SP GR UR: 1.03 (ref 1–1.03)
UROBILINOGEN UR QL: NORMAL

## 2025-03-04 PROCEDURE — 74018 RADEX ABDOMEN 1 VIEW: CPT

## 2025-03-05 ENCOUNTER — OFFICE VISIT (OUTPATIENT)
Dept: CARDIOLOGY | Facility: CLINIC | Age: 80
End: 2025-03-05
Payer: MEDICARE

## 2025-03-05 VITALS
WEIGHT: 270 LBS | HEIGHT: 75 IN | DIASTOLIC BLOOD PRESSURE: 72 MMHG | HEART RATE: 63 BPM | SYSTOLIC BLOOD PRESSURE: 116 MMHG | OXYGEN SATURATION: 92 % | BODY MASS INDEX: 33.57 KG/M2

## 2025-03-05 DIAGNOSIS — I25.10 CORONARY ARTERY DISEASE INVOLVING NATIVE CORONARY ARTERY OF NATIVE HEART WITHOUT ANGINA PECTORIS: Primary | ICD-10-CM

## 2025-03-05 DIAGNOSIS — E78.2 MIXED HYPERLIPIDEMIA: ICD-10-CM

## 2025-03-05 DIAGNOSIS — I10 PRIMARY HYPERTENSION: ICD-10-CM

## 2025-03-05 PROCEDURE — 1159F MED LIST DOCD IN RCRD: CPT | Performed by: NURSE PRACTITIONER

## 2025-03-05 PROCEDURE — 3074F SYST BP LT 130 MM HG: CPT | Performed by: NURSE PRACTITIONER

## 2025-03-05 PROCEDURE — 93000 ELECTROCARDIOGRAM COMPLETE: CPT | Performed by: NURSE PRACTITIONER

## 2025-03-05 PROCEDURE — 99214 OFFICE O/P EST MOD 30 MIN: CPT | Performed by: NURSE PRACTITIONER

## 2025-03-05 PROCEDURE — 1160F RVW MEDS BY RX/DR IN RCRD: CPT | Performed by: NURSE PRACTITIONER

## 2025-03-05 PROCEDURE — 3078F DIAST BP <80 MM HG: CPT | Performed by: NURSE PRACTITIONER

## 2025-03-05 NOTE — PROGRESS NOTES
Subjective:     Encounter Date: 3/5/2025      Patient ID: Axel Morris is a 80 y.o. male.    Chief Complaint: Follow-up coronary disease     History of Present Illness    Mr. Morris presents for follow up.     By way of review, he had a circumflex stent placed by Dr. Robledo in 2018, which resolved symptoms of rather typical angina he was having beforehand, and have not recurred since.     When he followed up in February 2024 he was doing well.  He reported during the winter he was a little short of breath climbing stairs or walking longer distances.  He related this to getting older.  He was not having any chest discomfort.  He had made some diet modifications and had lost about 7 pounds.  He was not having any chest discomfort.  His LDL was not quite at goal and Dr. Tamayo uptitrated his atorvastatin to 80 mg.  They also discussed weight loss and the use of my fitness pal for assistance with this.    Today the patient reports he feels great.  He denies any physical limitations in terms of what he likes to do day today.  He denies any chest discomfort or shortness of breath.  He reports well-controlled blood pressure.    The following portions of the patient's history were reviewed and updated as appropriate: allergies, current medications, past family history, past medical history, past social history, past surgical history, and problem list.    Review of Systems   Constitutional: Negative for malaise/fatigue.   Cardiovascular:  Negative for chest pain, claudication, dyspnea on exertion, leg swelling, near-syncope, orthopnea, palpitations, paroxysmal nocturnal dyspnea and syncope.   Respiratory:  Negative for cough and shortness of breath.    Hematologic/Lymphatic: Does not bruise/bleed easily.   Musculoskeletal:  Negative for falls.   Gastrointestinal:  Negative for bloating.   Neurological:  Negative for dizziness, light-headedness and weakness.           Current Outpatient Medications:     aspirin 81 MG  tablet, Take 1 tablet by mouth Daily. Dr. Tamayo, Disp: , Rfl:     atorvastatin (LIPITOR) 80 MG tablet, Take 1 tablet by mouth Daily., Disp: 90 tablet, Rfl: 3    azelastine (ASTELIN) 0.1 % nasal spray, Administer 2 sprays into the nostril(s) as directed by provider As Needed for Rhinitis. Use in each nostril as directed, Disp: , Rfl:     glycopyrrolate (ROBINUL) 1 MG tablet, Take 1 tablet by mouth 3 (Three) Times a Day As Needed (vertigo)., Disp: 60 tablet, Rfl: 5    levothyroxine (SYNTHROID, LEVOTHROID) 25 MCG tablet, TAKE 1 TABLET EVERY MORNING, Disp: 90 tablet, Rfl: 3    LUTEIN PO, Take 25 mg by mouth Daily., Disp: , Rfl:     metoprolol succinate XL (TOPROL-XL) 25 MG 24 hr tablet, Take 1 tablet by mouth Daily., Disp: , Rfl:     montelukast (SINGULAIR) 10 MG tablet, Take 1 tablet by mouth Daily., Disp: , Rfl:     Multiple Vitamins-Minerals (MULTIVITAMIN WITH MINERALS) tablet tablet, Take 1 tablet by mouth Daily., Disp: , Rfl:     Multiple Vitamins-Minerals (OCUVITE ADULT FORMULA PO), Take  by mouth., Disp: , Rfl:     omeprazole (priLOSEC) 20 MG capsule, Take 1 capsule by mouth Daily., Disp: , Rfl:     pregabalin (LYRICA) 100 MG capsule, Take 1 capsule by mouth 2 (Two) Times a Day., Disp: , Rfl:     pyridostigmine (MESTINON) 60 MG tablet, TAKE 1 TABLET THREE TIMES A DAY, Disp: 270 tablet, Rfl: 1    Current Facility-Administered Medications:     lidocaine (XYLOCAINE) 1 % injection 5 mL, 5 mL, Infiltration, Once, Arturo Baum MD       Objective:      Vitals:    03/05/25 0900   BP: 116/72   Pulse: 63   SpO2: 92%   Weight - 270 lbs    Vitals and nursing note reviewed.   Constitutional:       General: Not in acute distress.     Appearance: Well-developed and not in distress. Not diaphoretic.   Neck:      Vascular: No JVD or JVR. JVD normal.   Pulmonary:      Effort: Pulmonary effort is normal. No respiratory distress.      Breath sounds: Normal breath sounds.   Cardiovascular:      Normal rate. Regular rhythm.       Murmurs: There is no murmur.   Edema:     Peripheral edema absent.   Abdominal:      Tenderness: There is no abdominal tenderness.   Skin:     General: Skin is warm and dry.   Neurological:      Mental Status: Alert, oriented to person, place, and time and oriented to person, place and time.       Lab Review:   Lab Results   Component Value Date    GLUCOSE 102 (H) 11/04/2024    BUN 19 11/04/2024    CREATININE 0.7 11/04/2024     11/04/2024    K 4.7 11/04/2024     11/04/2024    CALCIUM 9.0 11/04/2024    PROTEINTOT 6.6 11/04/2024    ALBUMIN 3.9 11/04/2024    ALT 31 11/04/2024    AST 29 11/04/2024    ALKPHOS 103 11/04/2024    BILITOT 0.7 11/04/2024    GLOB 2.9 07/02/2022    AGRATIO 1.4 07/02/2022    BCR 28.0 (H) 07/12/2023    ANIONGAP 9 11/04/2024    EGFR 91.2 07/12/2023                ECG 12 Lead    Date/Time: 3/5/2025 11:28 AM  Performed by: Madelaine Berger APRN    Authorized by: Madelaine Berger APRN  Comparison: compared with previous ECG from 2/22/2024  Similar to previous ECG  Rhythm: sinus rhythm  BPM: 63  Other findings: low voltage    Clinical impression: abnormal EKG          Assessment/Plan:     Problem List Items Addressed This Visit (all established and stable)         Cardiac and Vasculature    Coronary artery disease involving native coronary artery of native heart without angina pectoris - Primary    Overview     3.0 x 28 mm HENNA to LCx 12/26/18 (Venkat)           Mixed hyperlipidemia    Relevant Medications    atorvastatin (LIPITOR) 80 MG tablet    Primary hypertension         Recommendations/plans:    The patient is doing well from a cardiovascular standpoint.  His LDL is now well-controlled at 50 after up titration of atorvastatin last year.  He will continue his 80 mg dose.  His blood pressure is well-controlled as well.  He will also continue his current dose of Toprol-XL and aspirin 81 mg daily.  He is not having any angina.  He will follow-up with Dr. Tamayo in 1 year, but will call  sooner with any symptoms or concerns.        I spent 34 minutes caring for Axel on this date of service. This time includes time spent by me in the following activities: preparing for the visit, reviewing tests, performing a medically appropriate examination and/or evaluation, counseling and educating the patient/family/caregiver, and documenting information in the medical record

## 2025-03-14 RX ORDER — MONTELUKAST SODIUM 10 MG/1
10 TABLET ORAL NIGHTLY
Qty: 90 TABLET | Refills: 3 | Status: SHIPPED | OUTPATIENT
Start: 2025-03-14

## 2025-03-25 DIAGNOSIS — J32.4 CHRONIC PANSINUSITIS: ICD-10-CM

## 2025-03-25 DIAGNOSIS — G60.9 IDIOPATHIC PERIPHERAL NEUROPATHY: ICD-10-CM

## 2025-03-27 RX ORDER — LEVOTHYROXINE SODIUM 25 UG/1
25 TABLET ORAL EVERY MORNING
Qty: 90 TABLET | Refills: 3 | Status: SHIPPED | OUTPATIENT
Start: 2025-03-27

## 2025-03-28 RX ORDER — AZELASTINE 1 MG/ML
SPRAY, METERED NASAL
Qty: 3 EACH | Refills: 3 | Status: SHIPPED | OUTPATIENT
Start: 2025-03-28

## 2025-03-28 RX ORDER — PREGABALIN 100 MG/1
100 CAPSULE ORAL 2 TIMES DAILY
Qty: 180 CAPSULE | Refills: 1 | Status: SHIPPED | OUTPATIENT
Start: 2025-03-28 | End: 2025-09-24

## 2025-03-31 RX ORDER — ATORVASTATIN CALCIUM 80 MG/1
80 TABLET, FILM COATED ORAL DAILY
Qty: 90 TABLET | Refills: 3 | Status: SHIPPED | OUTPATIENT
Start: 2025-03-31

## 2025-04-02 ENCOUNTER — OFFICE VISIT (OUTPATIENT)
Dept: OTOLARYNGOLOGY | Facility: CLINIC | Age: 80
End: 2025-04-02
Payer: MEDICARE

## 2025-04-02 VITALS
SYSTOLIC BLOOD PRESSURE: 117 MMHG | WEIGHT: 272 LBS | HEIGHT: 75 IN | DIASTOLIC BLOOD PRESSURE: 77 MMHG | BODY MASS INDEX: 33.82 KG/M2

## 2025-04-02 DIAGNOSIS — H61.23 BILATERAL IMPACTED CERUMEN: ICD-10-CM

## 2025-04-02 DIAGNOSIS — E04.2 MULTIPLE THYROID NODULES: Primary | ICD-10-CM

## 2025-04-02 NOTE — PROGRESS NOTES
Ultrasound   YOB: 1945  Location: [facility]  Location Address: [address] [place] [address], [facility], [address] [place] [address]  Location Phone: [phone]    Chief Complaint   Patient presents with    Thyroid Problem       History of Present Illness  Axel Morris is a 80 y.o. male.  Axel Morris is here for follow up of ENT complaints. The patient has had problems with thyroid nodules   Nodules have been present for the past several years with no significant change   Patient denies overt clinical symptoms   He is not currently on thyroid replacement therapy   Dominant nodule underwent fine needle aspiration in the past with benign pathology        He is also s/p excision of atypical lesion of left temple 3/2021  Pathology demonstrated solar lentigo and he is followed routinely by dermatology       Fine Needle Aspiration (2024 10:00)   TSH (2024 11:14 EST)    US Thyroid (2025 08:47)   Past Medical History:   Diagnosis Date    Abnormal ECG     Arthritis     Callus     Coronary artery disease     COVID-19 vaccine series completed     pfizer    Difficulty walking     Dizziness     Fallen arches     Fracture of nasal bones     GERD (gastroesophageal reflux disease)     History of transfusion     HL (hearing loss)     Hx of colonic polyps     Hyperlipidemia     Idiopathic peripheral neuropathy 2017    Kidney stone on left side 2023    Neuropathy     Peripheral neuropathy     Pneumonia     Primary hypertension 2021    Rheumatic fever     Sinusitis     Skin cancer     Sleep apnea     Tinnitus     Verruca     Vision loss        Past Surgical History:   Procedure Laterality Date    ADENOIDECTOMY      CARDIAC CATHETERIZATION      CARDIAC SURGERY      HEART STENT    CHOLECYSTECTOMY      COLONOSCOPY N/A 2017    Procedure: COLONOSCOPY WITH ANESTHESIA;  Surgeon: Matthew Cagle MD;  Location: [facility] ENDOSCOPY;  Service:     COLONOSCOPY W/ POLYPECTOMY  2012    Adenomatous  polyp at 50 cm repeat exam in 5 years    CORONARY STENT PLACEMENT      ENDOSCOPY AND COLONOSCOPY  08/07/2009    Acute ulcerative esophagogastritis grade c, Hyperplastic polyp at 50 cm repeat colonoscopy in 3 years    EXTRACORPOREAL SHOCK WAVE LITHOTRIPSY (ESWL) Left 08/02/2023    Procedure: EXTRACORPOREAL SHOCKWAVE LITHOTRIPSY;  Surgeon: Marcellus Fowler MD;  Location:  [facility] OR;  Service: Urology;  Laterality: Left;    EYE SURGERY      DONNIE CATARACT REMOVAL    FLAP HEAD/NECK Left 03/10/2021    Procedure: POSSIBLE FLAP OR GRAFT.;  Surgeon: Roberto Og MD;  Location:  [facility] OR;  Service: ENT;  Laterality: Left;    HEAD/NECK LESION/CYST EXCISION Left 03/10/2021    Procedure: EXCISION OF ATYPICAL PIGMENTED LESION OF THE LEFT CENTRAL TEMPLE REGION WITH POSSIBLE FLAP OR GRAFT. - Left;  Surgeon: Roberto Og MD;  Location:  [facility] OR;  Service: ENT;  Laterality: Left;    REPLACEMENT TOTAL KNEE BILATERAL      TONSILLECTOMY         Outpatient Medications Marked as Taking for the 4/2/25 encounter (Office Visit) with Abeba Medina APRN   Medication Sig Dispense Refill    aspirin 81 MG tablet Take 1 tablet by mouth Daily. Dr. Tamayo      atorvastatin (LIPITOR) 80 MG tablet TAKE 1 TABLET DAILY 90 tablet 3    azelastine (ASTELIN) 0.1 % nasal spray Administer 2 sprays into the nostril(s) as directed by provider As Needed for Rhinitis. Use in each nostril as directed      glycopyrrolate (ROBINUL) 1 MG tablet Take 1 tablet by mouth 3 (Three) Times a Day As Needed (vertigo). 60 tablet 5    levothyroxine (SYNTHROID, LEVOTHROID) 25 MCG tablet TAKE 1 TABLET EVERY MORNING 90 tablet 3    LUTEIN PO Take 25 mg by mouth Daily.      metoprolol succinate XL (TOPROL-XL) 25 MG 24 hr tablet Take 1 tablet by mouth Daily.      montelukast (SINGULAIR) 10 MG tablet Take 1 tablet by mouth Daily.      Multiple Vitamins-Minerals (MULTIVITAMIN WITH MINERALS) tablet tablet Take 1 tablet by mouth Daily.      Multiple Vitamins-Minerals  (OCUVITE ADULT FORMULA PO) Take  by mouth.      omeprazole (priLOSEC) 20 MG capsule Take 1 capsule by mouth Daily.      pregabalin (LYRICA) 100 MG capsule Take 1 capsule by mouth 2 (Two) Times a Day.      pyridostigmine (MESTINON) 60 MG tablet TAKE 1 TABLET THREE TIMES A  tablet 1     Current Facility-Administered Medications for the 25 encounter (Office Visit) with Abeba Medina APRN   Medication Dose Route Frequency Provider Last Rate Last Admin    lidocaine (XYLOCAINE) 1 % injection 5 mL  5 mL Infiltration Once Arturo Baum MD           Ampicillin    Family History   Problem Relation Age of Onset    Heart failure Father     Rheum arthritis Father     Cancer Brother     Colon cancer Neg Hx     Colon polyps Neg Hx        Social History     Socioeconomic History    Marital status:    Tobacco Use    Smoking status: Former     Current packs/day: 0.00     Average packs/day: 1 pack/day for 10.0 years (10.0 ttl pk-yrs)     Types: Cigarettes     Start date: 1968     Quit date: 1978     Years since quittin.7     Passive exposure: Never    Smokeless tobacco: Never   Vaping Use    Vaping status: Never Used   Substance and Sexual Activity    Alcohol use: Never    Drug use: Never    Sexual activity: Defer       Review of Systems   Constitutional: Negative.    HENT: Negative.         Vitals:    25 0854   BP: 117/77       Body mass index is 34 kg/m².    Objective     Physical Exam  Vitals reviewed.   Constitutional:       Appearance: He is obese.   HENT:      Head: Normocephalic.      Right Ear: Ear canal and external ear normal. There is impacted cerumen.      Left Ear: Ear canal and external ear normal. There is impacted cerumen.      Nose: Nose normal.      Mouth/Throat:      Lips: Pink.      Mouth: Mucous membranes are moist.      Pharynx: Uvula midline.   Neck:      Comments: Mild thyromegaly   Palpable bilateral thyroid nodules     Musculoskeletal:      Cervical back: Full  passive range of motion without pain.   Neurological:      Mental Status: He is alert.       Ear Cerumen Removal    Date/Time: 4/2/2025 9:14 AM    Performed by: Abeba Medina APRN  Authorized by: Abeba Medina APRN  Consent: Verbal consent obtained  Consent given by: patient  Patient identity confirmed: verbally with patient    Anesthesia:  Local Anesthetic: none  Location details: left ear and right ear  Patient tolerance: patient tolerated the procedure well with no immediate complications  Procedure type: instrumentation, curette         Assessment & Plan   Diagnoses and all orders for this visit:    1. Multiple thyroid nodules (Primary)  -     US Thyroid; Future    2. Bilateral impacted cerumen    Other orders  -     Ear Cerumen Removal      * Surgery not found *  Orders Placed This Encounter   Procedures    Ear Cerumen Removal     This order was created via procedure documentation     Release to patient:   Routine Release [[id number]]    US Thyroid     Standing Status:   Future     Expected Date:   4/2/2026     Expiration Date:   7/2/2026     Reason for Exam::   Thyroid disease     Release to patient:   Routine Release [[id number]]     Return in about 1 year (around 4/2/2026).     F/u in one year with repeat ultrasound   Call for new/worsening concerns     Patient Instructions   The patient has a thyroid nodule, which is relatively small, and studies do not suggest a malignancy. I have recommended observation with follow-up with me for repeat ultrasound. I explained the pathology of thyroid nodules including the risks of cancer. The options of surgery were discussed, but the patient wants to pursue an observational course for now, which is reasonable. The patient wishes to continue to defer surgery at this time.

## 2025-04-11 DIAGNOSIS — G60.9 IDIOPATHIC PERIPHERAL NEUROPATHY: ICD-10-CM

## 2025-04-13 RX ORDER — PREGABALIN 100 MG/1
100 CAPSULE ORAL 2 TIMES DAILY
Qty: 180 CAPSULE | Refills: 2 | Status: SHIPPED | OUTPATIENT
Start: 2025-04-13 | End: 2026-01-08

## 2025-04-15 DIAGNOSIS — I10 ESSENTIAL HYPERTENSION: ICD-10-CM

## 2025-04-15 RX ORDER — METOPROLOL SUCCINATE 25 MG/1
25 TABLET, EXTENDED RELEASE ORAL DAILY
Qty: 90 TABLET | Refills: 3 | Status: SHIPPED | OUTPATIENT
Start: 2025-04-15

## 2025-04-28 DIAGNOSIS — G70.00 MYASTHENIA GRAVIS WITHOUT EXACERBATION: ICD-10-CM

## 2025-04-28 DIAGNOSIS — G60.9 IDIOPATHIC PERIPHERAL NEUROPATHY: ICD-10-CM

## 2025-04-28 RX ORDER — PYRIDOSTIGMINE BROMIDE 60 MG/1
60 TABLET ORAL 3 TIMES DAILY
Qty: 270 TABLET | Refills: 3 | Status: SHIPPED | OUTPATIENT
Start: 2025-04-28

## 2025-04-28 NOTE — TELEPHONE ENCOUNTER
[facility] lost is Lyrica and told him to get a 7 day supply from [employer] and they will ship it again.

## 2025-04-30 DIAGNOSIS — G60.9 IDIOPATHIC PERIPHERAL NEUROPATHY: ICD-10-CM

## 2025-05-01 DIAGNOSIS — G60.9 IDIOPATHIC PERIPHERAL NEUROPATHY: ICD-10-CM

## 2025-05-01 RX ORDER — PREGABALIN 100 MG/1
100 CAPSULE ORAL 2 TIMES DAILY
Qty: 180 CAPSULE | Refills: 2 | OUTPATIENT
Start: 2025-05-01 | End: 2026-01-26

## 2025-05-01 RX ORDER — PREGABALIN 100 MG/1
100 CAPSULE ORAL 2 TIMES DAILY
Qty: 14 CAPSULE | Refills: 0 | Status: SHIPPED | OUTPATIENT
Start: 2025-05-01 | End: 2025-05-01 | Stop reason: SDUPTHER

## 2025-05-02 RX ORDER — PREGABALIN 100 MG/1
100 CAPSULE ORAL 2 TIMES DAILY
Qty: 14 CAPSULE | Refills: 0 | Status: SHIPPED | OUTPATIENT
Start: 2025-05-02 | End: 2025-05-09

## 2025-05-05 ENCOUNTER — TELEPHONE (OUTPATIENT)
Dept: INTERNAL MEDICINE | Age: 80
End: 2025-05-05

## 2025-05-14 DIAGNOSIS — Z13.1 SCREENING FOR DIABETES MELLITUS: ICD-10-CM

## 2025-05-14 DIAGNOSIS — Z12.5 SCREENING FOR MALIGNANT NEOPLASM OF PROSTATE: ICD-10-CM

## 2025-05-14 DIAGNOSIS — E78.2 MIXED HYPERLIPIDEMIA: ICD-10-CM

## 2025-05-14 LAB
ALBUMIN SERPL-MCNC: 3.7 G/DL (ref 3.5–5.2)
ALP SERPL-CCNC: 102 U/L (ref 40–129)
ALT SERPL-CCNC: 46 U/L (ref 10–50)
ANION GAP SERPL CALCULATED.3IONS-SCNC: 10 MMOL/L (ref 8–16)
AST SERPL-CCNC: 38 U/L (ref 10–50)
BILIRUB SERPL-MCNC: 0.7 MG/DL (ref 0.2–1.2)
BUN SERPL-MCNC: 15 MG/DL (ref 8–23)
CALCIUM SERPL-MCNC: 9.4 MG/DL (ref 8.8–10.2)
CHLORIDE SERPL-SCNC: 106 MMOL/L (ref 98–107)
CHOLEST SERPL-MCNC: 111 MG/DL (ref 0–199)
CO2 SERPL-SCNC: 28 MMOL/L (ref 22–29)
CREAT SERPL-MCNC: 0.7 MG/DL (ref 0.7–1.2)
ERYTHROCYTE [DISTWIDTH] IN BLOOD BY AUTOMATED COUNT: 13.2 % (ref 11.5–14.5)
GLUCOSE SERPL-MCNC: 114 MG/DL (ref 70–99)
HBA1C MFR BLD: 5.9 % (ref 4–5.6)
HCT VFR BLD AUTO: 44.7 % (ref 42–52)
HDLC SERPL-MCNC: 35 MG/DL (ref 40–60)
HGB BLD-MCNC: 14.4 G/DL (ref 14–18)
LDLC SERPL CALC-MCNC: 44 MG/DL
MCH RBC QN AUTO: 30.8 PG (ref 27–31)
MCHC RBC AUTO-ENTMCNC: 32.2 G/DL (ref 33–37)
MCV RBC AUTO: 95.5 FL (ref 80–94)
PLATELET # BLD AUTO: 202 K/UL (ref 130–400)
PMV BLD AUTO: 10.6 FL (ref 9.4–12.4)
POTASSIUM SERPL-SCNC: 4.4 MMOL/L (ref 3.5–5.1)
PROT SERPL-MCNC: 6.3 G/DL (ref 6.4–8.3)
PSA SERPL-MCNC: 1.78 NG/ML (ref 0–4)
RBC # BLD AUTO: 4.68 M/UL (ref 4.7–6.1)
SODIUM SERPL-SCNC: 144 MMOL/L (ref 136–145)
TRIGL SERPL-MCNC: 158 MG/DL (ref 0–149)
WBC # BLD AUTO: 5.1 K/UL (ref 4.8–10.8)

## 2025-05-15 ENCOUNTER — OFFICE VISIT (OUTPATIENT)
Dept: INTERNAL MEDICINE | Age: 80
End: 2025-05-15
Payer: MEDICARE

## 2025-05-15 VITALS
DIASTOLIC BLOOD PRESSURE: 80 MMHG | OXYGEN SATURATION: 97 % | HEIGHT: 74 IN | SYSTOLIC BLOOD PRESSURE: 124 MMHG | WEIGHT: 273 LBS | HEART RATE: 62 BPM | BODY MASS INDEX: 35.04 KG/M2

## 2025-05-15 DIAGNOSIS — M15.0 PRIMARY OSTEOARTHRITIS INVOLVING MULTIPLE JOINTS: ICD-10-CM

## 2025-05-15 DIAGNOSIS — I10 ESSENTIAL HYPERTENSION: ICD-10-CM

## 2025-05-15 DIAGNOSIS — G47.33 OBSTRUCTIVE SLEEP APNEA: Chronic | ICD-10-CM

## 2025-05-15 DIAGNOSIS — G70.00 MYASTHENIA GRAVIS (HCC): ICD-10-CM

## 2025-05-15 DIAGNOSIS — E78.2 MIXED HYPERLIPIDEMIA: ICD-10-CM

## 2025-05-15 DIAGNOSIS — R73.9 HYPERGLYCEMIA: ICD-10-CM

## 2025-05-15 DIAGNOSIS — G60.9 IDIOPATHIC PERIPHERAL NEUROPATHY: Chronic | ICD-10-CM

## 2025-05-15 DIAGNOSIS — I25.10 CORONARY ARTERY DISEASE INVOLVING NATIVE CORONARY ARTERY OF NATIVE HEART WITHOUT ANGINA PECTORIS: Primary | ICD-10-CM

## 2025-05-15 DIAGNOSIS — K21.9 GASTROESOPHAGEAL REFLUX DISEASE WITHOUT ESOPHAGITIS: Chronic | ICD-10-CM

## 2025-05-15 DIAGNOSIS — J32.4 CHRONIC PANSINUSITIS: ICD-10-CM

## 2025-05-15 PROCEDURE — 99214 OFFICE O/P EST MOD 30 MIN: CPT | Performed by: INTERNAL MEDICINE

## 2025-05-15 PROCEDURE — 1159F MED LIST DOCD IN RCRD: CPT | Performed by: INTERNAL MEDICINE

## 2025-05-15 PROCEDURE — 1036F TOBACCO NON-USER: CPT | Performed by: INTERNAL MEDICINE

## 2025-05-15 PROCEDURE — G8417 CALC BMI ABV UP PARAM F/U: HCPCS | Performed by: INTERNAL MEDICINE

## 2025-05-15 PROCEDURE — 3079F DIAST BP 80-89 MM HG: CPT | Performed by: INTERNAL MEDICINE

## 2025-05-15 PROCEDURE — G8427 DOCREV CUR MEDS BY ELIG CLIN: HCPCS | Performed by: INTERNAL MEDICINE

## 2025-05-15 PROCEDURE — 1123F ACP DISCUSS/DSCN MKR DOCD: CPT | Performed by: INTERNAL MEDICINE

## 2025-05-15 PROCEDURE — 3074F SYST BP LT 130 MM HG: CPT | Performed by: INTERNAL MEDICINE

## 2025-05-15 SDOH — ECONOMIC STABILITY: FOOD INSECURITY: WITHIN THE PAST 12 MONTHS, THE FOOD YOU BOUGHT JUST DIDN'T LAST AND YOU DIDN'T HAVE MONEY TO GET MORE.: NEVER TRUE

## 2025-05-15 SDOH — ECONOMIC STABILITY: FOOD INSECURITY: WITHIN THE PAST 12 MONTHS, YOU WORRIED THAT YOUR FOOD WOULD RUN OUT BEFORE YOU GOT MONEY TO BUY MORE.: NEVER TRUE

## 2025-05-15 ASSESSMENT — PATIENT HEALTH QUESTIONNAIRE - PHQ9
1. LITTLE INTEREST OR PLEASURE IN DOING THINGS: NOT AT ALL
SUM OF ALL RESPONSES TO PHQ QUESTIONS 1-9: 0
2. FEELING DOWN, DEPRESSED OR HOPELESS: NOT AT ALL
SUM OF ALL RESPONSES TO PHQ QUESTIONS 1-9: 0

## 2025-05-15 NOTE — PROGRESS NOTES
Chief Complaint   Patient presents with    6 Month Follow-Up    Coronary Artery Disease       HPI: Patient is here today for 6-month follow-up of coronary artery disease myasthenia gravis hypertension idiopathic neuropathy and other medical issues. Had redo surgery on right knee- pain but still improving slowly- pain going up and down steps.  Neuropathy sy mostly controlled.  Myasthenia Gravis - down to 1 pill daily and stable. Slurry speech is less.  No cp or dyspnea or abd pain.     Past Medical History:   Diagnosis Date    Arthritis     CAD (coronary artery disease)     sees dr. khan    DVT, lower extremity, distal, chronic (HCC)     Familial hypercholesterolemia 07/27/2017    Gastroesophageal reflux disease without esophagitis 07/27/2017    History of blood transfusion 2003    Knee replacements    Hx of blood clots 2003    Post Knee replacement tx    Idiopathic peripheral neuropathy 07/27/2017    Knee pain     Myasthenia gravis (HCC)     sees dr. 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     left at fibular head s/p decompression 2009       Past Surgical History:   Procedure Laterality Date    CATARACT REMOVAL WITH IMPLANT Bilateral     CHOLECYSTECTOMY  07/2005    open    CORONARY ANGIOPLASTY WITH STENT PLACEMENT  12/26/2018    [place] to Circumflex    JOINT REPLACEMENT Bilateral 2003    Knee replacements    KNEE ARTHROPLASTY Bilateral 2003    complete combine condylye and plateau    NERVE SURGERY      Nerve release; leg    REVISION TOTAL KNEE ARTHROPLASTY Right 8/20/2024    RIGHT COMPLEX PATELLA REVISION performed by Rg Jenkins MD at [facility] OR       Family History   Problem Relation Age of Onset    COPD Mother     Rheum Arthritis Father     COPD Brother     Lung Cancer Brother        Social History     Socioeconomic History    Marital status:      Spouse name: avila    Number of children: 2

## 2025-05-20 NOTE — PROGRESS NOTES
[facility] - PODIATRY    Today's Date: 05/29/2025     Patient Name: Axel Morris  MRN: [id number]  CSN: [id number]  PCP: Isabel Best MD  Referring Provider: No ref. provider found    SUBJECTIVE     Chief Complaint   Patient presents with    Follow-up     Isabel Best MD 03/25/25  3 months for Foot Care Clinic  Pt here for nail/foot care. Pt reports no pain or issues.     HPI: Axel Morris, a 80 y.o.male, comes to clinic as a(n) established patient complaining of toenail/callus issues and complaining of neuropathy of both feet . Patient has h/o arthritis, CA, CAD, dizziness, GERD, HLD, Neuropathy, pneumonia, sleep apnea, tinnitus .  Patient presents with toenails that are long, thickened, irregular, and dystrophic and that he is unable to care for them himself at home.  He does states that he has neuropathy from the knee down bilaterally. Calluses have returned to the great toes and the right second toe.  Reports he was unable to make his last appointment and his nails have become significantly long, causing discomfort.  Denies pain in feet secondary to neuropathy . Relates previous treatment(s) including foot care by podiatry .  Continues Plavix daily.  Denies any constitutional symptoms. No other pedal complaints at this time.    Past Medical History:   Diagnosis Date    Abnormal ECG     Arthritis     Callus     Coronary artery disease     COVID-19 vaccine series completed     pfizer    Deep vein thrombosis Aug 2003    Difficulty walking     Dizziness     Fallen arches     Fracture of nasal bones     GERD (gastroesophageal reflux disease)     History of transfusion Aug 2003    HL (hearing loss)     Hx of colonic polyps     Hyperlipidemia     Idiopathic peripheral neuropathy 07/27/2017    Kidney stone on left side 06/19/2023    Neuropathy     Peripheral neuropathy     Pneumonia     Primary hypertension 12/23/2021    Rheumatic fever     Sinusitis     Skin cancer     Sleep apnea     Tinnitus      Verruca     Vision loss      Past Surgical History:   Procedure Laterality Date    ADENOIDECTOMY      CARDIAC CATHETERIZATION      CARDIAC SURGERY      HEART STENT    CHOLECYSTECTOMY      COLONOSCOPY N/A 2017    Procedure: COLONOSCOPY WITH ANESTHESIA;  Surgeon: Matthew Cagle MD;  Location:  [facility];  Service:     COLONOSCOPY W/ POLYPECTOMY  2012    Adenomatous polyp at 50 cm repeat exam in 5 years    CORONARY STENT PLACEMENT      ENDOSCOPY AND COLONOSCOPY  2009    Acute ulcerative esophagogastritis grade c, Hyperplastic polyp at 50 cm repeat colonoscopy in 3 years    EXTRACORPOREAL SHOCK WAVE LITHOTRIPSY (ESWL) Left 2023    Procedure: EXTRACORPOREAL SHOCKWAVE LITHOTRIPSY;  Surgeon: Marcellus Fowler MD;  Location:  [facility];  Service: Urology;  Laterality: Left;    EYE SURGERY      DONNIE CATARACT REMOVAL    FLAP HEAD/NECK Left 03/10/2021    Procedure: POSSIBLE FLAP OR GRAFT.;  Surgeon: Roberto Og MD;  Location:  [facility] OR;  Service: ENT;  Laterality: Left;    HEAD/NECK LESION/CYST EXCISION Left 03/10/2021    Procedure: EXCISION OF ATYPICAL PIGMENTED LESION OF THE LEFT CENTRAL TEMPLE REGION WITH POSSIBLE FLAP OR GRAFT. - Left;  Surgeon: Roberto Og MD;  Location:  [facility];  Service: ENT;  Laterality: Left;    REPLACEMENT TOTAL KNEE BILATERAL      TONSILLECTOMY       Family History   Problem Relation Age of Onset    Asthma Mother     Heart failure Father     Rheum arthritis Father     Cancer Brother     Colon cancer Neg Hx     Colon polyps Neg Hx      Social History     Socioeconomic History    Marital status:    Tobacco Use    Smoking status: Former     Current packs/day: 0.00     Average packs/day: 1 pack/day for 10.0 years (10.0 ttl pk-yrs)     Types: Cigarettes     Start date: 1968     Quit date: 1978     Years since quittin.8     Passive exposure: Never    Smokeless tobacco: Never   Vaping Use    Vaping status: Never Used   Substance  and Sexual Activity    Alcohol use: Never    Drug use: Never    Sexual activity: Defer     Allergies   Allergen Reactions    Ampicillin Rash     Current Outpatient Medications   Medication Sig Dispense Refill    aspirin 81 MG tablet Take 1 tablet by mouth Daily. Dr. Tamayo      atorvastatin (LIPITOR) 80 MG tablet TAKE 1 TABLET DAILY 90 tablet 3    azelastine (ASTELIN) 0.1 % nasal spray Administer 2 sprays into the nostril(s) as directed by provider As Needed for Rhinitis. Use in each nostril as directed      glycopyrrolate (ROBINUL) 1 MG tablet Take 1 tablet by mouth 3 (Three) Times a Day As Needed (vertigo). 60 tablet 5    levothyroxine (SYNTHROID, LEVOTHROID) 25 MCG tablet TAKE 1 TABLET EVERY MORNING 90 tablet 3    LUTEIN PO Take 25 mg by mouth Daily.      metoprolol succinate XL (TOPROL-XL) 25 MG 24 hr tablet Take 1 tablet by mouth Daily.      montelukast (SINGULAIR) 10 MG tablet Take 1 tablet by mouth Daily.      Multiple Vitamins-Minerals (MULTIVITAMIN WITH MINERALS) tablet tablet Take 1 tablet by mouth Daily.      Multiple Vitamins-Minerals (OCUVITE ADULT FORMULA PO) Take  by mouth.      omeprazole (priLOSEC) 20 MG capsule Take 1 capsule by mouth Daily.      pregabalin (LYRICA) 100 MG capsule Take 1 capsule by mouth 2 (Two) Times a Day.      pyridostigmine (MESTINON) 60 MG tablet TAKE 1 TABLET THREE TIMES A  tablet 3     Current Facility-Administered Medications   Medication Dose Route Frequency Provider Last Rate Last Admin    lidocaine (XYLOCAINE) 1 % injection 5 mL  5 mL Infiltration Once Arturo Baum MD         Review of Systems   Constitutional:  Negative for chills and fever.   HENT:  Negative for congestion.    Respiratory:  Negative for shortness of breath.    Cardiovascular:  Negative for chest pain and leg swelling.   Gastrointestinal:  Negative for constipation, diarrhea, nausea and vomiting.   Musculoskeletal:  Positive for arthralgias. Negative for myalgias.   Skin:  Negative for  wound.        Thickened, elongated, irregular toenails. Calluses.   Neurological:  Positive for numbness. Negative for dizziness and weakness.   Hematological:  Bruises/bleeds easily.   Psychiatric/Behavioral:  Negative for agitation, behavioral problems and confusion.        OBJECTIVE     Vitals:    05/29/25 1531   BP: 132/76   Pulse: 61   SpO2: 97%       PHYSICAL EXAM  GEN:   Accompanied by none.     Foot/Ankle Exam    GENERAL  Appearance:  appears stated age and obese  Orientation:  AAOx3  Affect:  appropriate  Gait:  unimpaired  Assistance:  independent  Right shoe gear: casual shoe  Left shoe gear: casual shoe    VASCULAR     Right Foot Vascularity   Dorsalis pedis:  2+  Posterior tibial:  2+  Skin temperature:  warm  Edema grading:  Trace and non-pitting  CFT:  3  Pedal hair growth:  Present  Varicosities:  mild varicosities     Left Foot Vascularity   Dorsalis pedis:  2+  Posterior tibial:  2+  Skin temperature:  warm  Edema grading:  Trace and non-pitting  CFT:  3  Pedal hair growth:  Present  Varicosities:  mild varicosities     NEUROLOGIC     Right Foot Neurologic   Light touch sensation: diminished  Vibratory sensation: diminished  Hot/Cold sensation: diminished  Protective Sensation using [place] Monofilament:   Sites intact: 5  Sites tested: 10     Left Foot Neurologic   Light touch sensation: diminished  Vibratory sensation: diminished  Protective Sensation using [place] Monofilament:   Sites intact: 1  Sites tested: 10    MUSCULOSKELETAL     Right Foot Musculoskeletal   Ecchymosis:  none  Tenderness:  toenail problem    Arch:  Pes planus  Hallux valgus: Yes       Left Foot Musculoskeletal   Ecchymosis:  none  Tenderness:  toenail problem  Arch:  Pes planus  Hallux valgus: Yes      MUSCLE STRENGTH     Right Foot Muscle Strength   Foot dorsiflexion:  5  Foot plantar flexion:  5  Foot inversion:  5  Foot eversion:  5     Left Foot Muscle Strength   Foot dorsiflexion:  5  Foot plantar  flexion:  4+  Foot inversion:  5  Foot eversion:  5    RANGE OF MOTION     Right Foot Range of Motion   Foot and ankle ROM within normal limits       Left Foot Range of Motion   Foot and ankle ROM within normal limits      DERMATOLOGIC      Right Foot Dermatologic   Skin  Positive for corn and dryness.   Nails  1.  Positive for onychomycosis, abnormal thickness, subungual debris and dystrophic nail.  2.  Positive for elongated, onychomycosis, abnormal thickness, subungual debris and dystrophic nail.  3.  Positive for elongated, onychomycosis, abnormal thickness, subungual debris and dystrophic nail.  4.  Positive for elongated, onychomycosis, abnormal thickness, subungual debris and dystrophic nail.  5.  Positive for elongated, onychomycosis, abnormal thickness, subungual debris and dystrophic nail.     Left Foot Dermatologic   Skin  Positive for corn and dryness.   Nails  1.  Positive for onychomycosis, abnormal thickness, subungual debris and dystrophic nail.  2.  Positive for elongated, onychomycosis, abnormal thickness, subungual debris and dystrophic nail.  3.  Positive for elongated, onychomycosis, abnormal thickness, subungual debris and dystrophic nail.  4.  Positive for elongated, onychomycosis, abnormally thick, subungual debris and dystrophic nail.  5.  Positive for elongated, onychomycosis, abnormally thick, subungual debris and dystrophic nail.    Image:       RADIOLOGY/NUCLEAR:  XR Knee 4+ View Right  Result Date: 5/28/2025  Narrative: X-rays AP lateral 45 degree PA weightbearing and merchant x-ray views of the right knee show Bilateral posterior stabilized knee arthroplasties press-fit.  Patella is been resurfaced.  There is improvement in the patellar orientation of the right knee from preop.  No radiolucent lines.      LABORATORY/CULTURE RESULTS:      PATHOLOGY RESULTS:       ASSESSMENT/PLAN     Diagnoses and all orders for this visit:    1. Onychomycosis (Primary)    2. Other polyneuropathy    3.  Pes planus of both feet    4. Valgus deformity of both great toes    5. Foot callus    6. Long term current use of anticoagulant        Comprehensive lower extremity examination and evaluation was performed.  After verbal consent obtained, nail(s) x10 debrided of length and thickness with nail nipper without incidence  After verbal consent obtained, calluses x1 pared utilizing dermal curette and/or scalpel without incidence  Patient may maintain nails and calluses at home utilizing emery board or pumice stone between visits as needed   Remain conservative with foot deformities.  An After Visit Summary was printed and given to the patient at discharge, including (if requested) any available informative/educational handouts regarding diagnosis, treatment, or medications. All questions were answered to patient/family satisfaction. Should symptoms fail to improve or worsen they agree to call or return to clinic or to go to the Emergency Department. Discussed the importance of following up with any needed screening tests/labs/specialist appointments and any requested follow-up recommended by me today. Importance of maintaining follow-up discussed and patient accepts that missed appointments can delay diagnosis and potentially lead to worsening of conditions.  Return in about 3 months (around 8/29/2025) for Diabetic foot care clinic, With Jenna VAUGHAN., or sooner if acute issues arise.        This document has been electronically signed by ALEXUS Bautista on May 29, 2025 22:39 CDT

## 2025-05-28 ENCOUNTER — OFFICE VISIT (OUTPATIENT)
Age: 80
End: 2025-05-28
Payer: MEDICARE

## 2025-05-28 VITALS — BODY MASS INDEX: 35.29 KG/M2 | HEIGHT: 74 IN | WEIGHT: 275 LBS

## 2025-05-28 DIAGNOSIS — T84.84XD PAIN DUE TO TOTAL RIGHT KNEE REPLACEMENT, SUBSEQUENT ENCOUNTER: ICD-10-CM

## 2025-05-28 DIAGNOSIS — M25.561 RIGHT KNEE PAIN, UNSPECIFIED CHRONICITY: Primary | ICD-10-CM

## 2025-05-28 DIAGNOSIS — Z96.651 PAIN DUE TO TOTAL RIGHT KNEE REPLACEMENT, SUBSEQUENT ENCOUNTER: ICD-10-CM

## 2025-05-28 PROCEDURE — 1036F TOBACCO NON-USER: CPT | Performed by: ORTHOPAEDIC SURGERY

## 2025-05-28 PROCEDURE — G8417 CALC BMI ABV UP PARAM F/U: HCPCS | Performed by: ORTHOPAEDIC SURGERY

## 2025-05-28 PROCEDURE — 99213 OFFICE O/P EST LOW 20 MIN: CPT | Performed by: ORTHOPAEDIC SURGERY

## 2025-05-28 PROCEDURE — 1159F MED LIST DOCD IN RCRD: CPT | Performed by: ORTHOPAEDIC SURGERY

## 2025-05-28 PROCEDURE — G8427 DOCREV CUR MEDS BY ELIG CLIN: HCPCS | Performed by: ORTHOPAEDIC SURGERY

## 2025-05-28 PROCEDURE — 1123F ACP DISCUSS/DSCN MKR DOCD: CPT | Performed by: ORTHOPAEDIC SURGERY

## 2025-05-28 NOTE — PROGRESS NOTES
[facility]  [facility]  [address]  [facility] [id number] [phone]         Justin Ann (: 1945) is a 80 y.o. male, patient, here for evaluation of the following chief complaint(s): Knee Pain (Right (revision)/SX:24)  .         Patient's PCP: Jennifer Richards MD     Patient's Last Appointment in this Department was on 2024      Subjective:     Chief Complaint   Patient presents with    Knee Pain     Right (revision)  SX:24        History of Present Illness  The patient presents for a follow-up of right knee pain.    He reports persistent pain in the right knee, particularly when ascending or descending stairs. The pain is localized to the anterior aspect of the knee, with no discomfort on the lateral side. Despite the pain, he notes an overall improvement in his condition compared to the pre-surgical state. Walking on level ground is manageable, although neuropathy may be contributing to his symptoms. He also mentions that his knee does not fully extend.    A surgical procedure was performed on 2024 at [place], during which the kneecap was redone and the plastic between the thigh bone and leg bone was changed to a more stabilizing material. He was discharged home the same day. An x-ray examination was conducted today.    SOCIAL HISTORY  Exercise: Walking              Medications  Current Outpatient Medications   Medication Sig Dispense Refill    pregabalin (LYRICA) 100 MG capsule Take 1 capsule by mouth 2 times daily for 7 days. Max Daily Amount: 200 mg 14 capsule 0    metoprolol succinate (TOPROL XL) 25 MG extended release tablet TAKE 1 TABLET DAILY 90 tablet 3    azelastine (ASTELIN) 0.1 % nasal spray USE 2 SPRAYS IN EACH NOSTRIL TWICE DAILY as needed 3 each 3    montelukast (SINGULAIR) 10 MG tablet TAKE 1 TABLET NIGHTLY 90 tablet 3    glycopyrrolate (ROBINUL) 1 MG tablet TAKE 1 TABLET TWICE A

## 2025-05-29 ENCOUNTER — OFFICE VISIT (OUTPATIENT)
Age: 80
End: 2025-05-29
Payer: MEDICARE

## 2025-05-29 VITALS
SYSTOLIC BLOOD PRESSURE: 132 MMHG | HEIGHT: 75 IN | HEART RATE: 61 BPM | DIASTOLIC BLOOD PRESSURE: 76 MMHG | OXYGEN SATURATION: 97 % | BODY MASS INDEX: 33.57 KG/M2 | WEIGHT: 270 LBS

## 2025-05-29 DIAGNOSIS — B35.1 ONYCHOMYCOSIS: Primary | ICD-10-CM

## 2025-05-29 DIAGNOSIS — M20.12 VALGUS DEFORMITY OF BOTH GREAT TOES: ICD-10-CM

## 2025-05-29 DIAGNOSIS — M20.11 VALGUS DEFORMITY OF BOTH GREAT TOES: ICD-10-CM

## 2025-05-29 DIAGNOSIS — Z79.01 LONG TERM CURRENT USE OF ANTICOAGULANT: ICD-10-CM

## 2025-05-29 DIAGNOSIS — M21.42 PES PLANUS OF BOTH FEET: ICD-10-CM

## 2025-05-29 DIAGNOSIS — L84 FOOT CALLUS: ICD-10-CM

## 2025-05-29 DIAGNOSIS — G62.89 OTHER POLYNEUROPATHY: ICD-10-CM

## 2025-05-29 DIAGNOSIS — M21.41 PES PLANUS OF BOTH FEET: ICD-10-CM

## 2025-07-08 DIAGNOSIS — G60.9 IDIOPATHIC PERIPHERAL NEUROPATHY: ICD-10-CM

## 2025-07-08 NOTE — TELEPHONE ENCOUNTER
Justin called requesting a refill of the below medication which has been pended for you:     Requested Prescriptions     Pending Prescriptions Disp Refills    omeprazole (PRILOSEC) 20 MG delayed release capsule 90 capsule 3     Sig: Take 1 capsule by mouth daily    pregabalin (LYRICA) 100 MG capsule 14 capsule 0     Sig: Take 1 capsule by mouth 2 times daily for 7 days. Max Daily Amount: 200 mg       Last Appointment Date: Visit date not found  Next Appointment Date: 11/17/2025    Allergies   Allergen Reactions    Ampicillin Rash    Penicillins Rash

## 2025-07-10 RX ORDER — OMEPRAZOLE 20 MG/1
20 CAPSULE, DELAYED RELEASE ORAL DAILY
Qty: 90 CAPSULE | Refills: 3 | Status: SHIPPED | OUTPATIENT
Start: 2025-07-10

## 2025-07-10 RX ORDER — PREGABALIN 100 MG/1
100 CAPSULE ORAL 2 TIMES DAILY
Qty: 180 CAPSULE | Refills: 1 | Status: SHIPPED | OUTPATIENT
Start: 2025-07-10 | End: 2026-01-06

## 2025-08-27 ENCOUNTER — OFFICE VISIT (OUTPATIENT)
Age: 80
End: 2025-08-27

## 2025-08-27 VITALS — HEIGHT: 74 IN | WEIGHT: 275 LBS | BODY MASS INDEX: 35.29 KG/M2

## 2025-08-27 DIAGNOSIS — M75.81 RIGHT ROTATOR CUFF TENDINITIS: ICD-10-CM

## 2025-08-27 DIAGNOSIS — M25.511 RIGHT SHOULDER PAIN, UNSPECIFIED CHRONICITY: Primary | ICD-10-CM

## 2025-08-27 RX ORDER — LIDOCAINE HYDROCHLORIDE 10 MG/ML
6 INJECTION, SOLUTION INFILTRATION; PERINEURAL ONCE
Status: COMPLETED | OUTPATIENT
Start: 2025-08-27 | End: 2025-08-27

## 2025-08-27 RX ORDER — BETAMETHASONE SODIUM PHOSPHATE AND BETAMETHASONE ACETATE 3; 3 MG/ML; MG/ML
6 INJECTION, SUSPENSION INTRA-ARTICULAR; INTRALESIONAL; INTRAMUSCULAR; SOFT TISSUE ONCE
Status: COMPLETED | OUTPATIENT
Start: 2025-08-27 | End: 2025-08-27

## 2025-08-27 RX ADMIN — LIDOCAINE HYDROCHLORIDE 6 ML: 10 INJECTION, SOLUTION INFILTRATION; PERINEURAL at 15:00

## 2025-08-27 RX ADMIN — BETAMETHASONE SODIUM PHOSPHATE AND BETAMETHASONE ACETATE 6 MG: 3; 3 INJECTION, SUSPENSION INTRA-ARTICULAR; INTRALESIONAL; INTRAMUSCULAR; SOFT TISSUE at 15:00

## (undated) DEVICE — BAND BAG 36" X 36": Brand: TIDI

## (undated) DEVICE — SPNG GZ WOVN 4X4IN 12PLY 10/BX STRL

## (undated) DEVICE — GOWN,NON-REINFORCED,SIRUS,SET IN SLV,XXL: Brand: MEDLINE

## (undated) DEVICE — MSK O2 MD CONCENTR A/ LF 7FT 1P/U

## (undated) DEVICE — Device: Brand: DEFENDO AIR/WATER/SUCTION AND BIOPSY VALVE

## (undated) DEVICE — SYRINGE 20ML LL S/C 50

## (undated) DEVICE — ZIMMER® STERILE DISPOSABLE TOURNIQUET CUFF WITH PLC, DUAL PORT, SINGLE BLADDER, 34 IN. (86 CM)

## (undated) DEVICE — SHEET,DRAPE,53X77,STERILE: Brand: MEDLINE

## (undated) DEVICE — BLADE LARYNSCP STERILE SZ 4 TI DISP SPECTRM LOPRO GLIDESCOPE

## (undated) DEVICE — DUAL CUT SAGITTAL BLADE

## (undated) DEVICE — PK ENT HD AND NK 30

## (undated) DEVICE — CONTAINER,SPECIMEN,OR STERILE,4OZ: Brand: MEDLINE

## (undated) DEVICE — GLOVE SURG SZ 85 CRM LTX FREE POLYISOPRENE POLYMER BEAD ANTI

## (undated) DEVICE — SUT MNCRYL 4/0 P3 18IN UD MCP494G

## (undated) DEVICE — HANDPIECE SET WITH COAXIAL MULTI-ORIFICE TIP AND SUCTION TUBE: Brand: INTERPULSE

## (undated) DEVICE — NEPTUNE E-SEP SMOKE EVACUATION PENCIL, COATED, 70MM BLADE, ROCKER SWITCH: Brand: NEPTUNE E-SEP

## (undated) DEVICE — BANDAGE COMPR W6INXL15FT BGE E SGL LAYERED CLP CLSR

## (undated) DEVICE — Device

## (undated) DEVICE — SOLUTION IRRIG 3000ML 0.9% SOD CHL USP UROMATIC PLAS CONT

## (undated) DEVICE — SENSR O2 OXIMAX FNGR A/ 18IN NONSTR

## (undated) DEVICE — SUTURE FIBERWIRE SZ 2 L38IN NONABSORBABLE BLU L36.6MM 1/2 AR7202

## (undated) DEVICE — TUBE ET 8MM NSL ORAL BASIC CUF INTMED MURPHY EYE RADPQ MRK

## (undated) DEVICE — SNAR POLYP SENSATION MICRO OVL 13 240X40

## (undated) DEVICE — TBG SMPL FLTR LINE NASL 02/C02 A/ BX/100

## (undated) DEVICE — THE CHANNEL CLEANING BRUSH IS A NYLON FLEXI BRUSH ATTACHED TO A FLEXIBLE PLASTIC SHEATH DESIGNED TO SAFELY REMOVE DEBRIS FROM FLEXIBLE ENDOSCOPES.

## (undated) DEVICE — CURAVIEW LED LARYNGOSCOPE BLADE & HANDLE,DISPOSABLE,MILLER 2: Brand: CURAPLEX

## (undated) DEVICE — ADHESIVE SKIN CLOSURE WND 8.661X1.5 IN 22 CM LIQUIBAND SECUR

## (undated) DEVICE — SUTURE VICRYL + SZ 2-0 L36IN ABSRB UD L36MM CT-1 1/2 CIR VCP945H

## (undated) DEVICE — GLV SURG BIOGEL M LTX PF 7 1/2

## (undated) DEVICE — PK TURNOVER RM ADV

## (undated) DEVICE — CEMENT MIXING SYSTEM WITH FEMORAL BREAKWAY NOZZLE: Brand: REVOLUTION

## (undated) DEVICE — 6.5MM ROUND SOLID CARBIDE BUR MEDIUM

## (undated) DEVICE — ENDOGATOR AUXILIARY WATER JET CONNECTOR: Brand: ENDOGATOR

## (undated) DEVICE — SUT ETHLN 5/0 P3 18IN 698H

## (undated) DEVICE — 6.3MM ROUND FAST CUTTING BUR

## (undated) DEVICE — THE SINGLE USE ETRAP – POLYP TRAP IS USED FOR SUCTION RETRIEVAL OF ENDOSCOPICALLY REMOVED POLYPS.: Brand: ETRAP

## (undated) DEVICE — DRESSING FOAM W4XL12IN SIL RECT ADH WTRPRF FLM BK W/ BORD

## (undated) DEVICE — SUTURE VICRYL + 3-0 L27IN ABSRB UD PS-2 L19MM 3/8 CIR PRIM VCP427H

## (undated) DEVICE — SUT ETHLN 6/0 P3 18IN 1698G

## (undated) DEVICE — 3M™ IOBAN™ 2 ANTIMICROBIAL INCISE DRAPE 6651EZ: Brand: IOBAN™ 2

## (undated) DEVICE — GLOVE SURG SZ 85 L12IN FNGR THK79MIL GRN LTX FREE

## (undated) DEVICE — CUFF,BP,DISP,1 TUBE,ADULT,HP: Brand: MEDLINE

## (undated) DEVICE — PREP SOL POVIDONE/IODINE BT 4OZ

## (undated) DEVICE — RECIPROCATING BLADE, DOUBLE SIDED, OFFSET  (70.0 X 1.0 X 12.5MM)

## (undated) DEVICE — GAUZE,SPONGE,8"X4",12PLY,XRAY,STRL,LF: Brand: MEDLINE